# Patient Record
Sex: FEMALE | Race: WHITE | NOT HISPANIC OR LATINO | URBAN - METROPOLITAN AREA
[De-identification: names, ages, dates, MRNs, and addresses within clinical notes are randomized per-mention and may not be internally consistent; named-entity substitution may affect disease eponyms.]

---

## 2017-11-14 ENCOUNTER — NEW PATIENT (OUTPATIENT)
Dept: URBAN - METROPOLITAN AREA CLINIC 51 | Facility: CLINIC | Age: 52
End: 2017-11-14
Payer: MEDICAID

## 2017-11-14 DIAGNOSIS — H43.392 OTHER VITREOUS OPACITIES, LEFT EYE: ICD-10-CM

## 2017-11-14 DIAGNOSIS — E11.9 TYPE 2 DIABETES MELLITUS WITHOUT COMPLICATIONS: Primary | ICD-10-CM

## 2017-11-14 DIAGNOSIS — Z79.84 LONG TERM (CURRENT) USE OF ORAL ANTIDIABETIC DRUGS: ICD-10-CM

## 2017-11-14 DIAGNOSIS — H25.013 CORTICAL AGE-RELATED CATARACT, BILATERAL: ICD-10-CM

## 2017-11-14 PROCEDURE — 92250 FUNDUS PHOTOGRAPHY W/I&R: CPT | Performed by: OPTOMETRIST

## 2017-11-14 PROCEDURE — 92004 COMPRE OPH EXAM NEW PT 1/>: CPT | Performed by: OPTOMETRIST

## 2017-11-14 ASSESSMENT — KERATOMETRY
OS: 42.66
OD: 42.97

## 2017-11-14 ASSESSMENT — VISUAL ACUITY
OD: 20/25
OS: 20/20

## 2017-11-14 ASSESSMENT — INTRAOCULAR PRESSURE
OD: 13
OS: 13

## 2020-11-13 ENCOUNTER — FOLLOW UP ESTABLISHED (OUTPATIENT)
Dept: URBAN - METROPOLITAN AREA CLINIC 51 | Facility: CLINIC | Age: 55
End: 2020-11-13
Payer: MEDICAID

## 2020-11-13 DIAGNOSIS — H43.313 VITREOUS MEMBRANES AND STRANDS, BILATERAL: ICD-10-CM

## 2020-11-13 DIAGNOSIS — Z79.4 LONG TERM (CURRENT) USE OF INSULIN: ICD-10-CM

## 2020-11-13 DIAGNOSIS — H11.053 PERIPHERAL PTERYGIUM, PROGRESSIVE, BILATERAL: ICD-10-CM

## 2020-11-13 DIAGNOSIS — H25.813 COMBINED FORMS OF AGE-RELATED CATARACT, BILATERAL: ICD-10-CM

## 2020-11-13 PROCEDURE — 92250 FUNDUS PHOTOGRAPHY W/I&R: CPT | Performed by: OPTOMETRIST

## 2020-11-13 PROCEDURE — 92014 COMPRE OPH EXAM EST PT 1/>: CPT | Performed by: OPTOMETRIST

## 2020-11-13 ASSESSMENT — VISUAL ACUITY
OS: 20/25
OD: 20/30

## 2020-11-13 ASSESSMENT — KERATOMETRY
OD: 43.16
OS: 42.67

## 2020-11-13 ASSESSMENT — INTRAOCULAR PRESSURE
OD: 13
OS: 12

## 2023-07-24 PROBLEM — Z00.00 ENCOUNTER FOR PREVENTIVE HEALTH EXAMINATION: Status: ACTIVE | Noted: 2023-07-24

## 2023-07-27 ENCOUNTER — APPOINTMENT (OUTPATIENT)
Dept: NEUROLOGY | Facility: CLINIC | Age: 58
End: 2023-07-27

## 2023-10-06 ENCOUNTER — OFFICE VISIT (OUTPATIENT)
Dept: URBAN - METROPOLITAN AREA CLINIC 51 | Facility: CLINIC | Age: 58
End: 2023-10-06
Payer: MEDICAID

## 2023-10-06 DIAGNOSIS — H52.4 PRESBYOPIA: ICD-10-CM

## 2023-10-06 DIAGNOSIS — H11.053 PERIPHERAL PTERYGIUM, PROGRESSIVE, BILATERAL: ICD-10-CM

## 2023-10-06 DIAGNOSIS — E11.3293 TYPE 2 DIAB W MILD NONPRLF DIABETIC RTNOP W/O MACULAR EDEMA, BILATERAL: ICD-10-CM

## 2023-10-06 DIAGNOSIS — H25.813 COMBINED FORMS OF AGE-RELATED CATARACT, BILATERAL: Primary | ICD-10-CM

## 2023-10-06 DIAGNOSIS — Z79.84 LONG TERM (CURRENT) USE OF ORAL ANTIDIABETIC DRUGS: ICD-10-CM

## 2023-10-06 DIAGNOSIS — H43.313 VITREOUS MEMBRANES AND STRANDS, BILATERAL: ICD-10-CM

## 2023-10-06 PROCEDURE — 99214 OFFICE O/P EST MOD 30 MIN: CPT | Performed by: OPTOMETRIST

## 2023-10-06 PROCEDURE — 92134 CPTRZ OPH DX IMG PST SGM RTA: CPT | Performed by: OPTOMETRIST

## 2023-10-06 ASSESSMENT — VISUAL ACUITY
OS: 20/20
OD: 20/25

## 2023-10-06 ASSESSMENT — INTRAOCULAR PRESSURE
OS: 14
OD: 14

## 2023-10-06 ASSESSMENT — KERATOMETRY
OS: 42.88
OD: 43.00

## 2024-06-18 ENCOUNTER — EMERGENCY (EMERGENCY)
Facility: HOSPITAL | Age: 59
LOS: 1 days | Discharge: HOME | End: 2024-06-18
Attending: EMERGENCY MEDICINE | Admitting: EMERGENCY MEDICINE
Payer: MEDICAID

## 2024-06-18 VITALS
SYSTOLIC BLOOD PRESSURE: 114 MMHG | RESPIRATION RATE: 18 BRPM | OXYGEN SATURATION: 95 % | DIASTOLIC BLOOD PRESSURE: 81 MMHG | HEART RATE: 91 BPM | TEMPERATURE: 98 F

## 2024-06-18 PROCEDURE — 99284 EMERGENCY DEPT VISIT MOD MDM: CPT

## 2024-06-18 RX ORDER — IBUPROFEN 200 MG
600 TABLET ORAL ONCE
Refills: 0 | Status: COMPLETED | OUTPATIENT
Start: 2024-06-18 | End: 2024-06-18

## 2024-06-18 RX ADMIN — Medication 600 MILLIGRAM(S): at 09:04

## 2024-06-18 NOTE — ED PROVIDER NOTE - CLINICAL SUMMARY MEDICAL DECISION MAKING FREE TEXT BOX
Recurrent left ankle pain, prior workup negative, patient refused additional workup.,  States she just wants a dose of Motrin to go to her program.  Stable for IA home with outpatient follow-up.

## 2024-06-18 NOTE — ED PROVIDER NOTE - CARE PROVIDER_API CALL
Junior Conklin  Vascular Surgery  [address]  [place] [address]  Phone: [phone]  Fax: [phone]  Follow Up Time: 7-10 Days

## 2024-06-18 NOTE — ED PROVIDER NOTE - CARE PLAN
1 Principal Discharge DX:	Foot and ankle pain  Secondary Diagnosis:	H/O varicose veins  Secondary Diagnosis:	H/O varicose veins

## 2024-06-18 NOTE — ED PROVIDER NOTE - PATIENT PORTAL LINK FT
You can access the FollowMyHealth Patient Portal offered by [facility] by registering at the following website: [email or web address]. By joining [facility]’s FollowMyHealth portal, you will also be able to view your health information using other applications (apps) compatible with our system.

## 2024-06-18 NOTE — ED PROVIDER NOTE - OBJECTIVE STATEMENT
59-year-old female with complaints of pain and discomfort in the left ankle/left lower extremity.  She was seen at [facility] yesterday and had an ultrasound which showed no DVT, she had x-rays that showed no fracture.  She was medicated with Motrin which completely relieved her pain and she went home.  She returns today because her pain recurred on the way to her program.  She did not take any medications today.  She has extensive varicose veins that she is aware of and scheduled for outpatient therapy for.  No fever/chills, no fall or trauma.  No other complaints.

## 2024-06-18 NOTE — ED ADULT TRIAGE NOTE - CHIEF COMPLAINT QUOTE
[place] [facility] [place] of left foot pain s/p injecting heroine into foot 3 days ago. Pt states that she has not used heroine or received methadone for 3 days. BGL in field 119

## 2024-06-18 NOTE — ED PROVIDER NOTE - PHYSICAL EXAMINATION
CONSTITUTIONAL: Well-appearing; well-nourished; in no apparent distress.   HEAD: Normocephalic; atraumatic.   EYES:  clear bilaterally  ENT: airway patent  Resp breathing comfortably with no distress  PSYCHOLOGICAL: The patient’s mood and manner are appropriate.  Left lower extremity with extensive varicose veins, no calf tenderness, no swelling, full range of motion of left ankle and knee with no swelling or bony tenderness.  DPI, NVI, left lower extremity compartments soft.

## 2024-06-21 DIAGNOSIS — M25.572 PAIN IN LEFT ANKLE AND JOINTS OF LEFT FOOT: ICD-10-CM

## 2024-06-21 DIAGNOSIS — M79.672 PAIN IN LEFT FOOT: ICD-10-CM

## 2024-06-21 DIAGNOSIS — Z86.79 PERSONAL HISTORY OF OTHER DISEASES OF THE CIRCULATORY SYSTEM: ICD-10-CM

## 2024-07-01 ENCOUNTER — INPATIENT (INPATIENT)
Facility: HOSPITAL | Age: 59
LOS: 27 days | Discharge: HOME | DRG: 500 | End: 2024-07-29
Attending: STUDENT IN AN ORGANIZED HEALTH CARE EDUCATION/TRAINING PROGRAM | Admitting: INTERNAL MEDICINE
Payer: MEDICAID

## 2024-07-01 VITALS
SYSTOLIC BLOOD PRESSURE: 107 MMHG | HEIGHT: 63 IN | TEMPERATURE: 99 F | DIASTOLIC BLOOD PRESSURE: 70 MMHG | HEART RATE: 99 BPM | WEIGHT: 179.02 LBS | OXYGEN SATURATION: 97 % | RESPIRATION RATE: 16 BRPM

## 2024-07-01 DIAGNOSIS — M79.605 PAIN IN LEFT LEG: ICD-10-CM

## 2024-07-01 DIAGNOSIS — D64.9 ANEMIA, UNSPECIFIED: ICD-10-CM

## 2024-07-01 DIAGNOSIS — F39 UNSPECIFIED MOOD [AFFECTIVE] DISORDER: ICD-10-CM

## 2024-07-01 DIAGNOSIS — Z29.9 ENCOUNTER FOR PROPHYLACTIC MEASURES, UNSPECIFIED: ICD-10-CM

## 2024-07-01 DIAGNOSIS — F19.90 OTHER PSYCHOACTIVE SUBSTANCE USE, UNSPECIFIED, UNCOMPLICATED: ICD-10-CM

## 2024-07-01 DIAGNOSIS — M70.72 OTHER BURSITIS OF HIP, LEFT HIP: ICD-10-CM

## 2024-07-01 DIAGNOSIS — G40.909 EPILEPSY, UNSPECIFIED, NOT INTRACTABLE, WITHOUT STATUS EPILEPTICUS: ICD-10-CM

## 2024-07-01 LAB
ADD ON TEST-SPECIMEN IN LAB: SIGNIFICANT CHANGE UP
ALBUMIN SERPL ELPH-MCNC: 3.3 G/DL — SIGNIFICANT CHANGE UP (ref 3.3–5)
ALP SERPL-CCNC: 76 U/L — SIGNIFICANT CHANGE UP (ref 40–120)
ALT FLD-CCNC: 27 U/L — SIGNIFICANT CHANGE UP (ref 10–45)
ANION GAP SERPL CALC-SCNC: 11 MMOL/L — SIGNIFICANT CHANGE UP (ref 5–17)
AST SERPL-CCNC: 61 U/L — HIGH (ref 10–40)
BASOPHILS # BLD AUTO: 0.03 K/UL — SIGNIFICANT CHANGE UP (ref 0–0.2)
BASOPHILS NFR BLD AUTO: 0.3 % — SIGNIFICANT CHANGE UP (ref 0–2)
BILIRUB SERPL-MCNC: 0.3 MG/DL — SIGNIFICANT CHANGE UP (ref 0.2–1.2)
BUN SERPL-MCNC: 24 MG/DL — HIGH (ref 7–23)
CALCIUM SERPL-MCNC: 10 MG/DL — SIGNIFICANT CHANGE UP (ref 8.4–10.5)
CHLORIDE SERPL-SCNC: 103 MMOL/L — SIGNIFICANT CHANGE UP (ref 96–108)
CO2 SERPL-SCNC: 25 MMOL/L — SIGNIFICANT CHANGE UP (ref 22–31)
CREAT SERPL-MCNC: 1.28 MG/DL — SIGNIFICANT CHANGE UP (ref 0.5–1.3)
CRP SERPL-MCNC: 99.5 MG/L — HIGH (ref 0–4)
EGFR: 49 ML/MIN/1.73M2 — LOW
EOSINOPHIL # BLD AUTO: 0.04 K/UL — SIGNIFICANT CHANGE UP (ref 0–0.5)
EOSINOPHIL NFR BLD AUTO: 0.5 % — SIGNIFICANT CHANGE UP (ref 0–6)
GLUCOSE SERPL-MCNC: 85 MG/DL — SIGNIFICANT CHANGE UP (ref 70–99)
HCT VFR BLD CALC: 31 % — LOW (ref 34.5–45)
HGB BLD-MCNC: 10.2 G/DL — LOW (ref 11.5–15.5)
IMM GRANULOCYTES NFR BLD AUTO: 0.7 % — SIGNIFICANT CHANGE UP (ref 0–0.9)
LYMPHOCYTES # BLD AUTO: 0.98 K/UL — LOW (ref 1–3.3)
LYMPHOCYTES # BLD AUTO: 11.1 % — LOW (ref 13–44)
MCHC RBC-ENTMCNC: 27.9 PG — SIGNIFICANT CHANGE UP (ref 27–34)
MCHC RBC-ENTMCNC: 32.9 GM/DL — SIGNIFICANT CHANGE UP (ref 32–36)
MCV RBC AUTO: 84.7 FL — SIGNIFICANT CHANGE UP (ref 80–100)
MONOCYTES # BLD AUTO: 0.39 K/UL — SIGNIFICANT CHANGE UP (ref 0–0.9)
MONOCYTES NFR BLD AUTO: 4.4 % — SIGNIFICANT CHANGE UP (ref 2–14)
NEUTROPHILS # BLD AUTO: 7.34 K/UL — SIGNIFICANT CHANGE UP (ref 1.8–7.4)
NEUTROPHILS NFR BLD AUTO: 83 % — HIGH (ref 43–77)
NRBC # BLD: 0 /100 WBCS — SIGNIFICANT CHANGE UP (ref 0–0)
PLATELET # BLD AUTO: 344 K/UL — SIGNIFICANT CHANGE UP (ref 150–400)
POTASSIUM SERPL-MCNC: 4.4 MMOL/L — SIGNIFICANT CHANGE UP (ref 3.5–5.3)
POTASSIUM SERPL-SCNC: 4.4 MMOL/L — SIGNIFICANT CHANGE UP (ref 3.5–5.3)
PROT SERPL-MCNC: 8.8 G/DL — HIGH (ref 6–8.3)
RBC # BLD: 3.66 M/UL — LOW (ref 3.8–5.2)
RBC # FLD: 14.3 % — SIGNIFICANT CHANGE UP (ref 10.3–14.5)
SODIUM SERPL-SCNC: 139 MMOL/L — SIGNIFICANT CHANGE UP (ref 135–145)
WBC # BLD: 8.84 K/UL — SIGNIFICANT CHANGE UP (ref 3.8–10.5)
WBC # FLD AUTO: 8.84 K/UL — SIGNIFICANT CHANGE UP (ref 3.8–10.5)

## 2024-07-01 PROCEDURE — 93971 EXTREMITY STUDY: CPT | Mod: 26,LT

## 2024-07-01 PROCEDURE — 99285 EMERGENCY DEPT VISIT HI MDM: CPT

## 2024-07-01 PROCEDURE — 99223 1ST HOSP IP/OBS HIGH 75: CPT | Mod: GC

## 2024-07-01 PROCEDURE — 72170 X-RAY EXAM OF PELVIS: CPT | Mod: 26

## 2024-07-01 PROCEDURE — 73701 CT LOWER EXTREMITY W/DYE: CPT | Mod: 26,LT,MC

## 2024-07-01 RX ORDER — NALOXONE HYDROCHLORIDE 0.4 MG/ML
1 INJECTION, SOLUTION INTRAMUSCULAR; INTRAVENOUS; SUBCUTANEOUS
Refills: 0 | Status: DISCONTINUED | OUTPATIENT
Start: 2024-07-01 | End: 2024-07-29

## 2024-07-01 RX ORDER — IBUPROFEN 200 MG
600 TABLET ORAL ONCE
Refills: 0 | Status: COMPLETED | OUTPATIENT
Start: 2024-07-01 | End: 2024-07-01

## 2024-07-01 RX ORDER — BACTERIOSTATIC SODIUM CHLORIDE 0.9 %
1000 VIAL (ML) INJECTION ONCE
Refills: 0 | Status: COMPLETED | OUTPATIENT
Start: 2024-07-01 | End: 2024-07-01

## 2024-07-01 RX ORDER — IBUPROFEN 200 MG
600 TABLET ORAL EVERY 8 HOURS
Refills: 0 | Status: DISCONTINUED | OUTPATIENT
Start: 2024-07-01 | End: 2024-07-02

## 2024-07-01 RX ORDER — PIPERACILLIN SODIUM, TAZOBACTAM SODIUM 3; .375 G/15ML; G/15ML
3.38 INJECTION, POWDER, LYOPHILIZED, FOR SOLUTION INTRAVENOUS ONCE
Refills: 0 | Status: COMPLETED | OUTPATIENT
Start: 2024-07-01 | End: 2024-07-01

## 2024-07-01 RX ORDER — VANCOMYCIN HYDROCHLORIDE 5 G/100ML
1250 INJECTION, POWDER, LYOPHILIZED, FOR SOLUTION INTRAVENOUS EVERY 12 HOURS
Refills: 0 | Status: DISCONTINUED | OUTPATIENT
Start: 2024-07-02 | End: 2024-07-02

## 2024-07-01 RX ORDER — KETOROLAC TROMETHAMINE 10 MG
15 TABLET ORAL ONCE
Refills: 0 | Status: DISCONTINUED | OUTPATIENT
Start: 2024-07-01 | End: 2024-07-01

## 2024-07-01 RX ORDER — LEVETIRACETAM 1000 MG/1
500 TABLET, FILM COATED ORAL EVERY 12 HOURS
Refills: 0 | Status: COMPLETED | OUTPATIENT
Start: 2024-07-01 | End: 2024-07-02

## 2024-07-01 RX ORDER — METHADONE HCL 10 MG
160 TABLET ORAL ONCE
Refills: 0 | Status: DISCONTINUED | OUTPATIENT
Start: 2024-07-01 | End: 2024-07-01

## 2024-07-01 RX ORDER — PIPERACILLIN SODIUM, TAZOBACTAM SODIUM 3; .375 G/15ML; G/15ML
3.38 INJECTION, POWDER, LYOPHILIZED, FOR SOLUTION INTRAVENOUS EVERY 8 HOURS
Refills: 0 | Status: DISCONTINUED | OUTPATIENT
Start: 2024-07-01 | End: 2024-07-02

## 2024-07-01 RX ORDER — ACETAMINOPHEN 500 MG
650 TABLET ORAL EVERY 6 HOURS
Refills: 0 | Status: DISCONTINUED | OUTPATIENT
Start: 2024-07-01 | End: 2024-07-02

## 2024-07-01 RX ORDER — VANCOMYCIN HYDROCHLORIDE 5 G/100ML
1000 INJECTION, POWDER, LYOPHILIZED, FOR SOLUTION INTRAVENOUS ONCE
Refills: 0 | Status: COMPLETED | OUTPATIENT
Start: 2024-07-01 | End: 2024-07-01

## 2024-07-01 RX ADMIN — Medication 600 MILLIGRAM(S): at 21:39

## 2024-07-01 RX ADMIN — Medication 600 MILLIGRAM(S): at 14:36

## 2024-07-01 RX ADMIN — Medication 160 MILLIGRAM(S): at 13:39

## 2024-07-01 RX ADMIN — PIPERACILLIN SODIUM, TAZOBACTAM SODIUM 200 GRAM(S): 3; .375 INJECTION, POWDER, LYOPHILIZED, FOR SOLUTION INTRAVENOUS at 16:51

## 2024-07-01 RX ADMIN — PIPERACILLIN SODIUM, TAZOBACTAM SODIUM 25 GRAM(S): 3; .375 INJECTION, POWDER, LYOPHILIZED, FOR SOLUTION INTRAVENOUS at 21:40

## 2024-07-01 RX ADMIN — VANCOMYCIN HYDROCHLORIDE 250 MILLIGRAM(S): 5 INJECTION, POWDER, LYOPHILIZED, FOR SOLUTION INTRAVENOUS at 18:20

## 2024-07-01 RX ADMIN — Medication 1000 MILLILITER(S): at 16:51

## 2024-07-01 RX ADMIN — Medication 600 MILLIGRAM(S): at 22:26

## 2024-07-01 RX ADMIN — LEVETIRACETAM 500 MILLIGRAM(S): 1000 TABLET, FILM COATED ORAL at 21:39

## 2024-07-01 NOTE — H&P ADULT - PROBLEM SELECTOR PLAN 5
As per patient has history of epilepsy  States she takes Keppra but unsure of dose, follows with Neurologist at [facility]  Plan to confirm meds and dosage As per patient has history of epilepsy  States she takes Keppra but unsure of dose, follows with Neurologist at [facility]  Per Jennifer, previously prescribed Keppra 1000mg bid.   - order Keppra 500mg bid x 2 doses for now, confirm doses in the morning

## 2024-07-01 NOTE — ED ADULT NURSE NOTE - OBJECTIVE STATEMENT
59y female presents to ED c/o left foot pain. Pt states she is undomiciled and IVDU who shoots up both heroine and cocaine into left foot. Pt has had pain to left foot xweeks and has been to multiple hospitals for treatment. Pt states she took full course of oral antibiotics and still having pain that radiates up left side of body. Pt recently seen at [facility] and given referral to vein specialist, pt has been unable to make appointment with them. No signs of redness/swelling/discharge to left foot. On keppra. Speaking in full and complete sentences. A&Ox4.

## 2024-07-01 NOTE — H&P ADULT - PROBLEM SELECTOR PLAN 7
F: NS 1L  E: replete as needed  N: regular diet  DVT ppx: hold prior to IR procedure  Dispo: [facility]

## 2024-07-01 NOTE — H&P ADULT - PROBLEM SELECTOR PLAN 4
As per patient has history of epilepsy  States she takes Keppra but unsure of dose, follows with Neurologist at [facility]  Plan to confirm meds and dosage Pt states history of anemia  On admission Hg 10.2, Hct 31.0  Continue to monitor H&H  Maintain active type & screen Pt states history of anemia  On admission Hg 10.2, Hct 31.0  - Continue to monitor H&H  - Maintain active type & screen

## 2024-07-01 NOTE — ED ADULT TRIAGE NOTE - CHIEF COMPLAINT QUOTE
as per EMS patient was picked up at the shelter for left foot pain, patient has hx of IV drug use, methadone use and states her foot hurts a lot".

## 2024-07-01 NOTE — H&P ADULT - NSHPPHYSICALEXAM_GEN_ALL_CORE
.  VITAL SIGNS:  T(C): 37.1 (07-01-24 @ 16:55), Max: 37.1 (07-01-24 @ 16:55)  T(F): 98.7 (07-01-24 @ 16:55), Max: 98.7 (07-01-24 @ 16:55)  HR: 82 (07-01-24 @ 16:55) (82 - 99)  BP: 100/68 (07-01-24 @ 16:55) (100/68 - 107/70)  BP(mean): --  RR: 16 (07-01-24 @ 16:55) (16 - 16)  SpO2: 96% (07-01-24 @ 16:55) (96% - 97%)  Wt(kg): --    PHYSICAL EXAM:    Constitutional: resting comfortably in bed; AAOx3  Head: NC/AT  Eyes: PERRL, EOMI, anicteric sclera  Neck: supple; no JVD or thyromegaly  Respiratory: CTA B/L; no W/R/R, no retractions  Cardiac: +S1/S2; RRR; no M/R/G   Gastrointestinal: abdomen soft, NT/ND; no rebound or guarding; +BSx4  Back: no midline, paraspinous or CVA tenderness  MSK- Full ROM of b/l UE and LE, no gross deformities, soft compressible compartments, no joint effusion, no foot drop  Extremities: no appreciable edema to b/l LEs but significantly tender to gross manipulation/palpation of LLE from prox thigh to foot, bounding/palpable DP pulse  Skin: warm, well perfused, +chronic appearing skin changes, track marks, and varicose veins over b/l LEs, no discrete wound or ulcer, palpable crepitus, or blister/bullae  Neurologic: AAOx3;no focal deficits

## 2024-07-01 NOTE — ED PROVIDER NOTE - OBJECTIVE STATEMENT
59 year old female with history of IVDU (cocaine, heroin), hep C, ?seizures, presenting with LLE pain x 1 mo. States having persistent LLE pain radiating from toes to L groin, was recently treated @ [facility] with antibiotics, denies recent fever/chills. Last IVDU on Saturday, injected into legs. Denies recent trauma. Takes 160mg methadone.

## 2024-07-01 NOTE — ED PROVIDER NOTE - PHYSICAL EXAMINATION
Gen - Nontoxic appearing, calm and cooperative; A+Ox3   HEENT - NCAT, EOMI, moist mucous membranes, clear oropharynx  Neck - supple  Resp - CTAB, no increased WOB  CV -  RRR, no m/r/g  Abd - soft, NT, ND; no guarding or rebound  Back - no midline, paraspinous, or CVA tenderness  MSK - FROM of b/l UE and LE, no gross deformities, soft compressible compartments, no joint effusion, NVI distally, no foot drop  Extrem - no appreciable edema to b/l LEs but significantly tender to gross manipulation/palpation of LLE from prox thigh to foot; bounding/palpable DP pulse  Neuro - no focal motor or sensation deficits  Skin - warm, well perfused; +chronic appearing skin changes, track marks, and varicose veins over b/l LEs; no discrete wound or ulcer, palpable crepitus, or blister/bullae

## 2024-07-01 NOTE — H&P ADULT - PROBLEM SELECTOR PLAN 1
Worsening left leg and thigh pain with radiation to groin  CT LE w IV contrast showed loculated fluid in L iliacus muscle, iliopsoas bursitis of infectious or inflamm. etiology  ortho consulted, recommended IV antibiotics and IR consult in AM, will continue to follow  Given Zosyn and Vanc in ED  Toradol 15 mg IV push given for pain control Worsening left leg and thigh pain with radiation to groin  CT LE w IV contrast showed loculated fluid in L iliacus muscle, iliopsoas bursitis of infectious or inflamm. etiology  ortho consulted, recommended IV antibiotics and IR consult in AM, will continue to follow  Given Zosyn and Vanc in ED  - f/u blood cultures  - IR consulted for drainage; NPO after MN  - c/w vancomycin 1250mg q24h, obtain trough before 3rd dose  - c/w zosyn 3.375g q8h to cover GNR

## 2024-07-01 NOTE — H&P ADULT - PROBLEM SELECTOR PLAN 2
Hx of IV drug use   Pt states she is chronically on methadone 160 mg 5 days a week since age 19  Last heroin injection in L leg on Saturday 6/29 Worsening left leg and thigh pain with radiation to groin  CT LE w IV contrast showed loculated fluid in L iliacus muscle, iliopsoas bursitis of infectious or inflamm. etiology  Ortho consulted, recommended IV antibiotics and IR consult in AM, will continue to follow  Given Zosyn and Vanc in ED  Toradol 15 mg IV push given for pain control  - f/u duplex of LLE --> no evidence of DVT  - pain control: tylenol for mild pain, ibuprofen 600mg q8h for moderate, toradol 15mg IV for severe pain

## 2024-07-01 NOTE — H&P ADULT - ASSESSMENT
Pt is 58 y/o F pmhx significant for IV drug use, anemia, epilepsy who presents for sharp L leg pain progressively worsening over the past month, likely 2/2 iliopsoas bursitis i/s/o IVDU.

## 2024-07-01 NOTE — ED ADULT NURSE NOTE - NSFALLUNIVINTERV_ED_ALL_ED
Bed/Stretcher in lowest position, wheels locked, appropriate side rails in place/Call bell, personal items and telephone in reach/Instruct patient to call for assistance before getting out of bed/chair/stretcher/Non-slip footwear applied when patient is off stretcher/[place] to call system/Physically safe environment - no spills, clutter or unnecessary equipment/Purposeful proactive rounding/Room/bathroom lighting operational, light cord in reach

## 2024-07-01 NOTE — ED ADULT TRIAGE NOTE - CCCP TRG CHIEF CMPLNT
PAST SURGICAL HISTORY:  H/O eye surgery     H/O right knee surgery     H/O transurethral resection of prostate      pain, foot

## 2024-07-01 NOTE — H&P ADULT - NSTOBACCOSCREENHP_GEN_A_NCS
Immediate Brief Procedure Note    Patient: Rigo Chambers    Pre-op Diagnosis:   Inguinal LAD; uncertain etiology     Post-op Diagnosis: Same    Procedure:   US guided core biopsy enlarged L inguinal ln     Proceduralist: Duane Garland MD    Assistants: none    Anesthesia Staff: [unfilled]    Anesthesia Type: local     Findings:   US guided core biopsy enlarged L inguinal lymph node  17/18G temno  Multiple cores   No complications     Estimated Blood Loss: Minimal     Complications: None    Specimens Removed:  As above   
No

## 2024-07-01 NOTE — H&P ADULT - ATTENDING COMMENTS
60 y/o F pmhx significant for IV drug use, anemia, epilepsy who presents for sharp L leg pain progressively worsening over the past month, likely 2/2 iliopsoas bursitis and ?abscess i/s/o IVDU. Also w/ severe L hip OA. Admitted for further management    #LLE pain    #Iliopsoas bursitis    #r/o abscess   #hip OA    #IVDU      Plan   - c/w vanc/zosyn  - IR consult for I&D per ortho  - f/up ESR/cRP, abscess cx, blood cx   - per ortho f/up CTAP to evaluate severe OA L hip   - c/w pain control, avoid opioids  - confirm methadone in am  - f/up utox   - monitor COWs

## 2024-07-01 NOTE — PATIENT PROFILE ADULT - FUNCTIONAL ASSESSMENT - DAILY ACTIVITY SCORE.
I'd recommend checking with your insurance to see if they cover the new shingles vaccine, Shingrix.  This vaccine is much more effective than the older shingles vaccine.  Check for availability at your pharmacy.     
23

## 2024-07-01 NOTE — ED PROVIDER NOTE - CLINICAL SUMMARY MEDICAL DECISION MAKING FREE TEXT BOX
59 year old female with history of IVDU (cocaine, heroin), hep C, ?seizures, presenting with LLE pain x 1 mo. 59 year old female with history of IVDU (cocaine, heroin), hep C, ?seizures, presenting with LLE pain x 1 mo. Overall nontoxic with good vitals/afebrile. Very tender to touch over LLE diffusely but no appreciable swelling, wound, or acute appearing skin changes overlying her chronic track marks/varicose veins. Given her IVDU into LEs--will obtain CT imaging to r/o deep space infection/abscess vs cellulitis. US to r/o DVT--overall low suspicion. Patient stating that she is unable to walk due to the acuity of her pain. Will reassess after adequate analgesia.

## 2024-07-01 NOTE — ED PROVIDER NOTE - PROGRESS NOTE DETAILS
9
Ronen Guerrero MD: CT showing ?loculated fluid in L iliacus muscle, consistent with region of tenderness/pain on exam, in setting of IVDU to LEs--ortho consulted for eval, will follow but recommending medical admission for IV abx/ IR consult. Endorsed to [facility].

## 2024-07-01 NOTE — PATIENT PROFILE ADULT - FALL HARM RISK - HARM RISK INTERVENTIONS

## 2024-07-01 NOTE — CONSULT NOTE ADULT - SUBJECTIVE AND OBJECTIVE BOX
Orthopaedic Surgery Consult Note    For Surgeon: Yared Hernandez MD    HPI: Pt is 58 y/o F pmhx significant for IV drug use, anemia, epilepsy who presents for sharp L leg pain progressively worsening over the past month. Pt states her pain began in the left thigh and later radiated to her toes but now has persisted in her entire left leg and groin. Pt reports this began about one month ago and required multiple ER visits. She states she went to [place] on 5/31 where nothing was done and she was discharged. Most recently last week she returned to [place] where she states she was given a 7 day course of oral antibiotics which she completed but did not improve her pain. She took Motrin which provided very minimal relief.  Pt last  injected heroin into her left thigh on Saturday 6/29. As per patient, she is chronically on methadone 160 mg 5 days a week since she was 19. She reports subjective fevers. Denies nausea, vomiting, chest pain, shortness of breath, constipation, diarrhea, pain on urination, hematuria, hematemesis.       PAST MEDICAL & SURGICAL HISTORY:  IVDU (intravenous drug user)  Anemia  Asthma  Epilepsy      MEDICATIONS  (STANDING):  ketorolac   Injectable 15 milliGRAM(s) IV Push once  levETIRAcetam 500 milliGRAM(s) Oral every 12 hours  piperacillin/tazobactam IVPB.. 3.375 Gram(s) IV Intermittent every 8 hours    MEDICATIONS  (PRN):  acetaminophen     Tablet .. 650 milliGRAM(s) Oral every 6 hours PRN Mild Pain (1 - 3)  ibuprofen  Tablet. 600 milliGRAM(s) Oral every 8 hours PRN Moderate Pain (4 - 6)      Vital Signs Last 24 Hrs  T(C): 37.5 (01 Jul 2024 19:31), Max: 37.5 (01 Jul 2024 19:31)  T(F): 99.5 (01 Jul 2024 19:31), Max: 99.5 (01 Jul 2024 19:31)  HR: 92 (01 Jul 2024 19:31) (82 - 99)  BP: 100/62 (01 Jul 2024 19:31) (100/62 - 107/70)  BP(mean): --  RR: 16 (01 Jul 2024 19:31) (16 - 16)  SpO2: 95% (01 Jul 2024 19:31) (95% - 97%)    Parameters below as of 01 Jul 2024 19:31  Patient On (Oxygen Delivery Method): room air      Physical Exam:  -ttp  -Pain w/ passive extension of L thigh & resisted flexion  -NVID                          10.2   8.84  )-----------( 344      ( 01 Jul 2024 12:58 )             31.0     07-01    139  |  103  |  24<H>  ----------------------------<  85  4.4   |  25  |  1.28    Ca    10.0      01 Jul 2024 12:58    TPro  8.8<H>  /  Alb  3.3  /  TBili  0.3  /  DBili  x   /  AST  61<H>  /  ALT  27  /  AlkPhos  76  07-01      Imaging:   -CT shows abscess along iliopsoas, collapsed femoral head and severe joint space narrowing      A/P: 59F w/ L sided iliopsoas abscess and severe hip arthritis    -Recommend AP Pelvis & L hip XRs  -Recommend admission for IV Abx, IR aspiration  -Ortho will continue to follow      -Discussed with Dr. Hernandez    Ortho Pager [phone]

## 2024-07-01 NOTE — H&P ADULT - HISTORY OF PRESENT ILLNESS
HPI: Pt is 58 y/o F pmhx significant for IV drug use, anemia, epilepsy who presents for sharp L leg pain progressively worsening over the past month. Pt states her pain began in the left thigh and later radiated to her toes but now has persisted in her entire left leg and groin. Pt reports this began about one month ago and required multiple ER visits. She states she went to [place] on 5/31 where nothing was done and she was discharged. Most recently last week she returned to [place] where she states she was given a 7 day course of oral antibiotics which she completed but did not improve her pain. She took Motrin which provided very minimal relief.  Pt last  injected heroin into her left thigh on Saturday 6/29. As per patient, she is chronically on methadone 160 mg 5 days a week since she was 19. She reports subjective fevers. Denies nausea, vomiting, chest pain, shortness of breath, constipation, diarrhea, pain on urination, hematuria, hematemesis.       In the ED:  Initial vital signs: T: 98.7 F, HR: 82, BP: 100/68, RR: 16, SpO2: 96% on RA  Labs: significant for AST 61, CRP 99.5, Hg 10.2, Hct 31.0  CT Lower Extremity with IV Contrast, Left: loculated fluid in L iliacus muscle, iliopsoas bursitis of infectious or inflammatory etiology  US Duplex Venous LE, Left: No evidence of L lower extremity DVT  Medications: Zosyn, Vancomycin, NS, Ibuprofen  Consults:         Ortho- recommended admission for IV antibiotics and IR consult, will continue to follow

## 2024-07-01 NOTE — H&P ADULT - NSHPLABSRESULTS_GEN_ALL_CORE
10.2   8.84  )-----------( 344      ( 01 Jul 2024 12:58 )             31.0       07-01    139  |  103  |  24<H>  ----------------------------<  85  4.4   |  25  |  1.28    Ca    10.0      01 Jul 2024 12:58    TPro  8.8<H>  /  Alb  3.3  /  TBili  0.3  /  DBili  x   /  AST  61<H>  /  ALT  27  /  AlkPhos  76  07-01              Urinalysis Basic - ( 01 Jul 2024 12:58 )    Color: x / Appearance: x / SG: x / pH: x  Gluc: 85 mg/dL / Ketone: x  / Bili: x / Urobili: x   Blood: x / Protein: x / Nitrite: x   Leuk Esterase: x / RBC: x / WBC x   Sq Epi: x / Non Sq Epi: x / Bacteria: x            Lactate Trend            CAPILLARY BLOOD GLUCOSE

## 2024-07-01 NOTE — H&P ADULT - PROBLEM SELECTOR PLAN 3
Pt states history of anemia  On admission Hg 10.2, Hct 31.0  Continue to monitor H&H  Maintain active type & screen Hx of IV drug use   Pt states she is chronically on methadone 160 mg 5 days a week since age 19  Last heroin injection in L leg on Saturday 6/29  - confirm dose with methadone clinic Hx of IV drug use   Pt states she is chronically on methadone 160 mg 5 days a week since age 19  Last heroin injection in L leg on Saturday 6/29  - confirm dose with methadone clinic  - monitor COWs

## 2024-07-01 NOTE — H&P ADULT - NSHPSOCIALHISTORY_GEN_ALL_CORE
Living in shelter in [place] Living in shelter in [place]; previously able to ambulate without assistance now unsteady on feet 2/2 pain.

## 2024-07-01 NOTE — H&P ADULT - PROBLEM SELECTOR PLAN 6
F: NS 1L  E: replete as needed  N: regular diet  DVT ppx: hold prior to IR procedure  Dispo: [facility] Patient previously prescribed quetiapine 150mg qd, buspirone 15mg qd, mirtazaine 15mg qd. Unclear what she is currently taking.  - med rec in the AM

## 2024-07-01 NOTE — H&P ADULT - NSICDXFAMILYHX_GEN_ALL_CORE_FT
FAMILY HISTORY:  Father  Still living? Unknown  Family history of alcohol abuse, Age at diagnosis: Age Unknown    Mother  Still living? Unknown  Family history of AIDS, Age at diagnosis: Age Unknown    Sibling  Still living? Unknown  Family history of AIDS, Age at diagnosis: Age Unknown

## 2024-07-02 DIAGNOSIS — E11.9 TYPE 2 DIABETES MELLITUS WITHOUT COMPLICATIONS: ICD-10-CM

## 2024-07-02 LAB
A1C WITH ESTIMATED AVERAGE GLUCOSE RESULT: 5.1 % — SIGNIFICANT CHANGE UP (ref 4–5.6)
ANION GAP SERPL CALC-SCNC: 8 MMOL/L — SIGNIFICANT CHANGE UP (ref 5–17)
APTT BLD: 35.8 SEC — HIGH (ref 24.5–35.6)
BASOPHILS # BLD AUTO: 0.02 K/UL — SIGNIFICANT CHANGE UP (ref 0–0.2)
BASOPHILS NFR BLD AUTO: 0.2 % — SIGNIFICANT CHANGE UP (ref 0–2)
BLD GP AB SCN SERPL QL: NEGATIVE — SIGNIFICANT CHANGE UP
BLD GP AB SCN SERPL QL: NEGATIVE — SIGNIFICANT CHANGE UP
BUN SERPL-MCNC: 25 MG/DL — HIGH (ref 7–23)
CALCIUM SERPL-MCNC: 9.2 MG/DL — SIGNIFICANT CHANGE UP (ref 8.4–10.5)
CHLORIDE SERPL-SCNC: 105 MMOL/L — SIGNIFICANT CHANGE UP (ref 96–108)
CO2 SERPL-SCNC: 26 MMOL/L — SIGNIFICANT CHANGE UP (ref 22–31)
CREAT SERPL-MCNC: 1.31 MG/DL — HIGH (ref 0.5–1.3)
EGFR: 48 ML/MIN/1.73M2 — LOW
EOSINOPHIL # BLD AUTO: 0.08 K/UL — SIGNIFICANT CHANGE UP (ref 0–0.5)
EOSINOPHIL NFR BLD AUTO: 1 % — SIGNIFICANT CHANGE UP (ref 0–6)
ESTIMATED AVERAGE GLUCOSE: 100 MG/DL — SIGNIFICANT CHANGE UP (ref 68–114)
GLUCOSE SERPL-MCNC: 89 MG/DL — SIGNIFICANT CHANGE UP (ref 70–99)
GRAM STN FLD: ABNORMAL
HCT VFR BLD CALC: 28.2 % — LOW (ref 34.5–45)
HGB BLD-MCNC: 9 G/DL — LOW (ref 11.5–15.5)
IMM GRANULOCYTES NFR BLD AUTO: 0.6 % — SIGNIFICANT CHANGE UP (ref 0–0.9)
INR BLD: 1.26 — HIGH (ref 0.85–1.18)
LYMPHOCYTES # BLD AUTO: 1.08 K/UL — SIGNIFICANT CHANGE UP (ref 1–3.3)
LYMPHOCYTES # BLD AUTO: 13 % — SIGNIFICANT CHANGE UP (ref 13–44)
MAGNESIUM SERPL-MCNC: 1.7 MG/DL — SIGNIFICANT CHANGE UP (ref 1.6–2.6)
MCHC RBC-ENTMCNC: 27.4 PG — SIGNIFICANT CHANGE UP (ref 27–34)
MCHC RBC-ENTMCNC: 31.9 GM/DL — LOW (ref 32–36)
MCV RBC AUTO: 86 FL — SIGNIFICANT CHANGE UP (ref 80–100)
METHOD TYPE: SIGNIFICANT CHANGE UP
MONOCYTES # BLD AUTO: 0.71 K/UL — SIGNIFICANT CHANGE UP (ref 0–0.9)
MONOCYTES NFR BLD AUTO: 8.5 % — SIGNIFICANT CHANGE UP (ref 2–14)
MSSA DNA SPEC QL NAA+PROBE: SIGNIFICANT CHANGE UP
NEUTROPHILS # BLD AUTO: 6.38 K/UL — SIGNIFICANT CHANGE UP (ref 1.8–7.4)
NEUTROPHILS NFR BLD AUTO: 76.7 % — SIGNIFICANT CHANGE UP (ref 43–77)
NRBC # BLD: 0 /100 WBCS — SIGNIFICANT CHANGE UP (ref 0–0)
PHOSPHATE SERPL-MCNC: 3.9 MG/DL — SIGNIFICANT CHANGE UP (ref 2.5–4.5)
PLATELET # BLD AUTO: 309 K/UL — SIGNIFICANT CHANGE UP (ref 150–400)
POTASSIUM SERPL-MCNC: 4.4 MMOL/L — SIGNIFICANT CHANGE UP (ref 3.5–5.3)
POTASSIUM SERPL-SCNC: 4.4 MMOL/L — SIGNIFICANT CHANGE UP (ref 3.5–5.3)
PROTHROM AB SERPL-ACNC: 14.3 SEC — HIGH (ref 9.5–13)
RBC # BLD: 3.28 M/UL — LOW (ref 3.8–5.2)
RBC # FLD: 14.1 % — SIGNIFICANT CHANGE UP (ref 10.3–14.5)
RH IG SCN BLD-IMP: POSITIVE — SIGNIFICANT CHANGE UP
RH IG SCN BLD-IMP: POSITIVE — SIGNIFICANT CHANGE UP
SODIUM SERPL-SCNC: 139 MMOL/L — SIGNIFICANT CHANGE UP (ref 135–145)
SPECIMEN SOURCE: SIGNIFICANT CHANGE UP
WBC # BLD: 8.32 K/UL — SIGNIFICANT CHANGE UP (ref 3.8–10.5)
WBC # FLD AUTO: 8.32 K/UL — SIGNIFICANT CHANGE UP (ref 3.8–10.5)

## 2024-07-02 PROCEDURE — 99233 SBSQ HOSP IP/OBS HIGH 50: CPT | Mod: GC

## 2024-07-02 RX ORDER — MIRTAZAPINE 15 MG
15 TABLET ORAL DAILY
Refills: 0 | Status: DISCONTINUED | OUTPATIENT
Start: 2024-07-02 | End: 2024-07-29

## 2024-07-02 RX ORDER — METHADONE HCL 10 MG
160 TABLET ORAL DAILY
Refills: 0 | Status: DISCONTINUED | OUTPATIENT
Start: 2024-07-02 | End: 2024-07-02

## 2024-07-02 RX ORDER — METHADONE HCL 10 MG
120 TABLET ORAL ONCE
Refills: 0 | Status: DISCONTINUED | OUTPATIENT
Start: 2024-07-02 | End: 2024-07-02

## 2024-07-02 RX ORDER — LIDOCAINE 5% 5 G/100G
1 CREAM TOPICAL DAILY
Refills: 0 | Status: DISCONTINUED | OUTPATIENT
Start: 2024-07-02 | End: 2024-07-29

## 2024-07-02 RX ORDER — BUSPIRONE HYDROCHLORIDE 15 MG/1
15 TABLET ORAL ONCE
Refills: 0 | Status: COMPLETED | OUTPATIENT
Start: 2024-07-02 | End: 2024-07-03

## 2024-07-02 RX ORDER — ACETAMINOPHEN 500 MG
975 TABLET ORAL EVERY 8 HOURS
Refills: 0 | Status: DISCONTINUED | OUTPATIENT
Start: 2024-07-02 | End: 2024-07-29

## 2024-07-02 RX ORDER — OXYCODONE HYDROCHLORIDE 30 MG/1
5 TABLET ORAL EVERY 6 HOURS
Refills: 0 | Status: DISCONTINUED | OUTPATIENT
Start: 2024-07-02 | End: 2024-07-09

## 2024-07-02 RX ORDER — OXYCODONE HYDROCHLORIDE 30 MG/1
10 TABLET ORAL EVERY 8 HOURS
Refills: 0 | Status: DISCONTINUED | OUTPATIENT
Start: 2024-07-02 | End: 2024-07-08

## 2024-07-02 RX ORDER — METHADONE HCL 10 MG
40 TABLET ORAL ONCE
Refills: 0 | Status: DISCONTINUED | OUTPATIENT
Start: 2024-07-02 | End: 2024-07-02

## 2024-07-02 RX ORDER — KETOROLAC TROMETHAMINE 10 MG
15 TABLET ORAL ONCE
Refills: 0 | Status: DISCONTINUED | OUTPATIENT
Start: 2024-07-02 | End: 2024-07-02

## 2024-07-02 RX ORDER — METHADONE HCL 10 MG
160 TABLET ORAL DAILY
Refills: 0 | Status: DISCONTINUED | OUTPATIENT
Start: 2024-07-03 | End: 2024-07-08

## 2024-07-02 RX ADMIN — PIPERACILLIN SODIUM, TAZOBACTAM SODIUM 25 GRAM(S): 3; .375 INJECTION, POWDER, LYOPHILIZED, FOR SOLUTION INTRAVENOUS at 09:58

## 2024-07-02 RX ADMIN — OXYCODONE HYDROCHLORIDE 5 MILLIGRAM(S): 30 TABLET ORAL at 17:37

## 2024-07-02 RX ADMIN — OXYCODONE HYDROCHLORIDE 5 MILLIGRAM(S): 30 TABLET ORAL at 18:37

## 2024-07-02 RX ADMIN — Medication 40 MILLIGRAM(S): at 10:27

## 2024-07-02 RX ADMIN — OXYCODONE HYDROCHLORIDE 10 MILLIGRAM(S): 30 TABLET ORAL at 21:20

## 2024-07-02 RX ADMIN — VANCOMYCIN HYDROCHLORIDE 166.67 MILLIGRAM(S): 5 INJECTION, POWDER, LYOPHILIZED, FOR SOLUTION INTRAVENOUS at 07:07

## 2024-07-02 RX ADMIN — Medication 100 MILLIGRAM(S): at 21:20

## 2024-07-02 RX ADMIN — Medication 975 MILLIGRAM(S): at 17:18

## 2024-07-02 RX ADMIN — LIDOCAINE 5% 1 PATCH: 5 CREAM TOPICAL at 18:16

## 2024-07-02 RX ADMIN — LIDOCAINE 5% 1 PATCH: 5 CREAM TOPICAL at 12:09

## 2024-07-02 RX ADMIN — LEVETIRACETAM 500 MILLIGRAM(S): 1000 TABLET, FILM COATED ORAL at 09:58

## 2024-07-02 RX ADMIN — Medication 600 MILLIGRAM(S): at 07:30

## 2024-07-02 RX ADMIN — Medication 15 MILLIGRAM(S): at 01:05

## 2024-07-02 RX ADMIN — OXYCODONE HYDROCHLORIDE 10 MILLIGRAM(S): 30 TABLET ORAL at 21:57

## 2024-07-02 RX ADMIN — Medication 15 MILLIGRAM(S): at 01:26

## 2024-07-02 RX ADMIN — OXYCODONE HYDROCHLORIDE 10 MILLIGRAM(S): 30 TABLET ORAL at 13:08

## 2024-07-02 RX ADMIN — Medication 120 MILLIGRAM(S): at 12:40

## 2024-07-02 RX ADMIN — Medication 975 MILLIGRAM(S): at 16:18

## 2024-07-02 RX ADMIN — Medication 975 MILLIGRAM(S): at 22:25

## 2024-07-02 RX ADMIN — Medication 15 MILLIGRAM(S): at 19:30

## 2024-07-02 RX ADMIN — OXYCODONE HYDROCHLORIDE 10 MILLIGRAM(S): 30 TABLET ORAL at 12:08

## 2024-07-02 RX ADMIN — Medication 975 MILLIGRAM(S): at 23:19

## 2024-07-02 RX ADMIN — Medication 600 MILLIGRAM(S): at 08:03

## 2024-07-02 RX ADMIN — LIDOCAINE 5% 1 PATCH: 5 CREAM TOPICAL at 23:19

## 2024-07-02 NOTE — PHYSICAL THERAPY INITIAL EVALUATION ADULT - PERTINENT HX OF CURRENT PROBLEM, REHAB EVAL
Pt is 60 y/o F pmhx significant for IV drug use, anemia, epilepsy who presents for sharp L leg pain progressively worsening over the past month, likely 2/2 iliopsoas bursitis i/s/o IVDU.

## 2024-07-02 NOTE — PROGRESS NOTE ADULT - PROBLEM SELECTOR PLAN 7
F: NS 1L  E: replete as needed  N: regular diet  DVT ppx: hold prior to IR procedure  Dispo: [facility] Patient previously prescribed quetiapine 150mg qd, buspirone 15mg qd, mirtazaine 15mg qd. Unclear what she is currently taking. Patient previously prescribed quetiapine 150mg qd, buspirone 15mg qd, mirtazaine 15mg qd. Unclear what she is currently taking.  - follow up on surescripts regarding her medications

## 2024-07-02 NOTE — PROGRESS NOTE ADULT - PROBLEM SELECTOR PLAN 3
Hx of IV drug use   Pt states she is chronically on methadone 160 mg 5 days a week since age 19  Last heroin injection in L leg on Saturday 6/29  - confirmed dose with methadone clinic: 160mg, 5x/week  - spoke with Esther Baker [phone]  - last dose given on 6/29 for 6/30  - monitor COWs Hx of IV drug use   Pt states she is chronically on methadone 160 mg 5 days a week since age 19  Last heroin injection in L leg on Saturday 6/29  - confirmed dose with methadone clinic: 160mg, 5x/week  - spoke with Esther Baker at methadone clinic: [phone]  - last dose given on 6/29 for 6/30  - monitor COWs

## 2024-07-02 NOTE — CONSULT NOTE ADULT - SUBJECTIVE AND OBJECTIVE BOX
58 y/o F pmhx significant for IV drug use, anemia, epilepsy who presents for sharp L leg pain progressively worsening over the past month. Pt states her pain began in the left thigh and later radiated to her toes but now has persisted in her entire left leg and groin. Pt reports this began about one month ago and required multiple ER visits. She states she went to [place] on 5/31 where nothing was done and she was discharged. Most recently last week she returned to [place] where she states she was given a 7 day course of oral antibiotics which she completed but did not improve her pain. She took Motrin which provided very minimal relief. CT showing small left iliacus loculated collection and moderate fluid in the left iliopsoas bursa. IR consulted for drainage of left iliacus collection.     Clinical History: LEG PAIN    Family history of AIDS (Mother, Sibling)    Family history of alcohol abuse (Father)    Handoff    MEWS Score    IVDU (intravenous drug user)    Anemia    Asthma    Epilepsy    Leg pain    Leg pain    Leg pain, left    Anemia    Epilepsy    IVDU (intravenous drug user)    Iliopsoas bursitis of left hip    Prophylactic measure    Mood disorder    LEG PAIN    13    [email or web address]        Meds:acetaminophen     Tablet .. 650 milliGRAM(s) Oral every 6 hours PRN  naloxone Injectable 1 milliGRAM(s) IV Push every 3 minutes PRN  piperacillin/tazobactam IVPB.. 3.375 Gram(s) IV Intermittent every 8 hours  vancomycin  IVPB 1250 milliGRAM(s) IV Intermittent every 12 hours      Allergies:No Known Allergies        Labs:                           9.0    8.32  )-----------( 309      ( 02 Jul 2024 05:30 )             28.2     PT/INR - ( 02 Jul 2024 05:30 )   PT: 14.3 sec;   INR: 1.26          PTT - ( 02 Jul 2024 05:30 )  PTT:35.8 sec  07-02    139  |  105  |  25<H>  ----------------------------<  89  4.4   |  26  |  1.31<H>    Ca    9.2      02 Jul 2024 05:30  Phos  3.9     07-02  Mg     1.7     07-02    TPro  8.8<H>  /  Alb  3.3  /  TBili  0.3  /  DBili  x   /  AST  61<H>  /  ALT  27  /  AlkPhos  76  07-01          Imaging Findings: Moderate fluid in the left iliopsoas bursa. Small fluid loculated within the left iliacus muscle.

## 2024-07-02 NOTE — SBIRT NOTE ADULT - NSSBIRTDRGPASSREFTXDET_GEN_A_CORE
Patient reported that she is currently on methadone. Patient reported that she uses heroin and cocaine "once a month" and is trying to discontinue her usage. Patient is connected to [facility]stance abuse program and has a .

## 2024-07-02 NOTE — PROGRESS NOTE ADULT - ASSESSMENT
This is a 58 y/o F with past medical history of IV drug use, anemia, epilepsy who presented with sharp LLE pain that progressively worsened over the past month. CT of LLE in ED showed loculated fluid in L iliacus muscle, iliopsoas bursitis of infectious or inflammatory etiology. Patient will be undergoing IR drainage procedure tomorrow.    HPI: Pt is 58 y/o F pmhx significant for IV drug use, anemia, epilepsy who presents for sharp L leg pain progressively worsening over the past month. Pt states her pain began in the left thigh and later radiated to her toes but now has persisted in her entire left leg and groin. Pt reports this began about one month ago and required multiple ER visits. She states she went to [place] on 5/31 where nothing was done and she was discharged. Most recently last week she returned to [place] where she states she was given a 7 day course of oral antibiotics which she completed but did not improve her pain. She took Motrin which provided very minimal relief.  Pt last  injected heroin into her left thigh on Saturday 6/29. As per patient, she is chronically on methadone 160 mg 5 days a week since she was 19. She reports subjective fevers. Denies nausea, vomiting, chest pain, shortness of breath, constipation, diarrhea, pain on urination, hematuria, hematemesis.  This is a 60 y/o F with past medical history of IV drug use on 160mg methadone, anemia, epilepsy who presented with sharp LLE pain that progressively worsened over the past month. CT of LLE in ED showed loculated fluid in L iliacus muscle, iliopsoas bursitis of infectious or inflammatory etiology. Patient will be undergoing IR drainage procedure tomorrow.

## 2024-07-02 NOTE — PROGRESS NOTE ADULT - PROBLEM SELECTOR PLAN 6
Patient previously prescribed quetiapine 150mg qd, buspirone 15mg qd, mirtazaine 15mg qd. Unclear what she is currently taking.  - med rec in the AM Per patient's shelter, she was on insulin at some point, unclear of when she was taking it or history of diabetes.  - HbA1c ordered, follow up  - blood glucose 89 today

## 2024-07-02 NOTE — PROGRESS NOTE ADULT - PROBLEM SELECTOR PLAN 1
Worsening left leg and thigh pain with radiation to groin  CT LE w IV contrast showed loculated fluid in L iliacus muscle, iliopsoas bursitis of infectious or inflamm. etiology  ortho consulted, recommended IV antibiotics and IR consult in AM, will continue to follow  Given Zosyn and Vanc in ED  - f/u blood cultures  - IR consulted for drainage; NPO after midnight  - c/w vancomycin 1250mg q24h, obtain trough before 3rd dose  - c/w zosyn 3.375g q8h to cover GNR Worsening left leg and thigh pain with radiation to groin  CT LE w IV contrast showed loculated fluid in L iliacus muscle, iliopsoas bursitis of infectious or inflamm. etiology  ortho consulted, recommended IV antibiotics and IR consult in AM, will continue to follow  Given Zosyn and Vanc in ED  - f/u blood cultures  - IR consulted for drainage; NPO after midnight  - c/w vancomycin 1250mg q12h (s/p 2nd dose)  - c/w zosyn 3.375g q8h to cover GNR (s/p 3rd dose)  - antibiotics held until after IR procedure

## 2024-07-02 NOTE — PROGRESS NOTE ADULT - PROBLEM SELECTOR PLAN 5
As per patient has history of epilepsy  States she takes Keppra but unsure of dose, follows with Neurologist at [facility]  Per Jennifer, previously prescribed Keppra 1000mg bid.   - order Keppra 500mg bid x 2 doses for now, confirm doses in the morning As per patient has history of epilepsy  States she takes Keppra but unsure of dose, follows with Neurologist at [facility]  Per Jennifer, previously prescribed Keppra 1000mg bid.   - order Keppra 500mg bid x 2 doses for now, confirm doses

## 2024-07-02 NOTE — CONSULT NOTE ADULT - ASSESSMENT
Assessment/Plan: 60 y/o F pmhx significant for IV drug use, anemia, epilepsy who presents for sharp L leg pain progressively worsening over the past month. Pt states her pain began in the left thigh and later radiated to her toes but now has persisted in her entire left leg and groin. Pt reports this began about one month ago and required multiple ER visits. She states she went to [place] on 5/31 where nothing was done and she was discharged. Most recently last week she returned to [place] where she states she was given a 7 day course of oral antibiotics which she completed but did not improve her pain. She took Motrin which provided very minimal relief. CT showing small left iliacus loculated collection and moderate fluid in the left iliopsoas bursa. IR consulted for drainage of left iliacus collection. Case reviewed with Dr. Harvey, defer drainage at this time as collection is small and not well organized. Please reconsult if clinically deteriorates or repeat imaging obtained.     Communicated with: Dr. Ingram primary team

## 2024-07-02 NOTE — PHYSICAL THERAPY INITIAL EVALUATION ADULT - GENERAL OBSERVATIONS, REHAB EVAL
PT IE Completed. Pt received semi-supine in bed, NAD, +hep-lock, +CB, +alarm. Cleared to be seen by ANUJ Jiang. Pt requires 1 person assist and RW for OOB mobility. Pt left as received all lines intact, needs met and within reach, RN aware.

## 2024-07-02 NOTE — CONSULT NOTE ADULT - ASSESSMENT
{\rtf1\jkkqhd35331\ansi\wsldtxl2195\ftnbj\uc1\deff0  {\fonttbl{\f0 \fnil Segoe UI;}{\f1 \fnil \fcharset0 Segoe UI;}{\f2 \fnil Times New Ronald;}}  {\colortbl ;\yup026\lkkps971\bwqh839 ;\red0\green0\blue0 ;\red0\green0\jmdy984 ;\red0\green0\blue0 ;}  {\stylesheet{\f0\fs20 Normal;}{\cs1 Default Paragraph Font;}{\cs2\f0\fs16 Line Number;}{\cs3\f2\fs24\ul\cf3 Hyperlink;}}  {\*\revtbl{Unknown;}}  \zfiknq81166\abhlry84301\muuga1182\azsrg4898\dqkhm1442\bidjb5376\kopmpag633\iwtrsip114\nogrowautofit\yewatg644\formshade\nofeaturethrottle1\dntblnsbdb\fet4\aendnotes\aftnnrlc\pgbrdrhead\pgbrdrfoot  \sectd\ilzxsl68767\wbthsv69691\guttersxn0\stasyuwv3277\tfbblrew8941\blttlbnb7925\ggupiagl0027\jkftryj725\emewaeq144\sbkpage\pgncont\pgndec  \plain\plain\f0\fs24\ql\plain\f0\fs24\plain\f0\fs20\ynlh5094\hich\f0\dbch\f0\loch\f0\fs20\par  I M\par  \par  59 y o F pmhx significant for IV drug use, anemia, epilepsy who presents for sharp L leg pain progressively worsening over the past month, likely 2/2 iliopsoas bursitis i/s/o IVDU. \par  \par  \plain\f1\fs20\iojj5424\hich\f1\dbch\f1\loch\f1\cf2\fs20\ul{\field{\*\fldinst HYPERLINK [id number] }{\fldrslt Problem/Plan - 1:}}\plain\f0\fs20\bxsa6262\hich\f0\dbch\f0\loch\f0\fs20\ql\par  \'b7  {\*\bkmkstart zf77888561013}{\*\bkmkend ev62159090178}Problem: {\*\bkmkstart fx72244252415}{\*\bkmkend ga32460237734}Iliopsoas bursitis of left hip. \par  \'b7  {\*\bkmkstart ls52168566808}{\*\bkmkend jq76077855781}Plan: {\*\bkmkstart mr44194278797}{\*\bkmkend uk89055778640}Worsening left leg and thigh pain with radiation to groin\par  CT LE w IV contrast showed loculated fluid in L iliacus muscle, iliopsoas bursitis of infectious or inflamm. etiology\par  ortho consulted, recommended IV antibiotics and IR consult in AM, will continue to follow\par  Given Zosyn and Vanc in ED\par  - f/u blood cultures\par  - IR consulted for drainage; NPO after MN\par  - c/w vancomycin 1250mg q24h, obtain trough before 3rd dose\par  - c/w zosyn 3.375g q8h to cover GNR.\par  \par  \plain\f1\fs20\jokc7695\hich\f1\dbch\f1\loch\f1\cf2\fs20\ul{\field{\*\fldinst HYPERLINK [id number] }{\fldrslt Problem/Plan - 2:}}\plain\f0\fs20\jyip3565\hich\f0\dbch\f0\loch\f0\fs20\ql\par  \'b7  {\*\bkmkstart uo08072708876}{\*\bkmkend vk01936396327}Problem: {\*\bkmkstart sn59456844523}{\*\bkmkend ij79695828227}Leg pain, left. \par  \'b7  {\*\bkmkstart yq20449965462}{\*\bkmkend bq60685784612}Plan: {\*\bkmkstart wi69233126488}{\*\bkmkend kl84083124084}Worsening left leg and thigh pain with radiation to groin\par  CT LE w IV contrast showed loculated fluid in L iliacus muscle, iliopsoas bursitis of infectious or inflamm. etiology\par  Ortho consulted, recommended IV antibiotics and IR consult in AM, will continue to follow\par  Given Zosyn and Vanc in ED\par  Toradol 15 mg IV push given for pain control\par  - f/u duplex of LLE --> no evidence of DVT\par  - pain control: tylenol for mild pain, ibuprofen 600mg q8h for moderate, toradol 15mg IV for severe pain.\par  \par  \plain\f1\fs20\tpok1898\hich\f1\dbch\f1\loch\f1\cf2\fs20\ul{\field{\*\fldinst HYPERLINK [id number] }{\fldrslt Problem/Plan - 3:}}\plain\f0\fs20\rhwa0145\hich\f0\dbch\f0\loch\f0\fs20\ql\par  \'b7  {\*\bkmkstart nq39681305491}{\*\bkmkend ja45574153754}Problem: {\*\bkmkstart mh70861877292}{\*\bkmkend mk82613250691}IVDU (intravenous drug user). \par  \'b7  {\*\bkmkstart ou54534319477}{\*\bkmkend qu94688815658}Plan: {\*\bkmkstart ya00257173295}{\*\bkmkend da12262725057}Hx of IV drug use \par  Pt states she is chronically on methadone 160 mg 5 days a week since age 19\par  Last heroin injection in L leg on Saturday 6/29\par  - confirm dose with methadone clinic\par  - monitor COWs.\plain\f1\fs20\rdsh0335\hich\f1\dbch\f1\loch\f1\cf2\fs20\strike\plain\f0\fs20\dkbv8156\hich\f0\dbch\f0\loch\f0\fs20\par  \par  \plain\f1\fs20\fwlx9637\hich\f1\dbch\f1\loch\f1\cf2\fs20\ul{\field{\*\fldinst HYPERLINK [id number] }{\fldrslt Problem/Plan - 4:}}\plain\f0\fs20\aimw4666\hich\f0\dbch\f0\loch\f0\fs20\ql\par  \'b7  {\*\bkmkstart bt02002214156}{\*\bkmkend av07391394826}Problem: {\*\bkmkstart ga73642799129}{\*\bkmkend jk12696258275}Anemia. \par  \'b7  {\*\bkmkstart pl99614001183}{\*\bkmkend rc93965022407}Plan: {\*\bkmkstart vg89058484474}{\*\bkmkend zc21763012384}Pt states history of anemia\par  On admission Hg 10.2, Hct 31.0\par  - Continue to monitor H&H\par  - Maintain active type & screen.\par  \par  \plain\f1\fs20\opiy3366\hich\f1\dbch\f1\loch\f1\cf2\fs20\ul{\field{\*\fldinst HYPERLINK [id number] }{\fldrslt Problem/Plan - 5:}}\plain\f0\fs20\brzp3389\hich\f0\dbch\f0\loch\f0\fs20\ql\par  \'b7  {\*\bkmkstart og26841988898}{\*\bkmkend ht49590492971}Problem: {\*\bkmkstart on91661338088}{\*\bkmkend sn10159176711}Epilepsy. \par  \'b7  {\*\bkmkstart uo10284864520}{\*\bkmkend nk25677202715}Plan: {\*\bkmkstart me41972487495}{\*\bkmkend ol67860416021}As per patient has history of epilepsy\par  States she takes Keppra but unsure of dose, follows with Neurologist at [facility]\par  Per [employer], previously prescribed Keppra 1000mg bid. \par  - order Keppra 500mg bid x 2 doses for now, confirm doses in the morning.\par  \par  \plain\f1\fs20\hyws2446\hich\f1\dbch\f1\loch\f1\cf2\fs20\ul{\field{\*\fldinst HYPERLINK [id number] }{\fldrslt Problem/Plan - 6:}}\plain\f0\fs20\juhq3413\hich\f0\dbch\f0\loch\f0\fs20\ql\par  \'b7  {\*\bkmkstart kn24923340234}{\*\bkmkend gv71632266052}Problem: {\*\bkmkstart iv34997884945}{\*\bkmkend qz12181599433}Mood disorder. \par  \'b7  {\*\bkmkstart xs35121275881}{\*\bkmkend bo48568737111}Plan: {\*\bkmkstart tk09716027944}{\*\bkmkend ii69504471400}Patient previously prescribed quetiapine 150mg qd, buspirone 15mg qd, mirtazaine 15mg qd. Unclear what she is currently taking.\par  - med rec in the AM.\par  \par  \plain\f1\fs20\bpbm9962\hich\f1\dbch\f1\loch\f1\cf2\fs20\ul{\field{\*\fldinst HYPERLINK [id number] }{\fldrslt Problem/Plan - 7:}}\plain\f0\fs20\ikoh9444\hich\f0\dbch\f0\loch\f0\fs20\ql\par  \'b7  {\*\bkmkstart gx46407622194}{\*\bkmkend rp33349907407}Problem: {\*\bkmkstart ul08134499678}{\*\bkmkend fg43721914246}Prophylactic measure. \par  \'b7  {\*\bkmkstart wk47954918732}{\*\bkmkend yo90482141320}Plan: {\*\bkmkstart xu96575760988}{\*\bkmkend ex02908542742}F: NS 1L\par  E: replete as needed\par  N: regular diet\par  DVT ppx: hold prior to IR procedure\par  Dispo: [facility]F.\par  \par  }

## 2024-07-02 NOTE — PHYSICAL THERAPY INITIAL EVALUATION ADULT - LEVEL OF INDEPENDENCE: STAIR NEGOTIATION, REHAB EVAL
Medication: omeprazole,  passed protocol. Amlodipine-benazepril  Last office visit date: 9/28/2023  Next appointment: 9/24/2024  Next appointment scheduled?: Yes   Number of refills given: 3     unable to perform

## 2024-07-02 NOTE — PROGRESS NOTE ADULT - PROBLEM SELECTOR PLAN 4
Pt states history of anemia  On admission Hg 10.2, Hct 31.0  - Continue to monitor H&H  - Maintain active type & screen  - Most recent Hb 9.0

## 2024-07-02 NOTE — PHYSICAL THERAPY INITIAL EVALUATION ADULT - ADDITIONAL COMMENTS
Pt reports living in [facility], elevator access to enter. PTA pt independent with ADLs, IADLs, and ambulation using SC.

## 2024-07-02 NOTE — PROGRESS NOTE ADULT - ATTENDING COMMENTS
Patient was seen and examined at bedside on 7/2/2024 at 1130 am with nurse present. Patient feels well; has no acute complaints. Denies SOB, CP, abdominal pain. ROS is otherwise negative. Vitals, labwork and pertinent imaging reviewed. Physical exam - NAD, AAO x 4, PERRLA, EOMI, supple neck, chest - CTA b/l, CV - irregular, no m/r/g, abd - soft, + BS, ext - wwp, psych - normal affect, skin - no rash    Plan  -PT/OT rec MARY  -Pain control  -D/w IR - patient planned for IR aspiration in AM, NPO s/p midnight  -Can stop abx pending IR procedure  -Cr.

## 2024-07-02 NOTE — CONSULT NOTE ADULT - SUBJECTIVE AND OBJECTIVE BOX
Patient is a 59y old  Female who presents with a chief complaint of left leg pain (01 Jul 2024 21:25)      HPI:  HPI: Pt is 58 y/o F pmhx significant for IV drug use, anemia, epilepsy who presents for sharp L leg pain progressively worsening over the past month. Pt states her pain began in the left thigh and later radiated to her toes but now has persisted in her entire left leg and groin. Pt reports this began about one month ago and required multiple ER visits. She states she went to [place] on 5/31 where nothing was done and she was discharged. Most recently last week she returned to [place] where she states she was given a 7 day course of oral antibiotics which she completed but did not improve her pain. She took Motrin which provided very minimal relief.  Pt last  injected heroin into her left thigh on Saturday 6/29. As per patient, she is chronically on methadone 160 mg 5 days a week since she was 19. She reports subjective fevers. Denies nausea, vomiting, chest pain, shortness of breath, constipation, diarrhea, pain on urination, hematuria, hematemesis.       In the ED:  Initial vital signs: T: 98.7 F, HR: 82, BP: 100/68, RR: 16, SpO2: 96% on RA  Labs: significant for AST 61, CRP 99.5, Hg 10.2, Hct 31.0  CT Lower Extremity with IV Contrast, Left: loculated fluid in L iliacus muscle, iliopsoas bursitis of infectious or inflammatory etiology  US Duplex Venous LE, Left: No evidence of L lower extremity DVT  Medications: Zosyn, Vancomycin, NS, Ibuprofen  Consults:         Ortho- recommended admission for IV antibiotics and IR consult, will continue to follow (01 Jul 2024 18:56)    PAST MEDICAL & SURGICAL HISTORY:  IVDU (intravenous drug user)      Anemia      Asthma      Epilepsy        MEDICATIONS  (STANDING):  levETIRAcetam 500 milliGRAM(s) Oral every 12 hours  piperacillin/tazobactam IVPB.. 3.375 Gram(s) IV Intermittent every 8 hours  vancomycin  IVPB 1250 milliGRAM(s) IV Intermittent every 12 hours    MEDICATIONS  (PRN):  acetaminophen     Tablet .. 650 milliGRAM(s) Oral every 6 hours PRN Mild Pain (1 - 3)  naloxone Injectable 1 milliGRAM(s) IV Push every 3 minutes PRN in case of overdose        FAMILY HISTORY:  Family history of AIDS (Mother, Sibling)    Family history of alcohol abuse (Father)        CBC Full  -  ( 02 Jul 2024 05:30 )  WBC Count : 8.32 K/uL  RBC Count : 3.28 M/uL  Hemoglobin : 9.0 g/dL  Hematocrit : 28.2 %  Platelet Count - Automated : 309 K/uL  Mean Cell Volume : 86.0 fl  Mean Cell Hemoglobin : 27.4 pg  Mean Cell Hemoglobin Concentration : 31.9 gm/dL  Auto Neutrophil # : 6.38 K/uL  Auto Lymphocyte # : 1.08 K/uL  Auto Monocyte # : 0.71 K/uL  Auto Eosinophil # : 0.08 K/uL  Auto Basophil # : 0.02 K/uL  Auto Neutrophil % : 76.7 %  Auto Lymphocyte % : 13.0 %  Auto Monocyte % : 8.5 %  Auto Eosinophil % : 1.0 %  Auto Basophil % : 0.2 %      07-02    139  |  105  |  25<H>  ----------------------------<  89  4.4   |  26  |  1.31<H>    Ca    9.2      02 Jul 2024 05:30  Phos  3.9     07-02  Mg     1.7     07-02    TPro  8.8<H>  /  Alb  3.3  /  TBili  0.3  /  DBili  x   /  AST  61<H>  /  ALT  27  /  AlkPhos  76  07-01      Urinalysis Basic - ( 02 Jul 2024 05:30 )    Color: x / Appearance: x / SG: x / pH: x  Gluc: 89 mg/dL / Ketone: x  / Bili: x / Urobili: x   Blood: x / Protein: x / Nitrite: x   Leuk Esterase: x / RBC: x / WBC x   Sq Epi: x / Non Sq Epi: x / Bacteria: x        Radiology :     < from: CT Lower Extremity w/ IV Cont, Left (07.01.24 @ 16:12) >  ACC: [id number] EXAM:  CT LWR EXT IC LT   ORDERED BY: GERALDO LUCERO     PROCEDURE DATE:  07/01/2024          INTERPRETATION:  CT LOWER EXTREMITY WITH IV CONTRAST LEFT dated 7/1/2024   4:12 PM    INDICATION: Left lower extremity pain and swelling    COMPARISON: None available.    TECHNIQUE: CT imaging of the left lower extremity was performed with   contrast. The data was reformatted in the axial, coronal, and sagittal   planes. Contrast: Omnipaque 350. Administered: 90 cc. Discarded: 10 cc.    FINDINGS:    OSSEOUS STRUCTURES: Severe left hip superior joint space narrowing with   sclerosis and marginal productive changes. Moderate medial lateral knee   joint space narrowing. Moderate patellofemoral compartment narrowing. Old   avulsion injury involving the anterior process of the calcaneus without   osseous healing. Degenerative changes at the navicula/cuneiform joints   and at the second through fourth TMT's. No cortical erosion or   destruction is present.  SYNOVIUM/ JOINT FLUID: No joint effusion. Moderate fluid in the left   iliopsoas bursa.  TENDONS: Intact tendons  MUSCLES: Small fluid loculated within the left iliacus muscle.  NEUROVASCULAR STRUCTURES: There are varicosities are noted.  INTRAPELVIC SOFT TISSUES: No abnormality  SUBCUTANEOUS SOFT TISSUES: No large soft tissue swelling. No drainable   collection.      IMPRESSION:    1.  Loculated fluid in the left iliacus muscle. Nonspecific and may   reflect infection or posttraumatic change.  2.  Iliopsoas bursitis of infectious or inflammatory etiology.  3.  Degenerative changes of the left hip and knee.  4.  Midfoot arthrosis.         Review of Systems : per HPI         Vital Signs Last 24 Hrs  T(C): 37.2 (02 Jul 2024 05:30), Max: 37.5 (01 Jul 2024 19:31)  T(F): 99 (02 Jul 2024 05:30), Max: 99.5 (01 Jul 2024 19:31)  HR: 82 (02 Jul 2024 05:30) (82 - 99)  BP: 110/74 (02 Jul 2024 05:30) (100/62 - 110/74)  BP(mean): --  RR: 17 (02 Jul 2024 05:30) (16 - 17)  SpO2: 96% (02 Jul 2024 05:30) (95% - 97%)    Parameters below as of 02 Jul 2024 05:30  Patient On (Oxygen Delivery Method): room air            Physical Exam:   59 y o woman lying comfortably in semi Andino's position , awake , alert , no acute complaints     Head: normocephalic , atraumatic    Eyes: PERRLA , EOMI , no nystagmus , sclera anicteric    ENT / FACE: neg nasal discharge , uvula midline , no oropharyngeal erythema / exudate    Neck: supple , negative JVD , negative carotid bruits , no thyromegaly    Chest: CTA bilaterally     Cardiovascular: regular rate and rhythm , neg murmurs / rubs / gallops    Abdomen: soft , non distended , no tenderness to palpation in all 4 quadrants ,  normal bowel sounds     Extremities: WWP , neg cyanosis /clubbing / edema     Musculoskeletal: pain w/ L hip flexion     Skin:     :     Neurologic Exam:     Alert and oriented   3     Motor Exam:        > 3+/5 x 4 extremities , LLE pain limited proximally     Sensation:         intact to light touch x 4 extremities                                DTR:           biceps/brachioradialis: equal                            patella/ankle: equal          Gait:  not tested         PM&R Impression: admitted for  L leg pain progressively worsening over the past month      - CT revealed loculated fluid in the left iliacus muscle ,  Iliopsoas bursitis of infectious or inflammatory etiology        Recommendations / Plan:       1) Physical / Occupational therapy focusing on therapeutic exercises , equipment evaluation , bed mobility/transfer out of bed evaluation , progressive ambulation with assistive devices prn .    2) Current disposition plan recommendation:    pending functional progress , lives in a shelter

## 2024-07-02 NOTE — PROGRESS NOTE ADULT - PROBLEM SELECTOR PLAN 2
Worsening left leg and thigh pain with radiation to groin  CT LE w IV contrast showed loculated fluid in L iliacus muscle, iliopsoas bursitis of infectious or inflamm. etiology  Ortho consulted, recommended IV antibiotics and IR consult, will continue to follow  Given Zosyn and Vanc in ED  s/p Toradol 15 mg IV push given for pain control  - duplex of LLE showed no evidence of DVT  - pain regimen: standing 975 tylenol q8, lidocaine patch, oxy prn 5mg q4

## 2024-07-02 NOTE — PROGRESS NOTE ADULT - SUBJECTIVE AND OBJECTIVE BOX
OVERNIGHT EVENTS: Patient continued to have LLE pain for which she was given IV toradol 15mg.     SUBJECTIVE / INTERVAL HPI: Patient seen and examined at bedside. She reports severe 10/10 LLE pain and ongoing swelling.     VITAL SIGNS:  Vital Signs Last 24 Hrs  T(C): 37.1 (02 Jul 2024 09:13), Max: 37.5 (01 Jul 2024 19:31)  T(F): 98.8 (02 Jul 2024 09:13), Max: 99.5 (01 Jul 2024 19:31)  HR: 85 (02 Jul 2024 09:13) (82 - 99)  BP: 106/67 (02 Jul 2024 09:13) (100/62 - 110/74)  BP(mean): --  RR: 16 (02 Jul 2024 09:13) (16 - 17)  SpO2: 98% (02 Jul 2024 09:13) (95% - 98%)    Parameters below as of 02 Jul 2024 09:13  Patient On (Oxygen Delivery Method): room air      I&O's Summary      PHYSICAL EXAM:  General: WDWN  HEENT: NC/AT; PERRL, anicteric sclera; MMM  Neck: supple  Cardiovascular: +S1/S2; RRR  Respiratory: CTA B/L; no W/R/R  Gastrointestinal: soft, NT/ND; +BSx4  Extremities: WWP; no edema, clubbing or cyanosis  Vascular: 2+ radial, DP/PT pulses B/L  Neurological: AAOx3; no focal deficits    MEDICATIONS:  MEDICATIONS  (STANDING):  acetaminophen     Tablet .. 975 milliGRAM(s) Oral every 8 hours  lidocaine   4% Patch 1 Patch Transdermal daily  methadone    Tablet 120 milliGRAM(s) Oral once  piperacillin/tazobactam IVPB.. 3.375 Gram(s) IV Intermittent every 8 hours  vancomycin  IVPB 1250 milliGRAM(s) IV Intermittent every 12 hours    MEDICATIONS  (PRN):  naloxone Injectable 1 milliGRAM(s) IV Push every 3 minutes PRN in case of overdose  oxyCODONE    IR 5 milliGRAM(s) Oral every 6 hours PRN Moderate Pain (4 - 6)  oxyCODONE    IR 10 milliGRAM(s) Oral every 8 hours PRN Severe Pain (7 - 10)      ALLERGIES:  Allergies    No Known Allergies    Intolerances        LABS:                        9.0    8.32  )-----------( 309      ( 02 Jul 2024 05:30 )             28.2     07-02    139  |  105  |  25<H>  ----------------------------<  89  4.4   |  26  |  1.31<H>    Ca    9.2      02 Jul 2024 05:30  Phos  3.9     07-02  Mg     1.7     07-02    TPro  8.8<H>  /  Alb  3.3  /  TBili  0.3  /  DBili  x   /  AST  61<H>  /  ALT  27  /  AlkPhos  76  07-01    PT/INR - ( 02 Jul 2024 05:30 )   PT: 14.3 sec;   INR: 1.26          PTT - ( 02 Jul 2024 05:30 )  PTT:35.8 sec  Urinalysis Basic - ( 02 Jul 2024 05:30 )    Color: x / Appearance: x / SG: x / pH: x  Gluc: 89 mg/dL / Ketone: x  / Bili: x / Urobili: x   Blood: x / Protein: x / Nitrite: x   Leuk Esterase: x / RBC: x / WBC x   Sq Epi: x / Non Sq Epi: x / Bacteria: x      CAPILLARY BLOOD GLUCOSE          RADIOLOGY & ADDITIONAL TESTS: Reviewed.   OVERNIGHT EVENTS: Patient continued to have LLE pain for which she was given IV toradol 15mg.     SUBJECTIVE / INTERVAL HPI: Patient seen and examined at bedside. She reports severe 10/10 LLE pain and ongoing swelling.     VITAL SIGNS:  Vital Signs Last 24 Hrs  T(C): 37.1 (02 Jul 2024 09:13), Max: 37.5 (01 Jul 2024 19:31)  T(F): 98.8 (02 Jul 2024 09:13), Max: 99.5 (01 Jul 2024 19:31)  HR: 85 (02 Jul 2024 09:13) (82 - 99)  BP: 106/67 (02 Jul 2024 09:13) (100/62 - 110/74)  BP(mean): --  RR: 16 (02 Jul 2024 09:13) (16 - 17)  SpO2: 98% (02 Jul 2024 09:13) (95% - 98%)    Parameters below as of 02 Jul 2024 09:13  Patient On (Oxygen Delivery Method): room air      PHYSICAL EXAM:  General: WDWN  HEENT: NC/AT; PERRL, anicteric sclera; MMM  Neck: supple  Cardiovascular: +S1/S2; RRR  Respiratory: CTA B/L; no W/R/R  Gastrointestinal: soft, NT/ND; +BSx4  Extremities: WWP; no edema, clubbing or cyanosis  Vascular: 2+ radial, DP/PT pulses B/L  Neurological: AAOx3; no focal deficits    MEDICATIONS:  MEDICATIONS  (STANDING):  acetaminophen     Tablet .. 975 milliGRAM(s) Oral every 8 hours  lidocaine   4% Patch 1 Patch Transdermal daily  methadone    Tablet 120 milliGRAM(s) Oral once  piperacillin/tazobactam IVPB.. 3.375 Gram(s) IV Intermittent every 8 hours  vancomycin  IVPB 1250 milliGRAM(s) IV Intermittent every 12 hours    MEDICATIONS  (PRN):  naloxone Injectable 1 milliGRAM(s) IV Push every 3 minutes PRN in case of overdose  oxyCODONE    IR 5 milliGRAM(s) Oral every 6 hours PRN Moderate Pain (4 - 6)  oxyCODONE    IR 10 milliGRAM(s) Oral every 8 hours PRN Severe Pain (7 - 10)      ALLERGIES:  Allergies    No Known Allergies    Intolerances        LABS:                        9.0    8.32  )-----------( 309      ( 02 Jul 2024 05:30 )             28.2     07-02    139  |  105  |  25<H>  ----------------------------<  89  4.4   |  26  |  1.31<H>    Ca    9.2      02 Jul 2024 05:30  Phos  3.9     07-02  Mg     1.7     07-02    TPro  8.8<H>  /  Alb  3.3  /  TBili  0.3  /  DBili  x   /  AST  61<H>  /  ALT  27  /  AlkPhos  76  07-01    PT/INR - ( 02 Jul 2024 05:30 )   PT: 14.3 sec;   INR: 1.26          PTT - ( 02 Jul 2024 05:30 )  PTT:35.8 sec  Urinalysis Basic - ( 02 Jul 2024 05:30 )    Color: x / Appearance: x / SG: x / pH: x  Gluc: 89 mg/dL / Ketone: x  / Bili: x / Urobili: x   Blood: x / Protein: x / Nitrite: x   Leuk Esterase: x / RBC: x / WBC x   Sq Epi: x / Non Sq Epi: x / Bacteria: x      CAPILLARY BLOOD GLUCOSE          RADIOLOGY & ADDITIONAL TESTS: Reviewed.   OVERNIGHT EVENTS: Patient continued to have LLE pain for which she was given IV toradol 15mg.     SUBJECTIVE / INTERVAL HPI: Patient seen and examined at bedside. She reports severe 10/10 LLE pain and ongoing swelling. Denies symptoms of fevers, chills, nausea, vomiting.  Patient was reassessed later in the evening around 5pm.    VITAL SIGNS:  Vital Signs Last 24 Hrs  T(C): 37.1 (02 Jul 2024 09:13), Max: 37.5 (01 Jul 2024 19:31)  T(F): 98.8 (02 Jul 2024 09:13), Max: 99.5 (01 Jul 2024 19:31)  HR: 85 (02 Jul 2024 09:13) (82 - 99)  BP: 106/67 (02 Jul 2024 09:13) (100/62 - 110/74)  BP(mean): --  RR: 16 (02 Jul 2024 09:13) (16 - 17)  SpO2: 98% (02 Jul 2024 09:13) (95% - 98%)    Parameters below as of 02 Jul 2024 09:13  Patient On (Oxygen Delivery Method): room air      PHYSICAL EXAM:  General: well developed.  HEENT: NC/AT; PERRL, anicteric sclera; MMM  Neck: supple  Cardiovascular: +S1/S2; RRR  Respiratory: CTA B/L; no W/R/R  Gastrointestinal: soft, NT/ND; +BSx4  Extremities: Tenderness to palpation of entire LLE, greatest in upper thigh and groin, LLE edema, full ROM of all extremities.   Vascular: 2+ radial, DP/PT pulses B/L  Neurological: AAOx3; no focal deficits    MEDICATIONS:  MEDICATIONS  (STANDING):  acetaminophen     Tablet .. 975 milliGRAM(s) Oral every 8 hours  lidocaine   4% Patch 1 Patch Transdermal daily  methadone    Tablet 120 milliGRAM(s) Oral once  piperacillin/tazobactam IVPB.. 3.375 Gram(s) IV Intermittent every 8 hours  vancomycin  IVPB 1250 milliGRAM(s) IV Intermittent every 12 hours    MEDICATIONS  (PRN):  naloxone Injectable 1 milliGRAM(s) IV Push every 3 minutes PRN in case of overdose  oxyCODONE    IR 5 milliGRAM(s) Oral every 6 hours PRN Moderate Pain (4 - 6)  oxyCODONE    IR 10 milliGRAM(s) Oral every 8 hours PRN Severe Pain (7 - 10)      ALLERGIES:  No Known Allergies      LABS:                        9.0    8.32  )-----------( 309      ( 02 Jul 2024 05:30 )             28.2     07-02    139  |  105  |  25<H>  ----------------------------<  89  4.4   |  26  |  1.31<H>    Ca    9.2      02 Jul 2024 05:30  Phos  3.9     07-02  Mg     1.7     07-02    TPro  8.8<H>  /  Alb  3.3  /  TBili  0.3  /  DBili  x   /  AST  61<H>  /  ALT  27  /  AlkPhos  76  07-01    PT/INR - ( 02 Jul 2024 05:30 )   PT: 14.3 sec;   INR: 1.26          PTT - ( 02 Jul 2024 05:30 )  PTT:35.8 sec  Urinalysis Basic - ( 02 Jul 2024 05:30 )    Color: x / Appearance: x / SG: x / pH: x  Gluc: 89 mg/dL / Ketone: x  / Bili: x / Urobili: x   Blood: x / Protein: x / Nitrite: x   Leuk Esterase: x / RBC: x / WBC x   Sq Epi: x / Non Sq Epi: x / Bacteria: x      CAPILLARY BLOOD GLUCOSE          RADIOLOGY & ADDITIONAL TESTS: Reviewed.   OVERNIGHT EVENTS: Patient continued to have LLE pain for which she was given IV toradol 15mg.     SUBJECTIVE / INTERVAL HPI: Patient seen and examined at bedside. She reports severe 10/10 LLE pain and ongoing swelling. Denies symptoms of fevers, chills, nausea, vomiting.  Patient was reassessed later in the evening around 5pm. She states her pain is well controlled with oxycodone and lidocaine patch and she was able to get up to walk around and clean herself. She also understands that she is having her IR procedure tomorrow and knows she needs to be NPO overnight.     VITAL SIGNS:  Vital Signs Last 24 Hrs  T(C): 37.1 (02 Jul 2024 09:13), Max: 37.5 (01 Jul 2024 19:31)  T(F): 98.8 (02 Jul 2024 09:13), Max: 99.5 (01 Jul 2024 19:31)  HR: 85 (02 Jul 2024 09:13) (82 - 99)  BP: 106/67 (02 Jul 2024 09:13) (100/62 - 110/74)  BP(mean): --  RR: 16 (02 Jul 2024 09:13) (16 - 17)  SpO2: 98% (02 Jul 2024 09:13) (95% - 98%)    Parameters below as of 02 Jul 2024 09:13  Patient On (Oxygen Delivery Method): room air      PHYSICAL EXAM:  General: well developed.  HEENT: NC/AT; PERRL, anicteric sclera; MMM  Neck: supple  Cardiovascular: +S1/S2; RRR  Respiratory: CTA B/L; no W/R/R  Gastrointestinal: soft, NT/ND; +BSx4  Extremities: Tenderness to palpation of entire LLE, greatest in upper thigh and groin, LLE edema, full ROM of all extremities, track marks in bilateral lower extremities.   Vascular: 2+ radial, DP/PT pulses B/L  Neurological: AAOx3; no focal deficits    MEDICATIONS:  MEDICATIONS  (STANDING):  acetaminophen     Tablet .. 975 milliGRAM(s) Oral every 8 hours  lidocaine   4% Patch 1 Patch Transdermal daily  methadone    Tablet 120 milliGRAM(s) Oral once  piperacillin/tazobactam IVPB.. 3.375 Gram(s) IV Intermittent every 8 hours  vancomycin  IVPB 1250 milliGRAM(s) IV Intermittent every 12 hours    MEDICATIONS  (PRN):  naloxone Injectable 1 milliGRAM(s) IV Push every 3 minutes PRN in case of overdose  oxyCODONE    IR 5 milliGRAM(s) Oral every 6 hours PRN Moderate Pain (4 - 6)  oxyCODONE    IR 10 milliGRAM(s) Oral every 8 hours PRN Severe Pain (7 - 10)      ALLERGIES:  No Known Allergies      LABS:                        9.0    8.32  )-----------( 309      ( 02 Jul 2024 05:30 )             28.2     07-02    139  |  105  |  25<H>  ----------------------------<  89  4.4   |  26  |  1.31<H>    Ca    9.2      02 Jul 2024 05:30  Phos  3.9     07-02  Mg     1.7     07-02    TPro  8.8<H>  /  Alb  3.3  /  TBili  0.3  /  DBili  x   /  AST  61<H>  /  ALT  27  /  AlkPhos  76  07-01    PT/INR - ( 02 Jul 2024 05:30 )   PT: 14.3 sec;   INR: 1.26          PTT - ( 02 Jul 2024 05:30 )  PTT:35.8 sec  Urinalysis Basic - ( 02 Jul 2024 05:30 )    Color: x / Appearance: x / SG: x / pH: x  Gluc: 89 mg/dL / Ketone: x  / Bili: x / Urobili: x   Blood: x / Protein: x / Nitrite: x   Leuk Esterase: x / RBC: x / WBC x   Sq Epi: x / Non Sq Epi: x / Bacteria: x      CAPILLARY BLOOD GLUCOSE          RADIOLOGY & ADDITIONAL TESTS: Reviewed.   OVERNIGHT EVENTS: Patient continued to have LLE pain for which she was given IV toradol 15mg.     SUBJECTIVE / INTERVAL HPI: Patient seen and examined at bedside. She reports severe 10/10 LLE pain and ongoing swelling. Denies symptoms of fevers, chills, nausea, vomiting.  Patient was reassessed later in the evening around 5pm. She states her pain is well controlled with oxycodone and lidocaine patch and she was able to get up to walk around and clean herself. She also understands that she is having her IR procedure tomorrow and knows she needs to be NPO overnight.     Spoke with patient's brother about her care. Patient consented twice to this to patient experience advocate. Brother's contact: [phone].     VITAL SIGNS:  Vital Signs Last 24 Hrs  T(C): 37.1 (02 Jul 2024 09:13), Max: 37.5 (01 Jul 2024 19:31)  T(F): 98.8 (02 Jul 2024 09:13), Max: 99.5 (01 Jul 2024 19:31)  HR: 85 (02 Jul 2024 09:13) (82 - 99)  BP: 106/67 (02 Jul 2024 09:13) (100/62 - 110/74)  BP(mean): --  RR: 16 (02 Jul 2024 09:13) (16 - 17)  SpO2: 98% (02 Jul 2024 09:13) (95% - 98%)    Parameters below as of 02 Jul 2024 09:13  Patient On (Oxygen Delivery Method): room air      PHYSICAL EXAM:  General: well developed.  HEENT: NC/AT; PERRL, anicteric sclera; MMM  Neck: supple  Cardiovascular: +S1/S2; RRR  Respiratory: CTA B/L; no W/R/R  Gastrointestinal: soft, NT/ND; +BSx4  Extremities: Tenderness to palpation of entire LLE, greatest in upper thigh and groin, LLE edema, full ROM of all extremities, track marks in bilateral lower extremities.   Vascular: 2+ radial, DP/PT pulses B/L  Neurological: AAOx3; no focal deficits    MEDICATIONS:  MEDICATIONS  (STANDING):  acetaminophen     Tablet .. 975 milliGRAM(s) Oral every 8 hours  lidocaine   4% Patch 1 Patch Transdermal daily  methadone    Tablet 120 milliGRAM(s) Oral once  piperacillin/tazobactam IVPB.. 3.375 Gram(s) IV Intermittent every 8 hours  vancomycin  IVPB 1250 milliGRAM(s) IV Intermittent every 12 hours    MEDICATIONS  (PRN):  naloxone Injectable 1 milliGRAM(s) IV Push every 3 minutes PRN in case of overdose  oxyCODONE    IR 5 milliGRAM(s) Oral every 6 hours PRN Moderate Pain (4 - 6)  oxyCODONE    IR 10 milliGRAM(s) Oral every 8 hours PRN Severe Pain (7 - 10)      ALLERGIES:  No Known Allergies      LABS:                        9.0    8.32  )-----------( 309      ( 02 Jul 2024 05:30 )             28.2     07-02    139  |  105  |  25<H>  ----------------------------<  89  4.4   |  26  |  1.31<H>    Ca    9.2      02 Jul 2024 05:30  Phos  3.9     07-02  Mg     1.7     07-02    TPro  8.8<H>  /  Alb  3.3  /  TBili  0.3  /  DBili  x   /  AST  61<H>  /  ALT  27  /  AlkPhos  76  07-01    PT/INR - ( 02 Jul 2024 05:30 )   PT: 14.3 sec;   INR: 1.26          PTT - ( 02 Jul 2024 05:30 )  PTT:35.8 sec  Urinalysis Basic - ( 02 Jul 2024 05:30 )    Color: x / Appearance: x / SG: x / pH: x  Gluc: 89 mg/dL / Ketone: x  / Bili: x / Urobili: x   Blood: x / Protein: x / Nitrite: x   Leuk Esterase: x / RBC: x / WBC x   Sq Epi: x / Non Sq Epi: x / Bacteria: x      CAPILLARY BLOOD GLUCOSE          RADIOLOGY & ADDITIONAL TESTS: Reviewed.   OVERNIGHT EVENTS: Patient continued to have LLE pain for which she was given IV toradol 15mg.     SUBJECTIVE / INTERVAL HPI: Patient seen and examined at bedside. She reports severe 10/10 LLE pain and ongoing swelling. Denies symptoms of fevers, chills, nausea, vomiting.  Patient was reassessed later in the evening around 5pm. She states her pain is well controlled with oxycodone and lidocaine patch and she was able to get up to walk around and clean herself. She also understands that she is having her IR procedure tomorrow and knows she needs to be NPO overnight.     Spoke with patient's brother about her care. Patient consented twice to this to patient experience advocate. Brother's contact: [phone].     VITAL SIGNS:  Vital Signs Last 24 Hrs  T(C): 37.1 (02 Jul 2024 09:13), Max: 37.5 (01 Jul 2024 19:31)  T(F): 98.8 (02 Jul 2024 09:13), Max: 99.5 (01 Jul 2024 19:31)  HR: 85 (02 Jul 2024 09:13) (82 - 99)  BP: 106/67 (02 Jul 2024 09:13) (100/62 - 110/74)  BP(mean): --  RR: 16 (02 Jul 2024 09:13) (16 - 17)  SpO2: 98% (02 Jul 2024 09:13) (95% - 98%)    Parameters below as of 02 Jul 2024 09:13  Patient On (Oxygen Delivery Method): room air      PHYSICAL EXAM:  General: fatigued, inadequate nutrition.  HEENT: NC/AT; PERRL, anicteric sclera; MMM  Neck: supple  Cardiovascular: +S1/S2; RRR  Respiratory: CTA B/L; no W/R/R  Gastrointestinal: soft, NT/ND; +BSx4  Extremities: Tenderness to palpation of entire LLE, greatest in upper thigh and groin, LLE edema, full ROM of all extremities, track marks in bilateral lower extremities.   Vascular: 2+ radial, DP/PT pulses B/L  Neurological: AAOx3; no focal deficits    MEDICATIONS:  MEDICATIONS  (STANDING):  acetaminophen     Tablet .. 975 milliGRAM(s) Oral every 8 hours  lidocaine   4% Patch 1 Patch Transdermal daily  methadone    Tablet 120 milliGRAM(s) Oral once  piperacillin/tazobactam IVPB.. 3.375 Gram(s) IV Intermittent every 8 hours  vancomycin  IVPB 1250 milliGRAM(s) IV Intermittent every 12 hours    MEDICATIONS  (PRN):  naloxone Injectable 1 milliGRAM(s) IV Push every 3 minutes PRN in case of overdose  oxyCODONE    IR 5 milliGRAM(s) Oral every 6 hours PRN Moderate Pain (4 - 6)  oxyCODONE    IR 10 milliGRAM(s) Oral every 8 hours PRN Severe Pain (7 - 10)      ALLERGIES:  No Known Allergies      LABS:                        9.0    8.32  )-----------( 309      ( 02 Jul 2024 05:30 )             28.2     07-02    139  |  105  |  25<H>  ----------------------------<  89  4.4   |  26  |  1.31<H>    Ca    9.2      02 Jul 2024 05:30  Phos  3.9     07-02  Mg     1.7     07-02    TPro  8.8<H>  /  Alb  3.3  /  TBili  0.3  /  DBili  x   /  AST  61<H>  /  ALT  27  /  AlkPhos  76  07-01    PT/INR - ( 02 Jul 2024 05:30 )   PT: 14.3 sec;   INR: 1.26          PTT - ( 02 Jul 2024 05:30 )  PTT:35.8 sec  Urinalysis Basic - ( 02 Jul 2024 05:30 )    Color: x / Appearance: x / SG: x / pH: x  Gluc: 89 mg/dL / Ketone: x  / Bili: x / Urobili: x   Blood: x / Protein: x / Nitrite: x   Leuk Esterase: x / RBC: x / WBC x   Sq Epi: x / Non Sq Epi: x / Bacteria: x      CAPILLARY BLOOD GLUCOSE          RADIOLOGY & ADDITIONAL TESTS: Reviewed.

## 2024-07-03 ENCOUNTER — RESULT REVIEW (OUTPATIENT)
Age: 59
End: 2024-07-03

## 2024-07-03 LAB
ALBUMIN SERPL ELPH-MCNC: 3 G/DL — LOW (ref 3.3–5)
ALP SERPL-CCNC: 66 U/L — SIGNIFICANT CHANGE UP (ref 40–120)
ALT FLD-CCNC: 18 U/L — SIGNIFICANT CHANGE UP (ref 10–45)
ANION GAP SERPL CALC-SCNC: 9 MMOL/L — SIGNIFICANT CHANGE UP (ref 5–17)
AST SERPL-CCNC: 30 U/L — SIGNIFICANT CHANGE UP (ref 10–40)
B-OH-BUTYR SERPL-SCNC: 0.1 MMOL/L — SIGNIFICANT CHANGE UP
BASOPHILS # BLD AUTO: 0.03 K/UL — SIGNIFICANT CHANGE UP (ref 0–0.2)
BASOPHILS NFR BLD AUTO: 0.3 % — SIGNIFICANT CHANGE UP (ref 0–2)
BILIRUB SERPL-MCNC: 0.3 MG/DL — SIGNIFICANT CHANGE UP (ref 0.2–1.2)
BUN SERPL-MCNC: 22 MG/DL — SIGNIFICANT CHANGE UP (ref 7–23)
CALCIUM SERPL-MCNC: 9.7 MG/DL — SIGNIFICANT CHANGE UP (ref 8.4–10.5)
CHLORIDE SERPL-SCNC: 102 MMOL/L — SIGNIFICANT CHANGE UP (ref 96–108)
CO2 SERPL-SCNC: 27 MMOL/L — SIGNIFICANT CHANGE UP (ref 22–31)
CREAT SERPL-MCNC: 1.18 MG/DL — SIGNIFICANT CHANGE UP (ref 0.5–1.3)
CRP SERPL-MCNC: 190.4 MG/L — HIGH (ref 0–4)
EGFR: 54 ML/MIN/1.73M2 — LOW
EOSINOPHIL # BLD AUTO: 0.05 K/UL — SIGNIFICANT CHANGE UP (ref 0–0.5)
EOSINOPHIL NFR BLD AUTO: 0.6 % — SIGNIFICANT CHANGE UP (ref 0–6)
GLUCOSE SERPL-MCNC: 94 MG/DL — SIGNIFICANT CHANGE UP (ref 70–99)
HCT VFR BLD CALC: 27.3 % — LOW (ref 34.5–45)
HGB BLD-MCNC: 9 G/DL — LOW (ref 11.5–15.5)
HIV 1+2 AB+HIV1 P24 AG SERPL QL IA: SIGNIFICANT CHANGE UP
IMM GRANULOCYTES NFR BLD AUTO: 0.7 % — SIGNIFICANT CHANGE UP (ref 0–0.9)
LYMPHOCYTES # BLD AUTO: 1.35 K/UL — SIGNIFICANT CHANGE UP (ref 1–3.3)
LYMPHOCYTES # BLD AUTO: 15.3 % — SIGNIFICANT CHANGE UP (ref 13–44)
MAGNESIUM SERPL-MCNC: 1.8 MG/DL — SIGNIFICANT CHANGE UP (ref 1.6–2.6)
MCHC RBC-ENTMCNC: 28.2 PG — SIGNIFICANT CHANGE UP (ref 27–34)
MCHC RBC-ENTMCNC: 33 GM/DL — SIGNIFICANT CHANGE UP (ref 32–36)
MCV RBC AUTO: 85.6 FL — SIGNIFICANT CHANGE UP (ref 80–100)
MONOCYTES # BLD AUTO: 0.76 K/UL — SIGNIFICANT CHANGE UP (ref 0–0.9)
MONOCYTES NFR BLD AUTO: 8.6 % — SIGNIFICANT CHANGE UP (ref 2–14)
NEUTROPHILS # BLD AUTO: 6.59 K/UL — SIGNIFICANT CHANGE UP (ref 1.8–7.4)
NEUTROPHILS NFR BLD AUTO: 74.5 % — SIGNIFICANT CHANGE UP (ref 43–77)
NRBC # BLD: 0 /100 WBCS — SIGNIFICANT CHANGE UP (ref 0–0)
PHOSPHATE SERPL-MCNC: 4.6 MG/DL — HIGH (ref 2.5–4.5)
PLATELET # BLD AUTO: 270 K/UL — SIGNIFICANT CHANGE UP (ref 150–400)
POTASSIUM SERPL-MCNC: 4.5 MMOL/L — SIGNIFICANT CHANGE UP (ref 3.5–5.3)
POTASSIUM SERPL-SCNC: 4.5 MMOL/L — SIGNIFICANT CHANGE UP (ref 3.5–5.3)
PROT SERPL-MCNC: 7.9 G/DL — SIGNIFICANT CHANGE UP (ref 6–8.3)
RBC # BLD: 3.19 M/UL — LOW (ref 3.8–5.2)
RBC # FLD: 14.5 % — SIGNIFICANT CHANGE UP (ref 10.3–14.5)
SODIUM SERPL-SCNC: 138 MMOL/L — SIGNIFICANT CHANGE UP (ref 135–145)
WBC # BLD: 8.84 K/UL — SIGNIFICANT CHANGE UP (ref 3.8–10.5)
WBC # FLD AUTO: 8.84 K/UL — SIGNIFICANT CHANGE UP (ref 3.8–10.5)

## 2024-07-03 PROCEDURE — 10160 PNXR ASPIR ABSC HMTMA BULLA: CPT

## 2024-07-03 PROCEDURE — 93306 TTE W/DOPPLER COMPLETE: CPT | Mod: 26

## 2024-07-03 PROCEDURE — 99233 SBSQ HOSP IP/OBS HIGH 50: CPT | Mod: GC

## 2024-07-03 PROCEDURE — 99222 1ST HOSP IP/OBS MODERATE 55: CPT

## 2024-07-03 PROCEDURE — 76942 ECHO GUIDE FOR BIOPSY: CPT | Mod: 26

## 2024-07-03 PROCEDURE — 74176 CT ABD & PELVIS W/O CONTRAST: CPT | Mod: 26

## 2024-07-03 RX ORDER — LEVETIRACETAM 1000 MG/1
500 TABLET, FILM COATED ORAL
Refills: 0 | Status: DISCONTINUED | OUTPATIENT
Start: 2024-07-03 | End: 2024-07-29

## 2024-07-03 RX ORDER — CEFAZOLIN SODIUM 10 G
2000 VIAL (EA) INJECTION EVERY 8 HOURS
Refills: 0 | Status: COMPLETED | OUTPATIENT
Start: 2024-07-03 | End: 2024-07-09

## 2024-07-03 RX ORDER — KETOROLAC TROMETHAMINE 10 MG
15 TABLET ORAL ONCE
Refills: 0 | Status: DISCONTINUED | OUTPATIENT
Start: 2024-07-03 | End: 2024-07-03

## 2024-07-03 RX ORDER — CEFAZOLIN SODIUM 10 G
VIAL (EA) INJECTION
Refills: 0 | Status: DISCONTINUED | OUTPATIENT
Start: 2024-07-03 | End: 2024-07-03

## 2024-07-03 RX ORDER — ACETAMINOPHEN 500 MG
1000 TABLET ORAL ONCE
Refills: 0 | Status: COMPLETED | OUTPATIENT
Start: 2024-07-03 | End: 2024-07-03

## 2024-07-03 RX ADMIN — OXYCODONE HYDROCHLORIDE 10 MILLIGRAM(S): 30 TABLET ORAL at 09:39

## 2024-07-03 RX ADMIN — Medication 400 MILLIGRAM(S): at 19:39

## 2024-07-03 RX ADMIN — Medication 15 MILLIGRAM(S): at 01:30

## 2024-07-03 RX ADMIN — LIDOCAINE 5% 1 PATCH: 5 CREAM TOPICAL at 11:01

## 2024-07-03 RX ADMIN — OXYCODONE HYDROCHLORIDE 10 MILLIGRAM(S): 30 TABLET ORAL at 19:58

## 2024-07-03 RX ADMIN — Medication 15 MILLIGRAM(S): at 11:01

## 2024-07-03 RX ADMIN — LIDOCAINE 5% 1 PATCH: 5 CREAM TOPICAL at 23:40

## 2024-07-03 RX ADMIN — OXYCODONE HYDROCHLORIDE 10 MILLIGRAM(S): 30 TABLET ORAL at 18:58

## 2024-07-03 RX ADMIN — LEVETIRACETAM 500 MILLIGRAM(S): 1000 TABLET, FILM COATED ORAL at 11:01

## 2024-07-03 RX ADMIN — OXYCODONE HYDROCHLORIDE 10 MILLIGRAM(S): 30 TABLET ORAL at 08:39

## 2024-07-03 RX ADMIN — LIDOCAINE 5% 1 PATCH: 5 CREAM TOPICAL at 18:25

## 2024-07-03 RX ADMIN — Medication 15 MILLIGRAM(S): at 01:10

## 2024-07-03 RX ADMIN — Medication 100 MILLIGRAM(S): at 22:05

## 2024-07-03 RX ADMIN — Medication 100 MILLIGRAM(S): at 06:47

## 2024-07-03 RX ADMIN — LEVETIRACETAM 500 MILLIGRAM(S): 1000 TABLET, FILM COATED ORAL at 22:05

## 2024-07-03 RX ADMIN — Medication 975 MILLIGRAM(S): at 23:05

## 2024-07-03 RX ADMIN — Medication 160 MILLIGRAM(S): at 11:01

## 2024-07-03 RX ADMIN — Medication 975 MILLIGRAM(S): at 22:05

## 2024-07-03 RX ADMIN — BUSPIRONE HYDROCHLORIDE 15 MILLIGRAM(S): 15 TABLET ORAL at 11:22

## 2024-07-03 RX ADMIN — Medication 100 MILLIGRAM(S): at 16:11

## 2024-07-03 NOTE — CONSULT NOTE ADULT - ASSESSMENT
Suggest:  -f/u bcxs   -send surveillance blood culture   -send culture from IR aspiration  -HIV screen, Hep C   -TTE   -check ESR   -continue cefazolin 2g IV Q8  59F with hx of IVDU (heroin/cocaine) on methadone, epilepsy who presents for sharp L leg pain progressively worsening over the past month found to have loculated fluid in L iliacus muscle, iliopsoas bursitis c/b MSSA bacteremia. Patient afebrile without leukocytosis. Fluid collection/bursitis likely 2/2 injecting IV drug into LLE. Given MSSA BSI- suspect MSSA as causative pathogen of iliopsoas collection. For IR drainage today.     Suggest:  -f/u bcxs   -send surveillance blood culture   -send culture from IR aspiration  -HIV screen, Hep C   -TTE to eval for IE   -check ESR   -ok to continue cefazolin 2g IV Q8     Team 2 will follow you.  Dr. Chaves will cover 7/4. Dr. He will assume care 7/5  Case d/w primary team.  Final recommendation pending attending note.    Lena Decker, Infectious Diseases PA  Please reach out for any questions 9 am-5pm. For evenings and weekends, please call the ID physician on call.  Work cell: [phone]

## 2024-07-03 NOTE — PROGRESS NOTE ADULT - PROBLEM SELECTOR PLAN 4
Plan: Pt states history of anemia  On admission Hg 10.2, Hct 31.0  - Continue to monitor H&H  - Maintain active type & screen  - Most recent Hb 9.0.

## 2024-07-03 NOTE — PROGRESS NOTE ADULT - PROBLEM SELECTOR PLAN 8
Plan: F: NS 1L  E: replete as needed  N: regular diet  DVT ppx: given 1 dose of lovenox on 7/2  Dispo: [facility].

## 2024-07-03 NOTE — PROGRESS NOTE ADULT - PROBLEM SELECTOR PLAN 1
Plan: Worsening left leg and thigh pain with radiation to groin  CT LE w IV contrast showed loculated fluid in L iliacus muscle, iliopsoas bursitis of infectious or inflamm. etiology  ortho consulted, recommended IV antibiotics and IR consult in AM, will continue to follow  Given Zosyn and Vanc in ED  - f/u blood cultures  - IR consulted for drainage; NPO after midnight  - c/w vancomycin 1250mg q12h (s/p 2nd dose)  - c/w zosyn 3.375g q8h to cover GNR (s/p 3rd dose)  - antibiotics held until after IR procedure. Plan: Worsening left leg and thigh pain with radiation to groin  CT LE w IV contrast showed loculated fluid in L iliacus muscle, iliopsoas bursitis of infectious or inflamm. etiology  ortho consulted, recommended IV antibiotics and IR consult in AM, will continue to follow  Given Zosyn and Vanc in ED  - f/u blood cultures  - IR consulted for drainage; NPO after midnight  - c/w vancomycin 1250mg q12h (s/p 2nd dose)  - c/w zosyn 3.375g q8h to cover GNR (s/p 3rd dose)  - antibiotics held until after IR procedure.  - ESR ordered  - cultures sent from IR aspiration  - send surveillance blood culture Plan: Worsening left leg and thigh pain with radiation to groin  CT LE w IV contrast showed loculated fluid in L iliacus muscle, iliopsoas bursitis of infectious or inflamm. etiology  ortho consulted, recommended IV antibiotics and IR consult in AM, will continue to follow  Given Zosyn and Vanc in ED  - blood cultures grew   - IR consulted for drainage; NPO after midnight  - c/w vancomycin 1250mg q12h (s/p 2nd dose)  - c/w zosyn 3.375g q8h to cover GNR (s/p 3rd dose)  - antibiotics held until after IR procedure.  - ESR ordered  - cultures sent from IR aspiration  - send surveillance blood culture Plan: Worsening left leg and thigh pain with radiation to groin  CT LE w IV contrast showed loculated fluid in L iliacus muscle, iliopsoas bursitis of infectious or inflammatory etiology  ortho consulted, recommended IV antibiotics and IR consult in AM, will continue to follow  Given Zosyn and Vanc in ED  - blood cultures grew   - continue with cefazolin 2g q8hs  - ESR ordered  - f/u cultures from IR aspiration  - surveillance blood culture sent

## 2024-07-03 NOTE — CONSULT NOTE ADULT - CONSULT REASON
L hip pain
Left leg abscess, MSSA bacteremia
request for left iliacus collection drainage
PM&R consult

## 2024-07-03 NOTE — PROGRESS NOTE ADULT - PROBLEM SELECTOR PLAN 7
Plan: Patient previously prescribed quetiapine 150mg qd, buspirone 15mg qd, mirtazaine 15mg qd. Unclear what she is currently taking.  - follow up on surescripts regarding her medications.

## 2024-07-03 NOTE — PROGRESS NOTE ADULT - ASSESSMENT
This is a 60 y/o F with past medical history of IV drug use on 160mg methadone, anemia, epilepsy who presented with sharp LLE pain that progressively worsened over the past month. CT of LLE in ED showed loculated fluid in L iliacus muscle, iliopsoas bursitis of infectious or inflammatory etiology. Patient will be undergoing IR drainage procedure today.     This is a 60 y/o F with past medical history of IV drug use on 160mg methadone, anemia, epilepsy who presented with sharp LLE pain that progressively worsened over the past month. CT of LLE in ED showed loculated fluid in L iliacus muscle, iliopsoas bursitis of infectious or inflammatory etiology. Patient underwent IR drainage procedure today and tolerated the procedure well.

## 2024-07-03 NOTE — CONSULT NOTE ADULT - NS ATTEND AMEND GEN_ALL_CORE FT
60yo shelter living F h/o IVDU (heroin/cocaine) on Methadone and epilepsy p/w L hip/leg pain x 1 month.  Patient started to have L hip/leg pain about 1 month PTA without any trauma. She had difficulty walking so due to pain so she went to ED at [facility] on 6/17, was told neg for DVT and X-ray neg.  She returned to [facility] on 6/17 and she was referred to vascular and was sent home. Patient returned to ED on 7/1 since L hip/leg pain progressively gotten worsen.  Denied fever/chills, n/v/d, abdominal pain, CP, SOB.  Upon arrival, she was afebrile, VSS, lab notable for WBC 8.84, Cr 1.28, CRP 99.5. CT LLE showed L loculated fluid in ileacus muscle, iliopsoas bursitis of infectious vs inflammatory etiology. Duplex neg.  She was put on vanc/Zosyn and was admitted to medicine.  Seen by ortho, who recommended IR drainage. BCx grew MSSA. Abx was switched to cefazolin. Today patient underwent IR drainage - 5cc purulent fluid was sent for culture. On exam, L lateral hip area ttp, but no edema/erythema.  TTE w/o vegetation.    Patient here with MSSA bacteremia due to L iliopsoas abscess and bursitis.  Cont cefazolin 2g IV q8h.  Obtain daily BCx (one set) until bacteremia clears >72h.  f/u Ortho regarding I&D plan.  Obtain TED.  f/u IR drainage culture.        Team 2 will follow you.  Dr. Chaves will cover 7/4, and Dr. He will resume care on Friday.  Case d/w primary team.    Mary Rodriguez MD, MS  Infectious Disease attending  office phone [phone]  For any questions during evening/weekend/holiday, please page ID on call

## 2024-07-03 NOTE — CONSULT NOTE ADULT - SUBJECTIVE AND OBJECTIVE BOX
INFECTIOUS DISEASES INITIAL CONSULT NOTE    HPI:  HPI: Pt is 60 y/o F pmhx significant for IV drug use, anemia, epilepsy who presents for sharp L leg pain progressively worsening over the past month. Pt states her pain began in the left thigh and later radiated to her toes but now has persisted in her entire left leg and groin. Pt reports this began about one month ago and required multiple ER visits. She states she went to [place] on 5/31 where nothing was done and she was discharged. Most recently last week she returned to [place] where she states she was given a 7 day course of oral antibiotics which she completed but did not improve her pain. She took Motrin which provided very minimal relief.  Pt last  injected heroin into her left thigh on Saturday 6/29. As per patient, she is chronically on methadone 160 mg 5 days a week since she was 19. She reports subjective fevers. Denies nausea, vomiting, chest pain, shortness of breath, constipation, diarrhea, pain on urination, hematuria, hematemesis.       In the ED:  Initial vital signs: T: 98.7 F, HR: 82, BP: 100/68, RR: 16, SpO2: 96% on RA  Labs: significant for AST 61, CRP 99.5, Hg 10.2, Hct 31.0  CT Lower Extremity with IV Contrast, Left: loculated fluid in L iliacus muscle, iliopsoas bursitis of infectious or inflammatory etiology  US Duplex Venous LE, Left: No evidence of L lower extremity DVT  Medications: Zosyn, Vancomycin, NS, Ibuprofen  Consults:         Ortho- recommended admission for IV antibiotics and IR consult, will continue to follow (01 Jul 2024 18:56)      PAST MEDICAL & SURGICAL HISTORY:  IVDU (intravenous drug user)      Anemia      Asthma      Epilepsy            Review of Systems:   Constitutional, eyes, ENT, cardiovascular, respiratory, gastrointestinal, genitourinary, integumentary, neurological, psychiatric and heme/lymph are otherwise negative other than noted above       ANTIBIOTICS:  MEDICATIONS  (STANDING):  acetaminophen     Tablet .. 975 milliGRAM(s) Oral every 8 hours  ceFAZolin   IVPB 2000 milliGRAM(s) IV Intermittent every 8 hours  levETIRAcetam 500 milliGRAM(s) Oral two times a day  lidocaine   4% Patch 1 Patch Transdermal daily  methadone    Tablet 160 milliGRAM(s) Oral daily  mirtazapine 15 milliGRAM(s) Oral daily    MEDICATIONS  (PRN):  naloxone Injectable 1 milliGRAM(s) IV Push every 3 minutes PRN in case of overdose  oxyCODONE    IR 5 milliGRAM(s) Oral every 6 hours PRN Moderate Pain (4 - 6)  oxyCODONE    IR 10 milliGRAM(s) Oral every 8 hours PRN Severe Pain (7 - 10)      Allergies    No Known Allergies    Intolerances        SOCIAL HISTORY:    FAMILY HISTORY:  Family history of AIDS (Mother, Sibling)    Family history of alcohol abuse (Father)     no FH leading to current infection    Vital Signs Last 24 Hrs  T(C): 37.5 (03 Jul 2024 08:38), Max: 37.5 (03 Jul 2024 08:38)  T(F): 99.5 (03 Jul 2024 08:38), Max: 99.5 (03 Jul 2024 08:38)  HR: 100 (03 Jul 2024 08:38) (92 - 100)  BP: 114/77 (03 Jul 2024 08:38) (103/62 - 114/77)  BP(mean): --  RR: 17 (03 Jul 2024 08:38) (17 - 18)  SpO2: 100% (03 Jul 2024 08:38) (95% - 100%)    Parameters below as of 03 Jul 2024 08:38  Patient On (Oxygen Delivery Method): room air        07-02-24 @ 07:01  -  07-03-24 @ 07:00  --------------------------------------------------------  IN: 100 mL / OUT: 0 mL / NET: 100 mL        PHYSICAL EXAM:  Constitutional: alert, NAD  Eyes: the sclera and conjunctiva were normal.   ENT: the ears and nose were normal in appearance.   Neck: the appearance of the neck was normal and the neck was supple.   Pulmonary: no respiratory distress and lungs were clear to auscultation bilaterally.   Heart: heart rate was normal and rhythm regular, normal S1 and S2  Vascular:. there was no peripheral edema  Abdomen: normal bowel sounds, soft, non-tender  Neurological: no focal deficits.   Psychiatric: the affect was normal      LABS:                        9.0    8.84  )-----------( 270      ( 03 Jul 2024 05:30 )             27.3     07-03    138  |  102  |  22  ----------------------------<  94  4.5   |  27  |  1.18    Ca    9.7      03 Jul 2024 05:30  Phos  4.6     07-03  Mg     1.8     07-03    TPro  7.9  /  Alb  3.0<L>  /  TBili  0.3  /  DBili  x   /  AST  30  /  ALT  18  /  AlkPhos  66  07-03    PT/INR - ( 02 Jul 2024 05:30 )   PT: 14.3 sec;   INR: 1.26          PTT - ( 02 Jul 2024 05:30 )  PTT:35.8 sec  Urinalysis Basic - ( 03 Jul 2024 05:30 )    Color: x / Appearance: x / SG: x / pH: x  Gluc: 94 mg/dL / Ketone: x  / Bili: x / Urobili: x   Blood: x / Protein: x / Nitrite: x   Leuk Esterase: x / RBC: x / WBC x   Sq Epi: x / Non Sq Epi: x / Bacteria: x        MICROBIOLOGY:    RADIOLOGY & ADDITIONAL STUDIES:   INFECTIOUS DISEASES INITIAL CONSULT NOTE    HPI:   59F with hx of IVDU (heroin/cocaine) on methadone, epilepsy who presents for sharp L leg pain progressively worsening over the past month. ID consulted for MSSA bacteremia.   Patient states L thigh pain radiating into hip and foot started about 1 month ago. She [facility] - most recently presented to [facility] the week before this admission. She was prescribed a 7d course of PO abx (pill was green and was taking TID  - ?Keflex) which she completed without improvement. She denies fevers (said she was told she had a fever at [facility] last week), chills, N/V/D PTA. She states she last injected heroin into her left thigh on Saturday 6/29. Upon arrival here, afebrile without leukocytosis. Labs notable for sCr 1.28 and CRP 99.5. She had CT LE with IVC which showed loculated fluid in L iliacus muscle, iliopsoas bursitis. She was started on IV vanc and zosyn. Ortho was consulted for L thigh collection and recommended IR drainage. Patient to have IR drainage today. Bcxs with MSSA (1/4 bottles). Vanc/zosyn discontinued and cefazolin started today. Today, patient states L thigh pain is worse than yesterday. Denies hardware/prosthetic material in body, denies other joint pains. Has had endocarditis in past (35 years ago) was treated with 6 weeks of IV antibiotics at [facility].       PAST MEDICAL & SURGICAL HISTORY:  IVDU (intravenous drug user)      Anemia      Asthma      Epilepsy      Review of Systems:   Constitutional, eyes, ENT, cardiovascular, respiratory, gastrointestinal, genitourinary, integumentary, neurological, psychiatric and heme/lymph are otherwise negative other than noted above       ANTIBIOTICS:  MEDICATIONS  (STANDING):  acetaminophen     Tablet .. 975 milliGRAM(s) Oral every 8 hours  ceFAZolin   IVPB 2000 milliGRAM(s) IV Intermittent every 8 hours  levETIRAcetam 500 milliGRAM(s) Oral two times a day  lidocaine   4% Patch 1 Patch Transdermal daily  methadone    Tablet 160 milliGRAM(s) Oral daily  mirtazapine 15 milliGRAM(s) Oral daily    MEDICATIONS  (PRN):  naloxone Injectable 1 milliGRAM(s) IV Push every 3 minutes PRN in case of overdose  oxyCODONE    IR 5 milliGRAM(s) Oral every 6 hours PRN Moderate Pain (4 - 6)  oxyCODONE    IR 10 milliGRAM(s) Oral every 8 hours PRN Severe Pain (7 - 10)      Allergies    No Known Allergies    Intolerances        SOCIAL HISTORY:  -lives in shelter but she is moving into her own apartment next week in [place]   -born in [place]   -denies pets   -denies travel   -denies tobacco, etoh  -IVDU (cocaine and heroin)       FAMILY HISTORY:  Family history of AIDS (Mother, Sibling)    Family history of alcohol abuse (Father)     no FH leading to current infection    Vital Signs Last 24 Hrs  T(C): 37.5 (03 Jul 2024 08:38), Max: 37.5 (03 Jul 2024 08:38)  T(F): 99.5 (03 Jul 2024 08:38), Max: 99.5 (03 Jul 2024 08:38)  HR: 100 (03 Jul 2024 08:38) (92 - 100)  BP: 114/77 (03 Jul 2024 08:38) (103/62 - 114/77)  BP(mean): --  RR: 17 (03 Jul 2024 08:38) (17 - 18)  SpO2: 100% (03 Jul 2024 08:38) (95% - 100%)    Parameters below as of 03 Jul 2024 08:38  Patient On (Oxygen Delivery Method): room air        07-02-24 @ 07:01  -  07-03-24 @ 07:00  --------------------------------------------------------  IN: 100 mL / OUT: 0 mL / NET: 100 mL      PHYSICAL EXAM:  Constitutional: alert, NAD  Eyes: the sclera and conjunctiva were normal.   ENT: the ears and nose were normal in appearance.   Neck: the appearance of the neck was normal and the neck was supple.   Pulmonary: no respiratory distress and lungs were clear to auscultation bilaterally.   Heart: heart rate was normal and rhythm regular, normal S1 and S2  Vascular:. there was no peripheral edema  Abdomen: normal bowel sounds, soft, non-tender  Extremities : LLE thigh warmth and tenderness extending towards L hip. Palpated c/t/l spine, b/l shoulders/elbows/wrists/knees/ankles - no tenderness.   Neurological: no focal deficits.   Psychiatric: the affect was normal      LABS:                        9.0    8.84  )-----------( 270      ( 03 Jul 2024 05:30 )             27.3     07-03    138  |  102  |  22  ----------------------------<  94  4.5   |  27  |  1.18    Ca    9.7      03 Jul 2024 05:30  Phos  4.6     07-03  Mg     1.8     07-03    TPro  7.9  /  Alb  3.0<L>  /  TBili  0.3  /  DBili  x   /  AST  30  /  ALT  18  /  AlkPhos  66  07-03    PT/INR - ( 02 Jul 2024 05:30 )   PT: 14.3 sec;   INR: 1.26          PTT - ( 02 Jul 2024 05:30 )  PTT:35.8 sec  Urinalysis Basic - ( 03 Jul 2024 05:30 )    Color: x / Appearance: x / SG: x / pH: x  Gluc: 94 mg/dL / Ketone: x  / Bili: x / Urobili: x   Blood: x / Protein: x / Nitrite: x   Leuk Esterase: x / RBC: x / WBC x   Sq Epi: x / Non Sq Epi: x / Bacteria: x      MICROBIOLOGY:    Culture - Blood (collected 07-01-24 @ 12:58)  Source: .Blood Blood  Gram Stain (07-02-24 @ 19:09):    Growth in anaerobic bottle: Gram Positive Cocci in Clusters  Preliminary Report (07-02-24 @ 19:09):    Growth in anaerobic bottle: Gram Positive Cocci in Clusters    Direct identification is available within approximately 3-5    hours either by Blood Panel Multiplexed PCR or Direct    MALDI-TOF. Details: [email or web address]  Organism: Blood Culture PCR (07-02-24 @ 21:34)  Organism: Blood Culture PCR (07-02-24 @ 21:34)      Method Type: PCR      -  Methicillin SENSITIVE Staphylococcus aureus (MSSA): Detec Any isolate of Staphylococcus aureus from a blood culture is NOT considered a contaminant.    Culture - Blood (collected 07-01-24 @ 12:58)  Source: .Blood Blood  Preliminary Report (07-02-24 @ 22:02):    No growth at 24 hours        RADIOLOGY & ADDITIONAL STUDIES:  reviewed

## 2024-07-03 NOTE — PROGRESS NOTE ADULT - ATTENDING COMMENTS
Patient was seen and examined at bedside on 7/3/2024 at 130 pm. Patient feels well; has no acute complaints. Denies SOB, CP, abdominal pain. ROS is otherwise negative. Vitals, labwork and pertinent imaging reviewed. Physical exam - NAD, AAO x 4, PERRLA, EOMI, supple neck, chest - CTA b/l, CV - irregular, no m/r/g, abd - soft, + BS, ext - wwp, psych - normal affect, skin - no rash    Plan  -PT/OT rec MARY  -Pain control  -Pt is now bacteremic w/ MSSA, Cefazolin started  -Patient planned for IR aspiration today; will also discuss new found bacteremia with Orthopedics for possible need of washout?  -Echo  -ID consult Patient was seen and examined at bedside on 7/3/2024 at 135 pm. Patient feels well; has no acute complaints. Denies SOB, CP, abdominal pain. ROS is otherwise negative. Vitals, labwork and pertinent imaging reviewed. Physical exam - NAD, AAO x 4, PERRLA, EOMI, supple neck, chest - CTA b/l, CV - irregular, no m/r/g, abd - soft, + BS, ext - wwp, psych - normal affect, skin - no rash    Plan  -PT/OT rec MARY  -Pain control  -Pt is now bacteremic w/ MSSA, Cefazolin started  -Patient planned for IR aspiration today; will also discuss new found bacteremia with Orthopedics for possible need of washout?  -Echo  -ID consult

## 2024-07-03 NOTE — PHARMACOTHERAPY INTERVENTION NOTE - COMMENTS
Recommended to consult infectious diseases since the 7/1 blood culture grew MSSA.      Maribel More, PharmD   Clinical Pharmacy Specialist, Infectious Diseases  Tele-Antimicrobial Stewardship Program (Tele-ASP)  Tele-ASP Phone: [phone]  
Recommended to initiate cefazolin 2g q8h for MSSA bacteremia. CrCl 60.      Dottie VázquezD   Clinical Pharmacy Specialist, Infectious Diseases  Tele-Antimicrobial Stewardship Program (Tele-ASP)  Tele-ASP Phone: [phone]

## 2024-07-03 NOTE — PROGRESS NOTE ADULT - PROBLEM SELECTOR PLAN 5
Plan: As per patient has history of epilepsy  States she takes Keppra but unsure of dose, follows with Neurologist at [facility]  Per [facility], previously prescribed Keppra 1000mg bid.   - order Keppra 500mg bid x 2 doses for now, confirm doses.

## 2024-07-03 NOTE — PROGRESS NOTE ADULT - PROBLEM SELECTOR PLAN 2
Plan: Worsening left leg and thigh pain with radiation to groin  CT LE w IV contrast showed loculated fluid in L iliacus muscle, iliopsoas bursitis of infectious or inflamm. etiology  Ortho consulted, recommended IV antibiotics and IR consult, will continue to follow  Given Zosyn and Vanc in ED  s/p Toradol 15 mg IV push given for pain control  - duplex of LLE showed no evidence of DVT  - pain regimen: standing 975 tylenol q8, lidocaine patch, oxy prn 5mg q4. Plan: Worsening left leg and thigh pain with radiation to groin  CT LE w IV contrast showed loculated fluid in L iliacus muscle, iliopsoas bursitis of infectious or inflamm. etiology  Ortho consulted, recommended IV antibiotics and IR consult, will continue to follow  Given Zosyn and Vanc in ED  s/p Toradol 15 mg IV push given for pain control  - duplex of LLE showed no evidence of DVT  - pain regimen: standing 975 tylenol q8, lidocaine patch, oxy prn 5mg q4.  - IR procedure completed Plan: Worsening left leg and thigh pain with radiation to groin  CT LE w IV contrast showed loculated fluid in L iliacus muscle, iliopsoas bursitis of infectious or inflamm. etiology  Ortho consulted, recommended IV antibiotics and IR consult, will continue to follow  Given Zosyn and Vanc in ED  s/p Toradol 15 mg IV push given for pain control  - duplex of LLE showed no evidence of DVT  - pain regimen: standing 975 tylenol q8, lidocaine patch, oxy prn 5mg q4.  - IR procedure completed, pt tolerated procedure well

## 2024-07-03 NOTE — PROGRESS NOTE ADULT - PROBLEM SELECTOR PLAN 6
Plan: Per patient's shelter, she was on insulin at some point, unclear of when she was taking it or history of diabetes.  - HbA1c ordered, follow up  - blood glucose 89 today.

## 2024-07-03 NOTE — PROGRESS NOTE ADULT - SUBJECTIVE AND OBJECTIVE BOX
OVERNIGHT EVENTS:    SUBJECTIVE / INTERVAL HPI: Patient seen and examined at bedside.     VITAL SIGNS:  Vital Signs Last 24 Hrs  T(C): 37.1 (02 Jul 2024 21:15), Max: 37.2 (02 Jul 2024 15:26)  T(F): 98.8 (02 Jul 2024 21:15), Max: 98.9 (02 Jul 2024 15:26)  HR: 92 (03 Jul 2024 05:47) (85 - 100)  BP: 103/67 (03 Jul 2024 05:47) (103/62 - 108/74)  BP(mean): --  RR: 17 (03 Jul 2024 05:47) (16 - 18)  SpO2: 95% (03 Jul 2024 05:47) (95% - 98%)    Parameters below as of 03 Jul 2024 05:47  Patient On (Oxygen Delivery Method): room air      02 Jul 2024 07:01  -  03 Jul 2024 06:01  --------------------------------------------------------  IN: 100 mL / OUT: 0 mL / NET: 100 mL        PHYSICAL EXAM:  General: WDWN  HEENT: NC/AT; PERRL, anicteric sclera; MMM  Neck: supple  Cardiovascular: +S1/S2; RRR  Respiratory: CTA B/L; no W/R/R  Gastrointestinal: soft, NT/ND; +BSx4  Extremities: WWP; no edema, clubbing or cyanosis  Vascular: 2+ radial, DP/PT pulses B/L  Neurological: AAOx3; no focal deficits    MEDICATIONS:  MEDICATIONS  (STANDING):  acetaminophen     Tablet .. 975 milliGRAM(s) Oral every 8 hours  busPIRone 15 milliGRAM(s) Oral once  ceFAZolin   IVPB 2000 milliGRAM(s) IV Intermittent every 8 hours  lidocaine   4% Patch 1 Patch Transdermal daily  methadone    Tablet 160 milliGRAM(s) Oral daily  mirtazapine 15 milliGRAM(s) Oral daily    MEDICATIONS  (PRN):  naloxone Injectable 1 milliGRAM(s) IV Push every 3 minutes PRN in case of overdose  oxyCODONE    IR 5 milliGRAM(s) Oral every 6 hours PRN Moderate Pain (4 - 6)  oxyCODONE    IR 10 milliGRAM(s) Oral every 8 hours PRN Severe Pain (7 - 10)      ALLERGIES:  Allergies        LABS:                        9.0    8.32  )-----------( 309      ( 02 Jul 2024 05:30 )             28.2     07-02    139  |  105  |  25<H>  ----------------------------<  89  4.4   |  26  |  1.31<H>    Ca    9.2      02 Jul 2024 05:30  Phos  3.9     07-02  Mg     1.7     07-02    TPro  8.8<H>  /  Alb  3.3  /  TBili  0.3  /  DBili  x   /  AST  61<H>  /  ALT  27  /  AlkPhos  76  07-01    PT/INR - ( 02 Jul 2024 05:30 )   PT: 14.3 sec;   INR: 1.26          PTT - ( 02 Jul 2024 05:30 )  PTT:35.8 sec  Urinalysis Basic - ( 02 Jul 2024 05:30 )    Color: x / Appearance: x / SG: x / pH: x  Gluc: 89 mg/dL / Ketone: x  / Bili: x / Urobili: x   Blood: x / Protein: x / Nitrite: x   Leuk Esterase: x / RBC: x / WBC x   Sq Epi: x / Non Sq Epi: x / Bacteria: x      CAPILLARY BLOOD GLUCOSE          RADIOLOGY & ADDITIONAL TESTS: Reviewed.   OVERNIGHT EVENTS: No overnight events.    SUBJECTIVE / INTERVAL HPI: Patient seen and examined at bedside. She had her IR procedure done this morning and tolerated it well with no issues. Immediately after the procedure, she took multiple walks in the hallway with her walker. She denies pain, warmth, redness, swelling at the incision site. Her pain is well controlled with the current regimen. Denies fever, chills, nausea, vomiting, shortness of breath, chest pain.    VITAL SIGNS:  Vital Signs Last 24 Hrs  T(C): 37.1 (02 Jul 2024 21:15), Max: 37.2 (02 Jul 2024 15:26)  T(F): 98.8 (02 Jul 2024 21:15), Max: 98.9 (02 Jul 2024 15:26)  HR: 92 (03 Jul 2024 05:47) (85 - 100)  BP: 103/67 (03 Jul 2024 05:47) (103/62 - 108/74)  BP(mean): --  RR: 17 (03 Jul 2024 05:47) (16 - 18)  SpO2: 95% (03 Jul 2024 05:47) (95% - 98%)    Parameters below as of 03 Jul 2024 05:47  Patient On (Oxygen Delivery Method): room air      02 Jul 2024 07:01  -  03 Jul 2024 06:01  --------------------------------------------------------  IN: 100 mL / OUT: 0 mL / NET: 100 mL      PHYSICAL EXAM;  General: well developed.  HEENT: adentulous, NC/AT; PERRL, anicteric sclera; dry mucous membranes  Cardiovascular: +S1/S2; RRR  Respiratory: CTA B/L; no W/R/R  Gastrointestinal: soft, NT/ND; +BSx4  Extremities: mild TTP of LLE, greatest in upper thigh and groin but decreased from yesterday, LLE edema, full ROM of all extremities, track marks in bilateral lower extremities.   Vascular: 2+ radial, DP/PT pulses B/L  Neurological: AAOx3; no focal deficits      MEDICATIONS:  MEDICATIONS  (STANDING):  acetaminophen     Tablet .. 975 milliGRAM(s) Oral every 8 hours  busPIRone 15 milliGRAM(s) Oral once  ceFAZolin   IVPB 2000 milliGRAM(s) IV Intermittent every 8 hours  lidocaine   4% Patch 1 Patch Transdermal daily  methadone    Tablet 160 milliGRAM(s) Oral daily  mirtazapine 15 milliGRAM(s) Oral daily    MEDICATIONS  (PRN):  naloxone Injectable 1 milliGRAM(s) IV Push every 3 minutes PRN in case of overdose  oxyCODONE    IR 5 milliGRAM(s) Oral every 6 hours PRN Moderate Pain (4 - 6)  oxyCODONE    IR 10 milliGRAM(s) Oral every 8 hours PRN Severe Pain (7 - 10)      ALLERGIES:  Allergies        LABS:                        9.0    8.32  )-----------( 309      ( 02 Jul 2024 05:30 )             28.2     07-02    139  |  105  |  25<H>  ----------------------------<  89  4.4   |  26  |  1.31<H>    Ca    9.2      02 Jul 2024 05:30  Phos  3.9     07-02  Mg     1.7     07-02    TPro  8.8<H>  /  Alb  3.3  /  TBili  0.3  /  DBili  x   /  AST  61<H>  /  ALT  27  /  AlkPhos  76  07-01    PT/INR - ( 02 Jul 2024 05:30 )   PT: 14.3 sec;   INR: 1.26          PTT - ( 02 Jul 2024 05:30 )  PTT:35.8 sec  Urinalysis Basic - ( 02 Jul 2024 05:30 )    Color: x / Appearance: x / SG: x / pH: x  Gluc: 89 mg/dL / Ketone: x  / Bili: x / Urobili: x   Blood: x / Protein: x / Nitrite: x   Leuk Esterase: x / RBC: x / WBC x   Sq Epi: x / Non Sq Epi: x / Bacteria: x      CAPILLARY BLOOD GLUCOSE          RADIOLOGY & ADDITIONAL TESTS: Reviewed.   OVERNIGHT EVENTS: No overnight events.    SUBJECTIVE / INTERVAL HPI: Patient seen and examined at bedside. She had her IR procedure done this morning and tolerated it well with no issues. Immediately after the procedure, she took multiple walks in the hallway with her walker. She denies pain, warmth, redness, swelling at the incision site. Her pain is well controlled with the current regimen. Denies fever, chills, nausea, vomiting, shortness of breath, chest pain.    VITAL SIGNS:  Vital Signs Last 24 Hrs  T(C): 37.1 (02 Jul 2024 21:15), Max: 37.2 (02 Jul 2024 15:26)  T(F): 98.8 (02 Jul 2024 21:15), Max: 98.9 (02 Jul 2024 15:26)  HR: 92 (03 Jul 2024 05:47) (85 - 100)  BP: 103/67 (03 Jul 2024 05:47) (103/62 - 108/74)  BP(mean): --  RR: 17 (03 Jul 2024 05:47) (16 - 18)  SpO2: 95% (03 Jul 2024 05:47) (95% - 98%)    Parameters below as of 03 Jul 2024 05:47  Patient On (Oxygen Delivery Method): room air      02 Jul 2024 07:01  -  03 Jul 2024 06:01  --------------------------------------------------------  IN: 100 mL / OUT: 0 mL / NET: 100 mL      PHYSICAL EXAM;  General: fatigued, inadequate nutrition.  HEENT: adentulous, NC/AT; PERRL, anicteric sclera; dry mucous membranes  Cardiovascular: +S1/S2; RRR  Respiratory: CTA B/L; no W/R/R  Gastrointestinal: soft, NT/ND; +BSx4  Extremities: mild TTP of LLE, greatest in upper thigh and groin but decreased from yesterday, LLE edema, full ROM of all extremities, track marks in bilateral lower extremities.   Vascular: 2+ radial, DP/PT pulses B/L  Neurological: AAOx3; no focal deficits      MEDICATIONS:  MEDICATIONS  (STANDING):  acetaminophen     Tablet .. 975 milliGRAM(s) Oral every 8 hours  busPIRone 15 milliGRAM(s) Oral once  ceFAZolin   IVPB 2000 milliGRAM(s) IV Intermittent every 8 hours  lidocaine   4% Patch 1 Patch Transdermal daily  methadone    Tablet 160 milliGRAM(s) Oral daily  mirtazapine 15 milliGRAM(s) Oral daily    MEDICATIONS  (PRN):  naloxone Injectable 1 milliGRAM(s) IV Push every 3 minutes PRN in case of overdose  oxyCODONE    IR 5 milliGRAM(s) Oral every 6 hours PRN Moderate Pain (4 - 6)  oxyCODONE    IR 10 milliGRAM(s) Oral every 8 hours PRN Severe Pain (7 - 10)      ALLERGIES:  Allergies        LABS:                        9.0    8.32  )-----------( 309      ( 02 Jul 2024 05:30 )             28.2     07-02    139  |  105  |  25<H>  ----------------------------<  89  4.4   |  26  |  1.31<H>    Ca    9.2      02 Jul 2024 05:30  Phos  3.9     07-02  Mg     1.7     07-02    TPro  8.8<H>  /  Alb  3.3  /  TBili  0.3  /  DBili  x   /  AST  61<H>  /  ALT  27  /  AlkPhos  76  07-01    PT/INR - ( 02 Jul 2024 05:30 )   PT: 14.3 sec;   INR: 1.26          PTT - ( 02 Jul 2024 05:30 )  PTT:35.8 sec  Urinalysis Basic - ( 02 Jul 2024 05:30 )    Color: x / Appearance: x / SG: x / pH: x  Gluc: 89 mg/dL / Ketone: x  / Bili: x / Urobili: x   Blood: x / Protein: x / Nitrite: x   Leuk Esterase: x / RBC: x / WBC x   Sq Epi: x / Non Sq Epi: x / Bacteria: x      CAPILLARY BLOOD GLUCOSE          RADIOLOGY & ADDITIONAL TESTS: Reviewed.

## 2024-07-03 NOTE — PROGRESS NOTE ADULT - PROBLEM SELECTOR PLAN 3
Progress Note - OB/GYN  Richa Hernández 34 y o  female MRN: [id number]  Unit/Bed#: L&D 308-01 Encounter: [id number]    Assessment and Plan     Zee Torres is a patient of: [facility]  She is PPD# 1 s/p  spontaneous vaginal delivery  Recovering well and is stable  Forehead laceration  Assessment & Plan  Sustained in fall  Appears to require sutures  Gen surg consulted  Gestational diabetes mellitus (GDM) in third trimester  Assessment & Plan  Lab Results   Component Value Date    HGBA1C 4 9 2022       No results for input(s): POCGLU in the last 72 hours  Blood Sugar Average: Last 72 hrs:      Chronic hypertension affecting pregnancy  Assessment & Plan  Systolic (33LZI), NEK:346 , Min:105 , XYO:566      Diastolic (63PFX), ZPK:03, Min:58, Max:72      PreE Labs wnl   P:C ratio: 1 16        *  (spontaneous vaginal delivery)  Assessment & Plan  Recovering well   Encourage Ambulation  Encourage breastfeeding  GBS negative   Rh +      37 weeks gestation of pregnancy-resolved as of 2022  Assessment & Plan  Admit to OBN   Clear liquid diet   F/u T&S, CBC, RPR   IVF NS 125cc/hr   Continuous fetal monitoring and tocometry   Analgesia at maternal request   Vertex by TAUS  Induction plan: scott balloon, cytotec, pitocin          Disposition    - Anticipate discharge home on PPD# 1 vs 2      Subjective/Objective     Chief Complaint: Postpartum State     Subjective:    Richa Hernándze is PPD#1 s/p  spontaneous vaginal delivery  She has no current complaints  Pain is well controlled  Patient is currently voiding  She is ambulating  Patient is currently passing flatus and has had no bowel movement  She is tolerating PO, and denies nausea or vomitting  Patient denies fever, chills, chest pain, shortness of breath, or calf tenderness  Lochia is normal  She is  Breastfeeding  She is recovering well and is stable   She desires discharge today if she and baby are both cleared but understands that due to her dx of PreE she may require additional monitoring  Vitals:   /72 (BP Location: Left arm)   Pulse 89   Temp 97 6 °F (36 4 °C) (Oral)   Resp 18   Ht 5' 7" (1 702 m)   Wt 126 kg (277 lb)   LMP 01/05/2022   SpO2 99%   Breastfeeding Yes   BMI 43 38 kg/m²       Intake/Output Summary (Last 24 hours) at 9/29/2022 0654  Last data filed at 9/28/2022 2101  Gross per 24 hour   Intake --   Output 3125 ml   Net -3125 ml       Invasive Devices  Timeline    Peripheral Intravenous Line  Duration           Peripheral IV 09/27/22 Dorsal (posterior); Right Hand 2 days                Physical Exam:   GEN: Rima Melchor appears well, alert and oriented x 3, pleasant and cooperative   CARDIO: RRR, no murmurs or rubs  RESP:  CTAB, no wheezes or rales  ABDOMEN: soft, no tenderness, no distention, fundus @ U-3  EXTREMITIES: non tender, no erythema  Labs:     Hemoglobin   Date Value Ref Range Status   09/28/2022 10 1 (L) 11 5 - 15 4 g/dL Final   09/28/2022 11 6 11 5 - 15 4 g/dL Final     WBC   Date Value Ref Range Status   09/28/2022 18 67 (H) 4 31 - 10 16 Thousand/uL Final   09/28/2022 21 02 (H) 4 31 - 10 16 Thousand/uL Final     Platelets   Date Value Ref Range Status   09/28/2022 274 149 - 390 Thousands/uL Final   09/28/2022 290 149 - 390 Thousands/uL Final     Creatinine   Date Value Ref Range Status   09/28/2022 0 68 0 60 - 1 30 mg/dL Final     Comment:     Standardized to IDMS reference method   09/28/2022 0 84 0 60 - 1 30 mg/dL Final     Comment:     Standardized to IDMS reference method     AST   Date Value Ref Range Status   09/28/2022 26 5 - 45 U/L Final     Comment:     Specimen collection should occur prior to Sulfasalazine administration due to the potential for falsely depressed results  09/28/2022 31 5 - 45 U/L Final     Comment:     Specimen collection should occur prior to Sulfasalazine administration due to the potential for falsely depressed results        ALT Date Value Ref Range Status   09/28/2022 50 12 - 78 U/L Final     Comment:     Specimen collection should occur prior to Sulfasalazine administration due to the potential for falsely depressed results  09/28/2022 58 12 - 78 U/L Final     Comment:     Specimen collection should occur prior to Sulfasalazine administration due to the potential for falsely depressed results             Breonna Campa  9/29/2022  6:54 AM Plan: Hx of IV drug use   Pt states she is chronically on methadone 160 mg 5 days a week since age 19  Last heroin injection in L leg on Saturday 6/29  - confirmed dose with methadone clinic: 160mg, 5x/week  - spoke with Esther Baker at methadone clinic: [phone]  - last dose given on 6/29 for 6/30  - monitor COWs. Plan: Hx of IV drug use   Pt states she is chronically on methadone 160 mg 5 days a week since age 19  Last heroin injection in L leg on Saturday 6/29  - confirmed dose with methadone clinic: 160mg, 5x/week  - spoke with Esther Baker at methadone clinic: [phone]  - last dose given on 6/29 for 6/30  - monitor COWs.  - HIV screen  - hepatitis panel ordered  - ID recommended TTE   - continue cefazolin 2g IV q8 per ID recs Plan: Hx of IV drug use   Pt states she is chronically on methadone 160 mg 5 days a week since age 19  Last heroin injection in L leg on Saturday 6/29  - confirmed dose with methadone clinic: 160mg, 5x/week  - spoke with Esther Baker at methadone clinic: [phone]  - last dose given on 6/29 for 6/30  - monitor COWs.  - HIV testing ordered, f/u  - hepatitis panel ordered  - ID recommended TTE   - continue cefazolin 2g IV q8 per ID recs Plan: Hx of IV drug use   Pt states she is chronically on methadone 160 mg 5 days a week since age 19  Last heroin injection in L leg on Saturday 6/29  - confirmed dose with methadone clinic: 160mg, 5x/week  - spoke with Esther Baker at methadone clinic: [phone]  - last dose given on 6/29 for 6/30  - monitor COWs.  - HIV testing ordered, f/u  - hepatitis panel ordered  - TTE ordered  - continue cefazolin 2g IV q8 per ID recs

## 2024-07-04 DIAGNOSIS — R78.81 BACTEREMIA: ICD-10-CM

## 2024-07-04 LAB
-  CLINDAMYCIN: SIGNIFICANT CHANGE UP
-  ERYTHROMYCIN: SIGNIFICANT CHANGE UP
-  GENTAMICIN: SIGNIFICANT CHANGE UP
-  OXACILLIN: SIGNIFICANT CHANGE UP
-  PENICILLIN: SIGNIFICANT CHANGE UP
-  RIFAMPIN: SIGNIFICANT CHANGE UP
-  TETRACYCLINE: SIGNIFICANT CHANGE UP
-  TRIMETHOPRIM/SULFAMETHOXAZOLE: SIGNIFICANT CHANGE UP
-  VANCOMYCIN: SIGNIFICANT CHANGE UP
ADD ON TEST-SPECIMEN IN LAB: SIGNIFICANT CHANGE UP
ALBUMIN SERPL ELPH-MCNC: 2.6 G/DL — LOW (ref 3.3–5)
ALP SERPL-CCNC: 66 U/L — SIGNIFICANT CHANGE UP (ref 40–120)
ALT FLD-CCNC: 16 U/L — SIGNIFICANT CHANGE UP (ref 10–45)
ANION GAP SERPL CALC-SCNC: 7 MMOL/L — SIGNIFICANT CHANGE UP (ref 5–17)
AST SERPL-CCNC: 29 U/L — SIGNIFICANT CHANGE UP (ref 10–40)
BASOPHILS # BLD AUTO: 0.02 K/UL — SIGNIFICANT CHANGE UP (ref 0–0.2)
BASOPHILS NFR BLD AUTO: 0.3 % — SIGNIFICANT CHANGE UP (ref 0–2)
BILIRUB SERPL-MCNC: 0.2 MG/DL — SIGNIFICANT CHANGE UP (ref 0.2–1.2)
BLD GP AB SCN SERPL QL: NEGATIVE — SIGNIFICANT CHANGE UP
BUN SERPL-MCNC: 21 MG/DL — SIGNIFICANT CHANGE UP (ref 7–23)
CALCIUM SERPL-MCNC: 9.3 MG/DL — SIGNIFICANT CHANGE UP (ref 8.4–10.5)
CHLORIDE SERPL-SCNC: 101 MMOL/L — SIGNIFICANT CHANGE UP (ref 96–108)
CO2 SERPL-SCNC: 28 MMOL/L — SIGNIFICANT CHANGE UP (ref 22–31)
CREAT SERPL-MCNC: 1.03 MG/DL — SIGNIFICANT CHANGE UP (ref 0.5–1.3)
CRP SERPL-MCNC: 230.3 MG/L — HIGH (ref 0–4)
CULTURE RESULTS: ABNORMAL
EGFR: 63 ML/MIN/1.73M2 — SIGNIFICANT CHANGE UP
EOSINOPHIL # BLD AUTO: 0.06 K/UL — SIGNIFICANT CHANGE UP (ref 0–0.5)
EOSINOPHIL NFR BLD AUTO: 0.8 % — SIGNIFICANT CHANGE UP (ref 0–6)
ERYTHROCYTE [SEDIMENTATION RATE] IN BLOOD: 119 MM/HR — HIGH
GLUCOSE SERPL-MCNC: 110 MG/DL — HIGH (ref 70–99)
GRAM STN FLD: ABNORMAL
GRAM STN FLD: SIGNIFICANT CHANGE UP
HAV IGM SER-ACNC: SIGNIFICANT CHANGE UP
HBV CORE IGM SER-ACNC: SIGNIFICANT CHANGE UP
HBV SURFACE AG SER-ACNC: SIGNIFICANT CHANGE UP
HCT VFR BLD CALC: 23.6 % — LOW (ref 34.5–45)
HCV AB S/CO SERPL IA: 20.59 S/CO — HIGH
HCV AB SERPL-IMP: REACTIVE
HGB BLD-MCNC: 7.6 G/DL — LOW (ref 11.5–15.5)
HIV 1+2 AB+HIV1 P24 AG SERPL QL IA: SIGNIFICANT CHANGE UP
IMM GRANULOCYTES NFR BLD AUTO: 0.5 % — SIGNIFICANT CHANGE UP (ref 0–0.9)
LYMPHOCYTES # BLD AUTO: 0.97 K/UL — LOW (ref 1–3.3)
LYMPHOCYTES # BLD AUTO: 12.3 % — LOW (ref 13–44)
MAGNESIUM SERPL-MCNC: 1.9 MG/DL — SIGNIFICANT CHANGE UP (ref 1.6–2.6)
MCHC RBC-ENTMCNC: 27.4 PG — SIGNIFICANT CHANGE UP (ref 27–34)
MCHC RBC-ENTMCNC: 32.2 GM/DL — SIGNIFICANT CHANGE UP (ref 32–36)
MCV RBC AUTO: 85.2 FL — SIGNIFICANT CHANGE UP (ref 80–100)
METHOD TYPE: SIGNIFICANT CHANGE UP
MONOCYTES # BLD AUTO: 0.82 K/UL — SIGNIFICANT CHANGE UP (ref 0–0.9)
MONOCYTES NFR BLD AUTO: 10.4 % — SIGNIFICANT CHANGE UP (ref 2–14)
NEUTROPHILS # BLD AUTO: 5.96 K/UL — SIGNIFICANT CHANGE UP (ref 1.8–7.4)
NEUTROPHILS NFR BLD AUTO: 75.7 % — SIGNIFICANT CHANGE UP (ref 43–77)
NRBC # BLD: 0 /100 WBCS — SIGNIFICANT CHANGE UP (ref 0–0)
ORGANISM # SPEC MICROSCOPIC CNT: ABNORMAL
ORGANISM # SPEC MICROSCOPIC CNT: ABNORMAL
ORGANISM # SPEC MICROSCOPIC CNT: SIGNIFICANT CHANGE UP
PHOSPHATE SERPL-MCNC: 4 MG/DL — SIGNIFICANT CHANGE UP (ref 2.5–4.5)
PLATELET # BLD AUTO: 274 K/UL — SIGNIFICANT CHANGE UP (ref 150–400)
POTASSIUM SERPL-MCNC: 4.2 MMOL/L — SIGNIFICANT CHANGE UP (ref 3.5–5.3)
POTASSIUM SERPL-SCNC: 4.2 MMOL/L — SIGNIFICANT CHANGE UP (ref 3.5–5.3)
PROT SERPL-MCNC: 7.4 G/DL — SIGNIFICANT CHANGE UP (ref 6–8.3)
RBC # BLD: 2.77 M/UL — LOW (ref 3.8–5.2)
RBC # FLD: 14.5 % — SIGNIFICANT CHANGE UP (ref 10.3–14.5)
RH IG SCN BLD-IMP: POSITIVE — SIGNIFICANT CHANGE UP
SODIUM SERPL-SCNC: 136 MMOL/L — SIGNIFICANT CHANGE UP (ref 135–145)
SPECIMEN SOURCE: SIGNIFICANT CHANGE UP
SPECIMEN SOURCE: SIGNIFICANT CHANGE UP
VANCOMYCIN TROUGH SERPL-MCNC: <4 UG/ML — LOW (ref 10–20)
WBC # BLD: 7.87 K/UL — SIGNIFICANT CHANGE UP (ref 3.8–10.5)
WBC # FLD AUTO: 7.87 K/UL — SIGNIFICANT CHANGE UP (ref 3.8–10.5)

## 2024-07-04 PROCEDURE — 99232 SBSQ HOSP IP/OBS MODERATE 35: CPT

## 2024-07-04 PROCEDURE — 99233 SBSQ HOSP IP/OBS HIGH 50: CPT | Mod: GC

## 2024-07-04 RX ORDER — HEPARIN SODIUM 1000 [USP'U]/ML
5000 INJECTION, SOLUTION INTRAVENOUS; SUBCUTANEOUS EVERY 8 HOURS
Refills: 0 | Status: DISCONTINUED | OUTPATIENT
Start: 2024-07-04 | End: 2024-07-07

## 2024-07-04 RX ORDER — SENNOSIDES 8.6 MG/1
1 TABLET ORAL ONCE
Refills: 0 | Status: COMPLETED | OUTPATIENT
Start: 2024-07-04 | End: 2024-07-04

## 2024-07-04 RX ORDER — LORATADINE 10 MG
17 TABLET,DISINTEGRATING ORAL ONCE
Refills: 0 | Status: COMPLETED | OUTPATIENT
Start: 2024-07-04 | End: 2024-07-04

## 2024-07-04 RX ADMIN — Medication 975 MILLIGRAM(S): at 14:23

## 2024-07-04 RX ADMIN — Medication 975 MILLIGRAM(S): at 15:00

## 2024-07-04 RX ADMIN — Medication 975 MILLIGRAM(S): at 22:57

## 2024-07-04 RX ADMIN — OXYCODONE HYDROCHLORIDE 10 MILLIGRAM(S): 30 TABLET ORAL at 03:43

## 2024-07-04 RX ADMIN — LEVETIRACETAM 500 MILLIGRAM(S): 1000 TABLET, FILM COATED ORAL at 05:12

## 2024-07-04 RX ADMIN — Medication 160 MILLIGRAM(S): at 11:02

## 2024-07-04 RX ADMIN — Medication 100 MILLIGRAM(S): at 14:23

## 2024-07-04 RX ADMIN — OXYCODONE HYDROCHLORIDE 5 MILLIGRAM(S): 30 TABLET ORAL at 11:06

## 2024-07-04 RX ADMIN — HEPARIN SODIUM 5000 UNIT(S): 1000 INJECTION, SOLUTION INTRAVENOUS; SUBCUTANEOUS at 14:23

## 2024-07-04 RX ADMIN — OXYCODONE HYDROCHLORIDE 10 MILLIGRAM(S): 30 TABLET ORAL at 16:00

## 2024-07-04 RX ADMIN — OXYCODONE HYDROCHLORIDE 10 MILLIGRAM(S): 30 TABLET ORAL at 23:06

## 2024-07-04 RX ADMIN — Medication 100 MILLIGRAM(S): at 22:56

## 2024-07-04 RX ADMIN — Medication 17 GRAM(S): at 11:07

## 2024-07-04 RX ADMIN — OXYCODONE HYDROCHLORIDE 5 MILLIGRAM(S): 30 TABLET ORAL at 11:36

## 2024-07-04 RX ADMIN — OXYCODONE HYDROCHLORIDE 10 MILLIGRAM(S): 30 TABLET ORAL at 04:28

## 2024-07-04 RX ADMIN — Medication 975 MILLIGRAM(S): at 23:27

## 2024-07-04 RX ADMIN — LIDOCAINE 5% 1 PATCH: 5 CREAM TOPICAL at 11:02

## 2024-07-04 RX ADMIN — HEPARIN SODIUM 5000 UNIT(S): 1000 INJECTION, SOLUTION INTRAVENOUS; SUBCUTANEOUS at 22:57

## 2024-07-04 RX ADMIN — Medication 100 MILLIGRAM(S): at 05:25

## 2024-07-04 RX ADMIN — LEVETIRACETAM 500 MILLIGRAM(S): 1000 TABLET, FILM COATED ORAL at 18:25

## 2024-07-04 RX ADMIN — Medication 975 MILLIGRAM(S): at 05:47

## 2024-07-04 RX ADMIN — Medication 975 MILLIGRAM(S): at 05:12

## 2024-07-04 RX ADMIN — SENNOSIDES 1 TABLET(S): 8.6 TABLET ORAL at 11:08

## 2024-07-04 RX ADMIN — OXYCODONE HYDROCHLORIDE 10 MILLIGRAM(S): 30 TABLET ORAL at 14:56

## 2024-07-04 RX ADMIN — Medication 15 MILLIGRAM(S): at 11:01

## 2024-07-04 NOTE — PROGRESS NOTE ADULT - SUBJECTIVE AND OBJECTIVE BOX
OVERNIGHT EVENTS:    SUBJECTIVE / INTERVAL HPI: Patient seen and examined at bedside.     VITAL SIGNS:  Vital Signs Last 24 Hrs  T(C): 37.8 (04 Jul 2024 05:48), Max: 38.2 (03 Jul 2024 19:39)  T(F): 100 (04 Jul 2024 05:48), Max: 100.7 (03 Jul 2024 19:39)  HR: 90 (04 Jul 2024 05:48) (90 - 100)  BP: 107/75 (04 Jul 2024 05:48) (103/68 - 118/72)  BP(mean): 86 (04 Jul 2024 05:48) (86 - 86)  RR: 16 (04 Jul 2024 05:48) (16 - 18)  SpO2: 98% (04 Jul 2024 05:48) (95% - 100%)    Parameters below as of 04 Jul 2024 05:48  Patient On (Oxygen Delivery Method): room air      I&O's Summary    02 Jul 2024 07:01  -  03 Jul 2024 07:00  --------------------------------------------------------  IN: 100 mL / OUT: 0 mL / NET: 100 mL        PHYSICAL EXAM:    General: WDWN  HEENT: NC/AT; PERRL, anicteric sclera; MMM  Neck: supple  Cardiovascular: +S1/S2; RRR  Respiratory: CTA B/L; no W/R/R  Gastrointestinal: soft, NT/ND; +BSx4  Extremities: WWP; no edema, clubbing or cyanosis  Vascular: 2+ radial, DP/PT pulses B/L  Neurological: AAOx3; no focal deficits    MEDICATIONS:  MEDICATIONS  (STANDING):  acetaminophen     Tablet .. 975 milliGRAM(s) Oral every 8 hours  ceFAZolin   IVPB 2000 milliGRAM(s) IV Intermittent every 8 hours  levETIRAcetam 500 milliGRAM(s) Oral two times a day  lidocaine   4% Patch 1 Patch Transdermal daily  methadone    Tablet 160 milliGRAM(s) Oral daily  mirtazapine 15 milliGRAM(s) Oral daily    MEDICATIONS  (PRN):  naloxone Injectable 1 milliGRAM(s) IV Push every 3 minutes PRN in case of overdose  oxyCODONE    IR 5 milliGRAM(s) Oral every 6 hours PRN Moderate Pain (4 - 6)  oxyCODONE    IR 10 milliGRAM(s) Oral every 8 hours PRN Severe Pain (7 - 10)      ALLERGIES:  Allergies    No Known Allergies    Intolerances        LABS:                        9.0    8.84  )-----------( 270      ( 03 Jul 2024 05:30 )             27.3     07-03    138  |  102  |  22  ----------------------------<  94  4.5   |  27  |  1.18    Ca    9.7      03 Jul 2024 05:30  Phos  4.6     07-03  Mg     1.8     07-03    TPro  7.9  /  Alb  3.0<L>  /  TBili  0.3  /  DBili  x   /  AST  30  /  ALT  18  /  AlkPhos  66  07-03      Urinalysis Basic - ( 03 Jul 2024 05:30 )    Color: x / Appearance: x / SG: x / pH: x  Gluc: 94 mg/dL / Ketone: x  / Bili: x / Urobili: x   Blood: x / Protein: x / Nitrite: x   Leuk Esterase: x / RBC: x / WBC x   Sq Epi: x / Non Sq Epi: x / Bacteria: x      CAPILLARY BLOOD GLUCOSE          RADIOLOGY & ADDITIONAL TESTS: Reviewed.   OVERNIGHT EVENTS: No overnight events.    SUBJECTIVE / INTERVAL HPI: Patient seen and examined at bedside. Still endorsing pain in her LLE. She has no other complaints at this time.     VITAL SIGNS:  Vital Signs Last 24 Hrs  T(C): 37.8 (04 Jul 2024 05:48), Max: 38.2 (03 Jul 2024 19:39)  T(F): 100 (04 Jul 2024 05:48), Max: 100.7 (03 Jul 2024 19:39)  HR: 90 (04 Jul 2024 05:48) (90 - 100)  BP: 107/75 (04 Jul 2024 05:48) (103/68 - 118/72)  BP(mean): 86 (04 Jul 2024 05:48) (86 - 86)  RR: 16 (04 Jul 2024 05:48) (16 - 18)  SpO2: 98% (04 Jul 2024 05:48) (95% - 100%)    Parameters below as of 04 Jul 2024 05:48  Patient On (Oxygen Delivery Method): room air      I&O's Summary    02 Jul 2024 07:01  -  03 Jul 2024 07:00  --------------------------------------------------------  IN: 100 mL / OUT: 0 mL / NET: 100 mL        PHYSICAL EXAM:    General: fatigued, inadequate nutrition.  HEENT: NC/AT; PERRL, anicteric sclera; MMM  Neck: supple  Cardiovascular: +S1/S2; RRR  Respiratory: CTA B/L; no W/R/R  Gastrointestinal: soft, NT/ND; +BSx4  Extremities: WWP; no edema, clubbing or cyanosis  Vascular: 2+ radial, DP/PT pulses B/L  Neurological: AAOx3; no focal deficits    MEDICATIONS:  MEDICATIONS  (STANDING):  acetaminophen     Tablet .. 975 milliGRAM(s) Oral every 8 hours  ceFAZolin   IVPB 2000 milliGRAM(s) IV Intermittent every 8 hours  levETIRAcetam 500 milliGRAM(s) Oral two times a day  lidocaine   4% Patch 1 Patch Transdermal daily  methadone    Tablet 160 milliGRAM(s) Oral daily  mirtazapine 15 milliGRAM(s) Oral daily    MEDICATIONS  (PRN):  naloxone Injectable 1 milliGRAM(s) IV Push every 3 minutes PRN in case of overdose  oxyCODONE    IR 5 milliGRAM(s) Oral every 6 hours PRN Moderate Pain (4 - 6)  oxyCODONE    IR 10 milliGRAM(s) Oral every 8 hours PRN Severe Pain (7 - 10)      ALLERGIES:  Allergies    No Known Allergies    Intolerances        LABS:                        9.0    8.84  )-----------( 270      ( 03 Jul 2024 05:30 )             27.3     07-03    138  |  102  |  22  ----------------------------<  94  4.5   |  27  |  1.18    Ca    9.7      03 Jul 2024 05:30  Phos  4.6     07-03  Mg     1.8     07-03    TPro  7.9  /  Alb  3.0<L>  /  TBili  0.3  /  DBili  x   /  AST  30  /  ALT  18  /  AlkPhos  66  07-03      Urinalysis Basic - ( 03 Jul 2024 05:30 )    Color: x / Appearance: x / SG: x / pH: x  Gluc: 94 mg/dL / Ketone: x  / Bili: x / Urobili: x   Blood: x / Protein: x / Nitrite: x   Leuk Esterase: x / RBC: x / WBC x   Sq Epi: x / Non Sq Epi: x / Bacteria: x      CAPILLARY BLOOD GLUCOSE          RADIOLOGY & ADDITIONAL TESTS: Reviewed.

## 2024-07-04 NOTE — PROGRESS NOTE ADULT - ASSESSMENT
This is a 60 y/o F with past medical history of IV drug use on 160mg methadone, anemia, epilepsy who presented with sharp LLE pain that progressively worsened over the past month. CT of LLE in ED showed loculated fluid in L iliacus muscle, iliopsoas bursitis of infectious or inflammatory etiology. Patient underwent IR drainage procedure today and tolerated the procedure well.

## 2024-07-04 NOTE — PROGRESS NOTE ADULT - PROBLEM SELECTOR PLAN 7
Plan: Patient previously prescribed quetiapine 150mg qd, buspirone 15mg qd, mirtazaine 15mg qd. Unclear what she is currently taking.  - follow up on surescripts regarding her medications. Plan: Per patient's shelter, she was on insulin at some point, unclear of when she was taking it or history of diabetes.  - HbA1c 5.1, well controlled  - blood glucose 110 today.

## 2024-07-04 NOTE — PROGRESS NOTE ADULT - SUBJECTIVE AND OBJECTIVE BOX
Physical Medicine and Rehabilitation Progress Note :       Patient is a 59y old  Female who presents with a chief complaint of left leg pain (04 Jul 2024 06:39)      HPI:  HPI: Pt is 58 y/o F pmhx significant for IV drug use, anemia, epilepsy who presents for sharp L leg pain progressively worsening over the past month. Pt states her pain began in the left thigh and later radiated to her toes but now has persisted in her entire left leg and groin. Pt reports this began about one month ago and required multiple ER visits. She states she went to [facility] on 5/31 where nothing was done and she was discharged. Most recently last week she returned to [facility] where she states she was given a 7 day course of oral antibiotics which she completed but did not improve her pain. She took Motrin which provided very minimal relief.  Pt last  injected heroin into her left thigh on Saturday 6/29. As per patient, she is chronically on methadone 160 mg 5 days a week since she was 19. She reports subjective fevers. Denies nausea, vomiting, chest pain, shortness of breath, constipation, diarrhea, pain on urination, hematuria, hematemesis.       In the ED:  Initial vital signs: T: 98.7 F, HR: 82, BP: 100/68, RR: 16, SpO2: 96% on RA  Labs: significant for AST 61, CRP 99.5, Hg 10.2, Hct 31.0  CT Lower Extremity with IV Contrast, Left: loculated fluid in L iliacus muscle, iliopsoas bursitis of infectious or inflammatory etiology  US Duplex Venous LE, Left: No evidence of L lower extremity DVT  Medications: Zosyn, Vancomycin, NS, Ibuprofen  Consults:         Ortho- recommended admission for IV antibiotics and IR consult, will continue to follow (01 Jul 2024 18:56)                            7.6    7.87  )-----------( 274      ( 04 Jul 2024 05:30 )             23.6       07-04    136  |  101  |  21  ----------------------------<  110<H>  4.2   |  28  |  1.03    Ca    9.3      04 Jul 2024 05:30  Phos  4.6     07-03  Mg     1.8     07-03    TPro  7.4  /  Alb  2.6<L>  /  TBili  0.2  /  DBili  x   /  AST  29  /  ALT  16  /  AlkPhos  66  07-04    Vital Signs Last 24 Hrs  T(C): 36.9 (04 Jul 2024 09:07), Max: 38.2 (03 Jul 2024 19:39)  T(F): 98.5 (04 Jul 2024 09:07), Max: 100.7 (03 Jul 2024 19:39)  HR: 88 (04 Jul 2024 09:07) (88 - 96)  BP: 109/75 (04 Jul 2024 09:07) (103/68 - 118/72)  BP(mean): 86 (04 Jul 2024 05:48) (86 - 86)  RR: 17 (04 Jul 2024 09:07) (16 - 18)  SpO2: 98% (04 Jul 2024 09:07) (95% - 98%)    Parameters below as of 04 Jul 2024 09:07  Patient On (Oxygen Delivery Method): room air        MEDICATIONS  (STANDING):  acetaminophen     Tablet .. 975 milliGRAM(s) Oral every 8 hours  ceFAZolin   IVPB 2000 milliGRAM(s) IV Intermittent every 8 hours  levETIRAcetam 500 milliGRAM(s) Oral two times a day  lidocaine   4% Patch 1 Patch Transdermal daily  methadone    Tablet 160 milliGRAM(s) Oral daily  mirtazapine 15 milliGRAM(s) Oral daily  polyethylene glycol 3350 17 Gram(s) Oral once  senna 1 Tablet(s) Oral once    MEDICATIONS  (PRN):  naloxone Injectable 1 milliGRAM(s) IV Push every 3 minutes PRN in case of overdose  oxyCODONE    IR 5 milliGRAM(s) Oral every 6 hours PRN Moderate Pain (4 - 6)  oxyCODONE    IR 10 milliGRAM(s) Oral every 8 hours PRN Severe Pain (7 - 10)      T(C): 36.9 (07-04-24 @ 09:07), Max: 38.2 (07-03-24 @ 19:39)  HR: 88 (07-04-24 @ 09:07) (88 - 96)  BP: 109/75 (07-04-24 @ 09:07) (103/68 - 118/72)  RR: 17 (07-04-24 @ 09:07) (16 - 18)  SpO2: 98% (07-04-24 @ 09:07) (95% - 98%)        Physical Exam:   59 y o woman lying comfortably in semi Andino's position , awake , alert , no acute complaints     Head: normocephalic , atraumatic    Eyes: PERRLA , EOMI , no nystagmus , sclera anicteric    ENT / FACE: neg nasal discharge , uvula midline , no oropharyngeal erythema / exudate    Neck: supple , negative JVD , negative carotid bruits , no thyromegaly    Chest: CTA bilaterally     Cardiovascular: regular rate and rhythm , neg murmurs / rubs / gallops    Abdomen: soft , non distended , no tenderness to palpation in all 4 quadrants ,  normal bowel sounds     Extremities: WWP , neg cyanosis /clubbing / edema     Musculoskeletal: pain w/ L hip flexion ,  in upper thigh and groin but decreased m LLE edema    Skin:     :     Neurologic Exam:     Alert and oriented   3     Motor Exam:        > 3+/5 x 4 extremities , LLE pain limited proximally     Sensation:         intact to light touch x 4 extremities                                DTR:           biceps/brachioradialis: equal                            patella/ankle: equal              Functional Status Assessment :   7/3/2024     Pain Assessment/Number Scale (0-10) Adult  Presence of Pain: complains of pain/discomfort  Body Location: left leg  Pain Rating (0-10): Rest: 8 (severe pain)  Pain Rating (0-10): Activity: 8 (severe pain)    Safety      AM-PAC Functional Assessment: Basic Mobility  Type of Assessment: Daily assessment  Turning from your back to your side while in a flat bed without using bedrails?: 3 = A little assistance  Moving from lying on your back to sitting on the flat side of a flat bed without using bedrails?: 3 = A little assistance  Moving to and from a bed to a chair (including a wheelchair)?: 3 = A little assistance  Standing up from a chair using your arms (e.g. wheelchair or bedside chair)?: 3 = A little assistance  Walking in hospital room?: 3 = A little assistance  Climbing 3-5 steps with a railing?: 3-calculated by average   Score: 18   Row Comment: Ask the patient "How much help from another person do you currently need? (If the patient hasn't done an activity recently, how much help from another person do you think he/she needs if he/she tried?)    Cognitive/Neuro      Cognitive/Neuro/Behavioral  Cognitive/Neuro/Behavioral [WDL Definition: Alert; opens eyes spontaneously; arouses to voice or touch; oriented x 4; follows commands; speech spontaneous, logical; purposeful motor response; behavior appropriate to situation]: WDL    Language Assistance  Preferred Language to Address Healthcare Preferred Language to Address Healthcare: English    Therapeutic Interventions      Bed Mobility  Bed Mobility Training Sit-to-Supine: supervsion;  verbal cues  Bed Mobility Training Supine-to-Sit: supervsion;  verbal cues    Sit-Stand Transfer Training  Transfer Training Sit-to-Stand Transfer: contact guard;  verbal cues;  1 person assist;  rolling walker  Transfer Training Stand-to-Sit Transfer: contact guard;  verbal cues;  1 person assist;  rolling walker  Sit-to-Stand Transfer Training Transfer Safety Analysis: decreased balance;  impaired balance;  decreased strength;  pain    Gait Training  Gait Training: contact guard;  verbal cues;  1 person assist;  rolling walker;  40 ft x2   Gait Analysis: 3-point gait   decreased angie;  crouch;  decreased step length;  shuffling;  decreased stride length;  impaired balance;  decreased strength;  pain  Gait Number of Times:: x 2  Type of Rest Type of Rest: standing  Duration of Rest Duration of Rest: 45 sec             PM&R Impression : as above    Current disposition plan recommendation :    subacute rehab placement

## 2024-07-04 NOTE — PROGRESS NOTE ADULT - PROBLEM SELECTOR PLAN 6
Plan: Per patient's shelter, she was on insulin at some point, unclear of when she was taking it or history of diabetes.  - HbA1c ordered, follow up  - blood glucose 89 today. Plan: As per patient has history of epilepsy  States she takes Keppra but unsure of dose, follows [facility]rolog[facility] at [facility]  Per [facility], previously prescribed Keppra 1000mg bid.   - order Keppra 500mg bid x 2 doses for now, confirm doses.

## 2024-07-04 NOTE — PROVIDER CONTACT NOTE (CRITICAL VALUE NOTIFICATION) - TEST AND RESULT REPORTED:
Hepatitis C virus interpretation reactive
Blood culture 7/1 growth in anaerobic bottle - gram positive cocci in clusters

## 2024-07-04 NOTE — PROGRESS NOTE ADULT - ASSESSMENT
# MSSA bacteremia from IVDU, complicated by L iliacus abscess, iliopsoas bursitis  - TTE negative for IE. Plan for TED tomorrow  - Follow serial blood cultures and repeat BCx x1 today  - Follow cx from iliopsoas IR aspiration (7/3)  - Continue cefazolin 2g IV q8h    ID Team 1 will follow today. ID Team 2 will resume care tomorrow.

## 2024-07-04 NOTE — PROGRESS NOTE ADULT - PROBLEM SELECTOR PLAN 3
Plan: Hx of IV drug use   Pt states she is chronically on methadone 160 mg 5 days a week since age 19  Last heroin injection in L leg on Saturday 6/29  - confirmed dose with methadone clinic: 160mg, 5x/week  - spoke with Esther Baker at methadone clinic: [phone]  - last dose given on 6/29 for 6/30  - monitor COWs.  - HIV testing ordered, f/u  - hepatitis panel ordered  - TTE ordered  - continue cefazolin 2g IV q8 per ID recs

## 2024-07-04 NOTE — PROGRESS NOTE ADULT - SUBJECTIVE AND OBJECTIVE BOX
INFECTIOUS DISEASES CONSULT FOLLOW-UP NOTE    INTERVAL HPI/OVERNIGHT EVENTS:  Coverage for Dr. He  T 100.7 overnight, afebrile today  L hip pain improving  Tolerating abx    ROS:   Constitutional, eyes, ENT, cardiovascular, respiratory, gastrointestinal, genitourinary, integumentary, neurological, psychiatric and heme/lymph are otherwise negative other than noted above       ANTIBIOTICS/RELEVANT:    MEDICATIONS  (STANDING):  acetaminophen     Tablet .. 975 milliGRAM(s) Oral every 8 hours  ceFAZolin   IVPB 2000 milliGRAM(s) IV Intermittent every 8 hours  heparin   Injectable 5000 Unit(s) SubCutaneous every 8 hours  levETIRAcetam 500 milliGRAM(s) Oral two times a day  lidocaine   4% Patch 1 Patch Transdermal daily  methadone    Tablet 160 milliGRAM(s) Oral daily  mirtazapine 15 milliGRAM(s) Oral daily    MEDICATIONS  (PRN):  naloxone Injectable 1 milliGRAM(s) IV Push every 3 minutes PRN in case of overdose  oxyCODONE    IR 5 milliGRAM(s) Oral every 6 hours PRN Moderate Pain (4 - 6)  oxyCODONE    IR 10 milliGRAM(s) Oral every 8 hours PRN Severe Pain (7 - 10)        Vital Signs Last 24 Hrs  T(C): 37.6 (04 Jul 2024 15:39), Max: 38.2 (03 Jul 2024 19:39)  T(F): 99.7 (04 Jul 2024 15:39), Max: 100.7 (03 Jul 2024 19:39)  HR: 77 (04 Jul 2024 15:39) (77 - 90)  BP: 111/69 (04 Jul 2024 15:39) (103/68 - 111/69)  BP(mean): 86 (04 Jul 2024 05:48) (86 - 86)  RR: 17 (04 Jul 2024 15:39) (16 - 18)  SpO2: 94% (04 Jul 2024 15:39) (94% - 98%)    Parameters below as of 04 Jul 2024 15:39  Patient On (Oxygen Delivery Method): room air        PHYSICAL EXAM:  Constitutional: alert, NAD  Pulmonary: no respiratory distress  Heart: heart rate was normal and rhythm regular, no murmur  Abdomen: soft, non-tender  MSK: Improving LLE warmth  Skin: no rash or stigmata of IE      LABS:                        7.6    7.87  )-----------( 274      ( 04 Jul 2024 05:30 )             23.6     07-04    136  |  101  |  21  ----------------------------<  110<H>  4.2   |  28  |  1.03    Ca    9.3      04 Jul 2024 05:30  Phos  4.0     07-04  Mg     1.9     07-04    TPro  7.4  /  Alb  2.6<L>  /  TBili  0.2  /  DBili  x   /  AST  29  /  ALT  16  /  AlkPhos  66  07-04      Urinalysis Basic - ( 04 Jul 2024 05:30 )    Color: x / Appearance: x / SG: x / pH: x  Gluc: 110 mg/dL / Ketone: x  / Bili: x / Urobili: x   Blood: x / Protein: x / Nitrite: x   Leuk Esterase: x / RBC: x / WBC x   Sq Epi: x / Non Sq Epi: x / Bacteria: x        MICROBIOLOGY:  Reviewed    RADIOLOGY & ADDITIONAL STUDIES:  Reviewed

## 2024-07-04 NOTE — PROGRESS NOTE ADULT - SUBJECTIVE AND OBJECTIVE BOX
Ortho Note    Pt comfortable without complaints, pain controlled  Denies CP, SOB, N/V, numbness/tingling     Vital Signs Last 24 Hrs  T(C): 36.9 (07-04-24 @ 09:07), Max: 37.8 (07-04-24 @ 05:48)  T(F): 98.5 (07-04-24 @ 09:07), Max: 100 (07-04-24 @ 05:48)  HR: 88 (07-04-24 @ 09:07) (88 - 90)  BP: 109/75 (07-04-24 @ 09:07) (107/75 - 109/75)  BP(mean): 86 (07-04-24 @ 05:48) (86 - 86)  RR: 17 (07-04-24 @ 09:07) (16 - 17)  SpO2: 98% (07-04-24 @ 09:07) (98% - 98%)  I&O's Summary      General: Pt Alert and oriented, NAD  Physical Exam:  -ttp  -Pain w/ passive extension of L thigh & resisted flexion  -NVID                          7.6    7.87  )-----------( 274      ( 04 Jul 2024 05:30 )             23.6     07-04    136  |  101  |  21  ----------------------------<  110<H>  4.2   |  28  |  1.03    Ca    9.3      04 Jul 2024 05:30  Phos  4.6     07-03  Mg     1.8     07-03    TPro  7.4  /  Alb  2.6<L>  /  TBili  0.2  /  DBili  x   /  AST  29  /  ALT  16  /  AlkPhos  66  07-04      A/P: 59F w/ L sided iliopsoas abscess and severe hip arthritis    -No acute orhtopeidc surgical intervention at this time  -s/p IR aspiration  -Recommend IV Abx,  - f/u  IR aspiration results  Recommend Gen surg consult if c/f patient decompensation / need for surgical intervention      Ortho Pager [phone]

## 2024-07-04 NOTE — PROGRESS NOTE ADULT - PROBLEM SELECTOR PLAN 2
Plan: Worsening left leg and thigh pain with radiation to groin  CT LE w IV contrast showed loculated fluid in L iliacus muscle, iliopsoas bursitis of infectious or inflamm. etiology  Ortho consulted, recommended IV antibiotics and IR consult, will continue to follow  Given Zosyn and Vanc in ED  s/p Toradol 15 mg IV push given for pain control  - duplex of LLE showed no evidence of DVT  - pain regimen: standing 975 tylenol q8, lidocaine patch, oxy prn 5mg q4.  - IR procedure completed, pt tolerated procedure well

## 2024-07-04 NOTE — PROGRESS NOTE ADULT - ASSESSMENT
{\rtf1\kzpvcz06745\ansi\mynvgdd4837\ftnbj\uc1\deff0  {\fonttbl{\f0 \fnil Segoe UI;}{\f1 \fnil \fcharset0 Segoe UI;}{\f2 \fnil Times New Ronald;}}  {\colortbl ;\kuk851\bxnkm756\nfli079 ;\red0\green0\blue0 ;\red0\green0\pdqq834 ;\red0\green0\blue0 ;}  {\stylesheet{\f0\fs20 Normal;}{\cs1 Default Paragraph Font;}{\cs2\f0\fs16 Line Number;}{\cs3\f2\fs24\ul\cf3 Hyperlink;}}  {\*\revtbl{Unknown;}}  \wttczn95565\ncmhyh60249\iqcyl0428\hyigu1987\tvnmj5274\gdvuo7366\lhtvnuf685\kynxoqm988\nogrowautofit\fvohyf057\formshade\nofeaturethrottle1\dntblnsbdb\fet4\aendnotes\aftnnrlc\pgbrdrhead\pgbrdrfoot  \sectd\gwrpfk67023\szcmps58480\guttersxn0\bgyhukcn6399\itcytleb9655\ptthuidr6582\tmoxivif0031\ugcacbq600\ardmxki155\sbkpage\pgncont\pgndec  \plain\plain\f0\fs24\ql\plain\f0\fs24\plain\f0\fs20\cemq5104\hich\f0\dbch\f0\loch\f0\fs20\par  I M\par  \par   58 y/o F with past medical history of IV drug use on 160mg methadone, anemia, epilepsy who presented with sharp LLE pain that progressively worsened over the past month. CT of LLE in ED showed loculated fluid in L iliacus muscle, iliopsoas bursitis of   infectious or inflammatory etiology. Patient underwent IR drainage procedure today and tolerated the procedure well.\par  \par  \plain\f1\fs20\msms1938\hich\f1\dbch\f1\loch\f1\cf2\fs20\ul{\field{\*\fldinst HYPERLINK [id number] }{\fldrslt Problem/Plan - 1:}}\plain\f0\fs20\ojun0329\hich\f0\dbch\f0\loch\f0\fs20\ql\par  \'b7  {\*\bkmkstart [id number]}{\*\bkmkend jf12411390181}Problem: {\*\bkmkstart sd76979055819}{\*\bkmkend ss55302487735}Iliopsoas bursitis of left hip. \par  \'b7  {\*\bkmkstart xt30396609088}{\*\bkmkend pr09684721064}Plan: {\*\bkmkstart po91261931179}{\*\bkmkend ks90960874104}Plan: Worsening left leg and thigh pain with radiation to groin\par  CT LE w IV contrast showed loculated fluid in L iliacus muscle, iliopsoas bursitis of infectious or inflammatory etiology\par  ortho consulted, recommended IV antibiotics and IR consult in AM, will continue to follow\par  Given Zosyn and Vanc in ED\par  - blood cultures grew \par  - continue with cefazolin 2g q8hs\par  - ESR ordered\par  - f/u cultures from IR aspiration\par  - surveillance blood culture sent.\plain\f1\fs20\akvr7005\hich\f1\dbch\f1\loch\f1\cf2\fs20\strike\plain\f0\fs20\rfyl5552\hich\f0\dbch\f0\loch\f0\fs20\par  \par  \plain\f1\fs20\jzoe4341\hich\f1\dbch\f1\loch\f1\cf2\fs20\ul{\field{\*\fldinst HYPERLINK [id number] }{\fldrslt Problem/Plan - 2:}}\plain\f0\fs20\mreg7363\hich\f0\dbch\f0\loch\f0\fs20\ql\par  \'b7  {\*\bkmkstart gg17052408912}{\*\bkmkend jb58875031022}Problem: {\*\bkmkstart od14148861514}{\*\bkmkend pp54628114774}Leg pain, left. \par  \'b7  {\*\bkmkstart vu78819602393}{\*\bkmkend uz25276484604}Plan: {\*\bkmkstart sx20313168310}{\*\bkmkend op10970879521}Plan: Worsening left leg and thigh pain with radiation to groin\par  CT LE w IV contrast showed loculated fluid in L iliacus muscle, iliopsoas bursitis of infectious or inflamm. etiology\par  Ortho consulted, recommended IV antibiotics and IR consult, will continue to follow\par  Given Zosyn and Vanc in ED\par  s/p Toradol 15 mg IV push given for pain control\par  - duplex of LLE showed no evidence of DVT\par  - pain regimen: standing 975 tylenol q8, lidocaine patch, oxy prn 5mg q4.\par  - IR procedure completed, pt tolerated procedure well.\plain\f1\fs20\yjem0333\hich\f1\dbch\f1\loch\f1\cf2\fs20\strike\plain\f0\fs20\vrwy1458\hich\f0\dbch\f0\loch\f0\fs20\par  \par  \plain\f1\fs20\wgte8471\hich\f1\dbch\f1\loch\f1\cf2\fs20\ul{\field{\*\fldinst HYPERLINK [id number] }{\fldrslt Problem/Plan - 3:}}\plain\f0\fs20\elww0436\hich\f0\dbch\f0\loch\f0\fs20\ql\par  \'b7  {\*\bkmkstart fq75350037946}{\*\bkmkend nl61888007835}Problem: {\*\bkmkstart zi84699738277}{\*\bkmkend cx86333474745}IVDU (intravenous drug user). \par  \'b7  {\*\bkmkstart el52945857185}{\*\bkmkend gy13085529074}Plan: {\*\bkmkstart wh82202440782}{\*\bkmkend ox30356919911}Plan: Hx of IV drug use \par  Pt states she is chronically on methadone 160 mg 5 days a week since age 19\par  Last heroin injection in L leg on Saturday 6/29\par  - confirmed dose with methadone clinic: 160mg, 5x/week\par  - spoke with Esther Baker at methadone clinic: [phone]\par  - last dose given on 6/29 for 6/30\par  - monitor COWs.\par  - HIV testing ordered, f/u\par  - hepatitis panel ordered\par  - TTE ordered\par  - continue cefazolin 2g IV q8 per ID recs.\par  \par  \plain\f1\fs20\idem5773\hich\f1\dbch\f1\loch\f1\cf2\fs20\ul{\field{\*\fldinst HYPERLINK [id number] }{\fldrslt Problem/Plan - 4:}}\plain\f0\fs20\mkcl1568\hich\f0\dbch\f0\loch\f0\fs20\ql\par  \'b7  {\*\bkmkstart gm69212692502}{\*\bkmkend hg34555467795}Problem: {\*\bkmkstart zo24620844958}{\*\bkmkend pm53707174265}Anemia. \par  \'b7  {\*\bkmkstart zo87794935629}{\*\bkmkend vq07010032976}Plan: {\*\bkmkstart ym21756499772}{\*\bkmkend wm31978244261}Plan: Pt states history of anemia\par  On admission Hg 10.2, Hct 31.0\par  - Continue to monitor H&H\par  - Maintain active type & screen\par  - Most recent Hb 9.0.\par  \par  \plain\f1\fs20\bpgb2717\hich\f1\dbch\f1\loch\f1\cf2\fs20\ul{\field{\*\fldinst HYPERLINK [id number] }{\fldrslt Problem/Plan - 5:}}\plain\f0\fs20\hidh2797\hich\f0\dbch\f0\loch\f0\fs20\ql\par  \'b7  {\*\bkmkstart vd65115003217}{\*\bkmkend zb55345029440}Problem: {\*\bkmkstart qd97516007638}{\*\bkmkend rd67208143294}Epilepsy. \par  \'b7  {\*\bkmkstart ha71846966932}{\*\bkmkend na75942878046}Plan: {\*\bkmkstart vk34014881985}{\*\bkmkend ns23721277898}Plan: As per patient has history of epilepsy\par  States she takes Keppra but unsure of dose, follows with Neurologist at [facility]\par  Per [employer], previously prescribed Keppra 1000mg bid. \par  - order Keppra 500mg bid x 2 doses for now, confirm doses.\par  \par  \plain\f1\fs20\opaj7684\hich\f1\dbch\f1\loch\f1\cf2\fs20\ul{\field{\*\fldinst HYPERLINK [id number] }{\fldrslt Problem/Plan - 6:}}\plain\f0\fs20\xeof5431\hich\f0\dbch\f0\loch\f0\fs20\ql\par  \'b7  {\*\bkmkstart ad33778534501}{\*\bkmkend im92922196294}Problem: {\*\bkmkstart jc02885298562}{\*\bkmkend kl54859878459}Type 2 diabetes mellitus. \par  \'b7  {\*\bkmkstart id64694315335}{\*\bkmkend ah10879948456}Plan: {\*\bkmkstart fl26214928858}{\*\bkmkend vw32533256245}Plan: Per patient's shelter, she was on insulin at some point, unclear of when she was taking it or history of diabetes.\par  - HbA1c ordered, follow up\par  - blood glucose 89 today.\par  \par  \plain\f1\fs20\arxk2378\hich\f1\dbch\f1\loch\f1\cf2\fs20\ul{\field{\*\fldinst HYPERLINK [id number] }{\fldrslt Problem/Plan - 7:}}\plain\f0\fs20\jvhx0947\hich\f0\dbch\f0\loch\f0\fs20\ql\par  \'b7  {\*\bkmkstart xb66929591824}{\*\bkmkend vb50407892475}Problem: {\*\bkmkstart vq19873352202}{\*\bkmkend ak56855470022}Mood disorder. \par  \'b7  {\*\bkmkstart qf52796529868}{\*\bkmkend rc48691084770}Plan: {\*\bkmkstart ex82752035254}{\*\bkmkend gk04851683587}Plan: Patient previously prescribed quetiapine 150mg qd, buspirone 15mg qd, mirtazaine 15mg qd. Unclear what she is currently taking.\par  - follow up on surescripts regarding her medications.\par  \par  \plain\f1\fs20\cmqu9166\hich\f1\dbch\f1\loch\f1\cf2\fs20\ul{\field{\*\fldinst HYPERLINK [id number] }{\fldrslt Problem/Plan - 8:}}\plain\f0\fs20\agth8331\hich\f0\dbch\f0\loch\f0\fs20\ql\par  \'b7  {\*\bkmkstart vl26629889752}{\*\bkmkend wp39555323218}Problem: {\*\bkmkstart zs43257916672}{\*\bkmkend hm84749805267}Prophylactic measure. \par  \'b7  {\*\bkmkstart fu65537861289}{\*\bkmkend bz24665236033}Plan: {\*\bkmkstart gz84356085761}{\*\bkmkend og22098741421}Plan: F: NS 1L\par  E: replete as needed\par  N: regular diet\par  DVT ppx: given 1 dose of lovenox on 7/2\par  Dispo: RMF.\par  \par  \par  }

## 2024-07-04 NOTE — PROGRESS NOTE ADULT - PROBLEM SELECTOR PLAN 4
Plan: Pt states history of anemia  On admission Hg 10.2, Hct 31.0  - Continue to monitor H&H  - Maintain active type & screen  - Most recent Hb 9.0. on 7/1, blood culture showed MSSA  on Cefazolin  Check blood cultures daily  TTE showed no significant valvular disease, plan for TED tomorrow, NPO at midnight

## 2024-07-04 NOTE — PROGRESS NOTE ADULT - ATTENDING COMMENTS
Patient was seen and examined at bedside on 7/4/2024 at 930 am. Patient feels that her pain is improved. Denies SOB, CP, abdominal pain. ROS is otherwise negative. Vitals, labwork and pertinent imaging reviewed. Physical exam - NAD, AAO x 4, PERRLA, EOMI, supple neck, chest - CTA b/l, CV - irregular, no m/r/g, abd - soft, + BS, ext - wwp, ROM has improved, psych - normal affect, skin - no rash    Plan  -PT/OT rec MARY  -Pain control  -Pt is now bacteremic w/ MSSA, c/w Cefazolin; repeat BCX daily  -Surveillance blood cultures -   -S/p IR aspiration; will also discuss new found bacteremia with Orthopedics for possible need of washout? (CRP is uptrending)  -TTE without evidence of IE; check TED  -ID consulted  -Start DVT PPX

## 2024-07-04 NOTE — PROGRESS NOTE ADULT - PROBLEM SELECTOR PLAN 1
Plan: Worsening left leg and thigh pain with radiation to groin  CT LE w IV contrast showed loculated fluid in L iliacus muscle, iliopsoas bursitis of infectious or inflammatory etiology  ortho consulted, recommended IV antibiotics and IR consult in AM, will continue to follow  Given Zosyn and Vanc in ED  - blood cultures grew   - continue with cefazolin 2g q8hs  - ESR ordered  - f/u cultures from IR aspiration  - surveillance blood culture sent

## 2024-07-04 NOTE — PROGRESS NOTE ADULT - PROBLEM SELECTOR PLAN 5
Plan: As per patient has history of epilepsy  States she takes Keppra but unsure of dose, follows with Neurologist at [facility]  Per [employer], previously prescribed Keppra 1000mg bid.   - order Keppra 500mg bid x 2 doses for now, confirm doses. Plan: Pt states history of anemia  On admission Hg 10.2, Hct 31.0  - Continue to monitor H&H  - Maintain active type & screen  - Most recent Hb 7.6 from 9.0 Plan: Pt states history of anemia  On admission Hg 10.2, Hct 31.0  - Continue to monitor hemoglobin&hematocrit  - Maintain active type & screen  - Most recent Hb 7.6 from 9.0

## 2024-07-04 NOTE — PROGRESS NOTE ADULT - PROBLEM SELECTOR PLAN 8
Plan: F: NS 1L  E: replete as needed  N: regular diet  DVT ppx: given 1 dose of lovenox on 7/2  Dispo: RMF. Plan: Patient previously prescribed quetiapine 150mg qd, buspirone 15mg qd, mirtazaine 15mg qd. Unclear what she is currently taking.  - follow up on surescripts regarding her medications.

## 2024-07-05 ENCOUNTER — RESULT REVIEW (OUTPATIENT)
Age: 59
End: 2024-07-05

## 2024-07-05 DIAGNOSIS — F11.20 OPIOID DEPENDENCE, UNCOMPLICATED: ICD-10-CM

## 2024-07-05 LAB
-  CLINDAMYCIN: SIGNIFICANT CHANGE UP
-  ERYTHROMYCIN: SIGNIFICANT CHANGE UP
-  GENTAMICIN: SIGNIFICANT CHANGE UP
-  OXACILLIN: SIGNIFICANT CHANGE UP
-  PENICILLIN: SIGNIFICANT CHANGE UP
-  RIFAMPIN: SIGNIFICANT CHANGE UP
-  TETRACYCLINE: SIGNIFICANT CHANGE UP
-  TRIMETHOPRIM/SULFAMETHOXAZOLE: SIGNIFICANT CHANGE UP
-  VANCOMYCIN: SIGNIFICANT CHANGE UP
ALBUMIN SERPL ELPH-MCNC: 3.1 G/DL — LOW (ref 3.3–5)
ALP SERPL-CCNC: 72 U/L — SIGNIFICANT CHANGE UP (ref 40–120)
ALT FLD-CCNC: 32 U/L — SIGNIFICANT CHANGE UP (ref 10–45)
ANION GAP SERPL CALC-SCNC: 12 MMOL/L — SIGNIFICANT CHANGE UP (ref 5–17)
AST SERPL-CCNC: 65 U/L — HIGH (ref 10–40)
BILIRUB SERPL-MCNC: 0.3 MG/DL — SIGNIFICANT CHANGE UP (ref 0.2–1.2)
BUN SERPL-MCNC: 13 MG/DL — SIGNIFICANT CHANGE UP (ref 7–23)
CALCIUM SERPL-MCNC: 10.2 MG/DL — SIGNIFICANT CHANGE UP (ref 8.4–10.5)
CHLORIDE SERPL-SCNC: 97 MMOL/L — SIGNIFICANT CHANGE UP (ref 96–108)
CO2 SERPL-SCNC: 27 MMOL/L — SIGNIFICANT CHANGE UP (ref 22–31)
CREAT SERPL-MCNC: 0.87 MG/DL — SIGNIFICANT CHANGE UP (ref 0.5–1.3)
CRP SERPL-MCNC: 235.5 MG/L — HIGH (ref 0–4)
EGFR: 78 ML/MIN/1.73M2 — SIGNIFICANT CHANGE UP
ERYTHROCYTE [SEDIMENTATION RATE] IN BLOOD: 114 MM/HR — HIGH
GLUCOSE SERPL-MCNC: 90 MG/DL — SIGNIFICANT CHANGE UP (ref 70–99)
HCT VFR BLD CALC: 29 % — LOW (ref 34.5–45)
HCV RNA FLD QL NAA+PROBE: SIGNIFICANT CHANGE UP
HCV RNA SPEC QL PROBE+SIG AMP: SIGNIFICANT CHANGE UP
HGB BLD-MCNC: 9 G/DL — LOW (ref 11.5–15.5)
MAGNESIUM SERPL-MCNC: 2 MG/DL — SIGNIFICANT CHANGE UP (ref 1.6–2.6)
MCHC RBC-ENTMCNC: 27.7 PG — SIGNIFICANT CHANGE UP (ref 27–34)
MCHC RBC-ENTMCNC: 31 GM/DL — LOW (ref 32–36)
MCV RBC AUTO: 89.2 FL — SIGNIFICANT CHANGE UP (ref 80–100)
METHOD TYPE: SIGNIFICANT CHANGE UP
NRBC # BLD: 0 /100 WBCS — SIGNIFICANT CHANGE UP (ref 0–0)
PHOSPHATE SERPL-MCNC: 4.9 MG/DL — HIGH (ref 2.5–4.5)
PLATELET # BLD AUTO: 363 K/UL — SIGNIFICANT CHANGE UP (ref 150–400)
POTASSIUM SERPL-MCNC: 5 MMOL/L — SIGNIFICANT CHANGE UP (ref 3.5–5.3)
POTASSIUM SERPL-SCNC: 5 MMOL/L — SIGNIFICANT CHANGE UP (ref 3.5–5.3)
PROT SERPL-MCNC: 8.5 G/DL — HIGH (ref 6–8.3)
RBC # BLD: 3.25 M/UL — LOW (ref 3.8–5.2)
RBC # FLD: 14.2 % — SIGNIFICANT CHANGE UP (ref 10.3–14.5)
SODIUM SERPL-SCNC: 136 MMOL/L — SIGNIFICANT CHANGE UP (ref 135–145)
WBC # BLD: 6.82 K/UL — SIGNIFICANT CHANGE UP (ref 3.8–10.5)
WBC # FLD AUTO: 6.82 K/UL — SIGNIFICANT CHANGE UP (ref 3.8–10.5)

## 2024-07-05 PROCEDURE — 93312 ECHO TRANSESOPHAGEAL: CPT | Mod: 26

## 2024-07-05 PROCEDURE — 76376 3D RENDER W/INTRP POSTPROCES: CPT | Mod: 26

## 2024-07-05 PROCEDURE — 99233 SBSQ HOSP IP/OBS HIGH 50: CPT | Mod: GC

## 2024-07-05 PROCEDURE — 93320 DOPPLER ECHO COMPLETE: CPT | Mod: 26

## 2024-07-05 PROCEDURE — 99232 SBSQ HOSP IP/OBS MODERATE 35: CPT

## 2024-07-05 RX ORDER — LORATADINE 10 MG
17 TABLET,DISINTEGRATING ORAL DAILY
Refills: 0 | Status: DISCONTINUED | OUTPATIENT
Start: 2024-07-05 | End: 2024-07-11

## 2024-07-05 RX ORDER — SODIUM PHOSPHATE,MONO-DIBASIC
1 SOLUTION, ORAL ORAL ONCE
Refills: 0 | Status: DISCONTINUED | OUTPATIENT
Start: 2024-07-05 | End: 2024-07-22

## 2024-07-05 RX ORDER — SENNOSIDES 8.6 MG/1
2 TABLET ORAL AT BEDTIME
Refills: 0 | Status: DISCONTINUED | OUTPATIENT
Start: 2024-07-05 | End: 2024-07-25

## 2024-07-05 RX ADMIN — OXYCODONE HYDROCHLORIDE 5 MILLIGRAM(S): 30 TABLET ORAL at 13:00

## 2024-07-05 RX ADMIN — LEVETIRACETAM 500 MILLIGRAM(S): 1000 TABLET, FILM COATED ORAL at 06:39

## 2024-07-05 RX ADMIN — OXYCODONE HYDROCHLORIDE 5 MILLIGRAM(S): 30 TABLET ORAL at 04:48

## 2024-07-05 RX ADMIN — OXYCODONE HYDROCHLORIDE 10 MILLIGRAM(S): 30 TABLET ORAL at 07:29

## 2024-07-05 RX ADMIN — Medication 160 MILLIGRAM(S): at 11:08

## 2024-07-05 RX ADMIN — LIDOCAINE 5% 1 PATCH: 5 CREAM TOPICAL at 18:20

## 2024-07-05 RX ADMIN — Medication 100 MILLIGRAM(S): at 06:40

## 2024-07-05 RX ADMIN — Medication 975 MILLIGRAM(S): at 06:41

## 2024-07-05 RX ADMIN — Medication 975 MILLIGRAM(S): at 18:22

## 2024-07-05 RX ADMIN — Medication 975 MILLIGRAM(S): at 07:41

## 2024-07-05 RX ADMIN — LEVETIRACETAM 500 MILLIGRAM(S): 1000 TABLET, FILM COATED ORAL at 17:33

## 2024-07-05 RX ADMIN — LIDOCAINE 5% 1 PATCH: 5 CREAM TOPICAL at 11:07

## 2024-07-05 RX ADMIN — Medication 975 MILLIGRAM(S): at 17:31

## 2024-07-05 RX ADMIN — OXYCODONE HYDROCHLORIDE 10 MILLIGRAM(S): 30 TABLET ORAL at 07:59

## 2024-07-05 RX ADMIN — Medication 15 MILLIGRAM(S): at 11:09

## 2024-07-05 RX ADMIN — HEPARIN SODIUM 5000 UNIT(S): 1000 INJECTION, SOLUTION INTRAVENOUS; SUBCUTANEOUS at 06:39

## 2024-07-05 RX ADMIN — HEPARIN SODIUM 5000 UNIT(S): 1000 INJECTION, SOLUTION INTRAVENOUS; SUBCUTANEOUS at 17:34

## 2024-07-05 RX ADMIN — Medication 100 MILLIGRAM(S): at 17:31

## 2024-07-05 RX ADMIN — OXYCODONE HYDROCHLORIDE 5 MILLIGRAM(S): 30 TABLET ORAL at 04:18

## 2024-07-05 NOTE — PROGRESS NOTE ADULT - PROBLEM SELECTOR PLAN 7
Patient previously prescribed quetiapine 150mg qd, buspirone 15mg qd, mirtazaine 15mg qd. Unclear what she is currently taking.  - follow up on surescripts regarding her medications. Patient previously prescribed quetiapine 150mg qd, buspirone 15mg qd, mirtazapine 15mg qd. Unclear what she is currently taking.  - prescribed home doses of all medications

## 2024-07-05 NOTE — DIETITIAN INITIAL EVALUATION ADULT - PROBLEM SELECTOR PLAN 3
Hx of IV drug use   Pt states she is chronically on methadone 160 mg 5 days a week since age 19  Last heroin injection in L leg on Saturday 6/29  - confirm dose with methadone clinic  - monitor COWs

## 2024-07-05 NOTE — DIETITIAN INITIAL EVALUATION ADULT - WEIGHT FOR BMI (KG)
[FreeTextEntry2] : treatment will focus on supporting pt achieving his goals getting work, improving his mood, socializing more, decreasing perfectionism, and increasing motivation. will revisit treatment plan and duration in August. [Psychodynamic Therapy] : Psychodynamic Therapy  [Psychoeducation] : Psychoeducation  [de-identified] : Pt arrived on time for session. Pt reported he was able to make significant progress on his resume, which constitutes major progress toward treatment goals, as his avoidance of his resume has constituted a major source of angst and anxiety for pt. Additionally, Session focused on processing past modern masculinities group, and pt's conflicted feelings about other group members. Processed his capacity to express his displeasure in group and helped him see the valuable contribution of speaking his mind as it relates to having his feelings hurt by other group members. Pt was cooperative and open to therapeutic inquiry. Session concluded with pt in good emotional control.  [Recommended Frequency of Visits: ____] : Recommended frequency of visits: [unfilled] [Return in ____ week(s)] : Return in [unfilled] week(s) [FreeTextEntry1] : continue twice weekly psychotherapy, medication management, modern masculinities group.  81.2

## 2024-07-05 NOTE — PROGRESS NOTE ADULT - PROBLEM SELECTOR PLAN 5
As per patient has history of epilepsy  States she takes Keppra but unsure of dose, follows with Neurologist at [facility]  Per Jennifer, previously prescribed Keppra 1000mg bid.   - order Keppra 500mg bid x 2 doses for now, confirm doses.

## 2024-07-05 NOTE — DIETITIAN INITIAL EVALUATION ADULT - PROBLEM SELECTOR PLAN 2
Worsening left leg and thigh pain with radiation to groin  CT LE w IV contrast showed loculated fluid in L iliacus muscle, iliopsoas bursitis of infectious or inflamm. etiology  Ortho consulted, recommended IV antibiotics and IR consult in AM, will continue to follow  Given Zosyn and Vanc in ED  Toradol 15 mg IV push given for pain control  - f/u duplex of LLE --> no evidence of DVT  - pain control: tylenol for mild pain, ibuprofen 600mg q8h for moderate, toradol 15mg IV for severe pain

## 2024-07-05 NOTE — PROGRESS NOTE ADULT - PROBLEM SELECTOR PLAN 6
Problem/Plan - 6:  ·  Problem: Type 2 diabetes mellitus.   ·  Plan: Plan: Per patient's shelter, she was on insulin at some point, unclear of when she was taking it or history of diabetes.  - HbA1c ordered, follow up  - blood glucose 89 today. Per patient's shelter, she was on insulin at some point, unclear of when she was taking it or history of diabetes.  - HbA1c 5.1  - no need for management of this

## 2024-07-05 NOTE — DIETITIAN INITIAL EVALUATION ADULT - PROBLEM SELECTOR PLAN 1
Worsening left leg and thigh pain with radiation to groin  CT LE w IV contrast showed loculated fluid in L iliacus muscle, iliopsoas bursitis of infectious or inflamm. etiology  ortho consulted, recommended IV antibiotics and IR consult in AM, will continue to follow  Given Zosyn and Vanc in ED  - f/u blood cultures  - IR consulted for drainage; NPO after MN  - c/w vancomycin 1250mg q24h, obtain trough before 3rd dose  - c/w zosyn 3.375g q8h to cover GNR

## 2024-07-05 NOTE — DIETITIAN INITIAL EVALUATION ADULT - NS FNS DIET ORDER
Diet, Regular (07-05-24 @ 13:30)  Diet, NPO after Midnight:      NPO Start Date: 04-Jul-2024,   NPO Start Time: 23:59  Except Medications (07-05-24 @ 04:12)

## 2024-07-05 NOTE — PROGRESS NOTE ADULT - SUBJECTIVE AND OBJECTIVE BOX
INFECTIOUS DISEASES CONSULT FOLLOW-UP NOTE    INTERVAL HPI/OVERNIGHT EVENTS:    Patient seen and examined at bedside. JAKE. Afebrile.       ROS:   Constitutional, eyes, ENT, cardiovascular, respiratory, gastrointestinal, genitourinary, integumentary, neurological, psychiatric and heme/lymph are otherwise negative other than noted above       ANTIBIOTICS/RELEVANT:    MEDICATIONS  (STANDING):  acetaminophen     Tablet .. 975 milliGRAM(s) Oral every 8 hours  ceFAZolin   IVPB 2000 milliGRAM(s) IV Intermittent every 8 hours  heparin   Injectable 5000 Unit(s) SubCutaneous every 8 hours  levETIRAcetam 500 milliGRAM(s) Oral two times a day  lidocaine   4% Patch 1 Patch Transdermal daily  methadone    Tablet 160 milliGRAM(s) Oral daily  mirtazapine 15 milliGRAM(s) Oral daily    MEDICATIONS  (PRN):  naloxone Injectable 1 milliGRAM(s) IV Push every 3 minutes PRN in case of overdose  oxyCODONE    IR 5 milliGRAM(s) Oral every 6 hours PRN Moderate Pain (4 - 6)  oxyCODONE    IR 10 milliGRAM(s) Oral every 8 hours PRN Severe Pain (7 - 10)        Vital Signs Last 24 Hrs  T(C): 36.9 (05 Jul 2024 04:49), Max: 37.6 (04 Jul 2024 15:39)  T(F): 98.5 (05 Jul 2024 04:49), Max: 99.7 (04 Jul 2024 15:39)  HR: 78 (05 Jul 2024 04:49) (77 - 98)  BP: 115/60 (05 Jul 2024 04:49) (109/67 - 115/60)  BP(mean): --  RR: 18 (05 Jul 2024 04:49) (17 - 18)  SpO2: 96% (05 Jul 2024 04:49) (94% - 97%)    Parameters below as of 05 Jul 2024 04:49  Patient On (Oxygen Delivery Method): room air        PHYSICAL EXAM:  Constitutional: alert, NAD  Eyes: the sclera and conjunctiva were normal.   ENT: the ears and nose were normal in appearance.   Neck: the appearance of the neck was normal and the neck was supple.   Pulmonary: no respiratory distress and lungs were clear to auscultation bilaterally.   Heart: heart rate was normal and rhythm regular, normal S1 and S2  Vascular:. there was no peripheral edema  Abdomen: normal bowel sounds, soft, non-tender  Extremities : LLE thigh tenderness extending towards L hip. Palpated c/t/l spine, b/l shoulders/elbows/wrists/knees/ankles - no tenderness.   Neurological: no focal deficits.   Psychiatric: the affect was normal      LABS:                        9.0    6.82  )-----------( 363      ( 05 Jul 2024 10:00 )             29.0     07-05    136  |  97  |  13  ----------------------------<  90  5.0   |  27  |  0.87    Ca    10.2      05 Jul 2024 10:00  Phos  4.9     07-05  Mg     2.0     07-05    TPro  8.5<H>  /  Alb  3.1<L>  /  TBili  0.3  /  DBili  x   /  AST  65<H>  /  ALT  32  /  AlkPhos  72  07-05      Urinalysis Basic - ( 05 Jul 2024 10:00 )    Color: x / Appearance: x / SG: x / pH: x  Gluc: 90 mg/dL / Ketone: x  / Bili: x / Urobili: x   Blood: x / Protein: x / Nitrite: x   Leuk Esterase: x / RBC: x / WBC x   Sq Epi: x / Non Sq Epi: x / Bacteria: x        MICROBIOLOGY:    Culture - Blood (collected 07-03-24 @ 22:42)  Source: .Blood Blood  Preliminary Report (07-05-24 @ 02:02):    No growth at 24 hours    Culture - Body Fluid with Gram Stain (collected 07-03-24 @ 14:00)  Source: .Body Fluid left psoas tendon fluid  Gram Stain (07-04-24 @ 18:59):    Moderate polymorphonuclear leukocytes per low power field    No organisms seen per oil power field  Preliminary Report (07-04-24 @ 18:59):    Rare Staphylococcus aureus    Culture - Blood (collected 07-03-24 @ 08:30)  Source: .Blood Blood  Preliminary Report (07-04-24 @ 18:02):    No growth at 24 hours    Culture - Blood (collected 07-03-24 @ 08:30)  Source: .Blood Blood  Preliminary Report (07-04-24 @ 18:02):    No growth at 24 hours    Culture - Blood (collected 07-01-24 @ 12:58)  Source: .Blood Blood  Preliminary Report (07-04-24 @ 22:01):    No growth at 72 Hours    Culture - Blood (collected 07-01-24 @ 12:58)  Source: .Blood Blood  Gram Stain (07-02-24 @ 19:09):    Growth in anaerobic bottle: Gram Positive Cocci in Clusters  Final Report (07-04-24 @ 10:15):    Growth in anaerobic bottle: Staphylococcus aureus    Direct identification is available within approximately 3-5    hours either by Blood Panel Multiplexed PCR or Direct    MALDI-TOF. Details: [email or web address]  Organism: Blood Culture PCR  Staphylococcus aureus (07-04-24 @ 10:15)  Organism: Staphylococcus aureus (07-04-24 @ 10:15)      -  Clindamycin: R 0.5 This isolate is presumed to be clindamycin resistant based on detection of inducible resistance. Clindamycin may still be effective in some patients.      -  Oxacillin: S <=0.25 Oxacillin predicts susceptibility for dicloxacillin, methicillin, and nafcillin      -  Gentamicin: S <=1 Should not be used as monotherapy      -  Vancomycin: S 2      -  Tetracycline: S <=1      Method Type: BHARAT      -  Penicillin: R 4      -  Rifampin: S <=1 Should not be used as monotherapy      -  Erythromycin: R >4      -  Trimethoprim/Sulfamethoxazole: S <=0.5/9.5  Organism: Blood Culture PCR (07-04-24 @ 10:15)      Method Type: PCR      -  Methicillin SENSITIVE Staphylococcus aureus (MSSA): Detec Any isolate of Staphylococcus aureus from a blood culture is NOT considered a contaminant.        RADIOLOGY & ADDITIONAL STUDIES:  Reviewed

## 2024-07-05 NOTE — PROGRESS NOTE ADULT - PROBLEM SELECTOR PLAN 8
F: NS 1L  E: replete as needed  N: regular diet  DVT ppx: given 1 dose of lovenox on 7/2  Dispo: RMF.

## 2024-07-05 NOTE — DIETITIAN INITIAL EVALUATION ADULT - PROBLEM SELECTOR PLAN 5
As per patient has history of epilepsy  States she takes Keppra but unsure of dose, follows with Neurologist at [facility]  Per Jennifer, previously prescribed Keppra 1000mg bid.   - order Keppra 500mg bid x 2 doses for now, confirm doses in the morning

## 2024-07-05 NOTE — DIETITIAN INITIAL EVALUATION ADULT - PERTINENT LABORATORY DATA
07-05    136  |  97  |  13  ----------------------------<  90  5.0   |  27  |  0.87    Ca    10.2      05 Jul 2024 10:00  Phos  4.9     07-05  Mg     2.0     07-05    TPro  8.5<H>  /  Alb  3.1<L>  /  TBili  0.3  /  DBili  x   /  AST  65<H>  /  ALT  32  /  AlkPhos  72  07-05  A1C with Estimated Average Glucose Result: 5.1 % (07-02-24 @ 19:01)

## 2024-07-05 NOTE — DIETITIAN INITIAL EVALUATION ADULT - ORAL INTAKE PTA/DIET HISTORY
No known food allergies nor food intolerances reported. Pt reported good appetite PTA, denied following any specific diet at home, however reported has been forgetting to eat due to ?memory loss and IVDU.

## 2024-07-05 NOTE — DIETITIAN INITIAL EVALUATION ADULT - COLLABORATION WITH OTHER PROVIDERS
Lydia - [facility]/ Dr. Mensah  Returning call, available at:  [phone]   Recommendations provided to team

## 2024-07-05 NOTE — PROGRESS NOTE ADULT - SUBJECTIVE AND OBJECTIVE BOX
OVERNIGHT EVENTS:    SUBJECTIVE / INTERVAL HPI: Patient seen and examined at bedside.     VITAL SIGNS:  Vital Signs Last 24 Hrs  T(C): 36.9 (05 Jul 2024 04:49), Max: 37.6 (04 Jul 2024 15:39)  T(F): 98.5 (05 Jul 2024 04:49), Max: 99.7 (04 Jul 2024 15:39)  HR: 78 (05 Jul 2024 04:49) (77 - 98)  BP: 115/60 (05 Jul 2024 04:49) (109/67 - 115/60)  BP(mean): --  RR: 18 (05 Jul 2024 04:49) (17 - 18)  SpO2: 96% (05 Jul 2024 04:49) (94% - 98%)    Parameters below as of 05 Jul 2024 04:49  Patient On (Oxygen Delivery Method): room air      I&O's Summary      PHYSICAL EXAM:  General: WDWN  HEENT: NC/AT; PERRL, anicteric sclera; MMM  Neck: supple  Cardiovascular: +S1/S2; RRR  Respiratory: CTA B/L; no W/R/R  Gastrointestinal: soft, NT/ND; +BSx4  Extremities: WWP; no edema, clubbing or cyanosis  Vascular: 2+ radial, DP/PT pulses B/L  Neurological: AAOx3; no focal deficits    MEDICATIONS:  MEDICATIONS  (STANDING):  acetaminophen     Tablet .. 975 milliGRAM(s) Oral every 8 hours  ceFAZolin   IVPB 2000 milliGRAM(s) IV Intermittent every 8 hours  heparin   Injectable 5000 Unit(s) SubCutaneous every 8 hours  levETIRAcetam 500 milliGRAM(s) Oral two times a day  lidocaine   4% Patch 1 Patch Transdermal daily  methadone    Tablet 160 milliGRAM(s) Oral daily  mirtazapine 15 milliGRAM(s) Oral daily    MEDICATIONS  (PRN):  naloxone Injectable 1 milliGRAM(s) IV Push every 3 minutes PRN in case of overdose  oxyCODONE    IR 5 milliGRAM(s) Oral every 6 hours PRN Moderate Pain (4 - 6)  oxyCODONE    IR 10 milliGRAM(s) Oral every 8 hours PRN Severe Pain (7 - 10)      ALLERGIES:  Allergies    No Known Allergies    Intolerances        LABS:                        7.6    7.87  )-----------( 274      ( 04 Jul 2024 05:30 )             23.6     07-04    136  |  101  |  21  ----------------------------<  110<H>  4.2   |  28  |  1.03    Ca    9.3      04 Jul 2024 05:30  Phos  4.0     07-04  Mg     1.9     07-04    TPro  7.4  /  Alb  2.6<L>  /  TBili  0.2  /  DBili  x   /  AST  29  /  ALT  16  /  AlkPhos  66  07-04      Urinalysis Basic - ( 04 Jul 2024 05:30 )    Color: x / Appearance: x / SG: x / pH: x  Gluc: 110 mg/dL / Ketone: x  / Bili: x / Urobili: x   Blood: x / Protein: x / Nitrite: x   Leuk Esterase: x / RBC: x / WBC x   Sq Epi: x / Non Sq Epi: x / Bacteria: x      CAPILLARY BLOOD GLUCOSE          RADIOLOGY & ADDITIONAL TESTS: Reviewed.   OVERNIGHT EVENTS: No overnight events. Patient NPO at midnight.    SUBJECTIVE / INTERVAL HPI: Patient seen and examined at bedside. She reports some mild pain still in her lower extremity. She is currently on tylenol and oxycodone 5mg PRN for moderate pain, and 10mg PRN for severe pain.    VITAL SIGNS:  Vital Signs Last 24 Hrs  T(C): 36.9 (05 Jul 2024 04:49), Max: 37.6 (04 Jul 2024 15:39)  T(F): 98.5 (05 Jul 2024 04:49), Max: 99.7 (04 Jul 2024 15:39)  HR: 78 (05 Jul 2024 04:49) (77 - 98)  BP: 115/60 (05 Jul 2024 04:49) (109/67 - 115/60)  BP(mean): --  RR: 18 (05 Jul 2024 04:49) (17 - 18)  SpO2: 96% (05 Jul 2024 04:49) (94% - 98%)    Parameters below as of 05 Jul 2024 04:49  Patient On (Oxygen Delivery Method): room air    General: WDWN  HEENT: NC/AT; PERRL, anicteric sclera; MMM  Neck: supple  Cardiovascular: +S1/S2; RRR  Respiratory: CTA B/L; no W/R/R  Gastrointestinal: soft, NT/ND; +BSx4  Extremities: WWP; no edema, clubbing or cyanosis  Vascular: 2+ radial, DP/PT pulses B/L  Neurological: AAOx3; no focal deficits      PHYSICAL EXAM:  General: WDWN  HEENT: NC/AT; PERRL, anicteric sclera; MMM  Neck: supple  Cardiovascular: +S1/S2; RRR  Respiratory: CTA B/L; no W/R/R  Gastrointestinal: soft, NT/ND; +BSx4  Extremities: WWP; no edema, clubbing or cyanosis  Vascular: 2+ radial, DP/PT pulses B/L  Neurological: AAOx3; no focal deficits    MEDICATIONS:  MEDICATIONS  (STANDING):  acetaminophen     Tablet .. 975 milliGRAM(s) Oral every 8 hours  ceFAZolin   IVPB 2000 milliGRAM(s) IV Intermittent every 8 hours  heparin   Injectable 5000 Unit(s) SubCutaneous every 8 hours  levETIRAcetam 500 milliGRAM(s) Oral two times a day  lidocaine   4% Patch 1 Patch Transdermal daily  methadone    Tablet 160 milliGRAM(s) Oral daily  mirtazapine 15 milliGRAM(s) Oral daily    MEDICATIONS  (PRN):  naloxone Injectable 1 milliGRAM(s) IV Push every 3 minutes PRN in case of overdose  oxyCODONE    IR 5 milliGRAM(s) Oral every 6 hours PRN Moderate Pain (4 - 6)  oxyCODONE    IR 10 milliGRAM(s) Oral every 8 hours PRN Severe Pain (7 - 10)      ALLERGIES:  No Known Allergies          LABS:                        7.6    7.87  )-----------( 274      ( 04 Jul 2024 05:30 )             23.6     07-04    136  |  101  |  21  ----------------------------<  110<H>  4.2   |  28  |  1.03    Ca    9.3      04 Jul 2024 05:30  Phos  4.0     07-04  Mg     1.9     07-04    TPro  7.4  /  Alb  2.6<L>  /  TBili  0.2  /  DBili  x   /  AST  29  /  ALT  16  /  AlkPhos  66  07-04      Urinalysis Basic - ( 04 Jul 2024 05:30 )    Color: x / Appearance: x / SG: x / pH: x  Gluc: 110 mg/dL / Ketone: x  / Bili: x / Urobili: x   Blood: x / Protein: x / Nitrite: x   Leuk Esterase: x / RBC: x / WBC x   Sq Epi: x / Non Sq Epi: x / Bacteria: x      CAPILLARY BLOOD GLUCOSE          RADIOLOGY & ADDITIONAL TESTS: Reviewed.   OVERNIGHT EVENTS: No overnight events. Patient NPO at midnight.    SUBJECTIVE / INTERVAL HPI: Patient seen and examined at bedside. She reports some mild pain still in her lower extremity. She is currently on tylenol and oxycodone 5mg PRN for moderate pain, and 10mg PRN for severe pain which is working for her.    VITAL SIGNS:  Vital Signs Last 24 Hrs  T(C): 36.9 (05 Jul 2024 04:49), Max: 37.6 (04 Jul 2024 15:39)  T(F): 98.5 (05 Jul 2024 04:49), Max: 99.7 (04 Jul 2024 15:39)  HR: 78 (05 Jul 2024 04:49) (77 - 98)  BP: 115/60 (05 Jul 2024 04:49) (109/67 - 115/60)  BP(mean): --  RR: 18 (05 Jul 2024 04:49) (17 - 18)  SpO2: 96% (05 Jul 2024 04:49) (94% - 98%)    Parameters below as of 05 Jul 2024 04:49  Patient On (Oxygen Delivery Method): room air    General: WDWN  HEENT: NC/AT; PERRL, anicteric sclera; MMM  Neck: supple  Cardiovascular: +S1/S2; RRR  Respiratory: CTA B/L; no W/R/R  Gastrointestinal: soft, NT/ND; +BSx4  Extremities: WWP; no edema, clubbing or cyanosis  Vascular: 2+ radial, DP/PT pulses B/L  Neurological: AAOx3; no focal deficits      PHYSICAL EXAM:  General: WDWN  HEENT: NC/AT; PERRL, anicteric sclera; MMM  Neck: supple  Cardiovascular: +S1/S2; RRR  Respiratory: CTA B/L; no W/R/R  Gastrointestinal: soft, NT/ND; +BSx4  Extremities: WWP; no edema, clubbing or cyanosis  Vascular: 2+ radial, DP/PT pulses B/L  Neurological: AAOx3; no focal deficits    MEDICATIONS:  MEDICATIONS  (STANDING):  acetaminophen     Tablet .. 975 milliGRAM(s) Oral every 8 hours  ceFAZolin   IVPB 2000 milliGRAM(s) IV Intermittent every 8 hours  heparin   Injectable 5000 Unit(s) SubCutaneous every 8 hours  levETIRAcetam 500 milliGRAM(s) Oral two times a day  lidocaine   4% Patch 1 Patch Transdermal daily  methadone    Tablet 160 milliGRAM(s) Oral daily  mirtazapine 15 milliGRAM(s) Oral daily    MEDICATIONS  (PRN):  naloxone Injectable 1 milliGRAM(s) IV Push every 3 minutes PRN in case of overdose  oxyCODONE    IR 5 milliGRAM(s) Oral every 6 hours PRN Moderate Pain (4 - 6)  oxyCODONE    IR 10 milliGRAM(s) Oral every 8 hours PRN Severe Pain (7 - 10)      ALLERGIES:  No Known Allergies          LABS:                        7.6    7.87  )-----------( 274      ( 04 Jul 2024 05:30 )             23.6     07-04    136  |  101  |  21  ----------------------------<  110<H>  4.2   |  28  |  1.03    Ca    9.3      04 Jul 2024 05:30  Phos  4.0     07-04  Mg     1.9     07-04    TPro  7.4  /  Alb  2.6<L>  /  TBili  0.2  /  DBili  x   /  AST  29  /  ALT  16  /  AlkPhos  66  07-04      Urinalysis Basic - ( 04 Jul 2024 05:30 )    Color: x / Appearance: x / SG: x / pH: x  Gluc: 110 mg/dL / Ketone: x  / Bili: x / Urobili: x   Blood: x / Protein: x / Nitrite: x   Leuk Esterase: x / RBC: x / WBC x   Sq Epi: x / Non Sq Epi: x / Bacteria: x      CAPILLARY BLOOD GLUCOSE          RADIOLOGY & ADDITIONAL TESTS: Reviewed.   OVERNIGHT EVENTS: No overnight events. Patient NPO at midnight.    SUBJECTIVE / INTERVAL HPI: Patient seen and examined at bedside. She reports some mild pain still in her lower extremity. She is currently on tylenol and oxycodone 5mg PRN for moderate pain, and 10mg PRN for severe pain which is working for her.    VITAL SIGNS:  Vital Signs Last 24 Hrs  T(C): 36.9 (05 Jul 2024 04:49), Max: 37.6 (04 Jul 2024 15:39)  T(F): 98.5 (05 Jul 2024 04:49), Max: 99.7 (04 Jul 2024 15:39)  HR: 78 (05 Jul 2024 04:49) (77 - 98)  BP: 115/60 (05 Jul 2024 04:49) (109/67 - 115/60)  BP(mean): --  RR: 18 (05 Jul 2024 04:49) (17 - 18)  SpO2: 96% (05 Jul 2024 04:49) (94% - 98%)    Parameters below as of 05 Jul 2024 04:49  Patient On (Oxygen Delivery Method): room air    General: WDWN  HEENT: NC/AT; PERRL, anicteric sclera; MMM  Neck: supple  Cardiovascular: +S1/S2; RRR  Respiratory: CTA B/L; no W/R/R  Gastrointestinal: soft, NT/ND; +BSx4  Extremities: WWP; no edema, clubbing or cyanosis  Vascular: 2+ radial, DP/PT pulses B/L  Neurological: AAOx3; no focal deficits      PHYSICAL EXAM:  General: fatigued, inadequate nutrition.  HEENT: NC/AT; PERRL, anicteric sclera; MMM  Neck: supple  Cardiovascular: +S1/S2; RRR  Respiratory: CTA B/L; no W/R/R  Gastrointestinal: soft, NT/ND; +BSx4  Extremities: WWP; no edema, clubbing or cyanosis  Vascular: 2+ radial, DP/PT pulses B/L  Neurological: AAOx3; no focal deficits    MEDICATIONS:  MEDICATIONS  (STANDING):  acetaminophen     Tablet .. 975 milliGRAM(s) Oral every 8 hours  ceFAZolin   IVPB 2000 milliGRAM(s) IV Intermittent every 8 hours  heparin   Injectable 5000 Unit(s) SubCutaneous every 8 hours  levETIRAcetam 500 milliGRAM(s) Oral two times a day  lidocaine   4% Patch 1 Patch Transdermal daily  methadone    Tablet 160 milliGRAM(s) Oral daily  mirtazapine 15 milliGRAM(s) Oral daily    MEDICATIONS  (PRN):  naloxone Injectable 1 milliGRAM(s) IV Push every 3 minutes PRN in case of overdose  oxyCODONE    IR 5 milliGRAM(s) Oral every 6 hours PRN Moderate Pain (4 - 6)  oxyCODONE    IR 10 milliGRAM(s) Oral every 8 hours PRN Severe Pain (7 - 10)      ALLERGIES:  No Known Allergies          LABS:                        7.6    7.87  )-----------( 274      ( 04 Jul 2024 05:30 )             23.6     07-04    136  |  101  |  21  ----------------------------<  110<H>  4.2   |  28  |  1.03    Ca    9.3      04 Jul 2024 05:30  Phos  4.0     07-04  Mg     1.9     07-04    TPro  7.4  /  Alb  2.6<L>  /  TBili  0.2  /  DBili  x   /  AST  29  /  ALT  16  /  AlkPhos  66  07-04      Urinalysis Basic - ( 04 Jul 2024 05:30 )    Color: x / Appearance: x / SG: x / pH: x  Gluc: 110 mg/dL / Ketone: x  / Bili: x / Urobili: x   Blood: x / Protein: x / Nitrite: x   Leuk Esterase: x / RBC: x / WBC x   Sq Epi: x / Non Sq Epi: x / Bacteria: x      CAPILLARY BLOOD GLUCOSE          RADIOLOGY & ADDITIONAL TESTS: Reviewed.   OVERNIGHT EVENTS: No overnight events. Patient NPO at midnight.    SUBJECTIVE / INTERVAL HPI: Patient seen and examined at bedside. She reports some mild pain still in her lower extremity. She is currently on tylenol and oxycodone 5mg PRN for moderate pain, and 10mg PRN for severe pain which is working for her.    VITAL SIGNS:  Vital Signs Last 24 Hrs  T(C): 36.9 (05 Jul 2024 04:49), Max: 37.6 (04 Jul 2024 15:39)  T(F): 98.5 (05 Jul 2024 04:49), Max: 99.7 (04 Jul 2024 15:39)  HR: 78 (05 Jul 2024 04:49) (77 - 98)  BP: 115/60 (05 Jul 2024 04:49) (109/67 - 115/60)  BP(mean): --  RR: 18 (05 Jul 2024 04:49) (17 - 18)  SpO2: 96% (05 Jul 2024 04:49) (94% - 98%)    Parameters below as of 05 Jul 2024 04:49  Patient On (Oxygen Delivery Method): room air    General:  fatigued, inadequate nutrition.  HEENT: NC/AT; PERRL, anicteric sclera; MMM  Neck: supple  Cardiovascular: +S1/S2; RRR  Respiratory: CTA B/L; no W/R/R  Gastrointestinal: soft, NT/ND; +BSx4  Extremities: WWP; no edema, clubbing or cyanosis  Vascular: 2+ radial, DP/PT pulses B/L  Neurological: AAOx3; no focal deficits      PHYSICAL EXAM:  General: fatigued, inadequate nutrition.  HEENT: NC/AT; PERRL, anicteric sclera; MMM  Neck: supple  Cardiovascular: +S1/S2; RRR  Respiratory: CTA B/L; no W/R/R  Gastrointestinal: soft, NT/ND; +BSx4  Extremities: WWP; no edema, clubbing or cyanosis  Vascular: 2+ radial, DP/PT pulses B/L  Neurological: AAOx3; no focal deficits    MEDICATIONS:  MEDICATIONS  (STANDING):  acetaminophen     Tablet .. 975 milliGRAM(s) Oral every 8 hours  ceFAZolin   IVPB 2000 milliGRAM(s) IV Intermittent every 8 hours  heparin   Injectable 5000 Unit(s) SubCutaneous every 8 hours  levETIRAcetam 500 milliGRAM(s) Oral two times a day  lidocaine   4% Patch 1 Patch Transdermal daily  methadone    Tablet 160 milliGRAM(s) Oral daily  mirtazapine 15 milliGRAM(s) Oral daily    MEDICATIONS  (PRN):  naloxone Injectable 1 milliGRAM(s) IV Push every 3 minutes PRN in case of overdose  oxyCODONE    IR 5 milliGRAM(s) Oral every 6 hours PRN Moderate Pain (4 - 6)  oxyCODONE    IR 10 milliGRAM(s) Oral every 8 hours PRN Severe Pain (7 - 10)      ALLERGIES:  No Known Allergies          LABS:                        7.6    7.87  )-----------( 274      ( 04 Jul 2024 05:30 )             23.6     07-04    136  |  101  |  21  ----------------------------<  110<H>  4.2   |  28  |  1.03    Ca    9.3      04 Jul 2024 05:30  Phos  4.0     07-04  Mg     1.9     07-04    TPro  7.4  /  Alb  2.6<L>  /  TBili  0.2  /  DBili  x   /  AST  29  /  ALT  16  /  AlkPhos  66  07-04      Urinalysis Basic - ( 04 Jul 2024 05:30 )    Color: x / Appearance: x / SG: x / pH: x  Gluc: 110 mg/dL / Ketone: x  / Bili: x / Urobili: x   Blood: x / Protein: x / Nitrite: x   Leuk Esterase: x / RBC: x / WBC x   Sq Epi: x / Non Sq Epi: x / Bacteria: x      CAPILLARY BLOOD GLUCOSE          RADIOLOGY & ADDITIONAL TESTS: Reviewed.

## 2024-07-05 NOTE — DIETITIAN INITIAL EVALUATION ADULT - OTHER CALCULATIONS
*Using +10% ideal body weight (126.5 pounds) as pt is >120% ideal body weight. Needs adjusted for malnutrition. ideal body weight: 115 pounds; % ideal body weight: 155.7%

## 2024-07-05 NOTE — DIETITIAN INITIAL EVALUATION ADULT - PROBLEM SELECTOR PLAN 7
Pre-op Diagnosis: Septic infrapatellar bursitis of left knee [M71.162, B96.89]    The above referenced H&P was reviewed by Jalyn Dick MD on 6/3/2020, the patient was examined and no significant changes have occurred in the patient's condition since the F: NS 1L  E: replete as needed  N: regular diet  DVT ppx: hold prior to IR procedure  Dispo: [facility]

## 2024-07-05 NOTE — PROGRESS NOTE ADULT - ATTENDING COMMENTS
59-year-old female with a PMHx of IVDU (on Methadone), anemia and epilepsy who presented with LLE pain, found to have loculated fluid collection of left iliacus muscle and iliopsoas bursitis c/b MSSA bacteremia.  BCx (7/1) MSSA, BCx (7/3) NGTD.     Plan:    -IR consulted, s/p drainage, Cx with staph aureus    -ID consulted, continue with Cefazolin, follow up TED and obtain another set of surveillance BCx    -ortho consulted, no acute surgical intervention   -PT recommends MARY    Rest of plan as per resident note.

## 2024-07-05 NOTE — PROGRESS NOTE ADULT - PROBLEM SELECTOR PLAN 1
Worsening left leg and thigh pain with radiation to groin  CT LE w IV contrast showed loculated fluid in L iliacus muscle, iliopsoas bursitis of infectious or inflammatory etiology  ortho consulted, recommended IV antibiotics and IR consult in AM, will continue to follow ********  Given Zosyn and Vanc in ED  - blood cultures grew ______  - cefazolin 2g q8hs, day ____  - ESR ordered **  - f/u cultures from IR aspiration **  - surveillance blood culture sent **  - HepC RNA per ID recs ** Worsening left leg and thigh pain with radiation to groin  CT LE w IV contrast showed loculated fluid in L iliacus muscle, iliopsoas bursitis of infectious or inflammatory etiology  ortho consulted, recommended IV antibiotics and IR consult in AM, will continue to follow   Given Zosyn and Vanc in ED  - blood cultures grew ______  - cefazolin 2g q8hs, day ____  - ESR ordered **  - f/u cultures from IR aspiration **  - surveillance blood culture sent **  - HepC RNA per ID recs ** Worsening left leg and thigh pain with radiation to groin  CT LE w IV contrast showed loculated fluid in L iliacus muscle, iliopsoas bursitis of infectious or inflammatory etiology  ortho consulted, recommended IV antibiotics and IR consult in AM, will continue to follow   Given Zosyn and Vanc in ED  - blood cultures grew MSSA  - cefazolin 2g q8hs, day 3  - ESR ordered **  - f/u cultures from IR aspiration  - surveillance blood cultures sent  - per ID recs, if cultures from 7/3 have no growth, no need for further surveillance cultures  - HepC RNA pending Worsening left leg and thigh pain with radiation to groin  CT LE w IV contrast showed loculated fluid in L iliacus muscle, iliopsoas bursitis of infectious or inflammatory etiology  ortho consulted, recommended IV antibiotics and IR consult in AM, will continue to follow   Given Zosyn and Vanc in ED  - blood cultures grew MSSA  - cefazolin 2g q8hs, day 3  -   - f/u cultures from IR aspiration  - surveillance blood cultures sent  - per ID recs, if cultures from 7/3 have no growth, no need for further surveillance cultures  - HepC RNA pending

## 2024-07-05 NOTE — DIETITIAN INITIAL EVALUATION ADULT - PERTINENT MEDS FT
MEDICATIONS  (STANDING):  acetaminophen     Tablet .. 975 milliGRAM(s) Oral every 8 hours  ceFAZolin   IVPB 2000 milliGRAM(s) IV Intermittent every 8 hours  heparin   Injectable 5000 Unit(s) SubCutaneous every 8 hours  levETIRAcetam 500 milliGRAM(s) Oral two times a day  lidocaine   4% Patch 1 Patch Transdermal daily  methadone    Tablet 160 milliGRAM(s) Oral daily  mirtazapine 15 milliGRAM(s) Oral daily    MEDICATIONS  (PRN):  naloxone Injectable 1 milliGRAM(s) IV Push every 3 minutes PRN in case of overdose  oxyCODONE    IR 5 milliGRAM(s) Oral every 6 hours PRN Moderate Pain (4 - 6)  oxyCODONE    IR 10 milliGRAM(s) Oral every 8 hours PRN Severe Pain (7 - 10)

## 2024-07-05 NOTE — PROGRESS NOTE ADULT - ASSESSMENT
59F with hx of IVDU (heroin/cocaine) on methadone, epilepsy who presents for sharp L leg pain progressively worsening over the past month found to have loculated fluid in L iliacus muscle, iliopsoas bursitis c/b MSSA bacteremia. Patient afebrile without leukocytosis. Fluid collection/bursitis likely 2/2 injecting IV drug into LLE. Given MSSA BSI- suspect MSSA as causative pathogen of iliopsoas collection. S/P IR drainage 7/3 - 5 cc purulent fluid aspirated and sent for culture. IR drainage cx growing staph aureus (sensis pending). TTE without vegetation- TED pending (scheduled for today)  HIV screen neg  Hep C Ab reactive - Quantitative RNA pending      Suggest:  -f/u bcxs 7/1 - MSSA  -f/u surveillance blood cultures 7/3 --ngtd    -send set of surveillance cultures today    -f/u IR aspirate culture 7/3  -f/u ETD  -continue cefazolin 2g IV Q8     Team 2 will follow you.  Dr. Chaves will cover the weekend. Dr. He will assume care 7/8  Case d/w primary team.  Final recommendation pending attending note.    Lena Decker, Infectious Diseases PA  Please reach out for any questions 9 am-5pm. For evenings and weekends, please call the ID physician on call.  Work cell: [phone]

## 2024-07-05 NOTE — PROGRESS NOTE ADULT - PROBLEM SELECTOR PLAN 2
Worsening left leg and thigh pain with radiation to groin  CT LE w IV contrast showed loculated fluid in L iliacus muscle, iliopsoas bursitis of infectious or inflamm. etiology  Ortho consulted, recommended IV antibiotics and IR consult, will continue to follow ********  Given Zosyn and Vanc in ED  s/p Toradol 15 mg IV push given for pain control  - duplex of LLE showed no evidence of DVT **  - pain regimen: standing 975 tylenol q8, lidocaine patch, oxy prn 5mg q4.  - IR procedure completed, pt tolerated procedure well. ** Left leg and thigh pain with radiation to groin  CT LE w IV contrast showed loculated fluid in L iliacus muscle, iliopsoas bursitis of likely infections etiology  s/p IR drainage   - duplex of LLE showed no evidence of DVT  - pain regimen: standing 975 tylenol q8, lidocaine patch, oxy prn 5mg for moderate pain, oxy prn 10mg for severe pain

## 2024-07-05 NOTE — PROGRESS NOTE ADULT - PROBLEM SELECTOR PLAN 4
Pt states history of anemia  On admission Hg 10.2, Hct 31.0  - Continue to monitor H&H  - Maintain active type & screen  - Most recent Hb 9.0. Pt states history of anemia  On admission Hg 10.2, Hct 31.0  - Continue to monitor H&H  - Maintain active type & screen  - Most recent Hb 9.0, improved from 7.6

## 2024-07-05 NOTE — DIETITIAN INITIAL EVALUATION ADULT - OTHER INFO
Per H&P: Pt is 58 y/o F pmhx significant for IV drug use, anemia, epilepsy who presents for sharp L leg pain progressively worsening over the past month. Pt states her pain began in the left thigh and later radiated to her toes but now has persisted in her entire left leg and groin. Pt reports this began about one month ago and required multiple ER visits. She states she went to [place] on 5/31 where nothing was done and she was discharged. Most recently last week she returned to [place] where she states she was given a 7 day course of oral antibiotics which she completed but did not improve her pain. She took Motrin which provided very minimal relief.  Pt last  injected heroin into her left thigh on Saturday 6/29. As per patient, she is chronically on methadone 160 mg 5 days a week since she was 19. She reports subjective fevers. Denies nausea, vomiting, chest pain, shortness of breath, constipation, diarrhea, pain on urination, hematuria, hematemesis.     Met with pt this AM at bedside. Pt was seated upright and able to articulate her nutrition hx well. No known food allergies nor food intolerances reported. Pt reported good appetite PTA, denied following any specific diet at home, however reported has been forgetting to eat due to ?memory loss and IVDU. Pt mentioned her current appetite is good, however she has been unable to eat due to NPO status for potential procedure. Pt endorsed nausea due to hunger (denied emesis). Pt denied diarrhea, however endorsed constipation, stated last BM 6/30. RD encouraged adequate fluids and fiber when PO diet returns to assist with BMs. Pt reported usual body weight 179 pounds and denies any recent wt loss/gain, however mentioned usual body weight ~286 pounds x 1 yr ago (37% wt loss x 1 year - meets criteria for malnutrition) with significant weight loss due to ?memory loss and IVDU (RD ?accuracy of previous weight). nutrition focused physical exam did not reveal any overt signs of muscle or subcutaneous fat wasting at this time.     *Note: Phos 4.9 mg/dL (7/5)

## 2024-07-05 NOTE — DIETITIAN INITIAL EVALUATION ADULT - PROBLEM SELECTOR PLAN 4
Pt states history of anemia  On admission Hg 10.2, Hct 31.0  - Continue to monitor H&H  - Maintain active type & screen

## 2024-07-05 NOTE — PROGRESS NOTE ADULT - PROBLEM SELECTOR PLAN 3
Problem/Plan - 3:  ·  Problem: IVDU (intravenous drug user).   ·  Plan: Plan: Hx of IV drug use   Pt states she is chronically on methadone 160 mg 5 days a week since age 19  Last heroin injection in L leg on Saturday 6/29  - confirmed dose with methadone clinic: 160mg, 5x/week  - spoke with Esther Baker at methadone clinic: [phone]  - last dose given on 6/29 for 6/30  - monitor COWs.  - HIV testing ordered, f/u  - hepatitis panel ordered ** results?  - TTE ordered ***  - TED scheduled for today 7/5  - continue cefazolin 2g IV q8 per ID recs. Hx of IV drug use  Pt states she is chronically on methadone 160 mg 5 days a week since age 19  Last heroin injection in L leg on Saturday 6/29  - confirmed dose with methadone clinic: 160mg, 5x/week  - last dose given on 6/29 for 6/30  - monitor COWs.  - HIV testing ordered, negative  - hepatitis panel ordered ** results?  - TTE ordered ***  - TED scheduled for today 7/5  - continue cefazolin 2g IV q8 per ID recs. Hx of IV drug use  Pt states she is chronically on methadone 160 mg 5 days a week since age 19  Last heroin injection in L leg on Saturday 6/29  - confirmed dose with methadone clinic: 160mg, 5x/week  - last dose given on 6/29 for 6/30  - monitor COWs.  - HIV testing ordered, negative  - hepatitis panel reactive  - TTE ordered, negative for endocarditis  - TED scheduled for today 7/5  - continue cefazolin 2g IV q8 per ID recs.

## 2024-07-05 NOTE — DIETITIAN INITIAL EVALUATION ADULT - ADD RECOMMEND
1. Continue with Regular diet  - Encourage adequate PO and protein intake  - Honor food preferences  2. Recommend MVI for general nutrient coverage  3. Recommend ENsure plus HP 1x/day   - Provides 350 kcal, 20 g pro  4. Continue with bowel regimen, prn  5. Appreciate weekly weight trends

## 2024-07-05 NOTE — DIETITIAN INITIAL EVALUATION ADULT - NSFNSGIIOFT_GEN_A_CORE
Pt endorsed nausea due to hunger (denied emesis). Pt denied diarrhea, however endorsed constipation, stated last BM 6/30.

## 2024-07-05 NOTE — DIETITIAN INITIAL EVALUATION ADULT - PROBLEM SELECTOR PLAN 6
Patient previously prescribed quetiapine 150mg qd, buspirone 15mg qd, mirtazaine 15mg qd. Unclear what she is currently taking.  - med rec in the AM

## 2024-07-05 NOTE — PROGRESS NOTE ADULT - ASSESSMENT
60 y/o F with past medical history of IV drug use on 160mg methadone, anemia, epilepsy who presented with sharp LLE pain that progressively worsened over the past month. CT of LLE in ED showed loculated fluid in L iliacus muscle, iliopsoas bursitis of infectious or inflammatory etiology. Patient underwent IR drainage procedure today and tolerated the procedure well.       58 y/o F with past medical history of IV drug use on 160mg methadone, anemia, epilepsy who presented with sharp LLE pain that progressively worsened over the past month. CT of LLE in ED showed loculated fluid in L iliacus muscle, iliopsoas bursitis of infectious or inflammatory etiology. Patient underwent IR drainage procedure and tolerated the procedure well. She is scheduled for TED today after TTE yesterday was normal.         60 y/o F with past medical history of IV drug use on 160mg methadone, anemia, epilepsy who presented with sharp LLE pain that progressively worsened over the past month. CT of LLE in ED showed loculated fluid in L iliacus muscle, iliopsoas bursitis of infectious or inflammatory etiology. Patient underwent IR drainage procedure and tolerated the procedure well. She is scheduled for TED today after TTE yesterday was normal. NPO.

## 2024-07-06 DIAGNOSIS — A41.01 SEPSIS DUE TO METHICILLIN SUSCEPTIBLE STAPHYLOCOCCUS AUREUS: ICD-10-CM

## 2024-07-06 DIAGNOSIS — E43 UNSPECIFIED SEVERE PROTEIN-CALORIE MALNUTRITION: ICD-10-CM

## 2024-07-06 LAB
ANION GAP SERPL CALC-SCNC: 9 MMOL/L — SIGNIFICANT CHANGE UP (ref 5–17)
BASOPHILS # BLD AUTO: 0.03 K/UL — SIGNIFICANT CHANGE UP (ref 0–0.2)
BASOPHILS NFR BLD AUTO: 0.5 % — SIGNIFICANT CHANGE UP (ref 0–2)
BUN SERPL-MCNC: 20 MG/DL — SIGNIFICANT CHANGE UP (ref 7–23)
CALCIUM SERPL-MCNC: 9.5 MG/DL — SIGNIFICANT CHANGE UP (ref 8.4–10.5)
CHLORIDE SERPL-SCNC: 99 MMOL/L — SIGNIFICANT CHANGE UP (ref 96–108)
CO2 SERPL-SCNC: 30 MMOL/L — SIGNIFICANT CHANGE UP (ref 22–31)
CREAT SERPL-MCNC: 1.15 MG/DL — SIGNIFICANT CHANGE UP (ref 0.5–1.3)
CULTURE RESULTS: SIGNIFICANT CHANGE UP
EGFR: 56 ML/MIN/1.73M2 — LOW
EOSINOPHIL # BLD AUTO: 0.16 K/UL — SIGNIFICANT CHANGE UP (ref 0–0.5)
EOSINOPHIL NFR BLD AUTO: 2.9 % — SIGNIFICANT CHANGE UP (ref 0–6)
GLUCOSE SERPL-MCNC: 102 MG/DL — HIGH (ref 70–99)
HCT VFR BLD CALC: 26.5 % — LOW (ref 34.5–45)
HGB BLD-MCNC: 8.3 G/DL — LOW (ref 11.5–15.5)
IMM GRANULOCYTES NFR BLD AUTO: 0.5 % — SIGNIFICANT CHANGE UP (ref 0–0.9)
LYMPHOCYTES # BLD AUTO: 0.98 K/UL — LOW (ref 1–3.3)
LYMPHOCYTES # BLD AUTO: 17.9 % — SIGNIFICANT CHANGE UP (ref 13–44)
MAGNESIUM SERPL-MCNC: 2 MG/DL — SIGNIFICANT CHANGE UP (ref 1.6–2.6)
MCHC RBC-ENTMCNC: 27.5 PG — SIGNIFICANT CHANGE UP (ref 27–34)
MCHC RBC-ENTMCNC: 31.3 GM/DL — LOW (ref 32–36)
MCV RBC AUTO: 87.7 FL — SIGNIFICANT CHANGE UP (ref 80–100)
MONOCYTES # BLD AUTO: 0.57 K/UL — SIGNIFICANT CHANGE UP (ref 0–0.9)
MONOCYTES NFR BLD AUTO: 10.4 % — SIGNIFICANT CHANGE UP (ref 2–14)
NEUTROPHILS # BLD AUTO: 3.7 K/UL — SIGNIFICANT CHANGE UP (ref 1.8–7.4)
NEUTROPHILS NFR BLD AUTO: 67.8 % — SIGNIFICANT CHANGE UP (ref 43–77)
NRBC # BLD: 0 /100 WBCS — SIGNIFICANT CHANGE UP (ref 0–0)
PHOSPHATE SERPL-MCNC: 4.8 MG/DL — HIGH (ref 2.5–4.5)
PLATELET # BLD AUTO: 339 K/UL — SIGNIFICANT CHANGE UP (ref 150–400)
POTASSIUM SERPL-MCNC: 5.2 MMOL/L — SIGNIFICANT CHANGE UP (ref 3.5–5.3)
POTASSIUM SERPL-SCNC: 5.2 MMOL/L — SIGNIFICANT CHANGE UP (ref 3.5–5.3)
RBC # BLD: 3.02 M/UL — LOW (ref 3.8–5.2)
RBC # FLD: 14.6 % — HIGH (ref 10.3–14.5)
SODIUM SERPL-SCNC: 138 MMOL/L — SIGNIFICANT CHANGE UP (ref 135–145)
SPECIMEN SOURCE: SIGNIFICANT CHANGE UP
WBC # BLD: 5.47 K/UL — SIGNIFICANT CHANGE UP (ref 3.8–10.5)
WBC # FLD AUTO: 5.47 K/UL — SIGNIFICANT CHANGE UP (ref 3.8–10.5)

## 2024-07-06 PROCEDURE — 99233 SBSQ HOSP IP/OBS HIGH 50: CPT | Mod: GC

## 2024-07-06 RX ADMIN — Medication 15 MILLIGRAM(S): at 11:07

## 2024-07-06 RX ADMIN — Medication 100 MILLIGRAM(S): at 18:52

## 2024-07-06 RX ADMIN — OXYCODONE HYDROCHLORIDE 10 MILLIGRAM(S): 30 TABLET ORAL at 18:53

## 2024-07-06 RX ADMIN — Medication 17 GRAM(S): at 05:53

## 2024-07-06 RX ADMIN — HEPARIN SODIUM 5000 UNIT(S): 1000 INJECTION, SOLUTION INTRAVENOUS; SUBCUTANEOUS at 18:53

## 2024-07-06 RX ADMIN — Medication 160 MILLIGRAM(S): at 11:08

## 2024-07-06 RX ADMIN — Medication 100 MILLIGRAM(S): at 01:00

## 2024-07-06 RX ADMIN — LEVETIRACETAM 500 MILLIGRAM(S): 1000 TABLET, FILM COATED ORAL at 05:53

## 2024-07-06 RX ADMIN — Medication 975 MILLIGRAM(S): at 11:07

## 2024-07-06 RX ADMIN — Medication 100 MILLIGRAM(S): at 11:09

## 2024-07-06 RX ADMIN — LIDOCAINE 5% 1 PATCH: 5 CREAM TOPICAL at 22:45

## 2024-07-06 RX ADMIN — Medication 975 MILLIGRAM(S): at 01:00

## 2024-07-06 RX ADMIN — OXYCODONE HYDROCHLORIDE 5 MILLIGRAM(S): 30 TABLET ORAL at 15:23

## 2024-07-06 RX ADMIN — LEVETIRACETAM 500 MILLIGRAM(S): 1000 TABLET, FILM COATED ORAL at 18:53

## 2024-07-06 RX ADMIN — OXYCODONE HYDROCHLORIDE 5 MILLIGRAM(S): 30 TABLET ORAL at 22:12

## 2024-07-06 RX ADMIN — SENNOSIDES 2 TABLET(S): 8.6 TABLET ORAL at 00:01

## 2024-07-06 RX ADMIN — HEPARIN SODIUM 5000 UNIT(S): 1000 INJECTION, SOLUTION INTRAVENOUS; SUBCUTANEOUS at 01:00

## 2024-07-06 RX ADMIN — Medication 975 MILLIGRAM(S): at 18:53

## 2024-07-06 RX ADMIN — LIDOCAINE 5% 1 PATCH: 5 CREAM TOPICAL at 11:08

## 2024-07-06 RX ADMIN — OXYCODONE HYDROCHLORIDE 10 MILLIGRAM(S): 30 TABLET ORAL at 10:37

## 2024-07-06 RX ADMIN — OXYCODONE HYDROCHLORIDE 5 MILLIGRAM(S): 30 TABLET ORAL at 22:42

## 2024-07-06 RX ADMIN — HEPARIN SODIUM 5000 UNIT(S): 1000 INJECTION, SOLUTION INTRAVENOUS; SUBCUTANEOUS at 11:34

## 2024-07-06 RX ADMIN — OXYCODONE HYDROCHLORIDE 10 MILLIGRAM(S): 30 TABLET ORAL at 00:01

## 2024-07-06 NOTE — PROGRESS NOTE ADULT - NS ATTEST RISK PROBLEM GEN_ALL_CORE FT
# Sepsis due to MSSA bacteremia   # Left Ilio Psoas bursitis  Abscess s/p IR drainage, Cultures + for MSSA  # Opioid Dependence on MAT with Methadone  # Severe Protein Calorie Malnutrition - Ensure plus HP once daily    Following up surveillance blood cx , independent interpretation of vitals, labs , imaging

## 2024-07-06 NOTE — PROGRESS NOTE ADULT - SUBJECTIVE AND OBJECTIVE BOX
OVERNIGHT EVENTS: No overnight events.     SUBJECTIVE / INTERVAL HPI: Patient seen and examined at bedside.     VITAL SIGNS:  Vital Signs Last 24 Hrs  T(C): 36.9 (06 Jul 2024 05:10), Max: 37.8 (05 Jul 2024 17:15)  T(F): 98.4 (06 Jul 2024 05:10), Max: 100 (05 Jul 2024 17:15)  HR: 85 (06 Jul 2024 05:10) (85 - 101)  BP: 100/68 (06 Jul 2024 05:10) (80/55 - 116/76)  BP(mean): --  RR: 17 (06 Jul 2024 05:10) (16 - 18)  SpO2: 95% (06 Jul 2024 05:10) (94% - 97%)    Parameters below as of 05 Jul 2024 23:55  Patient On (Oxygen Delivery Method): room air      I&O's Summary      PHYSICAL EXAM:  General: WDWN  HEENT: NC/AT; PERRL, anicteric sclera; MMM  Neck: supple  Cardiovascular: +S1/S2; RRR  Respiratory: CTA B/L; no W/R/R  Gastrointestinal: soft, NT/ND; +BSx4  Extremities: WWP; no edema, clubbing or cyanosis  Vascular: 2+ radial, DP/PT pulses B/L  Neurological: AAOx3; no focal deficits    MEDICATIONS:  MEDICATIONS  (STANDING):  acetaminophen     Tablet .. 975 milliGRAM(s) Oral every 8 hours  ceFAZolin   IVPB 2000 milliGRAM(s) IV Intermittent every 8 hours  heparin   Injectable 5000 Unit(s) SubCutaneous every 8 hours  levETIRAcetam 500 milliGRAM(s) Oral two times a day  lidocaine   4% Patch 1 Patch Transdermal daily  methadone    Tablet 160 milliGRAM(s) Oral daily  mirtazapine 15 milliGRAM(s) Oral daily    MEDICATIONS  (PRN):  naloxone Injectable 1 milliGRAM(s) IV Push every 3 minutes PRN in case of overdose  oxyCODONE    IR 5 milliGRAM(s) Oral every 6 hours PRN Moderate Pain (4 - 6)  oxyCODONE    IR 10 milliGRAM(s) Oral every 8 hours PRN Severe Pain (7 - 10)  polyethylene glycol 3350 17 Gram(s) Oral daily PRN Constipation  saline laxative (FLEET) Rectal Enema 1 Enema Rectal once PRN constipation, severe, please try oral laxatives first  senna 2 Tablet(s) Oral at bedtime PRN Constipation      ALLERGIES:  Allergies    No Known Allergies    Intolerances        LABS:                        8.3    5.47  )-----------( 339      ( 06 Jul 2024 06:46 )             26.5     07-06    138  |  99  |  20  ----------------------------<  102<H>  5.2   |  30  |  1.15    Ca    9.5      06 Jul 2024 06:46  Phos  4.8     07-06  Mg     2.0     07-06    TPro  8.5<H>  /  Alb  3.1<L>  /  TBili  0.3  /  DBili  x   /  AST  65<H>  /  ALT  32  /  AlkPhos  72  07-05      Urinalysis Basic - ( 06 Jul 2024 06:46 )    Color: x / Appearance: x / SG: x / pH: x  Gluc: 102 mg/dL / Ketone: x  / Bili: x / Urobili: x   Blood: x / Protein: x / Nitrite: x   Leuk Esterase: x / RBC: x / WBC x   Sq Epi: x / Non Sq Epi: x / Bacteria: x      CAPILLARY BLOOD GLUCOSE          RADIOLOGY & ADDITIONAL TESTS: Reviewed.   OVERNIGHT EVENTS: No overnight events.     SUBJECTIVE / INTERVAL HPI: Patient seen and examined at bedside. She feels well and her pain is well controlled by her current pain regimen. Denies any shortness of breath, chest pain, nausea, vomiting, fevers, chills.    VITAL SIGNS:  Vital Signs Last 24 Hrs  T(C): 36.9 (06 Jul 2024 05:10), Max: 37.8 (05 Jul 2024 17:15)  T(F): 98.4 (06 Jul 2024 05:10), Max: 100 (05 Jul 2024 17:15)  HR: 85 (06 Jul 2024 05:10) (85 - 101)  BP: 100/68 (06 Jul 2024 05:10) (80/55 - 116/76)  BP(mean): --  RR: 17 (06 Jul 2024 05:10) (16 - 18)  SpO2: 95% (06 Jul 2024 05:10) (94% - 97%)    Parameters below as of 05 Jul 2024 23:55  Patient On (Oxygen Delivery Method): room air      PHYSICAL EXAM;  General: well developed.  HEENT: adentulous, NC/AT; PERRL, anicteric sclera; dry mucous membranes  Cardiovascular: +S1/S2; RRR  Respiratory: CTA B/L; no W/R/R  Gastrointestinal: soft, NT/ND; +BSx4  Extremities: mild TTP of LLE, greatest in upper thigh and groin but decreased from yesterday, LLE edema, full ROM of all extremities, track marks in bilateral lower extremities.   Vascular: 2+ radial, DP/PT pulses B/L  Neurological: AAOx3; no focal deficits    MEDICATIONS:  MEDICATIONS  (STANDING):  acetaminophen     Tablet .. 975 milliGRAM(s) Oral every 8 hours  ceFAZolin   IVPB 2000 milliGRAM(s) IV Intermittent every 8 hours  heparin   Injectable 5000 Unit(s) SubCutaneous every 8 hours  levETIRAcetam 500 milliGRAM(s) Oral two times a day  lidocaine   4% Patch 1 Patch Transdermal daily  methadone    Tablet 160 milliGRAM(s) Oral daily  mirtazapine 15 milliGRAM(s) Oral daily    MEDICATIONS  (PRN):  naloxone Injectable 1 milliGRAM(s) IV Push every 3 minutes PRN in case of overdose  oxyCODONE    IR 5 milliGRAM(s) Oral every 6 hours PRN Moderate Pain (4 - 6)  oxyCODONE    IR 10 milliGRAM(s) Oral every 8 hours PRN Severe Pain (7 - 10)  polyethylene glycol 3350 17 Gram(s) Oral daily PRN Constipation  saline laxative (FLEET) Rectal Enema 1 Enema Rectal once PRN constipation, severe, please try oral laxatives first  senna 2 Tablet(s) Oral at bedtime PRN Constipation      ALLERGIES:  No Known Allergies        LABS:                        8.3    5.47  )-----------( 339      ( 06 Jul 2024 06:46 )             26.5     07-06    138  |  99  |  20  ----------------------------<  102<H>  5.2   |  30  |  1.15    Ca    9.5      06 Jul 2024 06:46  Phos  4.8     07-06  Mg     2.0     07-06    TPro  8.5<H>  /  Alb  3.1<L>  /  TBili  0.3  /  DBili  x   /  AST  65<H>  /  ALT  32  /  AlkPhos  72  07-05      Urinalysis Basic - ( 06 Jul 2024 06:46 )    Color: x / Appearance: x / SG: x / pH: x  Gluc: 102 mg/dL / Ketone: x  / Bili: x / Urobili: x   Blood: x / Protein: x / Nitrite: x   Leuk Esterase: x / RBC: x / WBC x   Sq Epi: x / Non Sq Epi: x / Bacteria: x      CAPILLARY BLOOD GLUCOSE      RADIOLOGY & ADDITIONAL TESTS: Reviewed.   OVERNIGHT EVENTS: No overnight events.     SUBJECTIVE / INTERVAL HPI: Patient seen and examined at bedside. She feels well and her pain is well controlled by her current pain regimen. Denies any shortness of breath, chest pain, nausea, vomiting, fevers, chills.    VITAL SIGNS:  Vital Signs Last 24 Hrs  T(C): 36.9 (06 Jul 2024 05:10), Max: 37.8 (05 Jul 2024 17:15)  T(F): 98.4 (06 Jul 2024 05:10), Max: 100 (05 Jul 2024 17:15)  HR: 85 (06 Jul 2024 05:10) (85 - 101)  BP: 100/68 (06 Jul 2024 05:10) (80/55 - 116/76)  BP(mean): --  RR: 17 (06 Jul 2024 05:10) (16 - 18)  SpO2: 95% (06 Jul 2024 05:10) (94% - 97%)    Parameters below as of 05 Jul 2024 23:55  Patient On (Oxygen Delivery Method): room air      PHYSICAL EXAM;  General: fatigued, inadequate nutrition.  HEENT: adentulous, NC/AT; PERRL, anicteric sclera; dry mucous membranes  Cardiovascular: +S1/S2; RRR  Respiratory: CTA B/L; no W/R/R  Gastrointestinal: soft, NT/ND; +BSx4  Extremities: mild TTP of LLE, greatest in upper thigh and groin but decreased from yesterday, LLE edema, full ROM of all extremities, track marks in bilateral lower extremities.   Vascular: 2+ radial, DP/PT pulses B/L  Neurological: AAOx3; no focal deficits    MEDICATIONS:  MEDICATIONS  (STANDING):  acetaminophen     Tablet .. 975 milliGRAM(s) Oral every 8 hours  ceFAZolin   IVPB 2000 milliGRAM(s) IV Intermittent every 8 hours  heparin   Injectable 5000 Unit(s) SubCutaneous every 8 hours  levETIRAcetam 500 milliGRAM(s) Oral two times a day  lidocaine   4% Patch 1 Patch Transdermal daily  methadone    Tablet 160 milliGRAM(s) Oral daily  mirtazapine 15 milliGRAM(s) Oral daily    MEDICATIONS  (PRN):  naloxone Injectable 1 milliGRAM(s) IV Push every 3 minutes PRN in case of overdose  oxyCODONE    IR 5 milliGRAM(s) Oral every 6 hours PRN Moderate Pain (4 - 6)  oxyCODONE    IR 10 milliGRAM(s) Oral every 8 hours PRN Severe Pain (7 - 10)  polyethylene glycol 3350 17 Gram(s) Oral daily PRN Constipation  saline laxative (FLEET) Rectal Enema 1 Enema Rectal once PRN constipation, severe, please try oral laxatives first  senna 2 Tablet(s) Oral at bedtime PRN Constipation      ALLERGIES:  No Known Allergies        LABS:                        8.3    5.47  )-----------( 339      ( 06 Jul 2024 06:46 )             26.5     07-06    138  |  99  |  20  ----------------------------<  102<H>  5.2   |  30  |  1.15    Ca    9.5      06 Jul 2024 06:46  Phos  4.8     07-06  Mg     2.0     07-06    TPro  8.5<H>  /  Alb  3.1<L>  /  TBili  0.3  /  DBili  x   /  AST  65<H>  /  ALT  32  /  AlkPhos  72  07-05      Urinalysis Basic - ( 06 Jul 2024 06:46 )    Color: x / Appearance: x / SG: x / pH: x  Gluc: 102 mg/dL / Ketone: x  / Bili: x / Urobili: x   Blood: x / Protein: x / Nitrite: x   Leuk Esterase: x / RBC: x / WBC x   Sq Epi: x / Non Sq Epi: x / Bacteria: x      CAPILLARY BLOOD GLUCOSE      RADIOLOGY & ADDITIONAL TESTS: Reviewed.

## 2024-07-06 NOTE — PROGRESS NOTE ADULT - ASSESSMENT
I M    60 y/o F with past medical history of IV drug use on 160mg methadone, anemia, epilepsy who presented with sharp LLE pain that progressively worsened over the past month. CT of LLE in ED showed loculated fluid in L iliacus muscle, iliopsoas bursitis of infectious or inflammatory etiology. Patient underwent IR drainage procedure and tolerated the procedure well. She is scheduled for TED today after TTE yesterday was normal. NPO.      Problem/Plan - 1:  ·  Problem: Iliopsoas bursitis of left hip.   ·  Plan: Worsening left leg and thigh pain with radiation to groin  CT LE w IV contrast showed loculated fluid in L iliacus muscle, iliopsoas bursitis of infectious or inflammatory etiology  ortho consulted, recommended IV antibiotics and IR consult in AM, will continue to follow   Given Zosyn and Vanc in ED  - blood cultures grew MSSA  - cefazolin 2g q8hs, day 3  -   - f/u cultures from IR aspiration  - surveillance blood cultures sent  - per ID recs, if cultures from 7/3 have no growth, no need for further surveillance cultures  - HepC RNA pending.    Problem/Plan - 2:  ·  Problem: Leg pain, left.   ·  Plan: Left leg and thigh pain with radiation to groin  CT LE w IV contrast showed loculated fluid in L iliacus muscle, iliopsoas bursitis of likely infections etiology  s/p IR drainage   - duplex of LLE showed no evidence of DVT  - pain regimen: standing 975 tylenol q8, lidocaine patch, oxy prn 5mg for moderate pain, oxy prn 10mg for severe pain.    Problem/Plan - 3:  ·  Problem: Opioid dependence.  ·  Plan: Hx of IV drug use  Pt states she is chronically on methadone 160 mg 5 days a week since age 19  Last heroin injection in L leg on Saturday 6/29  - confirmed dose with methadone clinic: 160mg, 5x/week  - last dose given on 6/29 for 6/30  - monitor COWs.  - HIV testing ordered, negative  - hepatitis panel reactive  - TTE ordered, negative for endocarditis  - TED scheduled for today 7/5  - continue cefazolin 2g IV q8 per ID recs.    Problem/Plan - 4:  ·  Problem: Anemia.   ·  Plan: Pt states history of anemia  On admission Hg 10.2, Hct 31.0  - Continue to monitor H&H  - Maintain active type & screen  - Most recent Hb 9.0, improved from 7.6.    Problem/Plan - 5:  ·  Problem: Epilepsy.   ·  Plan: As per patient has history of epilepsy  States she takes Keppra but unsure of dose, follows with Neurologist at [facility]  Per [facility], previously prescribed Keppra 1000mg bid.   - order Keppra 500mg bid x 2 doses for now, confirm doses.    Problem/Plan - 6:  ·  Problem: Type 2 diabetes mellitus.   ·  Plan: Per patient's shelter, she was on insulin at some point, unclear of when she was taking it or history of diabetes.  - HbA1c 5.1  - no need for management of this.    Problem/Plan - 7:  ·  Problem: Mood disorder.   ·  Plan: Patient previously prescribed quetiapine 150mg qd, buspirone 15mg qd, mirtazapine 15mg qd. Unclear what she is currently taking.  - prescribed home doses of all medications.    Problem/Plan - 8:  ·  Problem: Prophylactic measure.   ·  Plan: F: NS 1L  E: replete as needed  N: regular diet  DVT ppx: given 1 dose of lovenox on 7/2  Dispo: [facility].

## 2024-07-06 NOTE — PROGRESS NOTE ADULT - PROBLEM SELECTOR PLAN 1
Patient was found to have blood and wound cultures positive for MSSA bacteria  - cefazolin 2g q8hs, day 4  - per ID recs, if cultures from 7/3 have no growth, there is no need for further surveillance cultures  - cultures from 7/3 have no growth  - patient is medically stable for discharge, pending [facility] for IV antibiotics

## 2024-07-06 NOTE — PROGRESS NOTE ADULT - PROBLEM SELECTOR PLAN 9
Patient previously prescribed quetiapine 150mg qd, buspirone 15mg qd, mirtazapine 15mg qd. Unclear what she is currently taking.  - prescribed home doses of all medications Patient previously prescribed quetiapine 150mg qd, buspirone 15mg qd, mirtazapine 15mg qd. Unclear what she is currently taking.  - attempted to do med rec with shelter, pharmacy, patient, unable to find current doses for her mood medications  - she is currently stable without her medications  - will re-attempt to med rec on Monday

## 2024-07-06 NOTE — PROGRESS NOTE ADULT - ASSESSMENT
60 y/o F with past medical history of IV drug use on 160mg methadone, anemia, epilepsy who presented with sharp LLE pain that progressively worsened over the past month. CT of LLE in ED showed loculated fluid in L iliacus muscle, iliopsoas bursitis of infectious or inflammatory etiology. Patient underwent IR drainage procedure and tolerated the procedure well. She is scheduled for TED today after TTE yesterday was normal. NPO. 58 y/o F with past medical history of IV drug use on 160mg methadone, anemia, epilepsy who presented with sharp LLE pain that progressively worsened over the past month. CT of LLE in ED showed loculated fluid in L iliacus muscle, iliopsoas bursitis of infectious or inflammatory etiology. Patient underwent IR drainage procedure and tolerated the procedure well. Patient was found to be negative for endocarditis. She is now being treated with IV cefazolin, pending dispo to [facility].

## 2024-07-06 NOTE — PROGRESS NOTE ADULT - PROBLEM SELECTOR PLAN 3
Left leg and thigh pain with radiation to groin  CT LE w IV contrast showed loculated fluid in L iliacus muscle, iliopsoas bursitis of likely infections etiology  s/p IR drainage   - duplex of LLE showed no evidence of DVT  - pain regimen: standing 975 tylenol q8, lidocaine patch, oxy prn 5mg for moderate pain, oxy prn 10mg for severe pain

## 2024-07-06 NOTE — PROGRESS NOTE ADULT - SUBJECTIVE AND OBJECTIVE BOX
Physical Medicine and Rehabilitation Progress Note :       Patient is a 59y old  Female who presents with a chief complaint of left leg pain (06 Jul 2024 08:55)      HPI:  HPI: Pt is 60 y/o F pmhx significant for IV drug use, anemia, epilepsy who presents for sharp L leg pain progressively worsening over the past month. Pt states her pain began in the left thigh and later radiated to her toes but now has persisted in her entire left leg and groin. Pt reports this began about one month ago and required multiple ER visits. She states she went to [place] on 5/31 where nothing was done and she was discharged. Most recently last week she returned to [place] where she states she was given a 7 day course of oral antibiotics which she completed but did not improve her pain. She took Motrin which provided very minimal relief.  Pt last  injected heroin into her left thigh on Saturday 6/29. As per patient, she is chronically on methadone 160 mg 5 days a week since she was 19. She reports subjective fevers. Denies nausea, vomiting, chest pain, shortness of breath, constipation, diarrhea, pain on urination, hematuria, hematemesis.       In the ED:  Initial vital signs: T: 98.7 F, HR: 82, BP: 100/68, RR: 16, SpO2: 96% on RA  Labs: significant for AST 61, CRP 99.5, Hg 10.2, Hct 31.0  CT Lower Extremity with IV Contrast, Left: loculated fluid in L iliacus muscle, iliopsoas bursitis of infectious or inflammatory etiology  US Duplex Venous LE, Left: No evidence of L lower extremity DVT  Medications: Zosyn, Vancomycin, NS, Ibuprofen  Consults:         Ortho- recommended admission for IV antibiotics and IR consult, will continue to follow (01 Jul 2024 18:56)                            8.3    5.47  )-----------( 339      ( 06 Jul 2024 06:46 )             26.5       07-06    138  |  99  |  20  ----------------------------<  102<H>  5.2   |  30  |  1.15    Ca    9.5      06 Jul 2024 06:46  Phos  4.8     07-06  Mg     2.0     07-06    TPro  8.5<H>  /  Alb  3.1<L>  /  TBili  0.3  /  DBili  x   /  AST  65<H>  /  ALT  32  /  AlkPhos  72  07-05    Vital Signs Last 24 Hrs  T(C): 36.9 (06 Jul 2024 05:10), Max: 37.8 (05 Jul 2024 17:15)  T(F): 98.4 (06 Jul 2024 05:10), Max: 100 (05 Jul 2024 17:15)  HR: 85 (06 Jul 2024 05:10) (85 - 101)  BP: 100/68 (06 Jul 2024 05:10) (80/55 - 116/76)  BP(mean): --  RR: 17 (06 Jul 2024 05:10) (16 - 18)  SpO2: 95% (06 Jul 2024 05:10) (94% - 97%)    Parameters below as of 05 Jul 2024 23:55  Patient On (Oxygen Delivery Method): room air        MEDICATIONS  (STANDING):  acetaminophen     Tablet .. 975 milliGRAM(s) Oral every 8 hours  ceFAZolin   IVPB 2000 milliGRAM(s) IV Intermittent every 8 hours  heparin   Injectable 5000 Unit(s) SubCutaneous every 8 hours  levETIRAcetam 500 milliGRAM(s) Oral two times a day  lidocaine   4% Patch 1 Patch Transdermal daily  methadone    Tablet 160 milliGRAM(s) Oral daily  mirtazapine 15 milliGRAM(s) Oral daily    MEDICATIONS  (PRN):  naloxone Injectable 1 milliGRAM(s) IV Push every 3 minutes PRN in case of overdose  oxyCODONE    IR 5 milliGRAM(s) Oral every 6 hours PRN Moderate Pain (4 - 6)  oxyCODONE    IR 10 milliGRAM(s) Oral every 8 hours PRN Severe Pain (7 - 10)  polyethylene glycol 3350 17 Gram(s) Oral daily PRN Constipation  saline laxative (FLEET) Rectal Enema 1 Enema Rectal once PRN constipation, severe, please try oral laxatives first  senna 2 Tablet(s) Oral at bedtime PRN Constipation      T(C): 36.9 (07-06-24 @ 05:10), Max: 37.8 (07-05-24 @ 17:15)  HR: 85 (07-06-24 @ 05:10) (85 - 101)  BP: 100/68 (07-06-24 @ 05:10) (80/55 - 116/76)  RR: 17 (07-06-24 @ 05:10) (16 - 18)  SpO2: 95% (07-06-24 @ 05:10) (94% - 97%)        Physical Exam:     59 y o woman lying comfortably in semi Andino's position , awake , alert , no acute complaints     Head: normocephalic , atraumatic    Eyes: PERRLA , EOMI , no nystagmus , sclera anicteric    ENT / FACE: neg nasal discharge , uvula midline , no oropharyngeal erythema / exudate    Neck: supple , negative JVD , negative carotid bruits , no thyromegaly    Chest: CTA bilaterally     Cardiovascular: regular rate and rhythm , neg murmurs / rubs / gallops    Abdomen: soft , non distended , no tenderness to palpation in all 4 quadrants ,  normal bowel sounds     Extremities: WWP , neg cyanosis /clubbing / edema     Musculoskeletal: pain w/ L hip flexion ,  in upper thigh and groin but decreased m LLE edema    Skin:     :     Neurologic Exam:     Alert and oriented   3     Motor Exam:        > 3+/5 x 4 extremities , LLE pain limited proximally     Sensation:         intact to light touch x 4 extremities                                DTR:           biceps/brachioradialis: equal                            patella/ankle: equal          Functional Status Assessment :   7/5/2024       Pain Assessment/Number Scale (0-10) Adult  Presence of Pain: complains of pain/discomfort  Body Location: leg  Pain Rating (0-10): Rest: 4 (moderate pain)  Pain Rating (0-10): Activity: 8 (severe pain)  Pain Precipitating/Aggravating Factors: movement    Re-assessment (Number Scale)  Pain Rating: Rest (Reassessment) Pain Rating: Rest: 6 (moderate pain)  Pain Response to Interventions: partial relief    Safety      AM-PAC Functional Assessment: Basic Mobility  Type of Assessment: Daily assessment  Turning from your back to your side while in a flat bed without using bedrails?: 3 = A little assistance  Moving from lying on your back to sitting on the flat side of a flat bed without using bedrails?: 3 = A little assistance  Moving to and from a bed to a chair (including a wheelchair)?: 3 = A little assistance  Standing up from a chair using your arms (e.g. wheelchair or bedside chair)?: 3 = A little assistance  Walking in hospital room?: 3 = A little assistance  Climbing 3-5 steps with a railing?: 3-calculated by average   Score: 18   Row Comment: Ask the patient "How much help from another person do you currently need? (If the patient hasn't done an activity recently, how much help from another person do you think he/she needs if he/she tried?)    Cognitive/Neuro      Cognitive/Neuro/Behavioral  Cognitive/Neuro/Behavioral [WDL Definition: Alert; opens eyes spontaneously; arouses to voice or touch; oriented x 4; follows commands; speech spontaneous, logical; purposeful motor response; behavior appropriate to situation]: WDL    Language Assistance  Preferred Language to Address Healthcare Preferred Language to Address Healthcare: English    Therapeutic Interventions      Bed Mobility  Bed Mobility Training Supine-to-Sit: contact guard;  verbal cues;  1 person assist  Bed Mobility Training Limitations: decreased strength;  impaired balance;  pain    Sit-Stand Transfer Training  Transfer Training Sit-to-Stand Transfer: minimum assist (75% patient effort);  verbal cues;  1 person assist;  rolling walker  Transfer Training Stand-to-Sit Transfer: minimum assist (75% patient effort);  verbal cues;  1 person assist;  rolling walker  Sit-to-Stand Transfer Training Transfer Safety Analysis: decreased weight-shifting ability;  decreased strength;  impaired balance;  pain    Gait Training  Gait Training: minimum assist (75% patient effort);  verbal cues;  1 person assist;  rolling walker;  30ft x 2  Gait Analysis: 3-point gait   patient with fairly steady, antalgic gait, no LOB, verbal cues for upright posture, gait distance limited by pain;  decreased angie;  decreased step length;  impaired balance;  decreased strength;  pain        PM&R Impression : as above    Current disposition plan recommendation :    subacute rehab placement

## 2024-07-06 NOTE — PROGRESS NOTE ADULT - PROBLEM SELECTOR PLAN 2
Worsening left leg and thigh pain with radiation to groin  CT LE w IV contrast showed loculated fluid in L iliacus muscle, iliopsoas bursitis of infectious or inflammatory etiology  ortho consulted, recommended IV antibiotics and IR consult in AM, will continue to follow   Given Zosyn and Vanc in ED  - blood cultures grew MSSA  - cefazolin 2g q8hs, day 3  -   - f/u cultures from IR aspiration  - surveillance blood cultures sent  - per ID recs, if cultures from 7/3 have no growth, no need for further surveillance cultures  - HepC RNA pending Worsening left leg and thigh pain with radiation to groin  CT LE w IV contrast showed loculated fluid in L iliacus muscle, iliopsoas bursitis of infectious or inflammatory etiology  ortho consulted, recommended IV antibiotics and IR consult in AM, will continue to follow   Given Zosyn and Vanc in ED  - blood cultures grew MSSA  - cefazolin 2g q8hs, day 4  -   - f/u cultures from IR aspiration  - surveillance blood cultures sent  - per ID recs, if cultures from 7/3 have no growth, no need for further surveillance cultures  - HepC RNA reactive

## 2024-07-06 NOTE — PROGRESS NOTE ADULT - PROBLEM SELECTOR PLAN 5
Pt states history of anemia  On admission Hg 10.2, Hct 31.0  - Continue to monitor H&H  - Maintain active type & screen  - Most recent Hb 9.0, improved from 7.6 Pt states history of anemia  On admission Hg 10.2, Hct 31.0  - Continue to monitor H&H  - Maintain active type & screen  - Hb stable between 7.5 - 9

## 2024-07-06 NOTE — PROGRESS NOTE ADULT - PROBLEM SELECTOR PLAN 6
As per patient has history of epilepsy  States she takes Keppra but unsure of dose, follows with Neurologist at [facility]  Per Jennifer, previously prescribed Keppra 1000mg bid.   - order Keppra 500mg bid x 2 doses for now, confirm doses.

## 2024-07-06 NOTE — PROGRESS NOTE ADULT - PROBLEM SELECTOR PLAN 7
Per patient's shelter, she was on insulin at some point, unclear of when she was taking it or history of diabetes.  - HbA1c 5.1  - no need for management of this

## 2024-07-06 NOTE — PROGRESS NOTE ADULT - ATTENDING COMMENTS
59 year old woman admitted to the hospital with left leg and thigh pain , she was found to have psoas and hip bursitis and MSSA bacteremia , Surveillance cultures have been negative since 7/3     vitals , labs, imaging reviewed and interpreted independently     TTE and TED without evidence of Endocarditis     Impression and Plan  # Sepsis due to MSSA bacteremia   # Left Ilio Psoas bursitis  Abscess s/p IR drainage, Cultures + for MSSA   - TTE and TED negative for Endocarditis   - Surveillance Blood cx negative since 7/3   - Cefazolin 2mg Q8hrs per ID   - Seen by Orthopedics , no further inpatient surgical intervention at this time     # Opioid Dependence on MAT with Methadone , continue Methadone 160mg 5times per week     # Anemia , hgb has been stable , will order Iron studies   # Epilepsy , Continue Keppra  # Severe Protein Calorie Malnutrition - Ensure plus HP once daily

## 2024-07-06 NOTE — PROGRESS NOTE ADULT - PROBLEM SELECTOR PLAN 4
Hx of IV drug use  Pt states she is chronically on methadone 160 mg 5 days a week since age 19  Last heroin injection in L leg on Saturday 6/29  - confirmed dose with methadone clinic: 160mg, 5x/week  - last dose given on 6/29 for 6/30  - monitor COWs.  - HIV testing ordered, negative  - hepatitis panel reactive  - TTE ordered, negative for endocarditis  - TED scheduled for today 7/5  - continue cefazolin 2g IV q8 per ID recs. Hx of IV drug use  Pt states she is chronically on methadone 160 mg 5 days a week since age 19  Last heroin injection in L leg on Saturday 6/29  - confirmed dose with methadone clinic: 160mg, 5x/week  - last dose given on 6/29 for 6/30  - monitor COWs.  - HIV testing ordered, negative  - hepatitis panel reactive  - TTE ordered, negative for endocarditis  - TED completed 7/5, negative for endocarditis  - continue cefazolin 2g IV q8 per ID recs.

## 2024-07-07 LAB
ALBUMIN SERPL ELPH-MCNC: 2.8 G/DL — LOW (ref 3.3–5)
ALP SERPL-CCNC: 83 U/L — SIGNIFICANT CHANGE UP (ref 40–120)
ALT FLD-CCNC: 15 U/L — SIGNIFICANT CHANGE UP (ref 10–45)
ANION GAP SERPL CALC-SCNC: 10 MMOL/L — SIGNIFICANT CHANGE UP (ref 5–17)
AST SERPL-CCNC: 37 U/L — SIGNIFICANT CHANGE UP (ref 10–40)
BASOPHILS # BLD AUTO: 0.03 K/UL — SIGNIFICANT CHANGE UP (ref 0–0.2)
BASOPHILS NFR BLD AUTO: 0.6 % — SIGNIFICANT CHANGE UP (ref 0–2)
BILIRUB SERPL-MCNC: 0.2 MG/DL — SIGNIFICANT CHANGE UP (ref 0.2–1.2)
BLD GP AB SCN SERPL QL: NEGATIVE — SIGNIFICANT CHANGE UP
BUN SERPL-MCNC: 17 MG/DL — SIGNIFICANT CHANGE UP (ref 7–23)
CALCIUM SERPL-MCNC: 9.7 MG/DL — SIGNIFICANT CHANGE UP (ref 8.4–10.5)
CHLORIDE SERPL-SCNC: 99 MMOL/L — SIGNIFICANT CHANGE UP (ref 96–108)
CO2 SERPL-SCNC: 30 MMOL/L — SIGNIFICANT CHANGE UP (ref 22–31)
CREAT SERPL-MCNC: 1.05 MG/DL — SIGNIFICANT CHANGE UP (ref 0.5–1.3)
CRP SERPL-MCNC: 147 MG/L — HIGH (ref 0–4)
EGFR: 62 ML/MIN/1.73M2 — SIGNIFICANT CHANGE UP
EOSINOPHIL # BLD AUTO: 0.16 K/UL — SIGNIFICANT CHANGE UP (ref 0–0.5)
EOSINOPHIL NFR BLD AUTO: 3.3 % — SIGNIFICANT CHANGE UP (ref 0–6)
ERYTHROCYTE [SEDIMENTATION RATE] IN BLOOD: 122 MM/HR — HIGH
GLUCOSE SERPL-MCNC: 101 MG/DL — HIGH (ref 70–99)
HCT VFR BLD CALC: 25.9 % — LOW (ref 34.5–45)
HGB BLD-MCNC: 8.1 G/DL — LOW (ref 11.5–15.5)
IMM GRANULOCYTES NFR BLD AUTO: 0.8 % — SIGNIFICANT CHANGE UP (ref 0–0.9)
LYMPHOCYTES # BLD AUTO: 1.51 K/UL — SIGNIFICANT CHANGE UP (ref 1–3.3)
LYMPHOCYTES # BLD AUTO: 31.5 % — SIGNIFICANT CHANGE UP (ref 13–44)
MAGNESIUM SERPL-MCNC: 2.1 MG/DL — SIGNIFICANT CHANGE UP (ref 1.6–2.6)
MCHC RBC-ENTMCNC: 27.4 PG — SIGNIFICANT CHANGE UP (ref 27–34)
MCHC RBC-ENTMCNC: 31.3 GM/DL — LOW (ref 32–36)
MCV RBC AUTO: 87.5 FL — SIGNIFICANT CHANGE UP (ref 80–100)
MONOCYTES # BLD AUTO: 0.57 K/UL — SIGNIFICANT CHANGE UP (ref 0–0.9)
MONOCYTES NFR BLD AUTO: 11.9 % — SIGNIFICANT CHANGE UP (ref 2–14)
NEUTROPHILS # BLD AUTO: 2.48 K/UL — SIGNIFICANT CHANGE UP (ref 1.8–7.4)
NEUTROPHILS NFR BLD AUTO: 51.9 % — SIGNIFICANT CHANGE UP (ref 43–77)
NRBC # BLD: 0 /100 WBCS — SIGNIFICANT CHANGE UP (ref 0–0)
PHOSPHATE SERPL-MCNC: 4 MG/DL — SIGNIFICANT CHANGE UP (ref 2.5–4.5)
PLATELET # BLD AUTO: 334 K/UL — SIGNIFICANT CHANGE UP (ref 150–400)
POTASSIUM SERPL-MCNC: 5.2 MMOL/L — SIGNIFICANT CHANGE UP (ref 3.5–5.3)
POTASSIUM SERPL-SCNC: 5.2 MMOL/L — SIGNIFICANT CHANGE UP (ref 3.5–5.3)
PROT SERPL-MCNC: 8 G/DL — SIGNIFICANT CHANGE UP (ref 6–8.3)
RBC # BLD: 2.96 M/UL — LOW (ref 3.8–5.2)
RBC # FLD: 14.6 % — HIGH (ref 10.3–14.5)
RH IG SCN BLD-IMP: POSITIVE — SIGNIFICANT CHANGE UP
SODIUM SERPL-SCNC: 139 MMOL/L — SIGNIFICANT CHANGE UP (ref 135–145)
WBC # BLD: 4.79 K/UL — SIGNIFICANT CHANGE UP (ref 3.8–10.5)
WBC # FLD AUTO: 4.79 K/UL — SIGNIFICANT CHANGE UP (ref 3.8–10.5)

## 2024-07-07 PROCEDURE — 36000 PLACE NEEDLE IN VEIN: CPT

## 2024-07-07 PROCEDURE — 99232 SBSQ HOSP IP/OBS MODERATE 35: CPT | Mod: GC

## 2024-07-07 PROCEDURE — 76937 US GUIDE VASCULAR ACCESS: CPT | Mod: 26

## 2024-07-07 RX ORDER — ENOXAPARIN SODIUM 120 MG/.8ML
40 INJECTION SUBCUTANEOUS EVERY 24 HOURS
Refills: 0 | Status: DISCONTINUED | OUTPATIENT
Start: 2024-07-07 | End: 2024-07-29

## 2024-07-07 RX ORDER — IBUPROFEN 200 MG
400 TABLET ORAL EVERY 6 HOURS
Refills: 0 | Status: DISCONTINUED | OUTPATIENT
Start: 2024-07-07 | End: 2024-07-08

## 2024-07-07 RX ORDER — KETOROLAC TROMETHAMINE 10 MG
10 TABLET ORAL ONCE
Refills: 0 | Status: DISCONTINUED | OUTPATIENT
Start: 2024-07-07 | End: 2024-07-07

## 2024-07-07 RX ORDER — ACETAMINOPHEN 500 MG
1000 TABLET ORAL ONCE
Refills: 0 | Status: COMPLETED | OUTPATIENT
Start: 2024-07-07 | End: 2024-07-07

## 2024-07-07 RX ADMIN — SENNOSIDES 2 TABLET(S): 8.6 TABLET ORAL at 01:23

## 2024-07-07 RX ADMIN — Medication 975 MILLIGRAM(S): at 14:21

## 2024-07-07 RX ADMIN — OXYCODONE HYDROCHLORIDE 10 MILLIGRAM(S): 30 TABLET ORAL at 14:04

## 2024-07-07 RX ADMIN — ENOXAPARIN SODIUM 40 MILLIGRAM(S): 120 INJECTION SUBCUTANEOUS at 22:07

## 2024-07-07 RX ADMIN — Medication 975 MILLIGRAM(S): at 17:18

## 2024-07-07 RX ADMIN — LEVETIRACETAM 500 MILLIGRAM(S): 1000 TABLET, FILM COATED ORAL at 06:10

## 2024-07-07 RX ADMIN — OXYCODONE HYDROCHLORIDE 5 MILLIGRAM(S): 30 TABLET ORAL at 17:26

## 2024-07-07 RX ADMIN — Medication 160 MILLIGRAM(S): at 11:14

## 2024-07-07 RX ADMIN — LIDOCAINE 5% 1 PATCH: 5 CREAM TOPICAL at 23:43

## 2024-07-07 RX ADMIN — OXYCODONE HYDROCHLORIDE 5 MILLIGRAM(S): 30 TABLET ORAL at 06:10

## 2024-07-07 RX ADMIN — OXYCODONE HYDROCHLORIDE 10 MILLIGRAM(S): 30 TABLET ORAL at 22:35

## 2024-07-07 RX ADMIN — OXYCODONE HYDROCHLORIDE 10 MILLIGRAM(S): 30 TABLET ORAL at 22:05

## 2024-07-07 RX ADMIN — OXYCODONE HYDROCHLORIDE 10 MILLIGRAM(S): 30 TABLET ORAL at 15:39

## 2024-07-07 RX ADMIN — Medication 975 MILLIGRAM(S): at 18:17

## 2024-07-07 RX ADMIN — Medication 100 MILLIGRAM(S): at 22:06

## 2024-07-07 RX ADMIN — Medication 975 MILLIGRAM(S): at 01:20

## 2024-07-07 RX ADMIN — LIDOCAINE 5% 1 PATCH: 5 CREAM TOPICAL at 19:15

## 2024-07-07 RX ADMIN — Medication 975 MILLIGRAM(S): at 01:50

## 2024-07-07 RX ADMIN — OXYCODONE HYDROCHLORIDE 10 MILLIGRAM(S): 30 TABLET ORAL at 03:41

## 2024-07-07 RX ADMIN — Medication 100 MILLIGRAM(S): at 13:55

## 2024-07-07 RX ADMIN — Medication 400 MILLIGRAM(S): at 17:11

## 2024-07-07 RX ADMIN — OXYCODONE HYDROCHLORIDE 10 MILLIGRAM(S): 30 TABLET ORAL at 03:11

## 2024-07-07 RX ADMIN — Medication 100 MILLIGRAM(S): at 04:00

## 2024-07-07 RX ADMIN — LEVETIRACETAM 500 MILLIGRAM(S): 1000 TABLET, FILM COATED ORAL at 17:18

## 2024-07-07 RX ADMIN — Medication 975 MILLIGRAM(S): at 09:18

## 2024-07-07 RX ADMIN — Medication 15 MILLIGRAM(S): at 11:14

## 2024-07-07 RX ADMIN — OXYCODONE HYDROCHLORIDE 5 MILLIGRAM(S): 30 TABLET ORAL at 06:40

## 2024-07-07 RX ADMIN — HEPARIN SODIUM 5000 UNIT(S): 1000 INJECTION, SOLUTION INTRAVENOUS; SUBCUTANEOUS at 01:20

## 2024-07-07 RX ADMIN — Medication 17 GRAM(S): at 22:04

## 2024-07-07 RX ADMIN — LIDOCAINE 5% 1 PATCH: 5 CREAM TOPICAL at 11:14

## 2024-07-07 RX ADMIN — Medication 400 MILLIGRAM(S): at 18:17

## 2024-07-07 RX ADMIN — HEPARIN SODIUM 5000 UNIT(S): 1000 INJECTION, SOLUTION INTRAVENOUS; SUBCUTANEOUS at 08:59

## 2024-07-07 RX ADMIN — OXYCODONE HYDROCHLORIDE 5 MILLIGRAM(S): 30 TABLET ORAL at 18:16

## 2024-07-07 NOTE — PROGRESS NOTE ADULT - SUBJECTIVE AND OBJECTIVE BOX
Patient is a 59y old  Female who presents with a chief complaint of left leg pain (06 Jul 2024 10:57)        SUBJECTIVE:  Patient was seen and examined at bedside.    Overnight Events :       Review of systems: 12 point Review of systems negative unless otherwise documented elsewhere in note.     Diet, Regular:   Supplement Feeding Modality:  Oral  Ensure Plus High Protein Cans or Servings Per Day:  1       Frequency:  Daily (07-06-24 @ 12:30) [Active]  Diet, NPO after Midnight:      NPO Start Date: 04-Jul-2024,   NPO Start Time: 23:59  Except Medications (07-05-24 @ 04:12) [Active]      MEDICATIONS:  MEDICATIONS  (STANDING):  acetaminophen     Tablet .. 975 milliGRAM(s) Oral every 8 hours  ceFAZolin   IVPB 2000 milliGRAM(s) IV Intermittent every 8 hours  enoxaparin Injectable 40 milliGRAM(s) SubCutaneous every 24 hours  levETIRAcetam 500 milliGRAM(s) Oral two times a day  lidocaine   4% Patch 1 Patch Transdermal daily  methadone    Tablet 160 milliGRAM(s) Oral daily  mirtazapine 15 milliGRAM(s) Oral daily    MEDICATIONS  (PRN):  ibuprofen  Tablet. 400 milliGRAM(s) Oral every 6 hours PRN Mild Pain (1 - 3)  naloxone Injectable 1 milliGRAM(s) IV Push every 3 minutes PRN in case of overdose  oxyCODONE    IR 5 milliGRAM(s) Oral every 6 hours PRN Moderate Pain (4 - 6)  oxyCODONE    IR 10 milliGRAM(s) Oral every 8 hours PRN Severe Pain (7 - 10)  polyethylene glycol 3350 17 Gram(s) Oral daily PRN Constipation  saline laxative (FLEET) Rectal Enema 1 Enema Rectal once PRN constipation, severe, please try oral laxatives first  senna 2 Tablet(s) Oral at bedtime PRN Constipation      Allergies    No Known Allergies    Intolerances        OBJECTIVE:  Vital Signs Last 24 Hrs  T(C): 36.7 (07 Jul 2024 10:00), Max: 37.4 (06 Jul 2024 21:00)  T(F): 98 (07 Jul 2024 10:00), Max: 99.4 (06 Jul 2024 21:00)  HR: 75 (07 Jul 2024 10:00) (75 - 100)  BP: 95/60 (07 Jul 2024 10:00) (92/68 - 137/80)  BP(mean): --  RR: 18 (07 Jul 2024 10:00) (17 - 18)  SpO2: 96% (07 Jul 2024 10:00) (95% - 96%)    Parameters below as of 07 Jul 2024 05:26  Patient On (Oxygen Delivery Method): room air      I&O's Summary    06 Jul 2024 07:01  -  07 Jul 2024 07:00  --------------------------------------------------------  IN: 50 mL / OUT: 0 mL / NET: 50 mL        PHYSICAL EXAM:  PHYSICAL EXAM;  General: well developed.  HEENT: adentulous, NC/AT; PERRL, anicteric sclera; dry mucous membranes  Cardiovascular: +S1/S2; RRR  Respiratory: CTA B/L; no W/R/R  Gastrointestinal: soft, NT/ND; +BSx4  Extremities: mild TTP of LLE, greatest in upper thigh and groin but decreased from yesterday, LLE edema, full ROM of all extremities, track marks in bilateral lower extremities.   Vascular: 2+ radial, DP/PT pulses B/L  Neurological: AAOx3; no focal deficits  LABS:                        8.1    4.79  )-----------( 334      ( 07 Jul 2024 05:30 )             25.9     07-07    139  |  99  |  17  ----------------------------<  101<H>  5.2   |  30  |  1.05    Ca    9.7      07 Jul 2024 05:30  Phos  4.0     07-07  Mg     2.1     07-07    TPro  8.0  /  Alb  2.8<L>  /  TBili  0.2  /  DBili  x   /  AST  37  /  ALT  15  /  AlkPhos  83  07-07    LIVER FUNCTIONS - ( 07 Jul 2024 05:30 )  Alb: 2.8 g/dL / Pro: 8.0 g/dL / ALK PHOS: 83 U/L / ALT: 15 U/L / AST: 37 U/L / GGT: x             CAPILLARY BLOOD GLUCOSE        Urinalysis Basic - ( 07 Jul 2024 05:30 )    Color: x / Appearance: x / SG: x / pH: x  Gluc: 101 mg/dL / Ketone: x  / Bili: x / Urobili: x   Blood: x / Protein: x / Nitrite: x   Leuk Esterase: x / RBC: x / WBC x   Sq Epi: x / Non Sq Epi: x / Bacteria: x        MICRODATA:      RADIOLOGY/OTHER STUDIES:

## 2024-07-07 NOTE — PROGRESS NOTE ADULT - ASSESSMENT
59 year old woman admitted to the hospital with left leg and thigh pain , she was found to have psoas and hip bursitis and MSSA bacteremia , Surveillance cultures have been negative since 7/3     TTE and TED without evidence of Endocarditis     Impression and Plan  # Sepsis due to MSSA bacteremia   # Left Ilio Psoas bursitis  Abscess s/p IR drainage, Cultures + for MSSA   - TTE and TED negative for Endocarditis   - Surveillance Blood cx negative since 7/3   - Cefazolin 2mg Q8hrs per ID   - Seen by Orthopedics , no further inpatient surgical intervention at this time     # Opioid Dependence on MAT with Methadone , continue Methadone 160mg 5times per week     # Anemia , hgb has been stable , will order Iron studies   # Epilepsy , Continue Keppra  # Severe Protein Calorie Malnutrition - Ensure plus HP once daily.

## 2024-07-08 LAB
ALBUMIN SERPL ELPH-MCNC: 3 G/DL — LOW (ref 3.3–5)
ALP SERPL-CCNC: 80 U/L — SIGNIFICANT CHANGE UP (ref 40–120)
ALT FLD-CCNC: 11 U/L — SIGNIFICANT CHANGE UP (ref 10–45)
ANION GAP SERPL CALC-SCNC: 8 MMOL/L — SIGNIFICANT CHANGE UP (ref 5–17)
AST SERPL-CCNC: 29 U/L — SIGNIFICANT CHANGE UP (ref 10–40)
BASOPHILS # BLD AUTO: 0.03 K/UL — SIGNIFICANT CHANGE UP (ref 0–0.2)
BASOPHILS NFR BLD AUTO: 0.8 % — SIGNIFICANT CHANGE UP (ref 0–2)
BILIRUB SERPL-MCNC: 0.2 MG/DL — SIGNIFICANT CHANGE UP (ref 0.2–1.2)
BUN SERPL-MCNC: 21 MG/DL — SIGNIFICANT CHANGE UP (ref 7–23)
CALCIUM SERPL-MCNC: 9.9 MG/DL — SIGNIFICANT CHANGE UP (ref 8.4–10.5)
CHLORIDE SERPL-SCNC: 95 MMOL/L — LOW (ref 96–108)
CO2 SERPL-SCNC: 35 MMOL/L — HIGH (ref 22–31)
CREAT SERPL-MCNC: 1.14 MG/DL — SIGNIFICANT CHANGE UP (ref 0.5–1.3)
CRP SERPL-MCNC: 106.7 MG/L — HIGH (ref 0–4)
CULTURE RESULTS: ABNORMAL
CULTURE RESULTS: SIGNIFICANT CHANGE UP
CULTURE RESULTS: SIGNIFICANT CHANGE UP
EGFR: 56 ML/MIN/1.73M2 — LOW
EOSINOPHIL # BLD AUTO: 0.18 K/UL — SIGNIFICANT CHANGE UP (ref 0–0.5)
EOSINOPHIL NFR BLD AUTO: 4.7 % — SIGNIFICANT CHANGE UP (ref 0–6)
ERYTHROCYTE [SEDIMENTATION RATE] IN BLOOD: 119 MM/HR — HIGH
FERRITIN SERPL-MCNC: 309 NG/ML — SIGNIFICANT CHANGE UP (ref 13–330)
GLUCOSE SERPL-MCNC: 92 MG/DL — SIGNIFICANT CHANGE UP (ref 70–99)
HCT VFR BLD CALC: 26.8 % — LOW (ref 34.5–45)
HGB BLD-MCNC: 8.2 G/DL — LOW (ref 11.5–15.5)
IMM GRANULOCYTES NFR BLD AUTO: 0.8 % — SIGNIFICANT CHANGE UP (ref 0–0.9)
IRON SATN MFR SERPL: 22 % — SIGNIFICANT CHANGE UP (ref 14–50)
IRON SATN MFR SERPL: 44 UG/DL — SIGNIFICANT CHANGE UP (ref 30–160)
LYMPHOCYTES # BLD AUTO: 1.17 K/UL — SIGNIFICANT CHANGE UP (ref 1–3.3)
LYMPHOCYTES # BLD AUTO: 30.5 % — SIGNIFICANT CHANGE UP (ref 13–44)
MAGNESIUM SERPL-MCNC: 2.1 MG/DL — SIGNIFICANT CHANGE UP (ref 1.6–2.6)
MCHC RBC-ENTMCNC: 27.7 PG — SIGNIFICANT CHANGE UP (ref 27–34)
MCHC RBC-ENTMCNC: 30.6 GM/DL — LOW (ref 32–36)
MCV RBC AUTO: 90.5 FL — SIGNIFICANT CHANGE UP (ref 80–100)
MONOCYTES # BLD AUTO: 0.46 K/UL — SIGNIFICANT CHANGE UP (ref 0–0.9)
MONOCYTES NFR BLD AUTO: 12 % — SIGNIFICANT CHANGE UP (ref 2–14)
NEUTROPHILS # BLD AUTO: 1.97 K/UL — SIGNIFICANT CHANGE UP (ref 1.8–7.4)
NEUTROPHILS NFR BLD AUTO: 51.2 % — SIGNIFICANT CHANGE UP (ref 43–77)
NRBC # BLD: 0 /100 WBCS — SIGNIFICANT CHANGE UP (ref 0–0)
ORGANISM # SPEC MICROSCOPIC CNT: ABNORMAL
ORGANISM # SPEC MICROSCOPIC CNT: SIGNIFICANT CHANGE UP
PHOSPHATE SERPL-MCNC: 5.5 MG/DL — HIGH (ref 2.5–4.5)
PLATELET # BLD AUTO: 342 K/UL — SIGNIFICANT CHANGE UP (ref 150–400)
POTASSIUM SERPL-MCNC: 4.9 MMOL/L — SIGNIFICANT CHANGE UP (ref 3.5–5.3)
POTASSIUM SERPL-SCNC: 4.9 MMOL/L — SIGNIFICANT CHANGE UP (ref 3.5–5.3)
PROT SERPL-MCNC: 8 G/DL — SIGNIFICANT CHANGE UP (ref 6–8.3)
RBC # BLD: 2.96 M/UL — LOW (ref 3.8–5.2)
RBC # FLD: 14.5 % — SIGNIFICANT CHANGE UP (ref 10.3–14.5)
SODIUM SERPL-SCNC: 138 MMOL/L — SIGNIFICANT CHANGE UP (ref 135–145)
SPECIMEN SOURCE: SIGNIFICANT CHANGE UP
TIBC SERPL-MCNC: 203 UG/DL — LOW (ref 220–430)
UIBC SERPL-MCNC: 159 UG/DL — SIGNIFICANT CHANGE UP (ref 110–370)
WBC # BLD: 3.84 K/UL — SIGNIFICANT CHANGE UP (ref 3.8–10.5)
WBC # FLD AUTO: 3.84 K/UL — SIGNIFICANT CHANGE UP (ref 3.8–10.5)

## 2024-07-08 PROCEDURE — 99233 SBSQ HOSP IP/OBS HIGH 50: CPT | Mod: GC

## 2024-07-08 PROCEDURE — 99232 SBSQ HOSP IP/OBS MODERATE 35: CPT

## 2024-07-08 RX ORDER — METHADONE HCL 10 MG
160 TABLET ORAL DAILY
Refills: 0 | Status: DISCONTINUED | OUTPATIENT
Start: 2024-07-08 | End: 2024-07-15

## 2024-07-08 RX ORDER — MELATONIN 3 MG
3 TABLET ORAL AT BEDTIME
Refills: 0 | Status: DISCONTINUED | OUTPATIENT
Start: 2024-07-08 | End: 2024-07-29

## 2024-07-08 RX ORDER — OXYCODONE HYDROCHLORIDE 30 MG/1
10 TABLET ORAL EVERY 6 HOURS
Refills: 0 | Status: DISCONTINUED | OUTPATIENT
Start: 2024-07-08 | End: 2024-07-15

## 2024-07-08 RX ORDER — CEFAZOLIN SODIUM 10 G
2000 VIAL (EA) INJECTION EVERY 8 HOURS
Refills: 0 | Status: DISCONTINUED | OUTPATIENT
Start: 2024-07-10 | End: 2024-07-17

## 2024-07-08 RX ORDER — KETOROLAC TROMETHAMINE 10 MG
15 TABLET ORAL EVERY 6 HOURS
Refills: 0 | Status: DISCONTINUED | OUTPATIENT
Start: 2024-07-08 | End: 2024-07-09

## 2024-07-08 RX ADMIN — LEVETIRACETAM 500 MILLIGRAM(S): 1000 TABLET, FILM COATED ORAL at 18:13

## 2024-07-08 RX ADMIN — Medication 15 MILLIGRAM(S): at 11:18

## 2024-07-08 RX ADMIN — OXYCODONE HYDROCHLORIDE 5 MILLIGRAM(S): 30 TABLET ORAL at 18:12

## 2024-07-08 RX ADMIN — Medication 160 MILLIGRAM(S): at 11:20

## 2024-07-08 RX ADMIN — Medication 15 MILLIGRAM(S): at 21:41

## 2024-07-08 RX ADMIN — Medication 15 MILLIGRAM(S): at 15:28

## 2024-07-08 RX ADMIN — Medication 975 MILLIGRAM(S): at 08:51

## 2024-07-08 RX ADMIN — OXYCODONE HYDROCHLORIDE 10 MILLIGRAM(S): 30 TABLET ORAL at 22:36

## 2024-07-08 RX ADMIN — OXYCODONE HYDROCHLORIDE 10 MILLIGRAM(S): 30 TABLET ORAL at 06:27

## 2024-07-08 RX ADMIN — Medication 975 MILLIGRAM(S): at 01:59

## 2024-07-08 RX ADMIN — LIDOCAINE 5% 1 PATCH: 5 CREAM TOPICAL at 23:30

## 2024-07-08 RX ADMIN — ENOXAPARIN SODIUM 40 MILLIGRAM(S): 120 INJECTION SUBCUTANEOUS at 21:41

## 2024-07-08 RX ADMIN — Medication 975 MILLIGRAM(S): at 01:29

## 2024-07-08 RX ADMIN — LEVETIRACETAM 500 MILLIGRAM(S): 1000 TABLET, FILM COATED ORAL at 06:28

## 2024-07-08 RX ADMIN — Medication 100 MILLIGRAM(S): at 12:25

## 2024-07-08 RX ADMIN — Medication 100 MILLIGRAM(S): at 06:28

## 2024-07-08 RX ADMIN — Medication 17 GRAM(S): at 12:25

## 2024-07-08 RX ADMIN — OXYCODONE HYDROCHLORIDE 10 MILLIGRAM(S): 30 TABLET ORAL at 13:16

## 2024-07-08 RX ADMIN — Medication 100 MILLIGRAM(S): at 21:41

## 2024-07-08 RX ADMIN — OXYCODONE HYDROCHLORIDE 5 MILLIGRAM(S): 30 TABLET ORAL at 12:32

## 2024-07-08 RX ADMIN — Medication 975 MILLIGRAM(S): at 18:13

## 2024-07-08 RX ADMIN — Medication 3 MILLIGRAM(S): at 21:41

## 2024-07-08 RX ADMIN — OXYCODONE HYDROCHLORIDE 10 MILLIGRAM(S): 30 TABLET ORAL at 06:57

## 2024-07-08 RX ADMIN — LIDOCAINE 5% 1 PATCH: 5 CREAM TOPICAL at 11:20

## 2024-07-08 RX ADMIN — OXYCODONE HYDROCHLORIDE 10 MILLIGRAM(S): 30 TABLET ORAL at 22:06

## 2024-07-08 RX ADMIN — OXYCODONE HYDROCHLORIDE 5 MILLIGRAM(S): 30 TABLET ORAL at 08:51

## 2024-07-08 RX ADMIN — Medication 975 MILLIGRAM(S): at 12:32

## 2024-07-08 NOTE — PROGRESS NOTE ADULT - PROBLEM SELECTOR PLAN 2
Worsening left leg and thigh pain with radiation to groin  CT LE w IV contrast showed loculated fluid in L iliacus muscle, iliopsoas bursitis of infectious or inflammatory etiology  ortho consulted, recommended IV antibiotics and IR consult in AM, will continue to follow   Given Zosyn and Vanc in ED  - blood cultures grew MSSA  - cefazolin 2g q8hs, day 4  -   - f/u cultures from IR aspiration  - surveillance blood cultures sent  - per ID recs, if cultures from 7/3 have no growth, no need for further surveillance cultures  - HepC RNA reactive Worsening left leg and thigh pain with radiation to groin  CT LE w IV contrast showed loculated fluid in L iliacus muscle, iliopsoas bursitis of infectious or inflammatory etiology  ortho consulted, recommended IV antibiotics and IR consult in AM, will continue to follow   Given Zosyn and Vanc in ED  - blood cultures grew MSSA  - cefazolin 2g q8hs, day 6  - ESR lateralized  - possibly need to re-image LLE tomorrow in setting of worsening leg pain  - HepC RNA reactive

## 2024-07-08 NOTE — PROGRESS NOTE ADULT - PROBLEM SELECTOR PLAN 3
Left leg and thigh pain with radiation to groin  CT LE w IV contrast showed loculated fluid in L iliacus muscle, iliopsoas bursitis of likely infections etiology  s/p IR drainage   - duplex of LLE showed no evidence of DVT  - pain regimen: standing 975 tylenol q8, lidocaine patch, oxy prn 5mg for moderate pain, oxy prn 10mg for severe pain  - consider CT lower extremity tomorrow (7/9) if pain does not improve Left leg and thigh pain with radiation to groin  CT LE w IV contrast showed loculated fluid in L iliacus muscle, iliopsoas bursitis of likely infections etiology  s/p IR drainage   - duplex of LLE showed no evidence of DVT  - pain regimen: standing 975 tylenol q8, lidocaine patch, oxy 5mg q8PRN for moderate pain, oxy 10mg q6PRN for severe pain, toradol 15mg IV push Q6 PRN   - consider CT lower extremity tomorrow (7/9) if pain does not improve

## 2024-07-08 NOTE — PROGRESS NOTE ADULT - PROBLEM SELECTOR PLAN 4
Hx of IV drug use  Pt states she is chronically on methadone 160 mg 5 days a week since age 19  Last heroin injection in L leg on Saturday 6/29  - confirmed dose with methadone clinic: 160mg, 5x/week  - last dose given on 6/29 for 6/30  - monitor COWs.  - HIV testing ordered, negative  - hepatitis panel reactive  - TTE ordered, negative for endocarditis  - TED completed 7/5, negative for endocarditis  - continue cefazolin 2g IV q8 per ID recs.

## 2024-07-08 NOTE — PROGRESS NOTE ADULT - PROBLEM SELECTOR PLAN 5
Pt states history of anemia  On admission Hg 10.2, Hct 31.0  - Continue to monitor H&H  - Maintain active type & screen  - Hb stable between 7.5 - 9

## 2024-07-08 NOTE — PROGRESS NOTE ADULT - ATTENDING COMMENTS
59-year-old female with a PMHx of IVDU (on Methadone), anemia and epilepsy who presented with LLE pain, found to have loculated fluid collection of left iliacus muscle and iliopsoas bursitis c/b MSSA bacteremia.  BCx (7/1) MSSA, BCx (7/3) NGTD.       Plan:      -IR consulted, s/p drainage, Cx with staph aureus      -TTE and TED negative for endocarditis     -ID consulted, continue with Cefazolin, follow up final recommendations     -ortho consulted, no acute surgical intervention     -PT recommends [facility], no acceptances to date   -SW consult     Rest of plan as per resident note.

## 2024-07-08 NOTE — PROGRESS NOTE ADULT - SUBJECTIVE AND OBJECTIVE BOX
INFECTIOUS DISEASES CONSULT FOLLOW-UP NOTE    INTERVAL HPI/OVERNIGHT EVENTS:      ROS:   Constitutional, eyes, ENT, cardiovascular, respiratory, gastrointestinal, genitourinary, integumentary, neurological, psychiatric and heme/lymph are otherwise negative other than noted above       ANTIBIOTICS/RELEVANT:    MEDICATIONS  (STANDING):  acetaminophen     Tablet .. 975 milliGRAM(s) Oral every 8 hours  ceFAZolin   IVPB 2000 milliGRAM(s) IV Intermittent every 8 hours  enoxaparin Injectable 40 milliGRAM(s) SubCutaneous every 24 hours  ketorolac   Injectable 15 milliGRAM(s) IV Push every 6 hours  levETIRAcetam 500 milliGRAM(s) Oral two times a day  lidocaine   4% Patch 1 Patch Transdermal daily  methadone    Tablet 160 milliGRAM(s) Oral daily  mirtazapine 15 milliGRAM(s) Oral daily    MEDICATIONS  (PRN):  naloxone Injectable 1 milliGRAM(s) IV Push every 3 minutes PRN in case of overdose  oxyCODONE    IR 5 milliGRAM(s) Oral every 6 hours PRN Moderate Pain (4 - 6)  oxyCODONE    IR 10 milliGRAM(s) Oral every 6 hours PRN Severe Pain (7 - 10)  polyethylene glycol 3350 17 Gram(s) Oral daily PRN Constipation  saline laxative (FLEET) Rectal Enema 1 Enema Rectal once PRN constipation, severe, please try oral laxatives first  senna 2 Tablet(s) Oral at bedtime PRN Constipation        Vital Signs Last 24 Hrs  T(C): 37.2 (08 Jul 2024 09:30), Max: 37.2 (07 Jul 2024 15:45)  T(F): 99 (08 Jul 2024 09:30), Max: 99 (08 Jul 2024 09:30)  HR: 81 (08 Jul 2024 09:30) (81 - 88)  BP: 104/74 (08 Jul 2024 09:30) (95/60 - 109/75)  BP(mean): --  RR: 18 (08 Jul 2024 09:30) (17 - 18)  SpO2: 98% (08 Jul 2024 09:30) (95% - 100%)    Parameters below as of 08 Jul 2024 09:30  Patient On (Oxygen Delivery Method): room air        07-07-24 @ 07:01  -  07-08-24 @ 07:00  --------------------------------------------------------  IN: 50 mL / OUT: 0 mL / NET: 50 mL      PHYSICAL EXAM:  Constitutional: alert, NAD  Eyes: the sclera and conjunctiva were normal.   ENT: the ears and nose were normal in appearance.   Neck: the appearance of the neck was normal and the neck was supple.   Pulmonary: no respiratory distress and lungs were clear to auscultation bilaterally.   Heart: heart rate was normal and rhythm regular, normal S1 and S2  Vascular:. there was no peripheral edema  Abdomen: normal bowel sounds, soft, non-tender  Neurological: no focal deficits.   Psychiatric: the affect was normal        LABS:                        8.2    3.84  )-----------( 342      ( 08 Jul 2024 06:59 )             26.8     07-08    138  |  95<L>  |  21  ----------------------------<  92  4.9   |  35<H>  |  1.14    Ca    9.9      08 Jul 2024 06:59  Phos  5.5     07-08  Mg     2.1     07-08    TPro  8.0  /  Alb  3.0<L>  /  TBili  0.2  /  DBili  x   /  AST  29  /  ALT  11  /  AlkPhos  80  07-08      Urinalysis Basic - ( 08 Jul 2024 06:59 )    Color: x / Appearance: x / SG: x / pH: x  Gluc: 92 mg/dL / Ketone: x  / Bili: x / Urobili: x   Blood: x / Protein: x / Nitrite: x   Leuk Esterase: x / RBC: x / WBC x   Sq Epi: x / Non Sq Epi: x / Bacteria: x        MICROBIOLOGY:      RADIOLOGY & ADDITIONAL STUDIES:  Reviewed INFECTIOUS DISEASES CONSULT FOLLOW-UP NOTE    INTERVAL HPI/OVERNIGHT EVENTS:    Patient seen and examined at bedside. JAKE. Afebrile. Complains of LLE pain.     ROS:   Constitutional, eyes, ENT, cardiovascular, respiratory, gastrointestinal, genitourinary, integumentary, neurological, psychiatric and heme/lymph are otherwise negative other than noted above       ANTIBIOTICS/RELEVANT:    MEDICATIONS  (STANDING):  acetaminophen     Tablet .. 975 milliGRAM(s) Oral every 8 hours  ceFAZolin   IVPB 2000 milliGRAM(s) IV Intermittent every 8 hours  enoxaparin Injectable 40 milliGRAM(s) SubCutaneous every 24 hours  ketorolac   Injectable 15 milliGRAM(s) IV Push every 6 hours  levETIRAcetam 500 milliGRAM(s) Oral two times a day  lidocaine   4% Patch 1 Patch Transdermal daily  methadone    Tablet 160 milliGRAM(s) Oral daily  mirtazapine 15 milliGRAM(s) Oral daily    MEDICATIONS  (PRN):  naloxone Injectable 1 milliGRAM(s) IV Push every 3 minutes PRN in case of overdose  oxyCODONE    IR 5 milliGRAM(s) Oral every 6 hours PRN Moderate Pain (4 - 6)  oxyCODONE    IR 10 milliGRAM(s) Oral every 6 hours PRN Severe Pain (7 - 10)  polyethylene glycol 3350 17 Gram(s) Oral daily PRN Constipation  saline laxative (FLEET) Rectal Enema 1 Enema Rectal once PRN constipation, severe, please try oral laxatives first  senna 2 Tablet(s) Oral at bedtime PRN Constipation        Vital Signs Last 24 Hrs  T(C): 37.2 (08 Jul 2024 09:30), Max: 37.2 (07 Jul 2024 15:45)  T(F): 99 (08 Jul 2024 09:30), Max: 99 (08 Jul 2024 09:30)  HR: 81 (08 Jul 2024 09:30) (81 - 88)  BP: 104/74 (08 Jul 2024 09:30) (95/60 - 109/75)  BP(mean): --  RR: 18 (08 Jul 2024 09:30) (17 - 18)  SpO2: 98% (08 Jul 2024 09:30) (95% - 100%)    Parameters below as of 08 Jul 2024 09:30  Patient On (Oxygen Delivery Method): room air        07-07-24 @ 07:01  -  07-08-24 @ 07:00  --------------------------------------------------------  IN: 50 mL / OUT: 0 mL / NET: 50 mL      PHYSICAL EXAM:  Constitutional: alert, NAD  Eyes: the sclera and conjunctiva were normal.   ENT: the ears and nose were normal in appearance.   Neck: the appearance of the neck was normal and the neck was supple.   Pulmonary: no respiratory distress and lungs were clear to auscultation bilaterally.   Heart: heart rate was normal and rhythm regular, normal S1 and S2  Vascular:. there was no peripheral edema  Abdomen: normal bowel sounds, soft, non-tender  Extremities : no  LLE thigh tenderness . Palpated c/t/l spine, b/l shoulders/elbows/wrists/knees/ankles - no tenderness.   Neurological: no focal deficits.   Psychiatric: the affect was normal      LABS:                        8.2    3.84  )-----------( 342      ( 08 Jul 2024 06:59 )             26.8     07-08    138  |  95<L>  |  21  ----------------------------<  92  4.9   |  35<H>  |  1.14    Ca    9.9      08 Jul 2024 06:59  Phos  5.5     07-08  Mg     2.1     07-08    TPro  8.0  /  Alb  3.0<L>  /  TBili  0.2  /  DBili  x   /  AST  29  /  ALT  11  /  AlkPhos  80  07-08      Urinalysis Basic - ( 08 Jul 2024 06:59 )    Color: x / Appearance: x / SG: x / pH: x  Gluc: 92 mg/dL / Ketone: x  / Bili: x / Urobili: x   Blood: x / Protein: x / Nitrite: x   Leuk Esterase: x / RBC: x / WBC x   Sq Epi: x / Non Sq Epi: x / Bacteria: x        MICROBIOLOGY:    Culture - Blood (collected 07-03-24 @ 22:42)  Source: .Blood Blood  Preliminary Report (07-08-24 @ 02:02):    No growth at 4 days    Culture - Body Fluid with Gram Stain (collected 07-03-24 @ 14:00)  Source: .Body Fluid left psoas tendon fluid  Gram Stain (07-04-24 @ 18:59):    Moderate polymorphonuclear leukocytes per low power field    No organisms seen per oil power field  Preliminary Report (07-04-24 @ 18:59):    Rare Staphylococcus aureus  Organism: Staphylococcus aureus (07-05-24 @ 15:40)  Organism: Staphylococcus aureus (07-05-24 @ 15:40)      Method Type: BHARAT      -  Clindamycin: R <=0.25 This isolate is presumed to be clindamycin resistant based on detection of inducible resistance. Clindamycin may still be effective in some patients.      -  Erythromycin: R >4      -  Gentamicin: S <=1 Should not be used as monotherapy      -  Oxacillin: S <=0.25 Oxacillin predicts susceptibility for dicloxacillin, methicillin, and nafcillin      -  Penicillin: R 4      -  Rifampin: S <=1 Should not be used as monotherapy      -  Tetracycline: S <=1      -  Trimethoprim/Sulfamethoxazole: S <=0.5/9.5      -  Vancomycin: S 2    Culture - Blood (collected 07-03-24 @ 08:30)  Source: .Blood Blood  Preliminary Report (07-07-24 @ 18:01):    No growth at 4 days    Culture - Blood (collected 07-03-24 @ 08:30)  Source: .Blood Blood  Preliminary Report (07-07-24 @ 18:01):    No growth at 4 days        RADIOLOGY & ADDITIONAL STUDIES:  Reviewed

## 2024-07-08 NOTE — PROGRESS NOTE ADULT - PROBLEM SELECTOR PLAN 9
Patient previously prescribed quetiapine 150mg qd, buspirone 15mg qd, mirtazapine 15mg qd. Unclear what she is currently taking.  - attempted to do med rec with shelter, pharmacy, patient, unable to find current doses for her mood medications  - she is currently stable without her medications  - will re-attempt to med rec on Monday Patient previously prescribed quetiapine 150mg qd, buspirone 15mg qd, mirtazapine 15mg qd. Unclear what she is currently taking.  - attempted to do med rec with shelter, pharmacy, patient, unable to find current doses for her mood medications  - she is currently stable without her medications  - will re-attempt to med rec on Tuesday

## 2024-07-08 NOTE — PROGRESS NOTE ADULT - ASSESSMENT
59F with hx of IVDU (heroin/cocaine) on methadone, epilepsy who presents for sharp L leg pain progressively worsening over the past month found to have loculated fluid in L iliacus muscle, iliopsoas bursitis c/b MSSA bacteremia. Patient afebrile without leukocytosis. Fluid collection/bursitis likely 2/2 injecting IV drug into LLE. Given MSSA BSI- suspect MSSA as causative pathogen of iliopsoas collection. S/P IR drainage 7/3 - 5 cc purulent fluid aspirated and sent for culture. IR drainage cx growing staph aureus (sensis pending). TTE without vegetation- TED without vegetation.   HIV screen neg  Hep C Ab reactive - Quantitative RNA not detected   bcxs 7/1 - MSSA  bcxs 7/3 -ngtd     Suggest:  -f/u bcxs 7/1 - MSSA  -f/u surveillance blood cultures 7/3 --ngtd    -send set of surveillance cultures today    -f/u IR aspirate culture 7/3    -continue cefazolin 2g IV Q8     Team 2 will follow you.  Dr. Chaves will cover the weekend. Dr. He will assume care 7/8  Case d/w primary team.  Final recommendation pending attending note.    Lena Decker, Infectious Diseases PA  Please reach out for any questions 9 am-5pm. For evenings and weekends, please call the ID physician on call.  Work cell: [phone] 59F with hx of IVDU (heroin/cocaine) on methadone, epilepsy who presents for sharp L leg pain progressively worsening over the past month found to have loculated fluid in L iliacus muscle, iliopsoas bursitis c/b MSSA bacteremia. Patient afebrile without leukocytosis. Fluid collection/bursitis likely 2/2 injecting IV drug into LLE. Given MSSA BSI- suspect MSSA as causative pathogen of iliopsoas collection. S/P IR drainage 7/3 - 5 cc purulent fluid aspirated and sent for culture. IR drainage cx grew MSSA.    TTE without vegetation  TED without vegetation.   HIV screen neg  Hep C Ab reactive - Quantitative RNA not detected   bcxs 7/1 - MSSA  bcxs 7/3 -ngtd     Suggest:  -f/u surveillance blood cultures 7/3 --ngtd    -continue cefazolin 2g IV Q8   -Patient will need PICC line   -Duration of antibiotics is tentatively 4-6 weeks. Will need repeat CT LLE with IVC in 4 weeks prior to stopping antibiotics   -Weekly labs: CMP, CBC with diff, ESR, CRP faxed to ID office at [phone]  -Patient to follow up with Dr. He in 2 weeks ([address] [place] [address], [phone]), ID office will call patient to schedule     Team 2 will follow you.    Case d/w primary team.  Final recommendation pending attending note.    Lena Decker, Infectious Diseases PA  Please reach out for any questions 9 am-5pm. For evenings and weekends, please call the ID physician on call.  Work cell: [phone]

## 2024-07-08 NOTE — PROGRESS NOTE ADULT - ASSESSMENT
58 y/o F with past medical history of IV drug use on 160mg methadone, anemia, epilepsy who presented with sharp LLE pain that progressively worsened over the past month. CT of LLE in ED showed loculated fluid in L iliacus muscle, iliopsoas bursitis of infectious or inflammatory etiology. Patient underwent IR drainage procedure and tolerated the procedure well. Patient was found to be negative for endocarditis. She is now being treated with IV cefazolin, pending dispo to [facility].   60 y/o F with past medical history of IV drug use on 160mg methadone, anemia, epilepsy who presented with sharp LLE pain that progressively worsened over the past month. CT of LLE in ED showed loculated fluid in L iliacus muscle, iliopsoas bursitis of infectious or inflammatory etiology. Patient underwent IR drainage procedure and tolerated the procedure well. Patient was found to be negative for endocarditis. She is now being treated with IV cefazolin for 4-6 weeks.

## 2024-07-08 NOTE — PROGRESS NOTE ADULT - PROBLEM SELECTOR PLAN 1
Patient was found to have blood and wound cultures positive for MSSA bacteria  - cefazolin 2g q8hs, day 4  - per ID recs, if cultures from 7/3 have no growth, there is no need for further surveillance cultures  - cultures from 7/3 have no growth  - patient is medically stable for discharge, pending [facility] for IV antibiotics Patient was found to have blood and wound cultures positive for MSSA bacteria  - cefazolin 2g q8hs, day 6  - per ID recs, cultures from 7/3 have no growth, so there is no need for further surveillance cultures  - patient is medically stable for discharge  - per ID, patient will need 4-6 weeks of antibiotics, they recommend PICC line

## 2024-07-08 NOTE — PROGRESS NOTE ADULT - SUBJECTIVE AND OBJECTIVE BOX
OVERNIGHT EVENTS:    SUBJECTIVE / INTERVAL HPI: Patient seen and examined at bedside.     VITAL SIGNS:  Vital Signs Last 24 Hrs  T(C): 36.9 (08 Jul 2024 05:24), Max: 37.2 (07 Jul 2024 15:45)  T(F): 98.5 (08 Jul 2024 05:24), Max: 98.9 (07 Jul 2024 15:45)  HR: 87 (08 Jul 2024 05:24) (75 - 88)  BP: 96/63 (08 Jul 2024 05:24) (95/60 - 109/75)  BP(mean): --  RR: 18 (08 Jul 2024 05:24) (17 - 18)  SpO2: 100% (08 Jul 2024 05:24) (95% - 100%)    Parameters below as of 08 Jul 2024 05:24  Patient On (Oxygen Delivery Method): room air      I&O's Summary    07 Jul 2024 07:01  -  08 Jul 2024 07:00  --------------------------------------------------------  IN: 50 mL / OUT: 0 mL / NET: 50 mL        PHYSICAL EXAM:  General: WDWN  HEENT: NC/AT; PERRL, anicteric sclera; MMM  Neck: supple  Cardiovascular: +S1/S2; RRR  Respiratory: CTA B/L; no W/R/R  Gastrointestinal: soft, NT/ND; +BSx4  Extremities: WWP; no edema, clubbing or cyanosis  Vascular: 2+ radial, DP/PT pulses B/L  Neurological: AAOx3; no focal deficits    MEDICATIONS:  MEDICATIONS  (STANDING):  acetaminophen     Tablet .. 975 milliGRAM(s) Oral every 8 hours  ceFAZolin   IVPB 2000 milliGRAM(s) IV Intermittent every 8 hours  enoxaparin Injectable 40 milliGRAM(s) SubCutaneous every 24 hours  levETIRAcetam 500 milliGRAM(s) Oral two times a day  lidocaine   4% Patch 1 Patch Transdermal daily  methadone    Tablet 160 milliGRAM(s) Oral daily  mirtazapine 15 milliGRAM(s) Oral daily    MEDICATIONS  (PRN):  ibuprofen  Tablet. 400 milliGRAM(s) Oral every 6 hours PRN Mild Pain (1 - 3)  naloxone Injectable 1 milliGRAM(s) IV Push every 3 minutes PRN in case of overdose  oxyCODONE    IR 10 milliGRAM(s) Oral every 8 hours PRN Severe Pain (7 - 10)  oxyCODONE    IR 5 milliGRAM(s) Oral every 6 hours PRN Moderate Pain (4 - 6)  polyethylene glycol 3350 17 Gram(s) Oral daily PRN Constipation  saline laxative (FLEET) Rectal Enema 1 Enema Rectal once PRN constipation, severe, please try oral laxatives first  senna 2 Tablet(s) Oral at bedtime PRN Constipation      ALLERGIES:  Allergies    No Known Allergies    Intolerances        LABS:                        8.2    3.84  )-----------( 342      ( 08 Jul 2024 06:59 )             26.8     07-08    x   |  x   |  21  ----------------------------<  92  x    |  x   |  1.14    Ca    9.9      08 Jul 2024 06:59  Phos  4.0     07-07  Mg     2.1     07-08    TPro  8.0  /  Alb  2.8<L>  /  TBili  0.2  /  DBili  x   /  AST  37  /  ALT  15  /  AlkPhos  83  07-07      Urinalysis Basic - ( 08 Jul 2024 06:59 )    Color: x / Appearance: x / SG: x / pH: x  Gluc: 92 mg/dL / Ketone: x  / Bili: x / Urobili: x   Blood: x / Protein: x / Nitrite: x   Leuk Esterase: x / RBC: x / WBC x   Sq Epi: x / Non Sq Epi: x / Bacteria: x      CAPILLARY BLOOD GLUCOSE          RADIOLOGY & ADDITIONAL TESTS: Reviewed.   OVERNIGHT EVENTS: No overnight events.    SUBJECTIVE / INTERVAL HPI: Patient seen and examined at bedside.     VITAL SIGNS:  Vital Signs Last 24 Hrs  T(C): 36.9 (08 Jul 2024 05:24), Max: 37.2 (07 Jul 2024 15:45)  T(F): 98.5 (08 Jul 2024 05:24), Max: 98.9 (07 Jul 2024 15:45)  HR: 87 (08 Jul 2024 05:24) (75 - 88)  BP: 96/63 (08 Jul 2024 05:24) (95/60 - 109/75)  BP(mean): --  RR: 18 (08 Jul 2024 05:24) (17 - 18)  SpO2: 100% (08 Jul 2024 05:24) (95% - 100%)    Parameters below as of 08 Jul 2024 05:24  Patient On (Oxygen Delivery Method): room air      I&O's Summary    07 Jul 2024 07:01  -  08 Jul 2024 07:00  --------------------------------------------------------  IN: 50 mL / OUT: 0 mL / NET: 50 mL        PHYSICAL EXAM:  General: WDWN  HEENT: NC/AT; PERRL, anicteric sclera; MMM  Neck: supple  Cardiovascular: +S1/S2; RRR  Respiratory: CTA B/L; no W/R/R  Gastrointestinal: soft, NT/ND; +BSx4  Extremities: WWP; no edema, clubbing or cyanosis  Vascular: 2+ radial, DP/PT pulses B/L  Neurological: AAOx3; no focal deficits    MEDICATIONS:  MEDICATIONS  (STANDING):  acetaminophen     Tablet .. 975 milliGRAM(s) Oral every 8 hours  ceFAZolin   IVPB 2000 milliGRAM(s) IV Intermittent every 8 hours  enoxaparin Injectable 40 milliGRAM(s) SubCutaneous every 24 hours  levETIRAcetam 500 milliGRAM(s) Oral two times a day  lidocaine   4% Patch 1 Patch Transdermal daily  methadone    Tablet 160 milliGRAM(s) Oral daily  mirtazapine 15 milliGRAM(s) Oral daily    MEDICATIONS  (PRN):  ibuprofen  Tablet. 400 milliGRAM(s) Oral every 6 hours PRN Mild Pain (1 - 3)  naloxone Injectable 1 milliGRAM(s) IV Push every 3 minutes PRN in case of overdose  oxyCODONE    IR 10 milliGRAM(s) Oral every 8 hours PRN Severe Pain (7 - 10)  oxyCODONE    IR 5 milliGRAM(s) Oral every 6 hours PRN Moderate Pain (4 - 6)  polyethylene glycol 3350 17 Gram(s) Oral daily PRN Constipation  saline laxative (FLEET) Rectal Enema 1 Enema Rectal once PRN constipation, severe, please try oral laxatives first  senna 2 Tablet(s) Oral at bedtime PRN Constipation      ALLERGIES:  Allergies    No Known Allergies    Intolerances        LABS:                        8.2    3.84  )-----------( 342      ( 08 Jul 2024 06:59 )             26.8     07-08    x   |  x   |  21  ----------------------------<  92  x    |  x   |  1.14    Ca    9.9      08 Jul 2024 06:59  Phos  4.0     07-07  Mg     2.1     07-08    TPro  8.0  /  Alb  2.8<L>  /  TBili  0.2  /  DBili  x   /  AST  37  /  ALT  15  /  AlkPhos  83  07-07      Urinalysis Basic - ( 08 Jul 2024 06:59 )    Color: x / Appearance: x / SG: x / pH: x  Gluc: 92 mg/dL / Ketone: x  / Bili: x / Urobili: x   Blood: x / Protein: x / Nitrite: x   Leuk Esterase: x / RBC: x / WBC x   Sq Epi: x / Non Sq Epi: x / Bacteria: x      CAPILLARY BLOOD GLUCOSE          RADIOLOGY & ADDITIONAL TESTS: Reviewed.   OVERNIGHT EVENTS: No overnight events.    SUBJECTIVE / INTERVAL HPI: Patient seen and examined at bedside. She reports that she still is having LLE pain in the upper thigh / groin region.     VITAL SIGNS:  Vital Signs Last 24 Hrs  T(C): 36.9 (08 Jul 2024 05:24), Max: 37.2 (07 Jul 2024 15:45)  T(F): 98.5 (08 Jul 2024 05:24), Max: 98.9 (07 Jul 2024 15:45)  HR: 87 (08 Jul 2024 05:24) (75 - 88)  BP: 96/63 (08 Jul 2024 05:24) (95/60 - 109/75)  BP(mean): --  RR: 18 (08 Jul 2024 05:24) (17 - 18)  SpO2: 100% (08 Jul 2024 05:24) (95% - 100%)    Parameters below as of 08 Jul 2024 05:24  Patient On (Oxygen Delivery Method): room air      07 Jul 2024 07:01  -  08 Jul 2024 07:00  --------------------------------------------------------  IN: 50 mL / OUT: 0 mL / NET: 50 mL        PHYSICAL EXAM:  General: fatigued, inadequate nutrition.  HEENT: adentulous, NC/AT; PERRL, anicteric sclera; dry mucous membranes  Cardiovascular: +S1/S2; RRR  Respiratory: CTA B/L; no W/R/R  Gastrointestinal: soft, NT/ND; +BSx4  Extremities: mild TTP of LLE, greatest in upper thigh and groin, LLE edema, full ROM of all extremities, track marks in bilateral lower extremities.   Vascular: 2+ radial, DP/PT pulses B/L  Neurological: AAOx3; no focal deficits    MEDICATIONS:  MEDICATIONS  (STANDING):  acetaminophen     Tablet .. 975 milliGRAM(s) Oral every 8 hours  ceFAZolin   IVPB 2000 milliGRAM(s) IV Intermittent every 8 hours  enoxaparin Injectable 40 milliGRAM(s) SubCutaneous every 24 hours  levETIRAcetam 500 milliGRAM(s) Oral two times a day  lidocaine   4% Patch 1 Patch Transdermal daily  methadone    Tablet 160 milliGRAM(s) Oral daily  mirtazapine 15 milliGRAM(s) Oral daily    MEDICATIONS  (PRN):  ibuprofen  Tablet. 400 milliGRAM(s) Oral every 6 hours PRN Mild Pain (1 - 3)  naloxone Injectable 1 milliGRAM(s) IV Push every 3 minutes PRN in case of overdose  oxyCODONE    IR 10 milliGRAM(s) Oral every 8 hours PRN Severe Pain (7 - 10)  oxyCODONE    IR 5 milliGRAM(s) Oral every 6 hours PRN Moderate Pain (4 - 6)  polyethylene glycol 3350 17 Gram(s) Oral daily PRN Constipation  saline laxative (FLEET) Rectal Enema 1 Enema Rectal once PRN constipation, severe, please try oral laxatives first  senna 2 Tablet(s) Oral at bedtime PRN Constipation      ALLERGIES:  Allergies    No Known Allergies    Intolerances        LABS:                        8.2    3.84  )-----------( 342      ( 08 Jul 2024 06:59 )             26.8     07-08    x   |  x   |  21  ----------------------------<  92  x    |  x   |  1.14    Ca    9.9      08 Jul 2024 06:59  Phos  4.0     07-07  Mg     2.1     07-08    TPro  8.0  /  Alb  2.8<L>  /  TBili  0.2  /  DBili  x   /  AST  37  /  ALT  15  /  AlkPhos  83  07-07      Urinalysis Basic - ( 08 Jul 2024 06:59 )    Color: x / Appearance: x / SG: x / pH: x  Gluc: 92 mg/dL / Ketone: x  / Bili: x / Urobili: x   Blood: x / Protein: x / Nitrite: x   Leuk Esterase: x / RBC: x / WBC x   Sq Epi: x / Non Sq Epi: x / Bacteria: x      CAPILLARY BLOOD GLUCOSE      RADIOLOGY & ADDITIONAL TESTS: Reviewed.   OVERNIGHT EVENTS: No overnight events.    SUBJECTIVE / INTERVAL HPI: Patient seen and examined at bedside. She reports that she still is having LLE pain in the upper thigh / groin region that is worse than yesterday. Her pain is not entirely well controlled with her current pain regimen. Denies other symptoms besides this pain including fever, shortness of breath, dizziness, nausea, vomitng.    VITAL SIGNS:  Vital Signs Last 24 Hrs  T(C): 36.9 (08 Jul 2024 05:24), Max: 37.2 (07 Jul 2024 15:45)  T(F): 98.5 (08 Jul 2024 05:24), Max: 98.9 (07 Jul 2024 15:45)  HR: 87 (08 Jul 2024 05:24) (75 - 88)  BP: 96/63 (08 Jul 2024 05:24) (95/60 - 109/75)  BP(mean): --  RR: 18 (08 Jul 2024 05:24) (17 - 18)  SpO2: 100% (08 Jul 2024 05:24) (95% - 100%)    Parameters below as of 08 Jul 2024 05:24  Patient On (Oxygen Delivery Method): room air      07 Jul 2024 07:01  -  08 Jul 2024 07:00  --------------------------------------------------------  IN: 50 mL / OUT: 0 mL / NET: 50 mL        PHYSICAL EXAM:  General: fatigued, inadequate nutrition.  HEENT: adentulous, NC/AT; PERRL, anicteric sclera; dry mucous membranes  Cardiovascular: +S1/S2; RRR  Respiratory: CTA B/L; no W/R/R  Gastrointestinal: soft, NT/ND; +BSx4  Extremities: mild TTP of LLE, greatest in upper thigh and groin, LLE edema, full ROM of all extremities, track marks in bilateral lower extremities.   Vascular: 2+ radial, DP/PT pulses B/L  Neurological: AAOx3; no focal deficits    MEDICATIONS:  MEDICATIONS  (STANDING):  acetaminophen     Tablet .. 975 milliGRAM(s) Oral every 8 hours  ceFAZolin   IVPB 2000 milliGRAM(s) IV Intermittent every 8 hours  enoxaparin Injectable 40 milliGRAM(s) SubCutaneous every 24 hours  levETIRAcetam 500 milliGRAM(s) Oral two times a day  lidocaine   4% Patch 1 Patch Transdermal daily  methadone    Tablet 160 milliGRAM(s) Oral daily  mirtazapine 15 milliGRAM(s) Oral daily    MEDICATIONS  (PRN):  ibuprofen  Tablet. 400 milliGRAM(s) Oral every 6 hours PRN Mild Pain (1 - 3)  naloxone Injectable 1 milliGRAM(s) IV Push every 3 minutes PRN in case of overdose  oxyCODONE    IR 10 milliGRAM(s) Oral every 8 hours PRN Severe Pain (7 - 10)  oxyCODONE    IR 5 milliGRAM(s) Oral every 6 hours PRN Moderate Pain (4 - 6)  polyethylene glycol 3350 17 Gram(s) Oral daily PRN Constipation  saline laxative (FLEET) Rectal Enema 1 Enema Rectal once PRN constipation, severe, please try oral laxatives first  senna 2 Tablet(s) Oral at bedtime PRN Constipation      ALLERGIES:  No Known Allergies        LABS:                        8.2    3.84  )-----------( 342      ( 08 Jul 2024 06:59 )             26.8     07-08    x   |  x   |  21  ----------------------------<  92  x    |  x   |  1.14    Ca    9.9      08 Jul 2024 06:59  Phos  4.0     07-07  Mg     2.1     07-08    TPro  8.0  /  Alb  2.8<L>  /  TBili  0.2  /  DBili  x   /  AST  37  /  ALT  15  /  AlkPhos  83  07-07      Urinalysis Basic - ( 08 Jul 2024 06:59 )    Color: x / Appearance: x / SG: x / pH: x  Gluc: 92 mg/dL / Ketone: x  / Bili: x / Urobili: x   Blood: x / Protein: x / Nitrite: x   Leuk Esterase: x / RBC: x / WBC x   Sq Epi: x / Non Sq Epi: x / Bacteria: x      CAPILLARY BLOOD GLUCOSE      RADIOLOGY & ADDITIONAL TESTS: Reviewed.

## 2024-07-08 NOTE — PROGRESS NOTE ADULT - PROBLEM SELECTOR PLAN 7
Per patient's shelter, she was on insulin at some point, unclear of when she was taking it or history of diabetes.  - HbA1c 5.1  - no need for management of this Per patient's shelter, she was on insulin at some point, unclear of when she was taking it or history of diabetes.  - HbA1c 5.1  - no need for management of this  - BG well controlled inpatient

## 2024-07-08 NOTE — PROGRESS NOTE ADULT - PROBLEM SELECTOR PLAN 6
As per patient has history of epilepsy  States she takes Keppra but unsure of dose, follows with Neurologist at [facility]  Per Jennifer, previously prescribed Keppra 1000mg bid.   - order Keppra 500mg bid x 2 doses for now, confirm doses.

## 2024-07-09 DIAGNOSIS — N17.9 ACUTE KIDNEY FAILURE, UNSPECIFIED: ICD-10-CM

## 2024-07-09 LAB
ALBUMIN SERPL ELPH-MCNC: 2.8 G/DL — LOW (ref 3.3–5)
ALP SERPL-CCNC: 88 U/L — SIGNIFICANT CHANGE UP (ref 40–120)
ALT FLD-CCNC: 6 U/L — LOW (ref 10–45)
ANION GAP SERPL CALC-SCNC: 9 MMOL/L — SIGNIFICANT CHANGE UP (ref 5–17)
AST SERPL-CCNC: 26 U/L — SIGNIFICANT CHANGE UP (ref 10–40)
BILIRUB SERPL-MCNC: <0.2 MG/DL — SIGNIFICANT CHANGE UP (ref 0.2–1.2)
BUN SERPL-MCNC: 27 MG/DL — HIGH (ref 7–23)
CALCIUM SERPL-MCNC: 9.4 MG/DL — SIGNIFICANT CHANGE UP (ref 8.4–10.5)
CHLORIDE SERPL-SCNC: 93 MMOL/L — LOW (ref 96–108)
CO2 SERPL-SCNC: 34 MMOL/L — HIGH (ref 22–31)
CREAT SERPL-MCNC: 1.28 MG/DL — SIGNIFICANT CHANGE UP (ref 0.5–1.3)
CULTURE RESULTS: SIGNIFICANT CHANGE UP
EGFR: 49 ML/MIN/1.73M2 — LOW
GLUCOSE SERPL-MCNC: 114 MG/DL — HIGH (ref 70–99)
HCT VFR BLD CALC: 26.4 % — LOW (ref 34.5–45)
HGB BLD-MCNC: 7.7 G/DL — LOW (ref 11.5–15.5)
MAGNESIUM SERPL-MCNC: 2.1 MG/DL — SIGNIFICANT CHANGE UP (ref 1.6–2.6)
MCHC RBC-ENTMCNC: 27.1 PG — SIGNIFICANT CHANGE UP (ref 27–34)
MCHC RBC-ENTMCNC: 29.2 GM/DL — LOW (ref 32–36)
MCV RBC AUTO: 93 FL — SIGNIFICANT CHANGE UP (ref 80–100)
NRBC # BLD: 0 /100 WBCS — SIGNIFICANT CHANGE UP (ref 0–0)
PHOSPHATE SERPL-MCNC: 4.8 MG/DL — HIGH (ref 2.5–4.5)
PLATELET # BLD AUTO: 321 K/UL — SIGNIFICANT CHANGE UP (ref 150–400)
POTASSIUM SERPL-MCNC: 4.6 MMOL/L — SIGNIFICANT CHANGE UP (ref 3.5–5.3)
POTASSIUM SERPL-SCNC: 4.6 MMOL/L — SIGNIFICANT CHANGE UP (ref 3.5–5.3)
PROT SERPL-MCNC: 7.6 G/DL — SIGNIFICANT CHANGE UP (ref 6–8.3)
RBC # BLD: 2.84 M/UL — LOW (ref 3.8–5.2)
RBC # FLD: 14.7 % — HIGH (ref 10.3–14.5)
SODIUM SERPL-SCNC: 136 MMOL/L — SIGNIFICANT CHANGE UP (ref 135–145)
SPECIMEN SOURCE: SIGNIFICANT CHANGE UP
WBC # BLD: 3.72 K/UL — LOW (ref 3.8–10.5)
WBC # FLD AUTO: 3.72 K/UL — LOW (ref 3.8–10.5)

## 2024-07-09 PROCEDURE — 76937 US GUIDE VASCULAR ACCESS: CPT | Mod: 26

## 2024-07-09 PROCEDURE — 99231 SBSQ HOSP IP/OBS SF/LOW 25: CPT

## 2024-07-09 PROCEDURE — 36000 PLACE NEEDLE IN VEIN: CPT

## 2024-07-09 PROCEDURE — 99233 SBSQ HOSP IP/OBS HIGH 50: CPT | Mod: GC

## 2024-07-09 RX ORDER — OXYCODONE HYDROCHLORIDE 30 MG/1
5 TABLET ORAL EVERY 6 HOURS
Refills: 0 | Status: DISCONTINUED | OUTPATIENT
Start: 2024-07-09 | End: 2024-07-15

## 2024-07-09 RX ORDER — BACTERIOSTATIC SODIUM CHLORIDE 0.9 %
1000 VIAL (ML) INJECTION ONCE
Refills: 0 | Status: COMPLETED | OUTPATIENT
Start: 2024-07-09 | End: 2024-07-09

## 2024-07-09 RX ADMIN — Medication 160 MILLIGRAM(S): at 11:09

## 2024-07-09 RX ADMIN — OXYCODONE HYDROCHLORIDE 10 MILLIGRAM(S): 30 TABLET ORAL at 05:08

## 2024-07-09 RX ADMIN — Medication 100 MILLIGRAM(S): at 12:55

## 2024-07-09 RX ADMIN — OXYCODONE HYDROCHLORIDE 5 MILLIGRAM(S): 30 TABLET ORAL at 12:55

## 2024-07-09 RX ADMIN — OXYCODONE HYDROCHLORIDE 5 MILLIGRAM(S): 30 TABLET ORAL at 22:02

## 2024-07-09 RX ADMIN — OXYCODONE HYDROCHLORIDE 5 MILLIGRAM(S): 30 TABLET ORAL at 06:58

## 2024-07-09 RX ADMIN — LEVETIRACETAM 500 MILLIGRAM(S): 1000 TABLET, FILM COATED ORAL at 17:31

## 2024-07-09 RX ADMIN — OXYCODONE HYDROCHLORIDE 5 MILLIGRAM(S): 30 TABLET ORAL at 06:28

## 2024-07-09 RX ADMIN — Medication 12.5 MILLIGRAM(S): at 17:36

## 2024-07-09 RX ADMIN — Medication 975 MILLIGRAM(S): at 17:31

## 2024-07-09 RX ADMIN — LEVETIRACETAM 500 MILLIGRAM(S): 1000 TABLET, FILM COATED ORAL at 06:24

## 2024-07-09 RX ADMIN — ENOXAPARIN SODIUM 40 MILLIGRAM(S): 120 INJECTION SUBCUTANEOUS at 21:33

## 2024-07-09 RX ADMIN — OXYCODONE HYDROCHLORIDE 5 MILLIGRAM(S): 30 TABLET ORAL at 21:32

## 2024-07-09 RX ADMIN — Medication 25 MILLIGRAM(S): at 21:33

## 2024-07-09 RX ADMIN — Medication 100 MILLIGRAM(S): at 21:33

## 2024-07-09 RX ADMIN — OXYCODONE HYDROCHLORIDE 10 MILLIGRAM(S): 30 TABLET ORAL at 11:08

## 2024-07-09 RX ADMIN — Medication 15 MILLIGRAM(S): at 08:55

## 2024-07-09 RX ADMIN — OXYCODONE HYDROCHLORIDE 10 MILLIGRAM(S): 30 TABLET ORAL at 04:38

## 2024-07-09 RX ADMIN — Medication 100 MILLIGRAM(S): at 06:25

## 2024-07-09 RX ADMIN — Medication 15 MILLIGRAM(S): at 11:08

## 2024-07-09 RX ADMIN — Medication 975 MILLIGRAM(S): at 01:07

## 2024-07-09 RX ADMIN — OXYCODONE HYDROCHLORIDE 10 MILLIGRAM(S): 30 TABLET ORAL at 17:31

## 2024-07-09 RX ADMIN — Medication 15 MILLIGRAM(S): at 02:05

## 2024-07-09 RX ADMIN — Medication 100 MILLILITER(S): at 11:37

## 2024-07-09 RX ADMIN — Medication 100 MILLILITER(S): at 20:07

## 2024-07-09 RX ADMIN — Medication 975 MILLIGRAM(S): at 08:55

## 2024-07-09 RX ADMIN — Medication 3 MILLIGRAM(S): at 21:33

## 2024-07-09 NOTE — PROGRESS NOTE ADULT - SUBJECTIVE AND OBJECTIVE BOX
INFECTIOUS DISEASES CONSULT FOLLOW-UP NOTE    INTERVAL HPI/OVERNIGHT EVENTS:    Patient seen and examined at bedside. JAKE. Afebrile. Endorses LLE pain. Is requesting to speak to psych       ROS:   Constitutional, eyes, ENT, cardiovascular, respiratory, gastrointestinal, genitourinary, integumentary, neurological, psychiatric and heme/lymph are otherwise negative other than noted above       ANTIBIOTICS/RELEVANT:    MEDICATIONS  (STANDING):  acetaminophen     Tablet .. 975 milliGRAM(s) Oral every 8 hours  ceFAZolin   IVPB 2000 milliGRAM(s) IV Intermittent every 8 hours  enoxaparin Injectable 40 milliGRAM(s) SubCutaneous every 24 hours  levETIRAcetam 500 milliGRAM(s) Oral two times a day  lidocaine   4% Patch 1 Patch Transdermal daily  melatonin 3 milliGRAM(s) Oral at bedtime  methadone    Tablet 160 milliGRAM(s) Oral daily  mirtazapine 15 milliGRAM(s) Oral daily    MEDICATIONS  (PRN):  naloxone Injectable 1 milliGRAM(s) IV Push every 3 minutes PRN in case of overdose  oxyCODONE    IR 10 milliGRAM(s) Oral every 6 hours PRN Severe Pain (7 - 10)  oxyCODONE    IR 5 milliGRAM(s) Oral every 6 hours PRN Moderate Pain (4 - 6)  polyethylene glycol 3350 17 Gram(s) Oral daily PRN Constipation  saline laxative (FLEET) Rectal Enema 1 Enema Rectal once PRN constipation, severe, please try oral laxatives first  senna 2 Tablet(s) Oral at bedtime PRN Constipation        Vital Signs Last 24 Hrs  T(C): 36.8 (09 Jul 2024 08:50), Max: 37.3 (08 Jul 2024 15:39)  T(F): 98.3 (09 Jul 2024 08:50), Max: 99.2 (08 Jul 2024 15:39)  HR: 84 (09 Jul 2024 08:50) (84 - 92)  BP: 97/64 (09 Jul 2024 08:50) (90/62 - 97/64)  BP(mean): 70 (09 Jul 2024 05:34) (70 - 70)  RR: 18 (09 Jul 2024 08:50) (17 - 18)  SpO2: 98% (09 Jul 2024 08:50) (95% - 98%)    Parameters below as of 09 Jul 2024 08:50  Patient On (Oxygen Delivery Method): room air        PHYSICAL EXAM:  Constitutional: alert, NAD  Eyes: the sclera and conjunctiva were normal.   ENT: the ears and nose were normal in appearance.   Neck: the appearance of the neck was normal and the neck was supple.   Pulmonary: no respiratory distress and lungs were clear to auscultation bilaterally.   Heart: heart rate was normal and rhythm regular, normal S1 and S2  Vascular:. there was no peripheral edema  Abdomen: normal bowel sounds, soft, non-tender  Extremities : no LLE thigh tenderness to palpation . Palpated c/t/l spine, b/l shoulders/elbows/wrists/knees/ankles - no tenderness.   Neurological: no focal deficits.   Psychiatric: the affect was normal        LABS:                        7.7    3.72  )-----------( 321      ( 09 Jul 2024 08:20 )             26.4     07-09    136  |  93<L>  |  27<H>  ----------------------------<  114<H>  4.6   |  34<H>  |  1.28    Ca    9.4      09 Jul 2024 08:20  Phos  4.8     07-09  Mg     2.1     07-09    TPro  7.6  /  Alb  2.8<L>  /  TBili  <0.2  /  DBili  x   /  AST  26  /  ALT  6<L>  /  AlkPhos  88  07-09      Urinalysis Basic - ( 09 Jul 2024 08:20 )    Color: x / Appearance: x / SG: x / pH: x  Gluc: 114 mg/dL / Ketone: x  / Bili: x / Urobili: x   Blood: x / Protein: x / Nitrite: x   Leuk Esterase: x / RBC: x / WBC x   Sq Epi: x / Non Sq Epi: x / Bacteria: x        MICROBIOLOGY:    Culture - Blood (collected 07-03-24 @ 22:42)  Source: .Blood Blood  Final Report (07-09-24 @ 02:00):    No growth at 5 days    Culture - Body Fluid with Gram Stain (collected 07-03-24 @ 14:00)  Source: .Body Fluid left psoas tendon fluid  Gram Stain (07-04-24 @ 18:59):    Moderate polymorphonuclear leukocytes per low power field    No organisms seen per oil power field  Final Report (07-08-24 @ 16:58):    Rare Staphylococcus aureus  Organism: Staphylococcus aureus (07-08-24 @ 16:58)  Organism: Staphylococcus aureus (07-08-24 @ 16:58)      Method Type: BHARAT      -  Clindamycin: R <=0.25 This isolate is presumed to be clindamycin resistant based on detection of inducible resistance. Clindamycin may still be effective in some patients.      -  Erythromycin: R >4      -  Gentamicin: S <=1 Should not be used as monotherapy      -  Oxacillin: S <=0.25 Oxacillin predicts susceptibility for dicloxacillin, methicillin, and nafcillin      -  Penicillin: R 4      -  Rifampin: S <=1 Should not be used as monotherapy      -  Tetracycline: S <=1      -  Trimethoprim/Sulfamethoxazole: S <=0.5/9.5      -  Vancomycin: S 2    Culture - Blood (collected 07-03-24 @ 08:30)  Source: .Blood Blood  Final Report (07-08-24 @ 18:00):    No growth at 5 days    Culture - Blood (collected 07-03-24 @ 08:30)  Source: .Blood Blood  Final Report (07-08-24 @ 18:00):    No growth at 5 days        RADIOLOGY & ADDITIONAL STUDIES:  Reviewed

## 2024-07-09 NOTE — PROGRESS NOTE ADULT - PROBLEM SELECTOR PLAN 1
Patient was found to have blood and wound cultures positive for MSSA bacteria  - cefazolin 2g q8hs, day 6  - per ID recs, cultures from 7/3 have no growth, so there is no need for further surveillance cultures  - patient is medically stable for discharge  - per ID, patient will need 4-6 weeks of antibiotics, they recommend PICC line Patient was found to have blood and wound cultures positive for MSSA bacteria  - cefazolin 2g q8hs, day 7  - per ID recs, cultures from 7/3 have no growth, so there is no need for further surveillance cultures  - patient is medically stable  - per ID, patient will need 4-6 weeks of antibiotics, they recommend PICC line

## 2024-07-09 NOTE — PROGRESS NOTE ADULT - PROBLEM SELECTOR PLAN 2
Worsening left leg and thigh pain with radiation to groin  CT LE w IV contrast showed loculated fluid in L iliacus muscle, iliopsoas bursitis of infectious or inflammatory etiology  ortho consulted, recommended IV antibiotics and IR consult in AM, will continue to follow   Given Zosyn and Vanc in ED  - blood cultures grew MSSA  - cefazolin 2g q8hs, day 6  - ESR lateralized  - possibly need to re-image LLE tomorrow in setting of worsening leg pain  - HepC RNA reactive Worsening left leg and thigh pain with radiation to groin  CT LE w IV contrast showed loculated fluid in L iliacus muscle, iliopsoas bursitis of infectious or inflammatory etiology  ortho consulted, recommended IV antibiotics and IR consult in AM, will continue to follow   Given Zosyn and Vanc in ED  - blood cultures grew MSSA  - cefazolin 2g q8hs, day 7  - ESR lateralized  - plan to re-image hip today d/t persistent pain  - HepC RNA reactive

## 2024-07-09 NOTE — PROGRESS NOTE ADULT - PROBLEM SELECTOR PLAN 4
- bun, cr, gfr ___  - discontinued toradol  - pt scheduled for ct le with contrast, getting fluid bolus  - encouraged fluid intake for patient Cr 1.28, BUN 29, GFR 49  - discontinued toradol  - scheduled for CT LE with IV contrast  - encouraged PO fluid intake  - started on 1L NS 100cc/hour

## 2024-07-09 NOTE — PROGRESS NOTE ADULT - PROBLEM SELECTOR PLAN 3
Left leg and thigh pain with radiation to groin  CT LE w IV contrast showed loculated fluid in L iliacus muscle, iliopsoas bursitis of likely infections etiology  s/p IR drainage   - duplex of LLE showed no evidence of DVT  - pain regimen: standing 975 tylenol q8, lidocaine patch, oxy 5mg q8PRN for moderate pain, oxy 10mg q6PRN for severe pain, toradol 15mg IV push Q6 PRN   - consider CT lower extremity tomorrow (7/9) if pain does not improve Left leg and thigh pain with radiation to groin  CT LE w IV contrast showed loculated fluid in L iliacus muscle, iliopsoas bursitis of likely infections etiology  s/p IR drainage   - duplex of LLE showed no evidence of DVT  - pain regimen: standing 975 tylenol q8, lidocaine patch, oxy 5mg q8PRN for moderate pain, oxy 10mg q6PRN for severe pain  - toradol held due to mildly elevated creatinine  - consider CT lower extremity scheduled for today

## 2024-07-09 NOTE — PROGRESS NOTE ADULT - PROBLEM SELECTOR PLAN 7
As per patient has history of epilepsy  States she takes Keppra but unsure of dose, follows with Neurologist at [facility]  Per Jennifer, previously prescribed Keppra 1000mg bid.   - order Keppra 500mg bid x 2 doses for now, confirm doses.

## 2024-07-09 NOTE — PROGRESS NOTE ADULT - PROBLEM SELECTOR PLAN 8
Per patient's shelter, she was on insulin at some point, unclear of when she was taking it or history of diabetes.  - HbA1c 5.1  - no need for management of this  - BG well controlled inpatient

## 2024-07-09 NOTE — PROGRESS NOTE ADULT - ASSESSMENT
{\rtf1\dbszww58603\ansi\lmdxrik5136\ftnbj\uc1\deff0  {\fonttbl{\f0 \fnil Segoe UI;}{\f1 \fnil \fcharset0 Segoe UI;}{\f2 \fnil Times New Ronald;}}  {\colortbl ;\mgt073\eioks725\mrlw989 ;\red0\green0\blue0 ;\red0\green0\blue0 ;}  {\stylesheet{\f0\fs20 Normal;}{\cs1 Default Paragraph Font;}{\cs2\f0\fs16 Line Number;}{\cs3\f2\fs24 Hyperlink;}}  {\*\revtbl{Unknown;}}  \qcmwev70140\ohkwpg66475\ldhwg6617\ibkal2876\pmqaw7059\cpbdi4317\lhroidv724\rrqbnau082\nogrowautofit\pzwkrq004\formshade\nofeaturethrottle1\dntblnsbdb\fet4\aendnotes\aftnnrlc\pgbrdrhead\pgbrdrfoot  \sectd\paxzfd28250\znhecy89286\guttersxn0\rczkclva0209\enylfklq6550\mtxltulu1103\vbjmviws9398\obfedsj566\uykbbdl357\sbkpage\pgncont\pgndec  \plain\plain\f0\fs24\ql\plain\f0\fs24\plain\f0\fs20\audv2510\hich\f0\dbch\f0\loch\f0\fs20\par  \par  I M\par  \par  59 y o F with past medical history of IV drug use on 160mg methadone, anemia, epilepsy who presented with sharp LLE pain that progressively worsened over the past month. CT of LLE in ED showed loculated fluid in L iliacus muscle, iliopsoas bursitis of   infectious or inflammatory etiology. Patient underwent IR drainage procedure and tolerated the procedure well. Patient was found to be negative for endocarditis. She is now being treated with IV cefazolin for 4-6 weeks. \par  \plain\f1\fs20\ubua8614\hich\f1\dbch\f1\loch\f1\cf2\fs20\strike\plain\f1\fs20\xuka1755\hich\f1\dbch\f1\loch\f1\cf2\fs20\plain\f0\fs20\nsgw6463\hich\f0\dbch\f0\loch\f0\fs20\par  \plain\f1\fs20\vwkm6833\hich\f1\dbch\f1\loch\f1\cf2\fs20\ul{\field{\*\fldinst HYPERLINK [id number] }{\fldrslt Nutritional Assessment:}}\plain\f0\fs20\mfgs4162\hich\f0\dbch\f0\loch\f0\fs20\ql\par  \trowd\vpnrjw26\lastrow\ggyjgao79\trpaddfl3\qvvuwyk87\trpaddfr3\trpaddt0\trpaddft3\trpaddb0\trpaddfb3\trleft0  \clvertalt\imrwke74\clpadft3\mdferp56\clpadfr3\clpadl0\clpadfl3\clpadb0\clpadfb3\tmlmd0114  \clvertalt\zgbdbs71\clpadft3\mcmmge88\clpadfr3\clpadl0\clpadfl3\clpadb0\clpadfb3\begcy7082  \pard\intbl\ssparaaux0\s0\fi-120\li120\ql\plain\f0\fs24{\*\bkmkstart hv120547353050}{\*\bkmkend rj522390541841}\plain\f0\fs20\unrk1120\hich\f0\dbch\f0\loch\f0\fs20 \'b7 Nutritional Assessment\cell  \pard\intbl\ssparaaux0\s0\li300\ri300\ql\plain\f0\fs24\plain\f0\fs20\vpxk0693\hich\f0\dbch\f0\loch\f0\fs20 This patient has been assessed with a concern for Malnutrition and has been determined to have a diagnosis/diagnoses of Severe protein-calorie malnutrition.\par  \par  This patient is being managed with: \par  Diet Regular-\par  Supplement Feeding Modality:  Oral\par  Ensure Plus High Protein Cans or Servings Per Day:  1       Frequency:  Daily\par  Entered: Jul 6 2024 12:30PM\par  \par  Diet NPO after Midnight-\par     NPO Start Date: 04-Jul-2024   NPO Start Time: 23:59\par  Except Medications\par  Entered: Jul 5 2024  4:11AM\cell  \intbl\row  \pard\ssparaaux0\s0\ql\plain\f0\fs24\plain\f0\fs20\bcpz5262\hich\f0\dbch\f0\loch\f0\fs20\par  \plain\f1\fs20\nagp6409\hich\f1\dbch\f1\loch\f1\cf2\fs20\ul{\field{\*\fldinst HYPERLINK [id number] }{\fldrslt Problem/Plan - 1:}}\plain\f0\fs20\uxzr4228\hich\f0\dbch\f0\loch\f0\fs20\ql\par  \'b7  {\*\bkmkstart gy54993584880}{\*\bkmkend yg23651695505}Problem: {\*\bkmkstart xf55606730707}{\*\bkmkend fw95811796205}Sepsis due to methicillin susceptible Staphylococcus aureus (MSSA) without acute organ dysfunction. \par  \'b7  {\*\bkmkstart ho54773770448}{\*\bkmkend mx28279870366}Plan: {\*\bkmkstart jm96825362966}{\*\bkmkend uk57080719289}Patient was found to have blood and wound cultures positive for MSSA bacteria\par  - cefazolin 2g q8hs, day 6\par  - per ID recs, cultures from 7/3 have no growth, so there is no need for further surveillance cultures\par  - patient is medically stable for discharge\par  - per ID, patient will need 4-6 weeks of antibiotics, they recommend PICC line.\plain\f1\fs20\umrv9342\hich\f1\dbch\f1\loch\f1\cf2\fs20\strike\plain\f0\fs20\tjze3412\hich\f0\dbch\f0\loch\f0\fs20\par  \par  \plain\f1\fs20\bmes2376\hich\f1\dbch\f1\loch\f1\cf2\fs20\ul{\field{\*\fldinst HYPERLINK [id number] }{\fldrslt Problem/Plan - 2:}}\plain\f0\fs20\gwcr9457\hich\f0\dbch\f0\loch\f0\fs20\ql\par  \'b7  {\*\bkmkstart ul82370057646}{\*\bkmkend wo95700101076}Problem: {\*\bkmkstart as52024868340}{\*\bkmkend bv84226704458}Iliopsoas bursitis of left hip. \par  \'b7  {\*\bkmkstart ka20948587334}{\*\bkmkend ev38325662354}Plan: {\*\bkmkstart li04985783797}{\*\bkmkend ww66590503742}Worsening left leg and thigh pain with radiation to groin\par  CT LE w IV contrast showed loculated fluid in L iliacus muscle, iliopsoas bursitis of infectious or inflammatory etiology\par  ortho consulted, recommended IV antibiotics and IR consult in AM, will continue to follow \par  Given Zosyn and Vanc in ED\par  - blood cultures grew MSSA\par  - cefazolin 2g q8hs, day 6\par  - ESR lateralized\par  - possibly need to re-image LLE tomorrow in setting of worsening leg pain\par  - HepC RNA reactive.\plain\f1\fs20\vezf9635\hich\f1\dbch\f1\loch\f1\cf2\fs20\strike\plain\f0\fs20\quva9618\hich\f0\dbch\f0\loch\f0\fs20\par  \par  \plain\f1\fs20\tcyl9700\hich\f1\dbch\f1\loch\f1\cf2\fs20\ul{\field{\*\fldinst HYPERLINK [id number] }{\fldrslt Problem/Plan - 3:}}\plain\f0\fs20\vwqq3048\hich\f0\dbch\f0\loch\f0\fs20\ql\par  \'b7  {\*\bkmkstart qa09387669573}{\*\bkmkend gy96923795495}Problem: {\*\bkmkstart mq77672379017}{\*\bkmkend wt44279851409}Leg pain, left. \par  \'b7  {\*\bkmkstart gu57088852025}{\*\bkmkend mf22653483190}Plan: {\*\bkmkstart jq63401940222}{\*\bkmkend dw37314411173}Left leg and thigh pain with radiation to groin\par  CT LE w IV contrast showed loculated fluid in L iliacus muscle, iliopsoas bursitis of likely infections etiology\par  s/p IR drainage \par  - duplex of LLE showed no evidence of DVT\par  - pain regimen: standing 975 tylenol q8, lidocaine patch, oxy 5mg q8PRN for moderate pain, oxy 10mg q6PRN for severe pain, toradol 15mg IV push Q6 PRN \par  - consider CT lower extremity tomorrow (7/9) if pain does not improve.\plain\f1\fs20\khxn4539\hich\f1\dbch\f1\loch\f1\cf2\fs20\strike\plain\f0\fs20\swmg1365\hich\f0\dbch\f0\loch\f0\fs20\par  \par  \plain\f1\fs20\obdd0138\hich\f1\dbch\f1\loch\f1\cf2\fs20\ul{\field{\*\fldinst HYPERLINK [id number] }{\fldrslt Problem/Plan - 4:}}\plain\f0\fs20\lfpn2699\hich\f0\dbch\f0\loch\f0\fs20\ql\par  \'b7  {\*\bkmkstart hl00841806276}{\*\bkmkend zv26138609321}Problem: {\*\bkmkstart zs97093572733}{\*\bkmkend bl14138080445}Opioid dependence. \par  \'b7  {\*\bkmkstart gg67628961114}{\*\bkmkend vg36898229150}Plan: {\*\bkmkstart kv18838702164}{\*\bkmkend vj52193241835}Hx of IV drug use\par  Pt states she is chronically on methadone 160 mg 5 days a week since age 19\par  Last heroin injection in L leg on Saturday 6/29\par  - confirmed dose with methadone clinic: 160mg, 5x/week\par  - last dose given on 6/29 for 6/30\par  - monitor COWs.\par  - HIV testing ordered, negative\par  - hepatitis panel reactive\par  - TTE ordered, negative for endocarditis\par  - TED completed 7/5, negative for endocarditis\par  - continue cefazolin 2g IV q8 per ID recs.\par  \par  \plain\f1\fs20\ibce0986\hich\f1\dbch\f1\loch\f1\cf2\fs20\ul{\field{\*\fldinst HYPERLINK [id number] }{\fldrslt Problem/Plan - 5:}}\plain\f0\fs20\ozdv2046\hich\f0\dbch\f0\loch\f0\fs20\ql\par  \'b7  {\*\bkmkstart ok82712604205}{\*\bkmkend ge66299218228}Problem: {\*\bkmkstart ui53579306878}{\*\bkmkend bw84482242101}Anemia. \par  \'b7  {\*\bkmkstart jf42987350944}{\*\bkmkend wq74954224068}Plan: {\*\bkmkstart yf43300888166}{\*\bkmkend cz40289669984}Pt states history of anemia\par  On admission Hg 10.2, Hct 31.0\par  - Continue to monitor H&H\par  - Maintain active type & screen\par  - Hb stable between 7.5 - 9.\par  \par  \plain\f1\fs20\lrob2805\hich\f1\dbch\f1\loch\f1\cf2\fs20\ul{\field{\*\fldinst HYPERLINK [id number] }{\fldrslt Problem/Plan - 6:}}\plain\f0\fs20\yzut9475\hich\f0\dbch\f0\loch\f0\fs20\ql\par  \'b7  {\*\bkmkstart tl06231057437}{\*\bkmkend bb09747401533}Problem: {\*\bkmkstart kp79973585580}{\*\bkmkend gz39323205233}Epilepsy. \par  \'b7  {\*\bkmkstart sq03352805111}{\*\bkmkend ll62556044437}Plan: {\*\bkmkstart ux58657699130}{\*\bkmkend io39381410216}As per patient has history of epilepsy\par  States she takes Keppra but unsure of dose, follows with Neurologist at [facility]\par  Per [employer], previously prescribed Keppra 1000mg bid. \par  - order Keppra 500mg bid x 2 doses for now, confirm doses.\par  \par  \plain\f1\fs20\tzld5669\hich\f1\dbch\f1\loch\f1\cf2\fs20\ul{\field{\*\fldinst HYPERLINK [id number] }{\fldrslt Problem/Plan - 7:}}\plain\f0\fs20\fvlj0264\hich\f0\dbch\f0\loch\f0\fs20\ql\par  \'b7  {\*\bkmkstart rz06703799789}{\*\bkmkend jl92072966355}Problem: {\*\bkmkstart nf32686931847}{\*\bkmkend vr56643606212}Type 2 diabetes mellitus. \par  \'b7  {\*\bkmkstart gi86771274592}{\*\bkmkend jt84645850725}Plan: {\*\bkmkstart bu57179473131}{\*\bkmkend fa04861001244}Per patient's shelter, she was on insulin at some point, unclear of when she was taking it or history of diabetes.\par  - HbA1c 5.1\par  - no need for management of this\par  - BG well controlled inpatient.\plain\f1\fs20\arte2492\hich\f1\dbch\f1\loch\f1\cf2\fs20\strike\plain\f0\fs20\dnde4030\hich\f0\dbch\f0\loch\f0\fs20\par  \par  \plain\f1\fs20\uvoj7708\hich\f1\dbch\f1\loch\f1\cf2\fs20\ul{\field{\*\fldinst HYPERLINK [id number] }{\fldrslt Problem/Plan - 8:}}\plain\f0\fs20\qvei3622\hich\f0\dbch\f0\loch\f0\fs20\ql\par  \'b7  {\*\bkmkstart zp15470491216}{\*\bkmkend uu11960306751}Problem: {\*\bkmkstart ih39089412425}{\*\bkmkend ig40229282290}Severe protein-calorie malnutrition. \par  \'b7  {\*\bkmkstart ox28321715265}{\*\bkmkend md67039994432}Plan: {\*\bkmkstart em54771915465}{\*\bkmkend da53117654142}- ensure HD added per nutrition recs.\par  \par  \plain\f1\fs20\obqc1083\hich\f1\dbch\f1\loch\f1\cf2\fs20\ul{\field{\*\fldinst HYPERLINK [id number] }{\fldrslt Problem/Plan - 9:}}\plain\f0\fs20\lztx0497\hich\f0\dbch\f0\loch\f0\fs20\ql\par  \'b7  {\*\bkmkstart sh56470188513}{\*\bkmkend vh85920823387}Problem: {\*\bkmkstart eo70313879022}{\*\bkmkend pz00763384978}Mood disorder. \par  \'b7  {\*\bkmkstart ty96065106195}{\*\bkmkend wz15556618911}Plan: {\*\bkmkstart wv10044815763}{\*\bkmkend zl21088465303}Patient previously prescribed quetiapine 150mg qd, buspirone 15mg qd, mirtazapine 15mg qd. Unclear what she is currently taking.\par  - attempted to do med rec with shelter, pharmacy, patient, unable to find current doses for her mood medications\par  - she is currently stable without her medications\par  - will re-attempt to med rec on Tuesday.\plain\f1\fs20\yhor4177\hich\f1\dbch\f1\loch\f1\cf2\fs20\strike\plain\f0\fs20\aylp0581\hich\f0\dbch\f0\loch\f0\fs20\par  \par  \plain\f1\fs20\qxzh3442\hich\f1\dbch\f1\loch\f1\cf2\fs20\ul{\field{\*\fldinst HYPERLINK [id number] }{\fldrslt Problem/Plan - 10:}}\plain\f0\fs20\djga3338\hich\f0\dbch\f0\loch\f0\fs20\ql\par  \'b7  {\*\bkmkstart bh53645180485}{\*\bkmkend vo12681176373}Problem: {\*\bkmkstart xm41395371886}{\*\bkmkend pm36192380106}Prophylactic measure. \par  \'b7  {\*\bkmkstart jo36200082030}{\*\bkmkend so43212606475}Plan; {\*\bkmkstart wk17599306351}{\*\bkmkend jk76769327318}F: NS 1L\par  E: replete as needed\par  N: regular diet\par  DVT ppx: given 1 dose of lovenox on 7/2\par  Dispo: RMF.\par  \par  }

## 2024-07-09 NOTE — PROGRESS NOTE ADULT - SUBJECTIVE AND OBJECTIVE BOX
OVERNIGHT EVENTS: No overnight events.    SUBJECTIVE / INTERVAL HPI: Patient seen and examined at bedside. She is still having some pain, however it is decreased from yesterday.     VITAL SIGNS:  Vital Signs Last 24 Hrs  T(C): 36.7 (09 Jul 2024 05:34), Max: 37.3 (08 Jul 2024 15:39)  T(F): 98.1 (09 Jul 2024 05:34), Max: 99.2 (08 Jul 2024 15:39)  HR: 84 (09 Jul 2024 05:34) (81 - 92)  BP: 95/60 (09 Jul 2024 05:34) (90/62 - 104/74)  BP(mean): 70 (09 Jul 2024 05:34) (70 - 70)  RR: 18 (09 Jul 2024 05:34) (17 - 18)  SpO2: 98% (09 Jul 2024 05:34) (95% - 98%)    Parameters below as of 09 Jul 2024 05:34  Patient On (Oxygen Delivery Method): room air      07 Jul 2024 07:01  -  08 Jul 2024 07:00  --------------------------------------------------------  IN: 50 mL / OUT: 0 mL / NET: 50 mL        PHYSICAL EXAM:  General: fatigued, inadequate nutrition.  HEENT: adentulous, NC/AT; PERRL, anicteric sclera; dry mucous membranes  Cardiovascular: +S1/S2; RRR  Respiratory: CTA B/L; no W/R/R  Gastrointestinal: soft, NT/ND; +BSx4  Extremities: mild TTP of LLE, greatest in upper thigh and groin, LLE edema, full ROM of all extremities, track marks in bilateral lower extremities.   Vascular: 2+ radial, DP/PT pulses B/L  Neurological: AAOx3; no focal deficits    MEDICATIONS:  MEDICATIONS  (STANDING):  acetaminophen     Tablet .. 975 milliGRAM(s) Oral every 8 hours  ceFAZolin   IVPB 2000 milliGRAM(s) IV Intermittent every 8 hours  enoxaparin Injectable 40 milliGRAM(s) SubCutaneous every 24 hours  ketorolac   Injectable 15 milliGRAM(s) IV Push every 6 hours  levETIRAcetam 500 milliGRAM(s) Oral two times a day  lidocaine   4% Patch 1 Patch Transdermal daily  melatonin 3 milliGRAM(s) Oral at bedtime  methadone    Tablet 160 milliGRAM(s) Oral daily  mirtazapine 15 milliGRAM(s) Oral daily    MEDICATIONS  (PRN):  naloxone Injectable 1 milliGRAM(s) IV Push every 3 minutes PRN in case of overdose  oxyCODONE    IR 10 milliGRAM(s) Oral every 6 hours PRN Severe Pain (7 - 10)  oxyCODONE    IR 5 milliGRAM(s) Oral every 6 hours PRN Moderate Pain (4 - 6)  polyethylene glycol 3350 17 Gram(s) Oral daily PRN Constipation  saline laxative (FLEET) Rectal Enema 1 Enema Rectal once PRN constipation, severe, please try oral laxatives first  senna 2 Tablet(s) Oral at bedtime PRN Constipation      ALLERGIES:  Allergies    No Known Allergies    Intolerances        LABS:                        8.2    3.84  )-----------( 342      ( 08 Jul 2024 06:59 )             26.8     07-08    138  |  95<L>  |  21  ----------------------------<  92  4.9   |  35<H>  |  1.14    Ca    9.9      08 Jul 2024 06:59  Phos  5.5     07-08  Mg     2.1     07-08    TPro  8.0  /  Alb  3.0<L>  /  TBili  0.2  /  DBili  x   /  AST  29  /  ALT  11  /  AlkPhos  80  07-08      Urinalysis Basic - ( 08 Jul 2024 06:59 )    Color: x / Appearance: x / SG: x / pH: x  Gluc: 92 mg/dL / Ketone: x  / Bili: x / Urobili: x   Blood: x / Protein: x / Nitrite: x   Leuk Esterase: x / RBC: x / WBC x   Sq Epi: x / Non Sq Epi: x / Bacteria: x      CAPILLARY BLOOD GLUCOSE          RADIOLOGY & ADDITIONAL TESTS: Reviewed.   OVERNIGHT EVENTS: No overnight events.    SUBJECTIVE / INTERVAL HPI: Patient seen and examined at bedside. She reports that she still is having LLE pain in the upper thigh / groin region that is mildly improved from yesterday. Denies other symptoms besides this pain including fever, shortness of breath, dizziness, nausea, vomiting      VITAL SIGNS:  Vital Signs Last 24 Hrs  T(C): 36.7 (09 Jul 2024 05:34), Max: 37.3 (08 Jul 2024 15:39)  T(F): 98.1 (09 Jul 2024 05:34), Max: 99.2 (08 Jul 2024 15:39)  HR: 84 (09 Jul 2024 05:34) (81 - 92)  BP: 95/60 (09 Jul 2024 05:34) (90/62 - 104/74)  BP(mean): 70 (09 Jul 2024 05:34) (70 - 70)  RR: 18 (09 Jul 2024 05:34) (17 - 18)  SpO2: 98% (09 Jul 2024 05:34) (95% - 98%)    Parameters below as of 09 Jul 2024 05:34  Patient On (Oxygen Delivery Method): room air      07 Jul 2024 07:01  -  08 Jul 2024 07:00  --------------------------------------------------------  IN: 50 mL / OUT: 0 mL / NET: 50 mL        PHYSICAL EXAM:  General: fatigued, inadequate nutrition.  HEENT: adentulous, NC/AT; PERRL, anicteric sclera; dry mucous membranes  Cardiovascular: +S1/S2; RRR  Respiratory: CTA B/L; no W/R/R  Gastrointestinal: soft, NT/ND; +BSx4  Extremities: mild TTP of LLE, greatest in upper thigh and groin, LLE edema, full ROM of all extremities, track marks in bilateral lower extremities.   Vascular: 2+ radial, DP/PT pulses B/L  Neurological: AAOx3; no focal deficits    MEDICATIONS:  MEDICATIONS  (STANDING):  acetaminophen     Tablet .. 975 milliGRAM(s) Oral every 8 hours  ceFAZolin   IVPB 2000 milliGRAM(s) IV Intermittent every 8 hours  enoxaparin Injectable 40 milliGRAM(s) SubCutaneous every 24 hours  ketorolac   Injectable 15 milliGRAM(s) IV Push every 6 hours  levETIRAcetam 500 milliGRAM(s) Oral two times a day  lidocaine   4% Patch 1 Patch Transdermal daily  melatonin 3 milliGRAM(s) Oral at bedtime  methadone    Tablet 160 milliGRAM(s) Oral daily  mirtazapine 15 milliGRAM(s) Oral daily    MEDICATIONS  (PRN):  naloxone Injectable 1 milliGRAM(s) IV Push every 3 minutes PRN in case of overdose  oxyCODONE    IR 10 milliGRAM(s) Oral every 6 hours PRN Severe Pain (7 - 10)  oxyCODONE    IR 5 milliGRAM(s) Oral every 6 hours PRN Moderate Pain (4 - 6)  polyethylene glycol 3350 17 Gram(s) Oral daily PRN Constipation  saline laxative (FLEET) Rectal Enema 1 Enema Rectal once PRN constipation, severe, please try oral laxatives first  senna 2 Tablet(s) Oral at bedtime PRN Constipation      ALLERGIES:  Allergies    No Known Allergies    Intolerances        LABS:                        8.2    3.84  )-----------( 342      ( 08 Jul 2024 06:59 )             26.8     07-08    138  |  95<L>  |  21  ----------------------------<  92  4.9   |  35<H>  |  1.14    Ca    9.9      08 Jul 2024 06:59  Phos  5.5     07-08  Mg     2.1     07-08    TPro  8.0  /  Alb  3.0<L>  /  TBili  0.2  /  DBili  x   /  AST  29  /  ALT  11  /  AlkPhos  80  07-08      Urinalysis Basic - ( 08 Jul 2024 06:59 )    Color: x / Appearance: x / SG: x / pH: x  Gluc: 92 mg/dL / Ketone: x  / Bili: x / Urobili: x   Blood: x / Protein: x / Nitrite: x   Leuk Esterase: x / RBC: x / WBC x   Sq Epi: x / Non Sq Epi: x / Bacteria: x      CAPILLARY BLOOD GLUCOSE          RADIOLOGY & ADDITIONAL TESTS: Reviewed.

## 2024-07-09 NOTE — PROGRESS NOTE ADULT - PROBLEM SELECTOR PLAN 10
Patient previously prescribed quetiapine 150mg qd, buspirone 15mg qd, mirtazapine 15mg qd. Unclear what she is currently taking.  - attempted to do med rec with shelter, pharmacy, patient, unable to find current doses for her mood medications  - she is currently stable without her medications  - will re-attempt to med rec on Tuesday Patient previously prescribed quetiapine 150mg qd, buspirone 15mg qd, mirtazapine 15mg qd. Unclear what she is currently taking.  - attempted to do med rec with shelter, pharmacy, patient, unable to find current doses for her mood medications  - she is endorsing negative thoughts, difficulty sleeping  - negative si/hi  - started 25 mg seroquel today, titrate up as needed Patient previously prescribed quetiapine 150mg qd, buspirone 15mg qd, mirtazapine 15mg qd. Unclear what she is currently taking.  - attempted to do med rec with shelter, pharmacy, patient, unable to find current doses for her mood medications  - she is endorsing 'ugly' thoughts about relapse, difficulty sleeping  - negative SI/HI  - started 25 mg seroquel today, titrate up as needed

## 2024-07-09 NOTE — PROGRESS NOTE ADULT - ASSESSMENT
59F with hx of IVDU (heroin/cocaine) on methadone, epilepsy who presents for sharp L leg pain progressively worsening over the past month found to have loculated fluid in L iliacus muscle, iliopsoas bursitis c/b MSSA bacteremia. Patient afebrile without leukocytosis. Fluid collection/bursitis likely 2/2 injecting IV drug into LLE. Given MSSA BSI- suspect MSSA as causative pathogen of iliopsoas collection. S/P IR drainage 7/3 - 5 cc purulent fluid aspirated and sent for culture. IR drainage cx grew MSSA.    TTE without vegetation  TED without vegetation.   HIV screen neg  Hep C Ab reactive - Quantitative RNA not detected   bcxs 7/1 - MSSA  bcxs 7/3 -ngtd     Suggest:  -f/u surveillance blood cultures 7/3 --ngtd    -continue cefazolin 2g IV Q8   -Patient will need PICC line   -Duration of antibiotics is tentatively 4-6 weeks. Will need repeat CT LLE with IVC in 4 weeks prior to stopping antibiotics   -Weekly labs: CMP, CBC with diff, ESR, CRP faxed to ID office at [phone]  -Patient to follow up with Dr. He in 2 weeks ([address] [place] [address], [phone]), ID office will call patient to schedule     Team 2 will follow you.    Case d/w primary team.  Final recommendation pending attending note.    Lena Decker, Infectious Diseases PA  Please reach out for any questions 9 am-5pm. For evenings and weekends, please call the ID physician on call.  Work cell: [phone] 59F with hx of IVDU (heroin/cocaine) on methadone, epilepsy who presents for sharp L leg pain progressively worsening over the past month found to have loculated fluid in L iliacus muscle, iliopsoas bursitis c/b MSSA bacteremia. Patient afebrile without leukocytosis. Fluid collection/bursitis likely 2/2 injecting IV drug into LLE. Given MSSA BSI- suspect MSSA as causative pathogen of iliopsoas collection. S/P IR drainage 7/3 - 5 cc purulent fluid aspirated and sent for culture. IR drainage cx grew MSSA.    TTE without vegetation  TED without vegetation.   HIV screen neg  Hep C Ab reactive - Quantitative RNA not detected   bcxs 7/1 - MSSA  bcxs 7/3 -ngtd     Suggest:  -f/u surveillance blood cultures 7/3 --ngtd    -continue cefazolin 2g IV Q8   -Patient will need PICC line   -Duration of antibiotics is tentatively 4-6 weeks. Will need repeat CT LLE with IVC in 4 weeks prior to stopping antibiotics   -Weekly labs: CMP, CBC with diff, ESR, CRP faxed to ID office at [phone]  -Patient to follow up with Dr. He in 2 weeks ([address] [place] [address] 4F, [phone]), ID office will call patient to schedule

## 2024-07-09 NOTE — PROGRESS NOTE ADULT - SUBJECTIVE AND OBJECTIVE BOX
Physical Medicine and Rehabilitation Progress Note :       Patient is a 59y old  Female who presents with a chief complaint of left leg pain (09 Jul 2024 06:54)      HPI:  HPI: Pt is 60 y/o F pmhx significant for IV drug use, anemia, epilepsy who presents for sharp L leg pain progressively worsening over the past month. Pt states her pain began in the left thigh and later radiated to her toes but now has persisted in her entire left leg and groin. Pt reports this began about one month ago and required multiple ER visits. She states she went to [facility] on 5/31 where nothing was done and she was discharged. Most recently last week she returned to [facility] where she states she was given a 7 day course of oral antibiotics which she completed but did not improve her pain. She took Motrin which provided very minimal relief.  Pt last  injected heroin into her left thigh on Saturday 6/29. As per patient, she is chronically on methadone 160 mg 5 days a week since she was 19. She reports subjective fevers. Denies nausea, vomiting, chest pain, shortness of breath, constipation, diarrhea, pain on urination, hematuria, hematemesis.       In the ED:  Initial vital signs: T: 98.7 F, HR: 82, BP: 100/68, RR: 16, SpO2: 96% on RA  Labs: significant for AST 61, CRP 99.5, Hg 10.2, Hct 31.0  CT Lower Extremity with IV Contrast, Left: loculated fluid in L iliacus muscle, iliopsoas bursitis of infectious or inflammatory etiology  US Duplex Venous LE, Left: No evidence of L lower extremity DVT  Medications: Zosyn, Vancomycin, NS, Ibuprofen  Consults:         Ortho- recommended admission for IV antibiotics and IR consult, will continue to follow (01 Jul 2024 18:56)                            7.7    3.72  )-----------( 321      ( 09 Jul 2024 08:20 )             26.4       07-09    136  |  93<L>  |  27<H>  ----------------------------<  114<H>  4.6   |  34<H>  |  1.28    Ca    9.4      09 Jul 2024 08:20  Phos  4.8     07-09  Mg     2.1     07-09    TPro  7.6  /  Alb  2.8<L>  /  TBili  <0.2  /  DBili  x   /  AST  26  /  ALT  6<L>  /  AlkPhos  88  07-09    Vital Signs Last 24 Hrs  T(C): 36.8 (09 Jul 2024 08:50), Max: 37.3 (08 Jul 2024 15:39)  T(F): 98.3 (09 Jul 2024 08:50), Max: 99.2 (08 Jul 2024 15:39)  HR: 84 (09 Jul 2024 08:50) (84 - 92)  BP: 97/64 (09 Jul 2024 08:50) (90/62 - 97/64)  BP(mean): 70 (09 Jul 2024 05:34) (70 - 70)  RR: 18 (09 Jul 2024 08:50) (17 - 18)  SpO2: 98% (09 Jul 2024 08:50) (95% - 98%)    Parameters below as of 09 Jul 2024 08:50  Patient On (Oxygen Delivery Method): room air        MEDICATIONS  (STANDING):  acetaminophen     Tablet .. 975 milliGRAM(s) Oral every 8 hours  ceFAZolin   IVPB 2000 milliGRAM(s) IV Intermittent every 8 hours  enoxaparin Injectable 40 milliGRAM(s) SubCutaneous every 24 hours  levETIRAcetam 500 milliGRAM(s) Oral two times a day  lidocaine   4% Patch 1 Patch Transdermal daily  melatonin 3 milliGRAM(s) Oral at bedtime  methadone    Tablet 160 milliGRAM(s) Oral daily  mirtazapine 15 milliGRAM(s) Oral daily    MEDICATIONS  (PRN):  naloxone Injectable 1 milliGRAM(s) IV Push every 3 minutes PRN in case of overdose  oxyCODONE    IR 10 milliGRAM(s) Oral every 6 hours PRN Severe Pain (7 - 10)  oxyCODONE    IR 5 milliGRAM(s) Oral every 6 hours PRN Moderate Pain (4 - 6)  polyethylene glycol 3350 17 Gram(s) Oral daily PRN Constipation  saline laxative (FLEET) Rectal Enema 1 Enema Rectal once PRN constipation, severe, please try oral laxatives first  senna 2 Tablet(s) Oral at bedtime PRN Constipation      T(C): 36.8 (07-09-24 @ 08:50), Max: 37.3 (07-08-24 @ 15:39)  HR: 84 (07-09-24 @ 08:50) (84 - 92)  BP: 97/64 (07-09-24 @ 08:50) (90/62 - 97/64)  RR: 18 (07-09-24 @ 08:50) (17 - 18)  SpO2: 98% (07-09-24 @ 08:50) (95% - 98%)        Physical Exam:    59 y o woman lying comfortably in semi Andino's position , awake , alert , no acute complaints     Head: normocephalic , atraumatic    Eyes: PERRLA , EOMI , no nystagmus , sclera anicteric    ENT / FACE: neg nasal discharge , uvula midline , no oropharyngeal erythema / exudate    Neck: supple , negative JVD , negative carotid bruits , no thyromegaly    Chest: CTA bilaterally     Cardiovascular: regular rate and rhythm , neg murmurs / rubs / gallops    Abdomen: soft , non distended , no tenderness to palpation in all 4 quadrants ,  normal bowel sounds     Extremities: WWP , neg cyanosis /clubbing / edema     Musculoskeletal: pain w/ L hip flexion ,  in upper thigh and groin but decreased m LLE edema    Skin:     :     Neurologic Exam:     Alert and oriented  x  3     Motor Exam:        > 3+/5 x 4 extremities , LLE pain limited proximally     Sensation:         intact to light touch x 4 extremities                                DTR:           biceps/brachioradialis: equal                            patella/ankle: equal         Functional Status Assessment :   7/8/2024   ervention/Re-Assesssment    Pain Assessment/Number Scale (0-10) Adult  Presence of Pain: complains of pain/discomfort  Body Location: L groin  Pain Rating (0-10): Rest: 4 (moderate pain)  Pain Rating (0-10): Activity: 6 (moderate pain)    Safety      AM-PAC Functional Assessment: Basic Mobility  Type of Assessment: Daily assessment  Turning from your back to your side while in a flat bed without using bedrails?: 4 = No assist / stand by assistance  Moving from lying on your back to sitting on the flat side of a flat bed without using bedrails?: 4 = No assist / stand by assistance  Moving to and from a bed to a chair (including a wheelchair)?: 3 = A little assistance  Standing up from a chair using your arms (e.g. wheelchair or bedside chair)?: 3 = A little assistance  Walking in hospital room?: 3 = A little assistance  Climbing 3-5 steps with a railing?: 2 = A lot of assistance  Score: 19   Row Comment: Ask the patient "How much help from another person do you currently need? (If the patient hasn't done an activity recently, how much help from another person do you think he/she needs if he/she tried?)    Cognitive/Neuro      Cognitive/Neuro/Behavioral  Cognitive/Neuro/Behavioral [WDL Definition: Alert; opens eyes spontaneously; arouses to voice or touch; oriented x 4; follows commands; speech spontaneous, logical; purposeful motor response; behavior appropriate to situation]: WDL  Mood/Behavior: cooperative    Language Assistance  Preferred Language to Address Healthcare Preferred Language to Address Healthcare: English    Therapeutic Interventions      Bed Mobility  Bed Mobility Training Rehab Potential: good, to achieve stated therapy goals  Bed Mobility Training Sit-to-Supine: supervsion  Bed Mobility Training Limitations: decreased strength;  impaired balance    Sit-Stand Transfer Training  Sit-to-Stand Transfer Training Rehab Potential: good, to achieve stated therapy goals  Transfer Training Sit-to-Stand Transfer: contact guard;  1 person assist;  nonverbal cues (demo/gestures);  verbal cues;  full weight-bearing   rolling walker  Transfer Training Stand-to-Sit Transfer: contact guard;  1 person assist;  nonverbal cues (demo/gestures);  verbal cues;  full weight-bearing   rolling walker  Sit-to-Stand Transfer Training Transfer Safety Analysis: decreased strength;  impaired balance;  decreased flexibility;  rolling walker    Gait Training  Gait Training Rehab Potential: good, to achieve stated therapy goals  Gait Training: contact guard;  1 person assist;  nonverbal cues (demo/gestures);  verbal cues;  full weight-bearing   rolling walker;  25'x2  Gait Analysis: decreased angie;  crouch;  increased time in double stance;  decreased step length;  decreased flexibility;  decreased strength;  impaired balance;  25'x2 limited by L groin pains;  rolling walker          PM&R Impression : as above    Current disposition plan recommendation :    subacute rehab placement

## 2024-07-09 NOTE — PROGRESS NOTE ADULT - ATTENDING COMMENTS
59-year-old female with a PMHx of IVDU (on Methadone), anemia and epilepsy who presented with LLE pain, found to have loculated fluid collection of left iliacus muscle and iliopsoas bursitis c/b MSSA bacteremia.  BCx (7/1) MSSA, BCx (7/3) NGTD.        Plan:       -IR consulted, s/p drainage, Cx with staph aureus       -TTE and TED negative for endocarditis      -ID consulted, continue with Cefazolin, tentative plan for 4–6 week course    -obtain repeat CT-LLE given persistent pain despite pain medications and ABx, will give IVFs pre and post contrast as Cr slowly up-trending   -ortho consulted, no acute surgical intervention      -PT recommends [facility], no acceptances to date    -SW consulted     Rest of plan as per resident note.

## 2024-07-09 NOTE — PROGRESS NOTE ADULT - ASSESSMENT
59F with hx of IVDU (heroin/cocaine) on methadone, epilepsy who presents for sharp L leg pain progressively worsening over the past month found to have loculated fluid in L iliacus muscle, iliopsoas bursitis c/b MSSA bacteremia. Patient afebrile without leukocytosis. Fluid collection/bursitis likely 2/2 injecting IV drug into LLE. Given MSSA BSI- suspect MSSA as causative pathogen of iliopsoas collection. S/P IR drainage 7/3 - 5 cc purulent fluid aspirated and sent for culture. IR drainage cx grew MSSA.    TTE without vegetation  TED without vegetation.   HIV screen neg  Hep C Ab reactive - Quantitative RNA not detected   bcxs 7/1 - MSSA  bcxs 7/3 -no growth    Suggest:  -f/u repeat CT LLE with IVC (patient still endorsing LLE pain)   -consult psych   -continue cefazolin 2g IV Q8   -Patient will need PICC line   -Duration of antibiotics is tentatively 4-6 weeks. Will need repeat CT LLE with IVC in 4 weeks prior to stopping antibiotics   -Weekly labs: CMP, CBC with diff, ESR, CRP faxed to ID office at [phone]  -Patient to follow up with Dr. He in 2 weeks ([address] [place] [address], [phone]), ID office will call patient to schedule     Team 2 will follow you.    Case d/w primary team.  Final recommendation pending attending note.    Lena Decker, Infectious Diseases PA  Please reach out for any questions 9 am-5pm. For evenings and weekends, please call the ID physician on call.  Work cell: [phone]

## 2024-07-10 LAB
ANION GAP SERPL CALC-SCNC: 6 MMOL/L — SIGNIFICANT CHANGE UP (ref 5–17)
BASOPHILS # BLD AUTO: 0.02 K/UL — SIGNIFICANT CHANGE UP (ref 0–0.2)
BASOPHILS NFR BLD AUTO: 0.5 % — SIGNIFICANT CHANGE UP (ref 0–2)
BUN SERPL-MCNC: 23 MG/DL — SIGNIFICANT CHANGE UP (ref 7–23)
CALCIUM SERPL-MCNC: 10 MG/DL — SIGNIFICANT CHANGE UP (ref 8.4–10.5)
CHLORIDE SERPL-SCNC: 95 MMOL/L — LOW (ref 96–108)
CO2 SERPL-SCNC: 35 MMOL/L — HIGH (ref 22–31)
CREAT SERPL-MCNC: 1.07 MG/DL — SIGNIFICANT CHANGE UP (ref 0.5–1.3)
EGFR: 61 ML/MIN/1.73M2 — SIGNIFICANT CHANGE UP
EOSINOPHIL # BLD AUTO: 0.18 K/UL — SIGNIFICANT CHANGE UP (ref 0–0.5)
EOSINOPHIL NFR BLD AUTO: 4.2 % — SIGNIFICANT CHANGE UP (ref 0–6)
GLUCOSE SERPL-MCNC: 116 MG/DL — HIGH (ref 70–99)
HCT VFR BLD CALC: 25.5 % — LOW (ref 34.5–45)
HGB BLD-MCNC: 8 G/DL — LOW (ref 11.5–15.5)
IMM GRANULOCYTES NFR BLD AUTO: 0.9 % — SIGNIFICANT CHANGE UP (ref 0–0.9)
LYMPHOCYTES # BLD AUTO: 1.3 K/UL — SIGNIFICANT CHANGE UP (ref 1–3.3)
LYMPHOCYTES # BLD AUTO: 30.7 % — SIGNIFICANT CHANGE UP (ref 13–44)
MAGNESIUM SERPL-MCNC: 2.3 MG/DL — SIGNIFICANT CHANGE UP (ref 1.6–2.6)
MCHC RBC-ENTMCNC: 27.6 PG — SIGNIFICANT CHANGE UP (ref 27–34)
MCHC RBC-ENTMCNC: 31.4 GM/DL — LOW (ref 32–36)
MCV RBC AUTO: 87.9 FL — SIGNIFICANT CHANGE UP (ref 80–100)
MONOCYTES # BLD AUTO: 0.53 K/UL — SIGNIFICANT CHANGE UP (ref 0–0.9)
MONOCYTES NFR BLD AUTO: 12.5 % — SIGNIFICANT CHANGE UP (ref 2–14)
NEUTROPHILS # BLD AUTO: 2.17 K/UL — SIGNIFICANT CHANGE UP (ref 1.8–7.4)
NEUTROPHILS NFR BLD AUTO: 51.2 % — SIGNIFICANT CHANGE UP (ref 43–77)
NRBC # BLD: 0 /100 WBCS — SIGNIFICANT CHANGE UP (ref 0–0)
PHOSPHATE SERPL-MCNC: 4.9 MG/DL — HIGH (ref 2.5–4.5)
PLATELET # BLD AUTO: 336 K/UL — SIGNIFICANT CHANGE UP (ref 150–400)
POTASSIUM SERPL-MCNC: 5.3 MMOL/L — SIGNIFICANT CHANGE UP (ref 3.5–5.3)
POTASSIUM SERPL-SCNC: 5.3 MMOL/L — SIGNIFICANT CHANGE UP (ref 3.5–5.3)
RBC # BLD: 2.9 M/UL — LOW (ref 3.8–5.2)
RBC # FLD: 14.8 % — HIGH (ref 10.3–14.5)
SODIUM SERPL-SCNC: 136 MMOL/L — SIGNIFICANT CHANGE UP (ref 135–145)
WBC # BLD: 4.24 K/UL — SIGNIFICANT CHANGE UP (ref 3.8–10.5)
WBC # FLD AUTO: 4.24 K/UL — SIGNIFICANT CHANGE UP (ref 3.8–10.5)

## 2024-07-10 PROCEDURE — 99232 SBSQ HOSP IP/OBS MODERATE 35: CPT

## 2024-07-10 PROCEDURE — 93010 ELECTROCARDIOGRAM REPORT: CPT

## 2024-07-10 PROCEDURE — 99232 SBSQ HOSP IP/OBS MODERATE 35: CPT | Mod: GC

## 2024-07-10 RX ADMIN — OXYCODONE HYDROCHLORIDE 5 MILLIGRAM(S): 30 TABLET ORAL at 17:22

## 2024-07-10 RX ADMIN — OXYCODONE HYDROCHLORIDE 10 MILLIGRAM(S): 30 TABLET ORAL at 20:50

## 2024-07-10 RX ADMIN — OXYCODONE HYDROCHLORIDE 10 MILLIGRAM(S): 30 TABLET ORAL at 14:53

## 2024-07-10 RX ADMIN — OXYCODONE HYDROCHLORIDE 5 MILLIGRAM(S): 30 TABLET ORAL at 16:22

## 2024-07-10 RX ADMIN — Medication 100 MILLIGRAM(S): at 13:53

## 2024-07-10 RX ADMIN — Medication 975 MILLIGRAM(S): at 10:24

## 2024-07-10 RX ADMIN — Medication 100 MILLIGRAM(S): at 07:16

## 2024-07-10 RX ADMIN — LIDOCAINE 5% 1 PATCH: 5 CREAM TOPICAL at 11:11

## 2024-07-10 RX ADMIN — OXYCODONE HYDROCHLORIDE 10 MILLIGRAM(S): 30 TABLET ORAL at 01:54

## 2024-07-10 RX ADMIN — Medication 975 MILLIGRAM(S): at 01:24

## 2024-07-10 RX ADMIN — OXYCODONE HYDROCHLORIDE 10 MILLIGRAM(S): 30 TABLET ORAL at 21:50

## 2024-07-10 RX ADMIN — Medication 975 MILLIGRAM(S): at 17:11

## 2024-07-10 RX ADMIN — OXYCODONE HYDROCHLORIDE 5 MILLIGRAM(S): 30 TABLET ORAL at 05:13

## 2024-07-10 RX ADMIN — LIDOCAINE 5% 1 PATCH: 5 CREAM TOPICAL at 18:13

## 2024-07-10 RX ADMIN — OXYCODONE HYDROCHLORIDE 10 MILLIGRAM(S): 30 TABLET ORAL at 07:50

## 2024-07-10 RX ADMIN — ENOXAPARIN SODIUM 40 MILLIGRAM(S): 120 INJECTION SUBCUTANEOUS at 21:56

## 2024-07-10 RX ADMIN — OXYCODONE HYDROCHLORIDE 10 MILLIGRAM(S): 30 TABLET ORAL at 01:24

## 2024-07-10 RX ADMIN — OXYCODONE HYDROCHLORIDE 10 MILLIGRAM(S): 30 TABLET ORAL at 08:20

## 2024-07-10 RX ADMIN — Medication 3 MILLIGRAM(S): at 21:56

## 2024-07-10 RX ADMIN — Medication 25 MILLIGRAM(S): at 21:56

## 2024-07-10 RX ADMIN — Medication 160 MILLIGRAM(S): at 11:11

## 2024-07-10 RX ADMIN — Medication 975 MILLIGRAM(S): at 09:24

## 2024-07-10 RX ADMIN — LEVETIRACETAM 500 MILLIGRAM(S): 1000 TABLET, FILM COATED ORAL at 17:11

## 2024-07-10 RX ADMIN — Medication 15 MILLIGRAM(S): at 11:11

## 2024-07-10 RX ADMIN — OXYCODONE HYDROCHLORIDE 5 MILLIGRAM(S): 30 TABLET ORAL at 05:43

## 2024-07-10 RX ADMIN — Medication 975 MILLIGRAM(S): at 18:11

## 2024-07-10 RX ADMIN — LEVETIRACETAM 500 MILLIGRAM(S): 1000 TABLET, FILM COATED ORAL at 07:16

## 2024-07-10 RX ADMIN — OXYCODONE HYDROCHLORIDE 10 MILLIGRAM(S): 30 TABLET ORAL at 13:53

## 2024-07-10 NOTE — PROGRESS NOTE ADULT - PROBLEM SELECTOR PLAN 7
As per patient has history of epilepsy  States she takes Keppra but unsure of dose, follows with Neurologist at [facility]  Per Jennifer, previously prescribed Keppra 1000mg bid.   - order Keppra 500mg bid x 2 doses for now, confirm doses.

## 2024-07-10 NOTE — PROGRESS NOTE ADULT - ASSESSMENT
59F with hx of IVDU (heroin/cocaine) on methadone, epilepsy who presents for sharp L leg pain progressively worsening over the past month found to have loculated fluid in L iliacus muscle, iliopsoas bursitis c/b MSSA bacteremia. Patient afebrile without leukocytosis. Fluid collection/bursitis likely 2/2 injecting IV drug into LLE. Given MSSA BSI- suspect MSSA as causative pathogen of iliopsoas collection. S/P IR drainage 7/3 - 5 cc purulent fluid aspirated and sent for culture. IR drainage cx grew MSSA.    TTE without vegetation  TED without vegetation.   HIV screen neg  Hep C Ab reactive - Quantitative RNA not detected   bcxs 7/1 - MSSA  bcxs 7/3 -no growth    Suggest:  -f/u repeat CT LLE with IVC (patient still endorsing LLE pain)   -continue cefazolin 2g IV Q8   -Patient will need PICC line   -Duration of antibiotics is tentatively 4-6 weeks (7/3- 8/13). Will need repeat CT LLE with IVC in 4 weeks prior to stopping antibiotics   -Weekly labs: CMP, CBC with diff, ESR, CRP faxed to ID office at [phone]  -Patient to follow up with Dr. He in 2 weeks ([address] [place] [address] 4F, [phone]), ID office will call patient to schedule          59F with hx of IVDU (heroin/cocaine) on methadone, epilepsy who presents for sharp L leg pain progressively worsening over the past month found to have loculated fluid in L iliacus muscle, iliopsoas bursitis c/b MSSA bacteremia. Patient afebrile without leukocytosis. Fluid collection/bursitis likely 2/2 injecting IV drug into LLE. Given MSSA BSI- suspect MSSA as causative pathogen of iliopsoas collection. S/P IR drainage 7/3 - 5 cc purulent fluid aspirated and sent for culture. IR drainage cx grew MSSA.    TTE without vegetation  TED without vegetation.   HIV screen neg  Hep C Ab reactive - Quantitative RNA not detected   bcxs 7/1 - MSSA  bcxs 7/3 -no growth    Suggest:  -f/u repeat CT LLE with IVC (patient still endorsing LLE pain)   -continue cefazolin 2g IV Q8   -if patient decides to leave AMA, can give dicloxacillin 500mg PO Q6 for same planned duration.   -Patient will need PICC line   -Duration of antibiotics is tentatively 4-6 weeks (7/3- 8/13). Will need repeat CT LLE with IVC in 4 weeks prior to stopping antibiotics   -Weekly labs: CMP, CBC with diff, ESR, CRP faxed to ID office at [phone]  -Patient to follow up with Dr. He in 2 weeks ([address] [place] [address], [phone]), ID office will call patient to schedule     Team 2 will sign off. Thank you for your consultation   Please reconsult with questions or if repeat CT LLE shows sizable collection     Case d/w primary team.  Final recommendation pending attending note.    Lena Decker, Infectious Diseases PA  Please reach out for any questions 9 am-5pm. For evenings and weekends, please call the ID physician on call.  Work cell: [phone]

## 2024-07-10 NOTE — PROGRESS NOTE ADULT - SUBJECTIVE AND OBJECTIVE BOX
[note incomplete] Patient is a 59y old  Female who presents with a chief complaint of left leg pain (10 Jul 2024 13:55)       INTERVAL HPI/OVERNIGHT EVENTS: No acute events overnight.    SUBJECTIVE: Pt seen and examined this morning. Patient reports persistent Left groin pain and intermittent chest discomfort. She denies chest pain but describes that occasionally, her heart feels heavier and "pounding". She denies palpitations. She denies SOB and lightheadedness. She additionally denies fevers, abdominal pain, N/V, and diarrhea.     All other review of systems was negative except otherwise noted in HPI above.    MEDICATIONS  (STANDING):  acetaminophen     Tablet .. 975 milliGRAM(s) Oral every 8 hours  ceFAZolin   IVPB 2000 milliGRAM(s) IV Intermittent every 8 hours  enoxaparin Injectable 40 milliGRAM(s) SubCutaneous every 24 hours  levETIRAcetam 500 milliGRAM(s) Oral two times a day  lidocaine   4% Patch 1 Patch Transdermal daily  melatonin 3 milliGRAM(s) Oral at bedtime  methadone    Tablet 160 milliGRAM(s) Oral daily  mirtazapine 15 milliGRAM(s) Oral daily  QUEtiapine 25 milliGRAM(s) Oral at bedtime    MEDICATIONS  (PRN):  naloxone Injectable 1 milliGRAM(s) IV Push every 3 minutes PRN in case of overdose  oxyCODONE    IR 10 milliGRAM(s) Oral every 6 hours PRN Severe Pain (7 - 10)  oxyCODONE    IR 5 milliGRAM(s) Oral every 6 hours PRN Moderate Pain (4 - 6)  polyethylene glycol 3350 17 Gram(s) Oral daily PRN Constipation  saline laxative (FLEET) Rectal Enema 1 Enema Rectal once PRN constipation, severe, please try oral laxatives first  senna 2 Tablet(s) Oral at bedtime PRN Constipation      Allergies    No Known Allergies    Intolerances    Vital Signs Last 24 Hrs  T(C): 37 (10 Jul 2024 14:45), Max: 37.1 (09 Jul 2024 21:32)  T(F): 98.6 (10 Jul 2024 14:45), Max: 98.8 (09 Jul 2024 21:32)  HR: 94 (10 Jul 2024 14:45) (78 - 94)  BP: 106/66 (10 Jul 2024 14:45) (96/59 - 114/73)  BP(mean): --  RR: 18 (10 Jul 2024 14:45) (18 - 18)  SpO2: 96% (10 Jul 2024 14:45) (96% - 98%)    Parameters below as of 10 Jul 2024 14:45  Patient On (Oxygen Delivery Method): room air    PHYSICAL EXAM:  Constitutional - NAD, Complaining of pain  HEENT - NCAT, EOMI  Neck - No limited ROM  Chest - Breathing comfortably on room air, CTAB   Cardio - Warm and well perfused, RRR  Abdomen -  Soft, NTND  Extremities - No peripheral edema. (+) Left groin without overlying erythema, swelling, or tenderness to palpation. Old IV puncture sites visible on the thighs.  Neurologic Exam: Awake and alert. Speaking fluently without dysarthria    LABS:                        8.0    4.24  )-----------( 336      ( 10 Jul 2024 05:30 )             25.5     10 Jul 2024 05:30    136    |  95     |  23     ----------------------------<  116    5.3     |  35     |  1.07     Ca    10.0       10 Jul 2024 05:30  Phos  4.9       10 Jul 2024 05:30  Mg     2.3       10 Jul 2024 05:30      RADIOLOGY & ADDITIONAL TESTS: None

## 2024-07-10 NOTE — PROGRESS NOTE ADULT - ATTENDING COMMENTS
59-year-old female with a PMHx of IVDU (on Methadone), anemia and epilepsy who presented with LLE pain, found to have loculated fluid collection of left iliacus muscle and iliopsoas bursitis c/b MSSA bacteremia.  BCx (7/1) MSSA, BCx (7/3) NGTD.         Plan:        -IR consulted, s/p drainage, Cx with staph aureus        -TTE and TED negative for endocarditis       -ID consulted, continue with Cefazolin, tentative plan for 4–6 week course     -obtain repeat CT-LLE given persistent pain despite pain medications and ABx  -ortho consulted, no acute surgical intervention       -PT recommends [facility], no acceptances to date     -SW consulted     Rest of plan as per resident note.

## 2024-07-10 NOTE — PROGRESS NOTE ADULT - PROBLEM SELECTOR PLAN 10
Patient previously prescribed quetiapine 150mg qd, buspirone 15mg qd, mirtazapine 15mg qd. Unclear what she is currently taking.  - attempted to do med rec with shelter, pharmacy, patient, unable to find current doses for her mood medications  - she is endorsing 'ugly' thoughts about relapse, difficulty sleeping  - negative SI/HI  - started 25 mg seroquel 7/9, titrate up as needed

## 2024-07-10 NOTE — PROGRESS NOTE ADULT - PROBLEM SELECTOR PLAN 4
Cr 1.28, BUN 29, GFR 49  - discontinued toradol  - scheduled for CT LE with IV contrast  - encouraged PO fluid intake  - started on 1L NS 100cc/hour --> completed

## 2024-07-10 NOTE — PROGRESS NOTE ADULT - PROBLEM SELECTOR PLAN 3
Left leg and thigh pain with radiation to groin  CT LE w IV contrast showed loculated fluid in L iliacus muscle, iliopsoas bursitis of likely infections etiology  s/p IR drainage   - duplex of LLE showed no evidence of DVT  - pain regimen: standing 975 tylenol q8, lidocaine patch, oxy 5mg q8PRN for moderate pain, oxy 10mg q6PRN for severe pain  - toradol held due to mildly elevated creatinine  - F/u CT as above

## 2024-07-10 NOTE — PROGRESS NOTE ADULT - PROBLEM SELECTOR PLAN 11
F: NS 1L  E: replete as needed  N: regular diet  DVT ppx: given 1 dose of lovenox on 7/2  Dispo: [facility].
F: NS 1L  E: replete as needed  N: regular diet  DVT ppx: given 1 dose of lovenox on 7/2  Dispo: [facility].

## 2024-07-10 NOTE — PROGRESS NOTE ADULT - ASSESSMENT
60 y/o F pmhx significant for IV drug use, anemia, epilepsy who presents for sharp L leg pain progressively worsening over the past month, likely 2/2 iliopsoas bursitis i/s/o IVDU, s/p IR aspiration on 7/3, on cefazolin 2 g for MSSA bacteremia.

## 2024-07-10 NOTE — PROGRESS NOTE ADULT - PROBLEM SELECTOR PLAN 1
Patient was found to have blood and wound cultures positive for MSSA bacteria  - cefazolin 2g q8hs, day 7  - per ID recs, cultures from 7/3 have no growth, so there is no need for further surveillance cultures  - patient is medically stable  - per ID, patient will need 4-6 weeks of antibiotics, they recommend PICC line  - In event of early discharge before completion of IV antibiotics, contingency plan per ID is PO Dicloxacillin 500mg q6h for 6 weeks

## 2024-07-10 NOTE — PROGRESS NOTE ADULT - SUBJECTIVE AND OBJECTIVE BOX
INFECTIOUS DISEASES CONSULT FOLLOW-UP NOTE    INTERVAL HPI/OVERNIGHT EVENTS:    Patient seen and examined at bedside. JAKE. Afebrile. States LLE pain improved today    ROS:   Constitutional, eyes, ENT, cardiovascular, respiratory, gastrointestinal, genitourinary, integumentary, neurological, psychiatric and heme/lymph are otherwise negative other than noted above       ANTIBIOTICS/RELEVANT:    MEDICATIONS  (STANDING):  acetaminophen     Tablet .. 975 milliGRAM(s) Oral every 8 hours  ceFAZolin   IVPB 2000 milliGRAM(s) IV Intermittent every 8 hours  enoxaparin Injectable 40 milliGRAM(s) SubCutaneous every 24 hours  levETIRAcetam 500 milliGRAM(s) Oral two times a day  lidocaine   4% Patch 1 Patch Transdermal daily  melatonin 3 milliGRAM(s) Oral at bedtime  methadone    Tablet 160 milliGRAM(s) Oral daily  mirtazapine 15 milliGRAM(s) Oral daily  QUEtiapine 25 milliGRAM(s) Oral at bedtime    MEDICATIONS  (PRN):  naloxone Injectable 1 milliGRAM(s) IV Push every 3 minutes PRN in case of overdose  oxyCODONE    IR 10 milliGRAM(s) Oral every 6 hours PRN Severe Pain (7 - 10)  oxyCODONE    IR 5 milliGRAM(s) Oral every 6 hours PRN Moderate Pain (4 - 6)  polyethylene glycol 3350 17 Gram(s) Oral daily PRN Constipation  saline laxative (FLEET) Rectal Enema 1 Enema Rectal once PRN constipation, severe, please try oral laxatives first  senna 2 Tablet(s) Oral at bedtime PRN Constipation        Vital Signs Last 24 Hrs  T(C): 36.9 (10 Jul 2024 08:50), Max: 37.1 (09 Jul 2024 21:32)  T(F): 98.5 (10 Jul 2024 08:50), Max: 98.8 (09 Jul 2024 21:32)  HR: 87 (10 Jul 2024 08:50) (78 - 93)  BP: 96/59 (10 Jul 2024 08:50) (90/60 - 114/73)  BP(mean): --  RR: 18 (10 Jul 2024 08:50) (18 - 18)  SpO2: 97% (10 Jul 2024 08:50) (94% - 98%)    Parameters below as of 10 Jul 2024 08:50  Patient On (Oxygen Delivery Method): room air        PHYSICAL EXAM:  Constitutional: alert, NAD  Eyes: the sclera and conjunctiva were normal.   ENT: the ears and nose were normal in appearance.   Neck: the appearance of the neck was normal and the neck was supple.   Pulmonary: no respiratory distress and lungs were clear to auscultation bilaterally.   Heart: heart rate was normal and rhythm regular, normal S1 and S2  Vascular:. there was no peripheral edema  Abdomen: normal bowel sounds, soft, non-tender  Extremities : no LLE thigh tenderness to palpation . Palpated c/t/l spine, b/l shoulders/elbows/wrists/knees/ankles - no tenderness.   Neurological: no focal deficits.   Psychiatric: the affect was normal          LABS:                        8.0    4.24  )-----------( 336      ( 10 Jul 2024 05:30 )             25.5     07-10    136  |  95<L>  |  23  ----------------------------<  116<H>  5.3   |  35<H>  |  1.07    Ca    10.0      10 Jul 2024 05:30  Phos  4.9     07-10  Mg     2.3     07-10    TPro  7.6  /  Alb  2.8<L>  /  TBili  <0.2  /  DBili  x   /  AST  26  /  ALT  6<L>  /  AlkPhos  88  07-09      Urinalysis Basic - ( 10 Jul 2024 05:30 )    Color: x / Appearance: x / SG: x / pH: x  Gluc: 116 mg/dL / Ketone: x  / Bili: x / Urobili: x   Blood: x / Protein: x / Nitrite: x   Leuk Esterase: x / RBC: x / WBC x   Sq Epi: x / Non Sq Epi: x / Bacteria: x        MICROBIOLOGY:    Culture - Blood (collected 07-03-24 @ 22:42)  Source: .Blood Blood  Final Report (07-09-24 @ 02:00):    No growth at 5 days    Culture - Body Fluid with Gram Stain (collected 07-03-24 @ 14:00)  Source: .Body Fluid left psoas tendon fluid  Gram Stain (07-04-24 @ 18:59):    Moderate polymorphonuclear leukocytes per low power field    No organisms seen per oil power field  Final Report (07-08-24 @ 16:58):    Rare Staphylococcus aureus  Organism: Staphylococcus aureus (07-08-24 @ 16:58)  Organism: Staphylococcus aureus (07-08-24 @ 16:58)      Method Type: BHRAAT      -  Clindamycin: R <=0.25 This isolate is presumed to be clindamycin resistant based on detection of inducible resistance. Clindamycin may still be effective in some patients.      -  Erythromycin: R >4      -  Gentamicin: S <=1 Should not be used as monotherapy      -  Oxacillin: S <=0.25 Oxacillin predicts susceptibility for dicloxacillin, methicillin, and nafcillin      -  Penicillin: R 4      -  Rifampin: S <=1 Should not be used as monotherapy      -  Tetracycline: S <=1      -  Trimethoprim/Sulfamethoxazole: S <=0.5/9.5      -  Vancomycin: S 2        RADIOLOGY & ADDITIONAL STUDIES:  Reviewed

## 2024-07-10 NOTE — PROGRESS NOTE ADULT - PROBLEM SELECTOR PLAN 2
Worsening left leg and thigh pain with radiation to groin  CT LE w IV contrast showed loculated fluid in L iliacus muscle, iliopsoas bursitis of infectious or inflammatory etiology  ortho consulted, recommended IV antibiotics and IR consult in AM, will continue to follow   Given Zosyn and Vanc in ED  - blood cultures grew MSSA  - cefazolin 2g q8hs, day 7  - ESR lateralized  - HepC RNA reactive  - Plan for CT of Left LE w/ IV contrast

## 2024-07-11 LAB
ALBUMIN SERPL ELPH-MCNC: 3 G/DL — LOW (ref 3.3–5)
ALP SERPL-CCNC: 89 U/L — SIGNIFICANT CHANGE UP (ref 40–120)
ALT FLD-CCNC: <5 U/L — LOW (ref 10–45)
ANION GAP SERPL CALC-SCNC: 7 MMOL/L — SIGNIFICANT CHANGE UP (ref 5–17)
AST SERPL-CCNC: 23 U/L — SIGNIFICANT CHANGE UP (ref 10–40)
BASOPHILS # BLD AUTO: 0.03 K/UL — SIGNIFICANT CHANGE UP (ref 0–0.2)
BASOPHILS NFR BLD AUTO: 0.7 % — SIGNIFICANT CHANGE UP (ref 0–2)
BILIRUB SERPL-MCNC: 0.2 MG/DL — SIGNIFICANT CHANGE UP (ref 0.2–1.2)
BUN SERPL-MCNC: 18 MG/DL — SIGNIFICANT CHANGE UP (ref 7–23)
CALCIUM SERPL-MCNC: 9.8 MG/DL — SIGNIFICANT CHANGE UP (ref 8.4–10.5)
CHLORIDE SERPL-SCNC: 97 MMOL/L — SIGNIFICANT CHANGE UP (ref 96–108)
CO2 SERPL-SCNC: 35 MMOL/L — HIGH (ref 22–31)
CREAT SERPL-MCNC: 1.04 MG/DL — SIGNIFICANT CHANGE UP (ref 0.5–1.3)
EGFR: 63 ML/MIN/1.73M2 — SIGNIFICANT CHANGE UP
EOSINOPHIL # BLD AUTO: 0.15 K/UL — SIGNIFICANT CHANGE UP (ref 0–0.5)
EOSINOPHIL NFR BLD AUTO: 3.3 % — SIGNIFICANT CHANGE UP (ref 0–6)
GLUCOSE SERPL-MCNC: 104 MG/DL — HIGH (ref 70–99)
HCT VFR BLD CALC: 27 % — LOW (ref 34.5–45)
HGB BLD-MCNC: 8.3 G/DL — LOW (ref 11.5–15.5)
IMM GRANULOCYTES NFR BLD AUTO: 0.4 % — SIGNIFICANT CHANGE UP (ref 0–0.9)
LYMPHOCYTES # BLD AUTO: 1.6 K/UL — SIGNIFICANT CHANGE UP (ref 1–3.3)
LYMPHOCYTES # BLD AUTO: 35.3 % — SIGNIFICANT CHANGE UP (ref 13–44)
MAGNESIUM SERPL-MCNC: 2.1 MG/DL — SIGNIFICANT CHANGE UP (ref 1.6–2.6)
MCHC RBC-ENTMCNC: 27.5 PG — SIGNIFICANT CHANGE UP (ref 27–34)
MCHC RBC-ENTMCNC: 30.7 GM/DL — LOW (ref 32–36)
MCV RBC AUTO: 89.4 FL — SIGNIFICANT CHANGE UP (ref 80–100)
MONOCYTES # BLD AUTO: 0.62 K/UL — SIGNIFICANT CHANGE UP (ref 0–0.9)
MONOCYTES NFR BLD AUTO: 13.7 % — SIGNIFICANT CHANGE UP (ref 2–14)
NEUTROPHILS # BLD AUTO: 2.11 K/UL — SIGNIFICANT CHANGE UP (ref 1.8–7.4)
NEUTROPHILS NFR BLD AUTO: 46.6 % — SIGNIFICANT CHANGE UP (ref 43–77)
NRBC # BLD: 0 /100 WBCS — SIGNIFICANT CHANGE UP (ref 0–0)
PHOSPHATE SERPL-MCNC: 4.8 MG/DL — HIGH (ref 2.5–4.5)
PLATELET # BLD AUTO: 314 K/UL — SIGNIFICANT CHANGE UP (ref 150–400)
POTASSIUM SERPL-MCNC: 5 MMOL/L — SIGNIFICANT CHANGE UP (ref 3.5–5.3)
POTASSIUM SERPL-SCNC: 5 MMOL/L — SIGNIFICANT CHANGE UP (ref 3.5–5.3)
PROT SERPL-MCNC: 8 G/DL — SIGNIFICANT CHANGE UP (ref 6–8.3)
RBC # BLD: 3.02 M/UL — LOW (ref 3.8–5.2)
RBC # FLD: 14.9 % — HIGH (ref 10.3–14.5)
SODIUM SERPL-SCNC: 139 MMOL/L — SIGNIFICANT CHANGE UP (ref 135–145)
WBC # BLD: 4.53 K/UL — SIGNIFICANT CHANGE UP (ref 3.8–10.5)
WBC # FLD AUTO: 4.53 K/UL — SIGNIFICANT CHANGE UP (ref 3.8–10.5)

## 2024-07-11 PROCEDURE — 73701 CT LOWER EXTREMITY W/DYE: CPT | Mod: 26,LT

## 2024-07-11 PROCEDURE — 93010 ELECTROCARDIOGRAM REPORT: CPT

## 2024-07-11 PROCEDURE — 76937 US GUIDE VASCULAR ACCESS: CPT | Mod: 26,59

## 2024-07-11 PROCEDURE — 99232 SBSQ HOSP IP/OBS MODERATE 35: CPT | Mod: GC

## 2024-07-11 PROCEDURE — 36569 INSJ PICC 5 YR+ W/O IMAGING: CPT

## 2024-07-11 RX ORDER — LORATADINE 10 MG
17 TABLET,DISINTEGRATING ORAL DAILY
Refills: 0 | Status: DISCONTINUED | OUTPATIENT
Start: 2024-07-11 | End: 2024-07-13

## 2024-07-11 RX ORDER — ASPIRIN 325 MG
5 TABLET ORAL DAILY
Refills: 0 | Status: DISCONTINUED | OUTPATIENT
Start: 2024-07-11 | End: 2024-07-25

## 2024-07-11 RX ADMIN — OXYCODONE HYDROCHLORIDE 10 MILLIGRAM(S): 30 TABLET ORAL at 18:17

## 2024-07-11 RX ADMIN — Medication 975 MILLIGRAM(S): at 00:53

## 2024-07-11 RX ADMIN — OXYCODONE HYDROCHLORIDE 10 MILLIGRAM(S): 30 TABLET ORAL at 03:45

## 2024-07-11 RX ADMIN — Medication 17 GRAM(S): at 11:16

## 2024-07-11 RX ADMIN — Medication 5 MILLIGRAM(S): at 09:55

## 2024-07-11 RX ADMIN — Medication 15 MILLIGRAM(S): at 11:16

## 2024-07-11 RX ADMIN — Medication 975 MILLIGRAM(S): at 09:12

## 2024-07-11 RX ADMIN — Medication 100 MILLIGRAM(S): at 16:33

## 2024-07-11 RX ADMIN — ENOXAPARIN SODIUM 40 MILLIGRAM(S): 120 INJECTION SUBCUTANEOUS at 22:43

## 2024-07-11 RX ADMIN — Medication 3 MILLIGRAM(S): at 22:43

## 2024-07-11 RX ADMIN — Medication 975 MILLIGRAM(S): at 10:12

## 2024-07-11 RX ADMIN — OXYCODONE HYDROCHLORIDE 10 MILLIGRAM(S): 30 TABLET ORAL at 11:15

## 2024-07-11 RX ADMIN — OXYCODONE HYDROCHLORIDE 5 MILLIGRAM(S): 30 TABLET ORAL at 22:25

## 2024-07-11 RX ADMIN — OXYCODONE HYDROCHLORIDE 5 MILLIGRAM(S): 30 TABLET ORAL at 21:25

## 2024-07-11 RX ADMIN — Medication 975 MILLIGRAM(S): at 17:17

## 2024-07-11 RX ADMIN — OXYCODONE HYDROCHLORIDE 10 MILLIGRAM(S): 30 TABLET ORAL at 12:15

## 2024-07-11 RX ADMIN — OXYCODONE HYDROCHLORIDE 5 MILLIGRAM(S): 30 TABLET ORAL at 08:20

## 2024-07-11 RX ADMIN — LIDOCAINE 5% 1 PATCH: 5 CREAM TOPICAL at 11:16

## 2024-07-11 RX ADMIN — OXYCODONE HYDROCHLORIDE 10 MILLIGRAM(S): 30 TABLET ORAL at 03:17

## 2024-07-11 RX ADMIN — Medication 25 MILLIGRAM(S): at 22:43

## 2024-07-11 RX ADMIN — LEVETIRACETAM 500 MILLIGRAM(S): 1000 TABLET, FILM COATED ORAL at 17:17

## 2024-07-11 RX ADMIN — OXYCODONE HYDROCHLORIDE 5 MILLIGRAM(S): 30 TABLET ORAL at 06:51

## 2024-07-11 RX ADMIN — OXYCODONE HYDROCHLORIDE 10 MILLIGRAM(S): 30 TABLET ORAL at 17:17

## 2024-07-11 RX ADMIN — Medication 975 MILLIGRAM(S): at 02:00

## 2024-07-11 RX ADMIN — LEVETIRACETAM 500 MILLIGRAM(S): 1000 TABLET, FILM COATED ORAL at 06:09

## 2024-07-11 RX ADMIN — LIDOCAINE 5% 1 PATCH: 5 CREAM TOPICAL at 18:59

## 2024-07-11 RX ADMIN — Medication 100 MILLIGRAM(S): at 22:42

## 2024-07-11 RX ADMIN — Medication 160 MILLIGRAM(S): at 11:15

## 2024-07-11 RX ADMIN — Medication 975 MILLIGRAM(S): at 18:17

## 2024-07-11 NOTE — PROGRESS NOTE ADULT - PROBLEM SELECTOR PLAN 10
F: S/p NS 1L  E: replete as needed  N: regular diet, no concentrated phosphorus  DVT ppx: Lovenox 40mg subq  Dispo: [facility].

## 2024-07-11 NOTE — PROGRESS NOTE ADULT - PROBLEM SELECTOR PLAN 5
Cr 1.28, BUN 29, GFR 49  - discontinued toradol  - encouraged PO fluid intake  - started on 1L NS 100cc/hour --> completed

## 2024-07-11 NOTE — PROCEDURE NOTE - NSPOSTCAREGUIDE_GEN_A_CORE
Verbal/written post procedure instructions were given to patient/caregiver/Instructed patient/caregiver regarding signs and symptoms of infection/Keep the cast/splint/dressing clean and dry/Care for catheter as per unit/ICU protocols
Verbal/written post procedure instructions were given to patient/caregiver/Instructed patient/caregiver to follow-up with primary care physician/Instructed patient/caregiver regarding signs and symptoms of infection/Keep the cast/splint/dressing clean and dry/Care for catheter as per unit/ICU protocols
Verbal/written post procedure instructions were given to patient/caregiver

## 2024-07-11 NOTE — PROGRESS NOTE ADULT - PROBLEM SELECTOR PLAN 7
As per patient has history of epilepsy  States she takes Keppra but unsure of dose, follows with Neurologist at [facility]  Per Jennifer, previously prescribed Keppra 1000mg bid.   - Order Keppra 500mg bid x 2 doses for now, confirm doses.

## 2024-07-11 NOTE — PROGRESS NOTE ADULT - PROBLEM SELECTOR PLAN 2
Worsening left leg and thigh pain with radiation to groin. Duplex LLE negative for DVT. CT LE w IV contrast showed loculated fluid in L iliacus muscle, iliopsoas bursitis of infectious or inflammatory etiology s/p IR aspiration on 7/3.  - Initial BCx on 7/1 positive for MSSA. Plan as above  - Pain regimen: standing 975 tylenol q8, lidocaine patch, oxy 5mg q8PRN for moderate pain, oxy 10mg q6PRN for severe pain  - Toradol held due to mildly elevated creatinine  - Plan for CT of Left LE w/ IV contrast on 7/12, now that IV access reestablished

## 2024-07-11 NOTE — PROCEDURE NOTE - NSPROCDETAILS_GEN_ALL_CORE
location identified, draped/prepped, sterile technique used/blood seen on insertion/dressing applied/flushes easily/secured in place/sterile technique, catheter placed
location identified, draped/prepped, sterile technique used/blood seen on insertion/dressing applied/flushes easily/secured in place/sterile technique, catheter placed
location identified, draped/prepped, sterile technique used/sterile dressing applied/sterile technique, catheter placed/supine position/ultrasound guidance

## 2024-07-11 NOTE — PROGRESS NOTE ADULT - ASSESSMENT
58 y/o F pmhx significant for IV drug use, anemia, epilepsy who presents for sharp L leg pain progressively worsening over the past month, likely 2/2 iliopsoas bursitis i/s/o IVDU, s/p IR aspiration on 7/3, on cefazolin 2 g for MSSA bacteremia.

## 2024-07-11 NOTE — PROGRESS NOTE ADULT - ATTENDING COMMENTS
59-year-old female with a PMHx of IVDU (on Methadone), anemia and epilepsy who presented with LLE pain, found to have loculated fluid collection of left iliacus muscle and iliopsoas bursitis c/b MSSA bacteremia.  BCx (7/1) MSSA, BCx (7/3) NGTD.          Plan:         -IR consulted, s/p drainage, Cx with staph aureus         -TTE and TED negative for endocarditis        -ID consulted, continue with Cefazolin, tentative plan for 4–6 week course      -obtain repeat CT-LLE given persistent pain despite pain medications and ABx   -ortho consulted, no acute surgical intervention        -PT recommends [facility], no acceptances to date        Rest of plan as per resident note.

## 2024-07-11 NOTE — PROGRESS NOTE ADULT - PROBLEM SELECTOR PLAN 8
Per patient's shelter, she was on insulin at some point, unclear of when she was taking it or history of diabetes.  - HbA1c 5.1  - BG well controlled inpatient

## 2024-07-11 NOTE — PROGRESS NOTE ADULT - PROBLEM SELECTOR PLAN 1
Patient was found to have blood and wound cultures positive for MSSA bacteria on 7/1  - per ID recs, cultures from 7/3 have no growth, so there is no need for further surveillance cultures  - Patient will need 4-6 weeks of IV Cefazolin 2g q8h. ID recommends PICC line  - In event of early discharge before completion of IV antibiotics, contingency plan per ID is PO Dicloxacillin 500mg q6h for 6 weeks  - Midline placed in Left UE by IR PICC team on 7/11

## 2024-07-11 NOTE — PROGRESS NOTE ADULT - PROBLEM SELECTOR PLAN 4
89y Female is under our care for     MEDS:  vancomycin  IVPB 1000 milliGRAM(s) IV Intermittent daily    ALLERGIES: Allergies    No Known Allergies    Intolerances        REVIEW OF SYSTEMS:  [  ] Not able to illicit  General:	  Chest:	  GI:	  :  Skin:	  Musculoskeletal:	  Neuro:	    VITALS:  Vital Signs Last 24 Hrs  T(C): 37.3 (20 Nov 2021 12:30), Max: 37.3 (20 Nov 2021 12:30)  T(F): 99.2 (20 Nov 2021 12:30), Max: 99.2 (20 Nov 2021 12:30)  HR: 98 (20 Nov 2021 12:30) (87 - 98)  BP: 165/84 (20 Nov 2021 12:30) (130/56 - 165/84)  BP(mean): --  RR: 22 (20 Nov 2021 12:30) (20 - 25)  SpO2: 100% (20 Nov 2021 12:30) (98% - 100%)    PHYSICAL EXAM:  HEENT:  Neck:  Respiratory:  Cardiovascular:  Gastrointestinal:  Extremities:  Skin:  Ortho:  Neuro:    LABS/DIAGNOSTIC TESTS:                        8.9    10.32 )-----------( 465      ( 19 Nov 2021 07:17 )             28.6     WBC Count: 10.32 K/uL (11-19 @ 07:17)  WBC Count: 10.03 K/uL (11-18 @ 06:39)  WBC Count: 12.17 K/uL (11-17 @ 08:14)  WBC Count: 9.82 K/uL (11-16 @ 07:11)    11-19    136  |  105  |  19<H>  ----------------------------<  104<H>  4.5   |  27  |  0.56    Ca    8.3<L>      19 Nov 2021 07:17  Phos  3.4     11-19  Mg     2.4     11-19    TPro  5.9<L>  /  Alb  1.4<L>  /  TBili  0.2  /  DBili  x   /  AST  30  /  ALT  31  /  AlkPhos  88  11-19      CULTURES:   .Blood Blood-Peripheral  11-17 @ 13:16   No growth to date.  --  --      Catheterized Catheterized  11-16 @ 18:43   No growth  --  --      .Blood Blood  11-03 @ 10:22   No Growth Final  --  --      Clean Catch Clean Catch (Midstream)  11-02 @ 21:01   No growth  --  --      .Sputum Sputum  11-01 @ 21:18   Moderate Streptococcus agalactiae (Group B) isolated  Group B streptococci are susceptible to ampicillin,  penicillin and cefazolin, but may be resistant to  erythromycin and clindamycin.  Recommendations for intrapartum prophylaxis for Group B  streptococci are penicillin or ampicillin.  Normal Respiratory [place] present  --    Rare polymorphonuclear leukocytes per low power field  No Squamous epithelial cells per low power field  Rare Yeast like cells seen per oil power field  Rare Gram Positive Rods seen per oil power field  Rare Gram positive cocci in pairs seen per oil power field      .Blood Blood-Peripheral  11-01 @ 03:56   No Growth Final  --  Blood Culture PCR  Escherichia coli        RADIOLOGY:  no new studies 89y Female is under our care for prior fevers with unclear source. Patient is in same disposition. No recurrent fevers for the past 3 days and wbc count is normal from yesterday. Will keep vanco for now.     MEDS:  vancomycin  IVPB 1000 milliGRAM(s) IV Intermittent daily    ALLERGIES: Allergies    No Known Allergies    Intolerances      REVIEW OF SYSTEMS:  [ X ] Not able to illicit    VITALS:  Vital Signs Last 24 Hrs  T(C): 37.3 (20 Nov 2021 12:30), Max: 37.3 (20 Nov 2021 12:30)  T(F): 99.2 (20 Nov 2021 12:30), Max: 99.2 (20 Nov 2021 12:30)  HR: 98 (20 Nov 2021 12:30) (87 - 98)  BP: 165/84 (20 Nov 2021 12:30) (130/56 - 165/84)  BP(mean): --  RR: 22 (20 Nov 2021 12:30) (20 - 25)  SpO2: 100% (20 Nov 2021 12:30) (98% - 100%)    PHYSICAL EXAM:  HEENT: +vent via trach  Neck: supple no LN's   Respiratory: scattered few rhonchi bilaterally, no rales or wheezes +right CT  Cardiovascular: S1 S2 reg no murmurs  Gastrointestinal: +BS with soft, nondistended abdomen; nontender +peg  Extremities: bilateral hand edema  Skin: no rashes  Ortho: n/a  Neuro: AAO x 0      LABS/DIAGNOSTIC TESTS:  No new labs                         8.9    10.32 )-----------( 465      ( 19 Nov 2021 07:17 )             28.6     WBC Count: 10.32 K/uL (11-19 @ 07:17)  WBC Count: 10.03 K/uL (11-18 @ 06:39)  WBC Count: 12.17 K/uL (11-17 @ 08:14)  WBC Count: 9.82 K/uL (11-16 @ 07:11)    11-19    136  |  105  |  19<H>  ----------------------------<  104<H>  4.5   |  27  |  0.56    Ca    8.3<L>      19 Nov 2021 07:17  Phos  3.4     11-19  Mg     2.4     11-19    TPro  5.9<L>  /  Alb  1.4<L>  /  TBili  0.2  /  DBili  x   /  AST  30  /  ALT  31  /  AlkPhos  88  11-19      CULTURES:   .Blood Blood-Peripheral  11-17 @ 13:16   No growth to date.  --  --      Catheterized Catheterized  11-16 @ 18:43   No growth  --  --      .Blood Blood  11-03 @ 10:22   No Growth Final  --  --      Clean Catch Clean Catch (Midstream)  11-02 @ 21:01   No growth  --  --      .Sputum Sputum  11-01 @ 21:18   Moderate Streptococcus agalactiae (Group B) isolated  Group B streptococci are susceptible to ampicillin,  penicillin and cefazolin, but may be resistant to  erythromycin and clindamycin.  Recommendations for intrapartum prophylaxis for Group B  streptococci are penicillin or ampicillin.  Normal Respiratory [place] present  --    Rare polymorphonuclear leukocytes per low power field  No Squamous epithelial cells per low power field  Rare Yeast like cells seen per oil power field  Rare Gram Positive Rods seen per oil power field  Rare Gram positive cocci in pairs seen per oil power field      .Blood Blood-Peripheral  11-01 @ 03:56   No Growth Final  --  Blood Culture PCR  Escherichia coli        RADIOLOGY:  no new studies Hx of IV drug use (heroin) on Methadone 160mg 5 days a week since age 19, confirmed with Methadone clinic. Last heroin injection in L leg on Saturday 6/29. Hep C positive, HIV negative. TTE 7/3 and TED 7/5 negative for endocarditis.  - Monitor COWs

## 2024-07-11 NOTE — PROGRESS NOTE ADULT - ASSESSMENT
I M    59 y o F pmhx significant for IV drug use, anemia, epilepsy who presents for sharp L leg pain progressively worsening over the past month, likely 2/2 iliopsoas bursitis i/s/o IVDU, s/p IR aspiration on 7/3, on cefazolin 2 g for MSSA bacteremia.       Nutritional Assessment:  · Nutritional Assessment	This patient has been assessed with a concern for Malnutrition and has been determined to have a diagnosis/diagnoses of Severe protein-calorie malnutrition.    This patient is being managed with:   Diet Regular-  Supplement Feeding Modality:  Oral  Ensure Plus High Protein Cans or Servings Per Day:  1       Frequency:  Daily  Entered: Jul 6 2024 12:30PM    Diet NPO after Midnight-     NPO Start Date: 04-Jul-2024   NPO Start Time: 23:59  Except Medications  Entered: Jul 5 2024  4:11AM    Problem/Plan - 1:  ·  Problem: Sepsis due to methicillin susceptible Staphylococcus aureus (MSSA) without acute organ dysfunction.   ·  Plan: Patient was found to have blood and wound cultures positive for MSSA bacteria  - cefazolin 2g q8hs, day 7  - per ID recs, cultures from 7/3 have no growth, so there is no need for further surveillance cultures  - patient is medically stable  - per ID, patient will need 4-6 weeks of antibiotics, they recommend PICC line  - In event of early discharge before completion of IV antibiotics, contingency plan per ID is PO Dicloxacillin 500mg q6h for 6 weeks.    Problem/Plan - 2:  ·  Problem: Iliopsoas bursitis of left hip.   ·  Plan: Worsening left leg and thigh pain with radiation to groin  CT LE w IV contrast showed loculated fluid in L iliacus muscle, iliopsoas bursitis of infectious or inflammatory etiology  ortho consulted, recommended IV antibiotics and IR consult in AM, will continue to follow   Given Zosyn and Vanc in ED  - blood cultures grew MSSA  - cefazolin 2g q8hs, day 7  - ESR lateralized  - HepC RNA reactive  - Plan for CT of Left LE w/ IV contrast.    Problem/Plan - 3:  ·  Problem: Leg pain, left.   ·  Plan: Left leg and thigh pain with radiation to groin  CT LE w IV contrast showed loculated fluid in L iliacus muscle, iliopsoas bursitis of likely infections etiology  s/p IR drainage   - duplex of LLE showed no evidence of DVT  - pain regimen: standing 975 tylenol q8, lidocaine patch, oxy 5mg q8PRN for moderate pain, oxy 10mg q6PRN for severe pain  - toradol held due to mildly elevated creatinine  - F/u CT as above.    Problem/Plan - 4:  ·  Problem: BOB (acute kidney injury).   ·  Plan: Cr 1.28, BUN 29, GFR 49  - discontinued toradol  - scheduled for CT LE with IV contrast  - encouraged PO fluid intake  - started on 1L NS 100cc/hour --> completed.    Problem/Plan - 5:  ·  Problem: Opioid dependence.   ·  Plan: Hx of IV drug use  Pt states she is chronically on methadone 160 mg 5 days a week since age 19  Last heroin injection in L leg on Saturday 6/29  - confirmed dose with methadone clinic: 160mg, 5x/week  - last dose given on 6/29 for 6/30  - monitor COWs.  - HIV testing ordered, negative  - hepatitis panel reactive  - TTE ordered, negative for endocarditis  - TED completed 7/5, negative for endocarditis  - continue cefazolin 2g IV q8 per ID recs.    Problem/Plan - 6:  ·  Problem: Anemia.   ·  Plan: Pt states history of anemia  On admission Hg 10.2, Hct 31.0  - Continue to monitor H&H  - Maintain active type & screen  - Hb stable between 7.5 - 9.    Problem/Plan - 7:  ·  Problem: Epilepsy.   ·  Plan: As per patient has history of epilepsy  States she takes Keppra but unsure of dose, follows with Neurologist at [facility]  Per [facility] [place] [facility]", previously prescribed Keppra 1000mg bid.   - order Keppra 500mg bid x 2 doses for now, confirm doses.    Problem/Plan - 8:  ·  Problem: Type 2 diabetes mellitus.   ·  Plan: Per patient's shelter, she was on insulin at some point, unclear of when she was taking it or history of diabetes.  - HbA1c 5.1  - no need for management of this  - BG well controlled inpatient.    Problem/Plan - 9:  ·  Problem: Severe protein-calorie malnutrition.   ·  Plan: - Ensure HD added per nutrition recs.    Problem/Plan - 10:  ·  Problem: Mood disorder.   ·  Plan; Patient previously prescribed quetiapine 150mg qd, buspirone 15mg qd, mirtazapine 15mg qd. Unclear what she is currently taking.  - attempted to do med rec with shelter, pharmacy, patient, unable to find current doses for her mood medications  - she is endorsing 'ugly' thoughts about relapse, difficulty sleeping  - negative SI/HI  - started 25 mg seroquel 7/9, titrate up as needed.    Problem/Plan - 11:  ·  Problem: Prophylactic measure.   ·  Plan: F: NS 1L  E: replete as needed  N: regular diet  DVT ppx: given 1 dose of lovenox on 7/2  Dispo: F.

## 2024-07-11 NOTE — PROGRESS NOTE ADULT - PROBLEM SELECTOR PLAN 3
Patient reports history of bipolar disorder with psychotic features. Previously prescribed quetiapine 150mg qd, buspirone 15mg qd, mirtazapine 15mg qd. Unclear what she is currently taking.  - Attempted to do med rec with shelter, pharmacy, patient, unable to find current doses for her mood medications  - She is endorsing 'ugly' thoughts about relapse, difficulty sleeping, visual hallucinations  - Negative SI/HI  - Started 25 mg Seroquel 7/9  - Worsening psychotic symptoms on  7/11. Re-attempted to contact shelter for med rec but unable to get through. Will aim to determine mood medication doses before making changes to regimen

## 2024-07-11 NOTE — PROGRESS NOTE ADULT - SUBJECTIVE AND OBJECTIVE BOX
Physical Medicine and Rehabilitation Progress Note :       Patient is a 59y old  Female who presents with a chief complaint of left leg pain (11 Jul 2024 06:12)      HPI:  HPI: Pt is 58 y/o F pmhx significant for IV drug use, anemia, epilepsy who presents for sharp L leg pain progressively worsening over the past month. Pt states her pain began in the left thigh and later radiated to her toes but now has persisted in her entire left leg and groin. Pt reports this began about one month ago and required multiple ER visits. She states she went to [place] on 5/31 where nothing was done and she was discharged. Most recently last week she returned to [place] where she states she was given a 7 day course of oral antibiotics which she completed but did not improve her pain. She took Motrin which provided very minimal relief.  Pt last  injected heroin into her left thigh on Saturday 6/29. As per patient, she is chronically on methadone 160 mg 5 days a week since she was 19. She reports subjective fevers. Denies nausea, vomiting, chest pain, shortness of breath, constipation, diarrhea, pain on urination, hematuria, hematemesis.       In the ED:  Initial vital signs: T: 98.7 F, HR: 82, BP: 100/68, RR: 16, SpO2: 96% on RA  Labs: significant for AST 61, CRP 99.5, Hg 10.2, Hct 31.0  CT Lower Extremity with IV Contrast, Left: loculated fluid in L iliacus muscle, iliopsoas bursitis of infectious or inflammatory etiology  US Duplex Venous LE, Left: No evidence of L lower extremity DVT  Medications: Zosyn, Vancomycin, NS, Ibuprofen  Consults:         Ortho- recommended admission for IV antibiotics and IR consult, will continue to follow (01 Jul 2024 18:56)                            8.3    4.53  )-----------( 314      ( 11 Jul 2024 05:30 )             27.0       07-11    139  |  97  |  18  ----------------------------<  104<H>  5.0   |  35<H>  |  1.04    Ca    9.8      11 Jul 2024 05:30  Phos  4.8     07-11  Mg     2.1     07-11    TPro  8.0  /  Alb  3.0<L>  /  TBili  0.2  /  DBili  x   /  AST  23  /  ALT  <5<L>  /  AlkPhos  89  07-11    Vital Signs Last 24 Hrs  T(C): 36.8 (11 Jul 2024 09:00), Max: 37.4 (10 Jul 2024 20:42)  T(F): 98.2 (11 Jul 2024 09:00), Max: 99.3 (10 Jul 2024 20:42)  HR: 83 (11 Jul 2024 09:00) (77 - 94)  BP: 117/86 (11 Jul 2024 09:00) (96/65 - 117/86)  BP(mean): --  RR: 14 (11 Jul 2024 09:00) (14 - 18)  SpO2: 94% (11 Jul 2024 09:00) (94% - 98%)    Parameters below as of 11 Jul 2024 09:00  Patient On (Oxygen Delivery Method): room air        MEDICATIONS  (STANDING):  acetaminophen     Tablet .. 975 milliGRAM(s) Oral every 8 hours  bisacodyl 5 milliGRAM(s) Oral daily  ceFAZolin   IVPB 2000 milliGRAM(s) IV Intermittent every 8 hours  enoxaparin Injectable 40 milliGRAM(s) SubCutaneous every 24 hours  levETIRAcetam 500 milliGRAM(s) Oral two times a day  lidocaine   4% Patch 1 Patch Transdermal daily  melatonin 3 milliGRAM(s) Oral at bedtime  methadone    Tablet 160 milliGRAM(s) Oral daily  mirtazapine 15 milliGRAM(s) Oral daily  polyethylene glycol 3350 17 Gram(s) Oral daily  QUEtiapine 25 milliGRAM(s) Oral at bedtime    MEDICATIONS  (PRN):  naloxone Injectable 1 milliGRAM(s) IV Push every 3 minutes PRN in case of overdose  oxyCODONE    IR 10 milliGRAM(s) Oral every 6 hours PRN Severe Pain (7 - 10)  oxyCODONE    IR 5 milliGRAM(s) Oral every 6 hours PRN Moderate Pain (4 - 6)  saline laxative (FLEET) Rectal Enema 1 Enema Rectal once PRN constipation, severe, please try oral laxatives first  senna 2 Tablet(s) Oral at bedtime PRN Constipation      T(C): 36.8 (07-11-24 @ 09:00), Max: 37.4 (07-10-24 @ 20:42)  HR: 83 (07-11-24 @ 09:00) (77 - 94)  BP: 117/86 (07-11-24 @ 09:00) (96/65 - 117/86)  RR: 14 (07-11-24 @ 09:00) (14 - 18)  SpO2: 94% (07-11-24 @ 09:00) (94% - 98%)        Physical Exam:    59 y o woman lying comfortably in semi Andino's position , awake , alert , no acute complaints     Head: normocephalic , atraumatic    Eyes: PERRLA , EOMI , no nystagmus , sclera anicteric    ENT / FACE: neg nasal discharge , uvula midline , no oropharyngeal erythema / exudate    Neck: supple , negative JVD , negative carotid bruits , no thyromegaly    Chest: CTA bilaterally     Cardiovascular: regular rate and rhythm , neg murmurs / rubs / gallops    Abdomen: soft , non distended , no tenderness to palpation in all 4 quadrants ,  normal bowel sounds     Extremities: WWP , neg cyanosis /clubbing / edema     Musculoskeletal: pain w/ L hip flexion ,  in upper thigh and groin but decreased , no erythema     Skin:     :     Neurologic Exam:     Alert and oriented  x  3     Motor Exam:        > 3+/5 x 4 extremities , LLE pain limited proximally     Sensation:         intact to light touch x 4 extremities                                DTR:           biceps/brachioradialis: equal                            patella/ankle: equal         Functional Status Assessment :   7/10/2024     Pain Assessment/Number Scale (0-10) Adult  Presence of Pain: complains of pain/discomfort  Body Location: L groin pain  Radiation To Radiation To: pt did not quantify     Safety      AM-PAC Functional Assessment: Basic Mobility  Type of Assessment: Daily assessment  Turning from your back to your side while in a flat bed without using bedrails?: 4 = No assist / stand by assistance  Moving from lying on your back to sitting on the flat side of a flat bed without using bedrails?: 3 = A little assistance  Moving to and from a bed to a chair (including a wheelchair)?: 3 = A little assistance  Standing up from a chair using your arms (e.g. wheelchair or bedside chair)?: 3 = A little assistance  Walking in hospital room?: 3 = A little assistance  Climbing 3-5 steps with a railing?: 2 = A lot of assistance  Score: 18   Row Comment: Ask the patient "How much help from another person do you currently need? (If the patient hasn't done an activity recently, how much help from another person do you think he/she needs if he/she tried?)    Cognitive/Neuro      Cognitive/Neuro/Behavioral  Cognitive/Neuro/Behavioral [WDL Definition: Alert; opens eyes spontaneously; arouses to voice or touch; oriented x 4; follows commands; speech spontaneous, logical; purposeful motor response; behavior appropriate to situation]: WDL    Language Assistance  Preferred Language to Address Healthcare Preferred Language to Address Healthcare: English    Therapeutic Interventions      Bed Mobility  Bed Mobility Training Supine-to-Sit: received standing    Gait Training  Gait Training Rehab Potential: good, to achieve stated therapy goals  Gait Training: contact guard;  1 person assist;  nonverbal cues (demo/gestures);  verbal cues;  full weight-bearing   rolling walker;  15'x3  Gait Analysis: decreased angie;  increased time in double stance;  decreased step length;  antalgic gait;  decreased toe clearance;  decreased flexibility;  decreased strength;  impaired balance;  pain;  15'x2;  rolling walker    Therapeutic Exercise  Therapeutic Exercise Detail: pt performed standing marching, hip ext/flex/abd x10 each w/ RW and CGA             PM&R Impression : as above    Current disposition plan recommendation :    subacute rehab placement

## 2024-07-11 NOTE — PROGRESS NOTE ADULT - SUBJECTIVE AND OBJECTIVE BOX
[note incomplete] Patient is a 59y old  Female who presents with a chief complaint of left leg pain (11 Jul 2024 12:29)       INTERVAL HPI/OVERNIGHT EVENTS: No acute events overnight. [facility] attempted to re-establish IV access overnight but was unsuccessful.    SUBJECTIVE: Pt seen and examined this morning. She reports persistent Left groin pain, unchanged. She also endorses worsening visual hallucinations, particularly of people coming into her room to hurt her. She also feels like "things are closing in". She is otherwise eating and drinking well. She denies N/V and diarrhea. Her last BM was 2 days ago, which she described as small pellets. She denies shortness of breath, chest pain, and fevers.    All other review of systems negative except otherwise noted in HPI above.    MEDICATIONS  (STANDING):  acetaminophen     Tablet .. 975 milliGRAM(s) Oral every 8 hours  bisacodyl 5 milliGRAM(s) Oral daily  ceFAZolin   IVPB 2000 milliGRAM(s) IV Intermittent every 8 hours  enoxaparin Injectable 40 milliGRAM(s) SubCutaneous every 24 hours  levETIRAcetam 500 milliGRAM(s) Oral two times a day  lidocaine   4% Patch 1 Patch Transdermal daily  melatonin 3 milliGRAM(s) Oral at bedtime  methadone    Tablet 160 milliGRAM(s) Oral daily  mirtazapine 15 milliGRAM(s) Oral daily  polyethylene glycol 3350 17 Gram(s) Oral daily  QUEtiapine 25 milliGRAM(s) Oral at bedtime    MEDICATIONS  (PRN):  naloxone Injectable 1 milliGRAM(s) IV Push every 3 minutes PRN in case of overdose  oxyCODONE    IR 5 milliGRAM(s) Oral every 6 hours PRN Moderate Pain (4 - 6)  oxyCODONE    IR 10 milliGRAM(s) Oral every 6 hours PRN Severe Pain (7 - 10)  saline laxative (FLEET) Rectal Enema 1 Enema Rectal once PRN constipation, severe, please try oral laxatives first  senna 2 Tablet(s) Oral at bedtime PRN Constipation      Allergies    No Known Allergies    Intolerances      Vital Signs Last 24 Hrs  T(C): 36.8 (11 Jul 2024 15:27), Max: 37.4 (10 Jul 2024 20:42)  T(F): 98.3 (11 Jul 2024 15:27), Max: 99.3 (10 Jul 2024 20:42)  HR: 90 (11 Jul 2024 15:27) (77 - 90)  BP: 108/73 (11 Jul 2024 15:27) (96/65 - 117/86)  BP(mean): --  RR: 18 (11 Jul 2024 15:27) (14 - 18)  SpO2: 95% (11 Jul 2024 15:27) (94% - 98%)    Parameters below as of 11 Jul 2024 15:27  Patient On (Oxygen Delivery Method): room air      PHYSICAL EXAM:  Constitutional - NAD, Comfortable  HEENT - NCAT, EOMI  Neck - No limited ROM  Chest - Breathing comfortably on room air, CTAB   Cardio - Warm and well perfused, RRR  Abdomen -  Soft, NTND  Extremities - No peripheral edema. Midline in place on Left UE  Neurologic Exam: Awake and alert. Moving all extremities, speaking fluently      LABS:                        8.3    4.53  )-----------( 314      ( 11 Jul 2024 05:30 )             27.0     11 Jul 2024 05:30    139    |  97     |  18     ----------------------------<  104    5.0     |  35     |  1.04     Ca    9.8        11 Jul 2024 05:30  Phos  4.8       11 Jul 2024 05:30  Mg     2.1       11 Jul 2024 05:30    TPro  8.0    /  Alb  3.0    /  TBili  0.2    /  DBili  x      /  AST  23     /  ALT  <5     /  AlkPhos  89     11 Jul 2024 05:30      BLOOD CULTURES  Culture - Blood (07.03.24 @ 22:42)   No growth at 5 days    RADIOLOGY & ADDITIONAL TESTS: None

## 2024-07-12 PROCEDURE — 99232 SBSQ HOSP IP/OBS MODERATE 35: CPT | Mod: GC

## 2024-07-12 RX ADMIN — LEVETIRACETAM 500 MILLIGRAM(S): 1000 TABLET, FILM COATED ORAL at 17:43

## 2024-07-12 RX ADMIN — OXYCODONE HYDROCHLORIDE 10 MILLIGRAM(S): 30 TABLET ORAL at 16:08

## 2024-07-12 RX ADMIN — Medication 5 MILLIGRAM(S): at 11:14

## 2024-07-12 RX ADMIN — LIDOCAINE 5% 1 PATCH: 5 CREAM TOPICAL at 11:14

## 2024-07-12 RX ADMIN — OXYCODONE HYDROCHLORIDE 10 MILLIGRAM(S): 30 TABLET ORAL at 01:48

## 2024-07-12 RX ADMIN — OXYCODONE HYDROCHLORIDE 5 MILLIGRAM(S): 30 TABLET ORAL at 07:19

## 2024-07-12 RX ADMIN — OXYCODONE HYDROCHLORIDE 10 MILLIGRAM(S): 30 TABLET ORAL at 23:20

## 2024-07-12 RX ADMIN — Medication 975 MILLIGRAM(S): at 18:43

## 2024-07-12 RX ADMIN — OXYCODONE HYDROCHLORIDE 10 MILLIGRAM(S): 30 TABLET ORAL at 08:34

## 2024-07-12 RX ADMIN — Medication 160 MILLIGRAM(S): at 11:15

## 2024-07-12 RX ADMIN — LEVETIRACETAM 500 MILLIGRAM(S): 1000 TABLET, FILM COATED ORAL at 06:30

## 2024-07-12 RX ADMIN — Medication 100 MILLIGRAM(S): at 06:30

## 2024-07-12 RX ADMIN — ENOXAPARIN SODIUM 40 MILLIGRAM(S): 120 INJECTION SUBCUTANEOUS at 22:20

## 2024-07-12 RX ADMIN — Medication 975 MILLIGRAM(S): at 08:34

## 2024-07-12 RX ADMIN — OXYCODONE HYDROCHLORIDE 5 MILLIGRAM(S): 30 TABLET ORAL at 13:17

## 2024-07-12 RX ADMIN — LIDOCAINE 5% 1 PATCH: 5 CREAM TOPICAL at 18:30

## 2024-07-12 RX ADMIN — Medication 15 MILLIGRAM(S): at 11:16

## 2024-07-12 RX ADMIN — Medication 975 MILLIGRAM(S): at 01:18

## 2024-07-12 RX ADMIN — OXYCODONE HYDROCHLORIDE 10 MILLIGRAM(S): 30 TABLET ORAL at 09:34

## 2024-07-12 RX ADMIN — Medication 25 MILLIGRAM(S): at 22:21

## 2024-07-12 RX ADMIN — OXYCODONE HYDROCHLORIDE 10 MILLIGRAM(S): 30 TABLET ORAL at 02:50

## 2024-07-12 RX ADMIN — OXYCODONE HYDROCHLORIDE 5 MILLIGRAM(S): 30 TABLET ORAL at 12:17

## 2024-07-12 RX ADMIN — Medication 975 MILLIGRAM(S): at 00:17

## 2024-07-12 RX ADMIN — Medication 3 MILLIGRAM(S): at 22:21

## 2024-07-12 RX ADMIN — Medication 975 MILLIGRAM(S): at 09:34

## 2024-07-12 RX ADMIN — Medication 100 MILLIGRAM(S): at 14:22

## 2024-07-12 RX ADMIN — OXYCODONE HYDROCHLORIDE 5 MILLIGRAM(S): 30 TABLET ORAL at 06:30

## 2024-07-12 RX ADMIN — Medication 975 MILLIGRAM(S): at 17:43

## 2024-07-12 RX ADMIN — Medication 100 MILLIGRAM(S): at 22:20

## 2024-07-12 RX ADMIN — OXYCODONE HYDROCHLORIDE 10 MILLIGRAM(S): 30 TABLET ORAL at 22:21

## 2024-07-12 RX ADMIN — OXYCODONE HYDROCHLORIDE 10 MILLIGRAM(S): 30 TABLET ORAL at 15:08

## 2024-07-12 NOTE — DISCHARGE NOTE PROVIDER - HOSPITAL COURSE
#Discharge: do not delete    Patient is __ yo M/F with past medical history of _____ presented with _____, found to have _____ (one liner)    Hospital course (by problem):     Patient was discharged to: (home/MARY/acute rehab/hospice, etc, and with what services – home health PT/RN? Home O2?)    New medications:   Changes to old medications:  Medications that were stopped:    Items to follow up as outpatient:    Physical exam at the time of discharge:       ***NOTE INCOMPLETE***  #Discharge    60 y/o F pmhx significant for IV drug use, anemia, epilepsy who presents for sharp L leg pain progressively worsening over the past month, likely 2/2 iliopsoas bursitis i/s/o IVDU, s/p IR aspiration on 7/3, on cefazolin 2 g for MSSA bacteremia.     Hospital course (by problem):     Problem/Plan - 1:  ·  Problem: Sepsis due to methicillin susceptible Staphylococcus aureus (MSSA) without acute organ dysfunction.   ·  Plan: Patient was found to have blood and wound cultures positive for MSSA bacteria on 7/1. Per ID recs, cultures from 7/3 have no growth, so there is no need for further surveillance cultures. ID recommends PICC line. Midline placed in Left UE by IR PICC team on 7/11. Patient will need 4-6 weeks of antibiotic therapy (7/3 - 8/13)  IV Cefazolin 2g q8h started 7/3 --> discontinued 7/17 per ID recs, as patient demonstrated lab findings of agranulocytosis (neut# 5.96 --> 1.42)  - Continue IV antibiotics through LUE midline  - In event of early discharge before completion of IV antibiotics, contingency plan per ID is PO Keflex 500mg Q6 or Cefadroxil 1g PO Q12 for same planned duration  - IV Oxacillin 2g q4h (7/17- )    Problem/Plan - 2:  ·  Problem: Iliopsoas bursitis of left hip.   ·  Plan: Worsening left leg and thigh pain with radiation to groin. Duplex LLE negative for DVT. CT LE w IV contrast showed loculated fluid in L iliacus muscle, iliopsoas bursitis of infectious or inflammatory etiology s/p IR aspiration on 7/3.  - Initial BCx on 7/1 positive for MSSA. Plan as above  - Pain regimen: standing 975 tylenol q8, lidocaine patch, oxy 5mg q8PRN for moderate pain, oxy 10mg q6PRN for severe pain  - Toradol held due to mildly elevated creatinine  - CT of Left LE w/ IV contrast on 7/11 re-demonstrating iliopsoas bursitis and surrounding fluid collection, mildly improved compared to 7/3 study  - MR of pelvis/hip no longer needed as patient seems to be improving clinically  - Per ID recs, obtain repeat CT of the Left LE w/ IV contrast after 4 weeks of abx are completed (7/31).    Problem/Plan - 3:  ·  Problem: Mood disorder.   ·  Plan: Patient reports history of bipolar disorder with psychotic features. Previously prescribed quetiapine 150mg qd, buspirone 15mg qd, mirtazapine 15mg qd. Unclear what she is currently taking.  - Attempted to do med rec with shelter, pharmacy, patient, unable to find current doses for her mood medications  - She is endorsing 'ugly' thoughts about relapse, difficulty sleeping, visual hallucinations  - Negative SI/HI  - Started 25 mg Seroquel 7/9  - Worsening psychotic symptoms on 7/11. Re-attempted to contact shelter for med rec but unable to get through. Will aim to determine mood medication doses before making changes to regimen  - Psych recs appreciated - decreased Seroquel from 100mg to 75mg on 7/18 as patient reported daytime drowsiness. Tolerating well.    Problem/Plan - 4:  ·  Problem: Opioid dependence.   ·  Plan: Hx of IV drug use (heroin) on Methadone 160mg 5 days a week since age 19, confirmed with Methadone clinic. Last heroin injection in L leg on Saturday 6/29. Hep C positive, HIV negative. TTE 7/3 and TED 7/5 negative for endocarditis.  - Monitor COWs.    Problem/Plan - 5:  ·  Problem: BOB (acute kidney injury).   ·  Plan: Cr 1.28, BUN 29, GFR 49  - discontinued Toradol  - encouraged PO fluid intake  - started on 1L NS 100cc/hour --> completed.    Problem/Plan - 6:  ·  Problem: Anemia.   ·  Plan: Pt states history of anemia  On admission Hg 10.2, Hct 31.0  - Continue to monitor H&H  - Maintain active type & screen  - Hb stable between 7.5 - 9.    Problem/Plan - 7:  ·  Problem: Epilepsy.   ·  Plan: As per patient has history of epilepsy  States she takes Keppra but unsure of dose, follows with Neurologist at [facility]  Per [employer], previously prescribed Keppra 1000mg bid.   - Order Keppra 500mg bid x 2 doses for now, confirm doses.    Problem/Plan - 8:  ·  Problem: Type 2 diabetes mellitus.   ·  Plan: Per patient's shelter, she was on insulin at some point, unclear of when she was taking it or history of diabetes.  - HbA1c 5.1  - BG well controlled inpatient.    Problem/Plan - 9:  ·  Problem: Severe protein-calorie malnutrition.   ·  Plan: - Ensure HD added per nutrition recs.    Patient was discharged to: (home/MARY/acute rehab/hospice, etc, and with what services – home health PT/RN? Home O2?)    New medications:   Changes to old medications:  Medications that were stopped:    Items to follow up as outpatient:    Physical exam at the time of discharge:       ***NOTE INCOMPLETE***  #Discharge    58 y/o female with PMHx significant for IV drug use, anemia, epilepsy who presents for sharp L leg pain progressively worsening over the past month, likely 2/2 iliopsoas bursitis i/s/o IVDU, s/p IR aspiration on 7/3, on cefazolin 2 g for MSSA bacteremia.     Hospital course (by problem):     Problem/Plan - 1:  ·  Problem: Sepsis due to methicillin susceptible Staphylococcus aureus (MSSA) without acute organ dysfunction.   ·  Plan: Patient was found to have blood and wound cultures positive for MSSA bacteria on 7/1. Per ID recs, cultures from 7/3 have no growth, so there is no need for further surveillance cultures. ID recommends PICC line. Midline placed in Left UE by IR PICC team on 7/11. Patient will need 4-6 weeks of antibiotic therapy (7/3 - 8/13)  IV Cefazolin 2g q8h started 7/3 --> discontinued 7/17 per ID recs, as patient demonstrated lab findings of agranulocytosis (neut# 5.96 --> 1.42)  - Continue IV antibiotics through LUE midline  - In event of early discharge before completion of IV antibiotics, contingency plan per ID is PO Keflex 500mg Q6 or Cefadroxil 1g PO Q12 for same planned duration  - IV Oxacillin 2g q4h (7/17- )    Problem/Plan - 2:  ·  Problem: Iliopsoas bursitis of left hip.   ·  Plan: Worsening left leg and thigh pain with radiation to groin. Duplex LLE negative for DVT. CT LE w IV contrast showed loculated fluid in L iliacus muscle, iliopsoas bursitis of infectious or inflammatory etiology s/p IR aspiration on 7/3. Initial BCx on 7/1 positive for MSSA. CT of Left LE w/ IV contrast on 7/11 re-demonstrating iliopsoas bursitis and surrounding fluid collection, mildly improved compared to 7/3 study. MR of pelvis/hip no longer needed as patient seems to be improving clinically. MR of pelvis/hip no longer needed as patient seems to be improving clinically  Inpatient pain regimen: Standing 975 tylenol q8, lidocaine patch, oxy 5mg q8PRN for moderate pain, oxy 10mg q6PRN for severe pain  - Per ID recs, obtain repeat CT of the Left LE w/ IV contrast after 4 weeks of abx are completed (7/31)    Problem/Plan - 3:  ·  Problem: Mood disorder.   ·  Plan: Patient reports history of bipolar disorder with psychotic features. Previously prescribed quetiapine 150mg qd, buspirone 15mg qd, mirtazapine 15mg qd. Unclear what she is currently taking. Attempted to do med rec with shelter, pharmacy, patient, unable to find current doses for her mood medications. Early in hospital course, patient endorsed "ugly" thoughts about relapse, difficulty sleeping, visual hallucinations--which improved after increasing inpatient Seroquel dose from 25mg to 100mg per psych consult recommendations. Negative SI/HI.    Problem/Plan - 4:  ·  Problem: Opioid dependence.   ·  Plan: Hx of IV drug use (heroin) on Methadone 160mg 5 days a week since age 19, confirmed with Methadone clinic. Last heroin injection in L leg on Saturday 6/29. Hep C positive, HIV negative. TTE 7/3 and TED 7/5 negative for endocarditis. Stable on daily Methadone 160mg.  - Continue consistent follow-up at methadone clinic    Problem/Plan - 5:  ·  Problem: BOB (acute kidney injury), resolved  ·  Plan: Initial Cr 1.28, BUN 29, GFR 49 --> resolved after 1L NS 100cc/hr, discontinuing Toradol, encouraging PO fluid intake    Problem/Plan - 6:  ·  Problem: Anemia.   ·  Plan: Pt states history of anemia. On admission Hg 10.2, Hct 31.0. During hospital course, Hgb stable between 7.5-9  - Follow up with PCP    Problem/Plan - 7:  ·  Problem: Epilepsy.   ·  Plan: As per patient has history of epilepsy. States she takes Keppra but unsure of dose, follows with Neurologist at [facility]. Per [employer], previously prescribed Keppra 1000mg bid. During admission, patient received Keppra 500mg bid. No breakthrough seizures.    Problem/Plan - 8:  ·  Problem: Type 2 diabetes mellitus.   ·  Plan: Per patient's shelter, she was on insulin at some point, unclear of when she was taking it or history of diabetes. HbA1c 5.1 on 7/2. Blood glucose well-controlled during admission    Problem/Plan - 9:  ·  Problem: Severe protein-calorie malnutrition.   ·  Plan: During admission, Ensure HD added per nutrition recs.    Patient was discharged to: (home/MARY/acute rehab/hospice, etc, and with what services – home health PT/RN? Home O2?)    New medications:   Changes to old medications: Keppra 500mg BID  Medications that were stopped:    Items to follow up as outpatient: PCP, ID    Physical exam at the time of discharge:       ***NOTE INCOMPLETE***  #Discharge    This is a 59 year old female with a history of IV drug use, anemia, and epilepsy who presented with worsening left leg pain for one month. Her pain is likely due to iliopsoas bursitis, potentially from her IV drug use. She underwent aspiration on 7/3. She developed MSSA bacteremia and required 4-6 weeks of antibiotic therapy. Her initial antibiotic, Cefazolin, was switched to Oxacillin due to agranulocytosis. Her hospital course was also complicated by acute kidney injury which resolved, and mood instability which improved with medication adjustments. She will follow up with her PCP and ID as an outpatient.    Hospital course (by problem):     #Sepsis due to methicillin susceptible Staphylococcus aureus (MSSA) without acute organ dysfunction.   Patient was found to have blood and wound cultures positive for MSSA bacteria on 7/1. Per ID recs, cultures from 7/3 have no growth, so there is no need for further surveillance cultures. ID recommends PICC line. Midline placed in Left UE by IR PICC team on 7/11. Patient will need 4-6 weeks of antibiotic therapy (7/3 - 8/13).  IV Cefazolin 2g q8h started 7/3 --> discontinued 7/17 per ID recs, as patient demonstrated lab findings of agranulocytosis (neut# 5.96 --> 1.42). Provided IV Oxacillin 2g q4h (7/17- 7/29)    Plan:  PO Keflex 500mg Q6 or Cefadroxil 1g PO Q12 for same planned duration      Iliopsoas bursitis of left hip.   Worsening left leg and thigh pain with radiation to groin. Duplex LLE negative for DVT. CT LE w IV contrast showed loculated fluid in L iliacus muscle, iliopsoas bursitis of infectious or inflammatory etiology s/p IR aspiration on 7/3. Initial BCx on 7/1 positive for MSSA. CT of Left LE w/ IV contrast on 7/11 re-demonstrating iliopsoas bursitis and surrounding fluid collection, mildly improved compared to 7/3 study. MR of pelvis/hip no longer needed as patient seems to be improving clinically. MR of pelvis/hip no longer needed as patient seems to be improving clinically  Inpatient pain regimen: Standing 975 tylenol q8, lidocaine patch, oxy 5mg q8PRN for moderate pain, oxy 10mg q6PRN for severe pain.  Repeat CT of the Left LE w/ IV contrast after 4 weeks of abx showed: ********  Plan:   - continue pain management with 975 tylenol as needed do not exceed more than 3.5g daily      Mood disorder.   Patient reports history of bipolar disorder with psychotic features. Previously prescribed quetiapine 150mg qd, buspirone 15mg qd, mirtazapine 15mg qd. Unclear what she is currently taking. Attempted to do med rec with shelter, pharmacy, patient, unable to find current doses for her mood medications. Early in hospital course, patient endorsed "ugly" thoughts about relapse, difficulty sleeping, visual hallucinations--which improved after increasing inpatient Seroquel dose from 25mg to 100mg per psych consult recommendations. Negative SI/HI.  Plan:  C/w seroquel 75mg at night time    Opioid dependence.   Hx of IV drug use (heroin) on Methadone 160mg 5 days a week since age 19, confirmed with Methadone clinic. Last heroin injection in L leg on Saturday 6/29. Hep C positive, HIV negative. TTE 7/3 and TED 7/5 negative for endocarditis. Stable on daily Methadone 160mg.  - Continue consistent follow-up at methadone clinic    BOB (acute kidney injury), resolved  ·  Plan: Initial Cr 1.28, BUN 29, GFR 49 --> resolved after 1L NS 100cc/hr, discontinuing Toradol, encouraging PO fluid intake    Anemia.   ·  Plan: Pt states history of anemia. On admission Hg 10.2, Hct 31.0. During hospital course, Hgb stable between 7.5-9  - Follow up with PCP    Epilepsy.   As per patient has history of epilepsy. States she takes Keppra but unsure of dose, follows with Neurologist at [facility]. Per [employer], previously prescribed Keppra 1000mg bid. During admission, patient received Keppra 500mg bid. No breakthrough seizures.  Plan:   - c/w keppra 500mg  -  f/u with PCP for further management    Type 2 diabetes mellitus.   ·  Plan: Per patient's shelter, she was on insulin at some point, unclear of when she was taking it or history of diabetes. HbA1c 5.1 on 7/2. Blood glucose well-controlled during admission  PLan:  f/u with PCP to monitor blood glucose routinely    Patient was discharged to: Shelter       New medications: ADD PO ABx choice  Changes to old medications: Keppra 500mg BID. Seroquel 75mg at bedtime  Medications that were stopped: n/a    Items to follow up as outpatient: PCP, ID    Physical exam at the time of discharge:  PHYSICAL EXAM:  Constitutional: resting comfortably in bed; NAD  Head: NC/AT  Eyes: PERRL, EOMI, anicteric sclera  ENT: no nasal discharge; MMM  Respiratory: CTA B/L; no W/R/R, no retractions  Cardiac: +S1/S2; RRR; no M/R/G  Gastrointestinal: soft, NT/ND; no rebound or guarding; +BSx4  Extremities: WWP, no clubbing or cyanosis; no peripheral edema. Capillary refill <2 sec, no rash or skin changes in the left ankle or lower extremity. Pain on palpation of Left hip but no bruising or swelling.  Musculoskeletal: NROM x4; no joint swelling, tenderness or erythema  Vascular: 2+ radial, DP/PT pulses B/L  Neurologic: AAOx3; CNII-XII grossly intact; no focal deficits  Psychiatric: affect and characteristics of appearance, verbalizations, behaviors are appropriate     #Discharge    This is a 59 year old female with a history of IV drug use, anemia, and epilepsy who presented with worsening left leg pain for one month. Her pain is likely due to iliopsoas bursitis, potentially from her IV drug use. She underwent aspiration on 7/3. She developed MSSA bacteremia and required 4-6 weeks of antibiotic therapy. Her initial antibiotic, Cefazolin, was switched to Oxacillin due to agranulocytosis. Her hospital course was also complicated by acute kidney injury which resolved, and mood instability which improved with medication adjustments. She will follow up with her PCP and ID as an outpatient.    Hospital course (by problem):     #Sepsis due to methicillin susceptible Staphylococcus aureus (MSSA) without acute organ dysfunction.   Patient was found to have blood and wound cultures positive for MSSA bacteria on 7/1. Per ID recs, cultures from 7/3 have no growth, so there is no need for further surveillance cultures. ID recommends PICC line. Midline placed in Left UE by IR PICC team on 7/11. Patient will need 4-6 weeks of antibiotic therapy (7/3 - 8/13).  IV Cefazolin 2g q8h started 7/3 --> discontinued 7/17 per ID recs, as patient demonstrated lab findings of agranulocytosis (neut# 5.96 --> 1.42). Provided IV Oxacillin 2g q4h (7/17- 7/29)    Plan:  PO Keflex 500mg Q6 for same planned duration  F/u with ID outpatient: Dr. He in 2 weeks after discharge ([address] [place] [address], [phone])      Iliopsoas bursitis of left hip.   Worsening left leg and thigh pain with radiation to groin. Duplex LLE negative for DVT. CT LE w IV contrast showed loculated fluid in L iliacus muscle, iliopsoas bursitis of infectious or inflammatory etiology s/p IR aspiration on 7/3. Initial BCx on 7/1 positive for MSSA. CT of Left LE w/ IV contrast on 7/11 re-demonstrating iliopsoas bursitis and surrounding fluid collection, mildly improved compared to 7/3 study. MR of pelvis/hip no longer needed as patient seems to be improving clinically. MR of pelvis/hip no longer needed as patient seems to be improving clinically  Inpatient pain regimen: Standing 975 tylenol q8, lidocaine patch, oxy 5mg q8PRN for moderate pain, oxy 10mg q6PRN for severe pain.  Repeat CT of the Left LE w/ IV contrast after 4 weeks of abx showed: ********  Plan:   - continue pain management with 975 tylenol as needed do not exceed more than 3.5g daily      Mood disorder.   Patient reports history of bipolar disorder with psychotic features. Previously prescribed quetiapine 150mg qd, buspirone 15mg qd, mirtazapine 15mg qd. Unclear what she is currently taking. Attempted to do med rec with shelter, pharmacy, patient, unable to find current doses for her mood medications. Early in hospital course, patient endorsed "ugly" thoughts about relapse, difficulty sleeping, visual hallucinations--which improved after increasing inpatient Seroquel dose from 25mg to 100mg per psych consult recommendations. Negative SI/HI.  Plan:  C/w seroquel 75mg at night time    Opioid dependence.   Hx of IV drug use (heroin) on Methadone 160mg 5 days a week since age 19, confirmed with Methadone clinic. Last heroin injection in L leg on Saturday 6/29. Hep C positive, HIV negative. TTE 7/3 and TED 7/5 negative for endocarditis. Stable on daily Methadone 160mg.  - Continue consistent follow-up at methadone clinic    BOB (acute kidney injury), resolved  ·  Plan: Initial Cr 1.28, BUN 29, GFR 49 --> resolved after 1L NS 100cc/hr, discontinuing Toradol, encouraging PO fluid intake    Anemia.   ·  Plan: Pt states history of anemia. On admission Hg 10.2, Hct 31.0. During hospital course, Hgb stable between 7.5-9  - Follow up with PCP    Epilepsy.   As per patient has history of epilepsy. States she takes Keppra but unsure of dose, follows with Neurologist at [facility]. Per [employer], previously prescribed Keppra 1000mg bid. During admission, patient received Keppra 500mg bid. No breakthrough seizures.  Plan:   - c/w keppra 500mg  -  f/u with PCP for further management    Type 2 diabetes mellitus.   ·  Plan: Per patient's shelter, she was on insulin at some point, unclear of when she was taking it or history of diabetes. HbA1c 5.1 on 7/2. Blood glucose well-controlled during admission  PLan:  f/u with PCP to monitor blood glucose routinely    Patient was discharged to: Shelter       New medications: PO Keflex 500mg Q6 for 14 days  Changes to old medications: Keppra 500mg BID. Seroquel 75mg at bedtime  Medications that were stopped: n/a    Items to follow up as outpatient: PCP, ID    Physical exam at the time of discharge:  PHYSICAL EXAM:  Constitutional: resting comfortably in bed; NAD  Head: NC/AT  Eyes: PERRL, EOMI, anicteric sclera  ENT: no nasal discharge; MMM  Respiratory: CTA B/L; no W/R/R, no retractions  Cardiac: +S1/S2; RRR; no M/R/G  Gastrointestinal: soft, NT/ND; no rebound or guarding; +BSx4  Extremities: WWP, no clubbing or cyanosis; no peripheral edema. Capillary refill <2 sec, no rash or skin changes in the left ankle or lower extremity. Pain on palpation of Left hip but no bruising or swelling.  Musculoskeletal: NROM x4; no joint swelling, tenderness or erythema  Vascular: 2+ radial, DP/PT pulses B/L  Neurologic: AAOx3; CNII-XII grossly intact; no focal deficits  Psychiatric: affect and characteristics of appearance, verbalizations, behaviors are appropriate

## 2024-07-12 NOTE — DISCHARGE NOTE PROVIDER - NSDCCPCAREPLAN_GEN_ALL_CORE_FT
PRINCIPAL DISCHARGE DIAGNOSIS  Diagnosis: MSSA bacteremia  Assessment and Plan of Treatment:        PRINCIPAL DISCHARGE DIAGNOSIS  Diagnosis: MSSA bacteremia  Assessment and Plan of Treatment: You have been diagnosed with Methicillin-Sensitive Staphylococcus Aureus (MSSA) bacteremia, which means you have a bloodstream infection caused by the Staphylococcus aureus bacteria. While this bacteria is commonly found on the skin, it can sometimes enter the bloodstream, leading to infection. To combat this infection, you will require a course of intravenous (IV) antibiotics, which are highly effective against MSSA. The duration of IV antibiotic treatment is typically several weeks to ensure complete eradication of the bacteria from your bloodstream. It is crucial to adhere to the full course of antibiotics prescribed by your physician, even if you begin to feel better before completing the entire course. Premature discontinuation of antibiotics can result in incomplete eradication of the bacteria, potentially leading to a recurrence of the infection or the development of antibiotic resistance. During your recovery, it is essential to maintain good hygiene practices to prevent further infection. This includes frequent handwashing with soap and water, especially before meals and after using the restroom. Additionally, ensure that any wounds are kept clean and covered with appropriate dressings to minimize the risk of bacterial entry. If you have any concerns or questions regarding your condition or treatment plan, please do not hesitate to discuss them with your healthcare provider.

## 2024-07-12 NOTE — DISCHARGE NOTE PROVIDER - NSDCCPTREATMENT_GEN_ALL_CORE_FT
PRINCIPAL PROCEDURE  Procedure: CTA leg left w contrast  Findings and Treatment: IMPRESSION:  CT of the left lower extremity from the iliac crests to the mid femur demonstrates significantly decreased in size to near resolution of the fluid collection deep to the iliopsoas muscle.

## 2024-07-12 NOTE — PROGRESS NOTE ADULT - PROBLEM SELECTOR PLAN 7
As per patient has history of epilepsy  States she takes Keppra but unsure of dose, follows with Neurologist at [facility]  Per Jennifer, previously prescribed Keppra 1000mg bid.   - Order Keppra 500mg bid x 2 doses for now, confirm doses.

## 2024-07-12 NOTE — PROGRESS NOTE ADULT - PROBLEM SELECTOR PLAN 4
Hx of IV drug use (heroin) on Methadone 160mg 5 days a week since age 19, confirmed with Methadone clinic. Last heroin injection in L leg on Saturday 6/29. Hep C positive, HIV negative. TTE 7/3 and TED 7/5 negative for endocarditis.  - Monitor COWs

## 2024-07-12 NOTE — DISCHARGE NOTE PROVIDER - ATTENDING DISCHARGE PHYSICAL EXAMINATION:
Constitutional: resting comfortably in bed; NAD  Head: NC/AT  Eyes: PERRL, EOMI, anicteric sclera  ENT: no nasal discharge; MMM  Respiratory: CTA B/L; no W/R/R, no retractions  Cardiac: +S1/S2; RRR; no M/R/G  Gastrointestinal: soft, NT/ND; no rebound or guarding; +BSx4  Extremities: WWP, no clubbing or cyanosis; no peripheral edema. Capillary refill <2 sec, no rash or skin changes in the left ankle or lower extremity. Pain on palpation of Left hip but no bruising or swelling.  Musculoskeletal: NROM x4; no joint swelling, tenderness or erythema  Vascular: 2+ radial, DP/PT pulses B/L  Neurologic: AAOx3; CNII-XII grossly intact; no focal deficits  Psychiatric: affect and characteristics of appearance, verbalizations, behaviors are appropriate

## 2024-07-12 NOTE — DISCHARGE NOTE PROVIDER - PROVIDER TOKENS
PROVIDER:[TOKEN:[[id number]],SCHEDULEDAPPT:[08/19/2024],SCHEDULEDAPPTTIME:[09:45 AM]] PROVIDER:[TOKEN:[[id number]],SCHEDULEDAPPT:[08/19/2024],SCHEDULEDAPPTTIME:[09:30 AM]] PROVIDER:[TOKEN:[[id number]],SCHEDULEDAPPT:[08/19/2024],SCHEDULEDAPPTTIME:[09:30 AM]],PROVIDER:[TOKEN:[[id number]],SCHEDULEDAPPT:[08/15/2024],SCHEDULEDAPPTTIME:[02:00 PM]]

## 2024-07-12 NOTE — DISCHARGE NOTE PROVIDER - NSDCFUSCHEDAPPT_GEN_ALL_CORE_FT
[facility]  INTMED [address]  Scheduled Appointment: 08/19/2024

## 2024-07-12 NOTE — PROGRESS NOTE ADULT - SUBJECTIVE AND OBJECTIVE BOX
[note incomplete] Patient is a 59y old  Female who presents with a chief complaint of left leg pain (12 Jul 2024 05:59)       INTERVAL HPI/OVERNIGHT EVENTS: No acute events overnight. Patient reported poor sleep due to Left lower extremity/groin pain.    SUBJECTIVE: Patient seen and examined this morning. This morning, patient reported having 2 bowel movements yesterday evening after receiving Miralax and now feels less backed up. She remains motivated to participate in her care plan and was asking for her to be restarted on her home psych meds due to her increasing anxiety and paranoia about her illness. She reports feeling overwhelmed but wants to get better. She reports persistent Left lower extremity/groin pain but is unsure if it is coming from the iliopsoas bursitis or from the arthritis in her left hip. She denies shortness of breath, chest pain, abdominal pain, N/V, and diarrhea.    All other review of systems negative except otherwise noted in HPI above.    MEDICATIONS  (STANDING):  acetaminophen     Tablet .. 975 milliGRAM(s) Oral every 8 hours  bisacodyl 5 milliGRAM(s) Oral daily  ceFAZolin   IVPB 2000 milliGRAM(s) IV Intermittent every 8 hours  enoxaparin Injectable 40 milliGRAM(s) SubCutaneous every 24 hours  levETIRAcetam 500 milliGRAM(s) Oral two times a day  lidocaine   4% Patch 1 Patch Transdermal daily  melatonin 3 milliGRAM(s) Oral at bedtime  methadone    Tablet 160 milliGRAM(s) Oral daily  mirtazapine 15 milliGRAM(s) Oral daily  polyethylene glycol 3350 17 Gram(s) Oral daily  QUEtiapine 25 milliGRAM(s) Oral at bedtime    MEDICATIONS  (PRN):  naloxone Injectable 1 milliGRAM(s) IV Push every 3 minutes PRN in case of overdose  oxyCODONE    IR 10 milliGRAM(s) Oral every 6 hours PRN Severe Pain (7 - 10)  oxyCODONE    IR 5 milliGRAM(s) Oral every 6 hours PRN Moderate Pain (4 - 6)  saline laxative (FLEET) Rectal Enema 1 Enema Rectal once PRN constipation, severe, please try oral laxatives first  senna 2 Tablet(s) Oral at bedtime PRN Constipation    Allergies    No Known Allergies    Intolerances      Vital Signs Last 24 Hrs  T(C): 37.1 (12 Jul 2024 14:33), Max: 37.1 (11 Jul 2024 21:51)  T(F): 98.8 (12 Jul 2024 14:33), Max: 98.8 (12 Jul 2024 14:33)  HR: 87 (12 Jul 2024 14:33) (77 - 87)  BP: 97/72 (12 Jul 2024 14:33) (97/72 - 106/70)  BP(mean): --  RR: 15 (12 Jul 2024 14:33) (15 - 16)  SpO2: 93% (12 Jul 2024 14:33) (93% - 98%)    Parameters below as of 12 Jul 2024 14:33  Patient On (Oxygen Delivery Method): room air      PHYSICAL EXAM:  Constitutional - NAD, Comfortable  HEENT - NCAT, EOMI  Neck - No limited ROM  Chest - Breathing comfortably on room air, CTAB   Cardio - Warm and well perfused, RRR  Abdomen -  Soft, NTND  Extremities - No peripheral edema. Midline in place in LUE.  Psychiatric - Anxious but conversing pleasantly    LABS: None      RADIOLOGY & ADDITIONAL TESTS:    CT Lower Extremity w/ IV Cont, Left (07.11.24 @ 19:43)  IMPRESSION:  Findings of moderate left iliopsoas bursitis with the additional finding   of irregular fluid collection deep to the left iliacus muscle as seen on   previous CT imaging, mildly improved compared to the most recent study of   7/3/2024, as discussed above. The findings may be infectious in etiology,   or posttraumatic or inflammatory.    Findings may be better evaluated by MRI of the musculoskeletal pelvis,   which should be considered.

## 2024-07-12 NOTE — DISCHARGE NOTE PROVIDER - NSDCMRMEDTOKEN_GEN_ALL_CORE_FT
dicloxacillin 500 mg oral capsule: 1 cap(s) orally every 6 hours take 1 dose (tablet) of the medication every 6 hours for 14 days.  levETIRAcetam 500 mg oral tablet: 1 tab(s) orally 2 times a day  melatonin 3 mg oral tablet: 1 tab(s) orally once a day (at bedtime)  methadone 10 mg oral tablet: 16 tab(s) orally every 24 hours  QUEtiapine 25 mg oral tablet: 3 tab(s) orally once a day (at bedtime)   dicloxacillin 250 mg oral capsule: 2 cap(s) orally every 6 hours Please take 500mg of Dicloxacillin (2 tabs) every 6 hours for 14 days. Note each tablet is 250mg  levETIRAcetam 500 mg oral tablet: 1 tab(s) orally 2 times a day  melatonin 3 mg oral tablet: 1 tab(s) orally once a day (at bedtime)  methadone 10 mg oral tablet: 16 tab(s) orally every 24 hours  QUEtiapine 25 mg oral tablet: 3 tab(s) orally once a day (at bedtime)

## 2024-07-12 NOTE — DISCHARGE NOTE PROVIDER - CARE PROVIDER_API CALL
Im, Joellen SCHWARTZ)  Internal Medicine  [address]  [place] [address]  Phone: [phone]  Fax: [phone]  Scheduled Appointment: 08/19/2024 09:45 AM   Im, Joellen SCHWARTZ)  Internal Medicine  [address]  [place] [address]  Phone: [phone]  Fax: [phone]  Scheduled Appointment: 08/19/2024 09:30 AM   Joellen Rodriguez)  Internal Medicine  [address]  [place] [address]  Phone: [phone]  Fax: [phone]  Scheduled Appointment: 08/19/2024 09:30 AM    Kamila He  Infectious Disease  [address]  [place] [address]  Phone: [phone]  Fax: [phone]  Scheduled Appointment: 08/15/2024 02:00 PM

## 2024-07-12 NOTE — DISCHARGE NOTE PROVIDER - CARE PROVIDERS DIRECT ADDRESSES
,[email or web address]

## 2024-07-12 NOTE — PROGRESS NOTE ADULT - PROBLEM SELECTOR PLAN 3
Patient reports history of bipolar disorder with psychotic features. Previously prescribed quetiapine 150mg qd, buspirone 15mg qd, mirtazapine 15mg qd. Unclear what she is currently taking.  - Attempted to do med rec with shelter, pharmacy, patient, unable to find current doses for her mood medications  - She is endorsing 'ugly' thoughts about relapse, difficulty sleeping, visual hallucinations  - Negative SI/HI  - Started 25 mg Seroquel 7/9  - Worsening psychotic symptoms on  7/11. Re-attempted to contact shelter for med rec but unable to get through. Will aim to determine mood medication doses before making changes to regimen  - Psych consult for medication management

## 2024-07-12 NOTE — PROGRESS NOTE ADULT - PROBLEM SELECTOR PLAN 1
Patient was found to have blood and wound cultures positive for MSSA bacteria on 7/1  - per ID recs, cultures from 7/3 have no growth, so there is no need for further surveillance cultures  - Patient will need 4-6 weeks of IV Cefazolin 2g q8h. ID recommends PICC line  - In event of early discharge before completion of IV antibiotics, contingency plan per ID is PO Dicloxacillin 500mg q6h for 6 weeks  - Midline placed in Left UE by IR PICC team on 7/11  - Continue IV antibiotics through LUE midline

## 2024-07-12 NOTE — PROGRESS NOTE ADULT - ATTENDING COMMENTS
59-year-old female with a PMHx of IVDU (on Methadone), anemia and epilepsy who presented with LLE pain, found to have loculated fluid collection of left iliacus muscle and iliopsoas bursitis c/b MSSA bacteremia.  BCx (7/1) MSSA, BCx (7/3) NGTD.           Plan:          -IR consulted, s/p drainage, Cx with staph aureus          -TTE and TED negative for endocarditis         -ID consulted, continue with Cefazolin, tentative plan for 4–6 week course       -obtain repeat MRI-LLE given persistent pain despite pain medications and ABx, repeat CT with residual bursitis and fluid collection    -ortho consulted, no acute surgical intervention         -psych consult for help with medication management  -PT recommends [facility], no acceptances to date         Rest of plan as per resident note.

## 2024-07-12 NOTE — PROGRESS NOTE ADULT - PROBLEM SELECTOR PLAN 2
Worsening left leg and thigh pain with radiation to groin. Duplex LLE negative for DVT. CT LE w IV contrast showed loculated fluid in L iliacus muscle, iliopsoas bursitis of infectious or inflammatory etiology s/p IR aspiration on 7/3.  - Initial BCx on 7/1 positive for MSSA. Plan as above  - Pain regimen: standing 975 tylenol q8, lidocaine patch, oxy 5mg q8PRN for moderate pain, oxy 10mg q6PRN for severe pain  - Toradol held due to mildly elevated creatinine  - CT of Left LE w/ IV contrast on 7/11 re-demonstrating iliopsoas bursitis and surrounding fluid collection, mildly improved compared to 7/3 study  - Will obtain f/u MRI of Left hip

## 2024-07-12 NOTE — DISCHARGE NOTE PROVIDER - NSDCFUADDAPPT_GEN_ALL_CORE_FT
Contact [facility] for PCP appt  Contact [facility] for PCP appt.    Please bring your Insurance card, Photo ID and Discharge paperwork to the following appointment:    (1) Please follow up with [facility], Dr. Henrietta Lui on behalf of Dr. Joellen Rodriguez at [address] [place] [address], [address], [place] [address] on 8/19/2024 at 9:45am.    Appointment was confirmed by Ms. YOKO Solorzano, Referral Coordinator.   Contact [facility] for PCP appt.    Please bring your Insurance card, Photo ID and Discharge paperwork to the following appointment:    (1) Please follow up with [facility] Provider, Dr. Henrietta Lui on behalf of Dr. Joellen Rodriguez at [address] [place] [address], [address], [place] [address] on 8/19/2024 at 9:45am.    Appointment was confirmed by Ms. YOKO Solorzano, Referral Coordinator.       Contact [facility] for PCP appt.    Please bring your Insurance card, Photo ID and Discharge paperwork to the following appointment:    (1) Please follow up with [facility] Care Provider, Dr. Henrietta Lui on behalf of Dr. Joellen Rodriguez at [address] [place] [address], [address], [place] [address] on 8/19/2024 at 9:45am.    Appointment was confirmed by Ms. YOKO Solorzano, Referral Coordinator.    Dr. He   [address] [place] [address]  [phone]  Appt time: 8/15/2024  Appt Date: 2pm    Please bring your Insurance card, Photo ID and Discharge paperwork to the following appointment.

## 2024-07-13 LAB
ALBUMIN SERPL ELPH-MCNC: 2.8 G/DL — LOW (ref 3.3–5)
ALP SERPL-CCNC: 92 U/L — SIGNIFICANT CHANGE UP (ref 40–120)
ALT FLD-CCNC: <5 U/L — LOW (ref 10–45)
ANION GAP SERPL CALC-SCNC: 9 MMOL/L — SIGNIFICANT CHANGE UP (ref 5–17)
AST SERPL-CCNC: 27 U/L — SIGNIFICANT CHANGE UP (ref 10–40)
BASOPHILS # BLD AUTO: 0.04 K/UL — SIGNIFICANT CHANGE UP (ref 0–0.2)
BASOPHILS NFR BLD AUTO: 0.9 % — SIGNIFICANT CHANGE UP (ref 0–2)
BILIRUB SERPL-MCNC: 0.2 MG/DL — SIGNIFICANT CHANGE UP (ref 0.2–1.2)
BUN SERPL-MCNC: 18 MG/DL — SIGNIFICANT CHANGE UP (ref 7–23)
CALCIUM SERPL-MCNC: 9.7 MG/DL — SIGNIFICANT CHANGE UP (ref 8.4–10.5)
CHLORIDE SERPL-SCNC: 94 MMOL/L — LOW (ref 96–108)
CO2 SERPL-SCNC: 32 MMOL/L — HIGH (ref 22–31)
CREAT SERPL-MCNC: 1.01 MG/DL — SIGNIFICANT CHANGE UP (ref 0.5–1.3)
EGFR: 65 ML/MIN/1.73M2 — SIGNIFICANT CHANGE UP
EOSINOPHIL # BLD AUTO: 0.22 K/UL — SIGNIFICANT CHANGE UP (ref 0–0.5)
EOSINOPHIL NFR BLD AUTO: 4.9 % — SIGNIFICANT CHANGE UP (ref 0–6)
GLUCOSE SERPL-MCNC: 77 MG/DL — SIGNIFICANT CHANGE UP (ref 70–99)
HCT VFR BLD CALC: 27 % — LOW (ref 34.5–45)
HGB BLD-MCNC: 8.1 G/DL — LOW (ref 11.5–15.5)
IMM GRANULOCYTES NFR BLD AUTO: 0.9 % — SIGNIFICANT CHANGE UP (ref 0–0.9)
LYMPHOCYTES # BLD AUTO: 1.65 K/UL — SIGNIFICANT CHANGE UP (ref 1–3.3)
LYMPHOCYTES # BLD AUTO: 36.5 % — SIGNIFICANT CHANGE UP (ref 13–44)
MAGNESIUM SERPL-MCNC: 2.1 MG/DL — SIGNIFICANT CHANGE UP (ref 1.6–2.6)
MCHC RBC-ENTMCNC: 27.6 PG — SIGNIFICANT CHANGE UP (ref 27–34)
MCHC RBC-ENTMCNC: 30 GM/DL — LOW (ref 32–36)
MCV RBC AUTO: 92.2 FL — SIGNIFICANT CHANGE UP (ref 80–100)
MONOCYTES # BLD AUTO: 0.66 K/UL — SIGNIFICANT CHANGE UP (ref 0–0.9)
MONOCYTES NFR BLD AUTO: 14.6 % — HIGH (ref 2–14)
NEUTROPHILS # BLD AUTO: 1.91 K/UL — SIGNIFICANT CHANGE UP (ref 1.8–7.4)
NEUTROPHILS NFR BLD AUTO: 42.2 % — LOW (ref 43–77)
NRBC # BLD: 0 /100 WBCS — SIGNIFICANT CHANGE UP (ref 0–0)
PHOSPHATE SERPL-MCNC: 4.7 MG/DL — HIGH (ref 2.5–4.5)
PLATELET # BLD AUTO: 326 K/UL — SIGNIFICANT CHANGE UP (ref 150–400)
POTASSIUM SERPL-MCNC: 4.8 MMOL/L — SIGNIFICANT CHANGE UP (ref 3.5–5.3)
POTASSIUM SERPL-SCNC: 4.8 MMOL/L — SIGNIFICANT CHANGE UP (ref 3.5–5.3)
PROT SERPL-MCNC: 8 G/DL — SIGNIFICANT CHANGE UP (ref 6–8.3)
RBC # BLD: 2.93 M/UL — LOW (ref 3.8–5.2)
RBC # FLD: 15.1 % — HIGH (ref 10.3–14.5)
SODIUM SERPL-SCNC: 135 MMOL/L — SIGNIFICANT CHANGE UP (ref 135–145)
WBC # BLD: 4.52 K/UL — SIGNIFICANT CHANGE UP (ref 3.8–10.5)
WBC # FLD AUTO: 4.52 K/UL — SIGNIFICANT CHANGE UP (ref 3.8–10.5)

## 2024-07-13 PROCEDURE — 99222 1ST HOSP IP/OBS MODERATE 55: CPT

## 2024-07-13 PROCEDURE — 99232 SBSQ HOSP IP/OBS MODERATE 35: CPT | Mod: GC

## 2024-07-13 RX ORDER — LORATADINE 10 MG
17 TABLET,DISINTEGRATING ORAL
Refills: 0 | Status: DISCONTINUED | OUTPATIENT
Start: 2024-07-13 | End: 2024-07-25

## 2024-07-13 RX ADMIN — Medication 15 MILLIGRAM(S): at 11:25

## 2024-07-13 RX ADMIN — LIDOCAINE 5% 1 PATCH: 5 CREAM TOPICAL at 19:35

## 2024-07-13 RX ADMIN — Medication 17 GRAM(S): at 17:10

## 2024-07-13 RX ADMIN — LIDOCAINE 5% 1 PATCH: 5 CREAM TOPICAL at 11:25

## 2024-07-13 RX ADMIN — LIDOCAINE 5% 1 PATCH: 5 CREAM TOPICAL at 00:49

## 2024-07-13 RX ADMIN — ENOXAPARIN SODIUM 40 MILLIGRAM(S): 120 INJECTION SUBCUTANEOUS at 22:06

## 2024-07-13 RX ADMIN — Medication 100 MILLIGRAM(S): at 13:13

## 2024-07-13 RX ADMIN — LIDOCAINE 5% 1 PATCH: 5 CREAM TOPICAL at 22:06

## 2024-07-13 RX ADMIN — LEVETIRACETAM 500 MILLIGRAM(S): 1000 TABLET, FILM COATED ORAL at 05:57

## 2024-07-13 RX ADMIN — Medication 5 MILLIGRAM(S): at 11:25

## 2024-07-13 RX ADMIN — Medication 160 MILLIGRAM(S): at 11:26

## 2024-07-13 RX ADMIN — OXYCODONE HYDROCHLORIDE 10 MILLIGRAM(S): 30 TABLET ORAL at 19:29

## 2024-07-13 RX ADMIN — OXYCODONE HYDROCHLORIDE 10 MILLIGRAM(S): 30 TABLET ORAL at 14:48

## 2024-07-13 RX ADMIN — OXYCODONE HYDROCHLORIDE 10 MILLIGRAM(S): 30 TABLET ORAL at 13:48

## 2024-07-13 RX ADMIN — Medication 975 MILLIGRAM(S): at 01:20

## 2024-07-13 RX ADMIN — Medication 25 MILLIGRAM(S): at 22:06

## 2024-07-13 RX ADMIN — OXYCODONE HYDROCHLORIDE 10 MILLIGRAM(S): 30 TABLET ORAL at 20:29

## 2024-07-13 RX ADMIN — LEVETIRACETAM 500 MILLIGRAM(S): 1000 TABLET, FILM COATED ORAL at 17:10

## 2024-07-13 RX ADMIN — OXYCODONE HYDROCHLORIDE 10 MILLIGRAM(S): 30 TABLET ORAL at 06:51

## 2024-07-13 RX ADMIN — OXYCODONE HYDROCHLORIDE 10 MILLIGRAM(S): 30 TABLET ORAL at 05:57

## 2024-07-13 RX ADMIN — Medication 100 MILLIGRAM(S): at 22:06

## 2024-07-13 RX ADMIN — Medication 100 MILLIGRAM(S): at 05:57

## 2024-07-13 RX ADMIN — Medication 975 MILLIGRAM(S): at 09:47

## 2024-07-13 RX ADMIN — Medication 975 MILLIGRAM(S): at 17:10

## 2024-07-13 RX ADMIN — Medication 3 MILLIGRAM(S): at 22:06

## 2024-07-13 RX ADMIN — Medication 975 MILLIGRAM(S): at 02:12

## 2024-07-13 NOTE — BH CONSULTATION LIAISON ASSESSMENT NOTE - NSICDXBHSECONDARYDX_PSY_ALL_CORE
Post traumatic stress disorder (PTSD)   F43.10  Polysubstance use disorder   F19.90  Opioid use disorder, severe, on maintenance therapy   F11.20  Borderline personality disorder   F60.3

## 2024-07-13 NOTE — BH CONSULTATION LIAISON ASSESSMENT NOTE - NSBHCHARTREVIEWVS_PSY_A_CORE FT
Vital Signs Last 24 Hrs  T(C): 36.8 (13 Jul 2024 08:38), Max: 37.1 (12 Jul 2024 14:33)  T(F): 98.2 (13 Jul 2024 08:38), Max: 98.8 (12 Jul 2024 14:33)  HR: 84 (13 Jul 2024 08:38) (82 - 89)  BP: 111/76 (13 Jul 2024 08:38) (97/72 - 111/76)  BP(mean): --  RR: 18 (13 Jul 2024 08:38) (15 - 18)  SpO2: 97% (13 Jul 2024 08:38) (93% - 97%)    Parameters below as of 13 Jul 2024 08:38  Patient On (Oxygen Delivery Method): room air

## 2024-07-13 NOTE — BH CONSULTATION LIAISON ASSESSMENT NOTE - NSBHCONSULTFOLLOWAFTERCARE_PSY_A_CORE FT
• [facility]  o Walk in services, Monday – Friday: 7:30am – 1pm   o [address]  [place] [address] [phone]    • [facility] located at [address] [facility], Ground Floor, [address] [place] [address] [phone]    • [facility] ([email or web address])  ?[address] [place] [address] Phone: [phone]  ? [address] [facility] [address] [place] [address] Phone: [phone]  ? [address] [facility] [address] [place] [address] Phone: [phone]      • [facility] ([email or web address])  o Comprehensive addiction treatment services, including psychopharm, psychotherapy, drug testing, as well as eating disorder treatment, and specialized treatment for LGBTQ, [employer] populations Walk Ins Mon-Fri 9am-2pm.   ? [address] [facility] [address] [phone]  ? [address] [place] [address] [phone]

## 2024-07-13 NOTE — BH CONSULTATION LIAISON ASSESSMENT NOTE - NSCOMMENTSUICRISKFACT_PSY_ALL_CORE
Chronically elevated risk for suicide/self-harm given previous psychiatric diagnoses of Bipolar Disorder vs SIMD, Borderline Personality Disorder, MDD, history of substance use, history of treatment noncompliance, history of trauma but no acute risk factors are identified at this time.

## 2024-07-13 NOTE — BH CONSULTATION LIAISON ASSESSMENT NOTE - NSBHCHARTREVIEWLAB_PSY_A_CORE FT
CBC:            8.1    4.52  )-----------( 326      ( 07-13-24 @ 05:30 )             27.0     Chem:         ( 07-13-24 @ 05:30 )  135  |  94<L>  |  18  ----------------------------<  77  4.8   |  32<H>  |  1.01      Liver Functions: ( 07-13-24 @ 05:30 )  Alb: 2.8 g/dL / Pro: 8.0 g/dL / ALK PHOS: 92 U/L / ALT: <5 U/L / AST: 27 U/L / GGT: x         Type & Screen:   Bilirubin: (07-13-24 @ 05:30)  Direct: x  / Indirect: x  / Total: 0.2

## 2024-07-13 NOTE — PROGRESS NOTE ADULT - PROBLEM SELECTOR PLAN 7
As per patient has history of epilepsy  States she takes Keppra but unsure of dose, follows with Neurologist at [facility]  Per Jennifer, previously prescribed Keppra 1000mg bid.   - Order Keppra 500mg bid x 2 doses for now, confirm doses.

## 2024-07-13 NOTE — BH CONSULTATION LIAISON ASSESSMENT NOTE - NSICDXNOPASTSURGICALHX_GEN_ALL_CORE
Subjective:       Patient ID: Sonido Millan is a 58 y.o. male.    Chief Complaint: Annual Exam    Mr. Millan comes to clinic today for annual physical. He is fasting today. He is due to update his colonoscopy. He had a colonoscopy in 2012; repeat was recommended 5 years later. The patient does complain of some bleeding from his rectum from hemorrhoids. He has had this problem in the past. This happens intermittently. The patient has no additional complaints today.     Review of Systems   Constitutional: Negative for activity change, appetite change and fever.   HENT: Negative for postnasal drip, rhinorrhea and sinus pressure.    Eyes: Negative for visual disturbance.   Respiratory: Negative for cough and shortness of breath.    Cardiovascular: Negative for chest pain.   Gastrointestinal: Negative for abdominal distention and abdominal pain.   Genitourinary: Negative for difficulty urinating and dysuria.   Musculoskeletal: Negative for arthralgias and myalgias.   Neurological: Negative for headaches.   Hematological: Negative for adenopathy.   Psychiatric/Behavioral: The patient is not nervous/anxious.        Objective:      Physical Exam   Constitutional: He is oriented to person, place, and time.   HENT:   Mouth/Throat: Oropharynx is clear and moist. No oropharyngeal exudate.   Eyes: Pupils are equal, round, and reactive to light. Conjunctivae are normal.   Cardiovascular: Normal rate and regular rhythm.   Pulmonary/Chest: Effort normal and breath sounds normal. He has no wheezes.   Abdominal: Soft. Bowel sounds are normal. There is no tenderness.   Musculoskeletal: He exhibits no edema.   Lymphadenopathy:     He has no cervical adenopathy.   Neurological: He is alert and oriented to person, place, and time.   Skin: No erythema.   Psychiatric: His behavior is normal.       Assessment:       1. Annual physical exam    2. Grade II hemorrhoids    3. History of colon polyps    4. Colon cancer screening        Plan:       Sonido was seen today for annual exam.    Diagnoses and all orders for this visit:    Annual physical exam  -     Comprehensive metabolic panel; Future  -     CBC auto differential; Future  -     Lipid panel; Future  -     PSA, Screening; Future  -     Hemoglobin A1c; Future    Grade II hemorrhoids  -     Ambulatory referral to Gastroenterology    History of colon polyps  -     Ambulatory referral to Gastroenterology    Colon cancer screening  -     Ambulatory referral to Gastroenterology      Patient will be notified of results when they return.    Patient to follow up on 08/19/19 for pre op appt.      <-- Click to add NO significant Past Surgical History

## 2024-07-13 NOTE — BH CONSULTATION LIAISON ASSESSMENT NOTE - SUMMARY
59 year old female, single, domiciled in a shelter, non-caregiver, employed with [employer] Anemia, Epilepsy, DM2, recent BOB, history of Intravenous Drug Use and PPH Schizoaffective Disorder vs Schizophrenia vs Bipolar Disorder vs SIMD, PTSD, MDD, DENISE, Anxiety Disorder Unspecified, Borderline Personality Disorder, Polysubstance Use Disorder (Tobacco, Opioid, Cocaine, Cannabis, Hallucinogens, Sedative/Hypnotics, Inhalants), history of Learning Disability, history of multiple prior inpatient psychiatric hospitalizations most recently at [facility] (04/08/24 – 04/18/24) for Schizoaffective Disorder Unspecified, history of multiple ED presentations most recently at [facility] (03/31/24) for Adjustment Disorder Unspecified, most recently in outpatient treatment at [facility] (06/21/23 – 06/13/24) for Schizoaffective Disorder Unspecified and [facility] (11/15/21 – 03/04/24) for Opioid Abuse Uncomplicated, no history of SA/NSSIB/Violence, history of Opioid Use Disorder (IV Heroin) and currently on Methadone 160 mg PO daily five days a week since the age of 19, who presented for sharp L leg pain progressively worsening over the past month, likely 2/2 iliopsoas bursitis i/s/o IVDU, s/p IR aspiration on 7/3, and prescribed Cefazolin 2 g for MSSA bacteremia without acute organ dysfunction.  Psychiatry consulted for management of Bipolar Disorder.       59 year old female, single, domiciled in a shelter, non-caregiver, employed with [employer] Anemia, Epilepsy, DM2, recent BOB, history of Intravenous Drug Use and PPH Schizoaffective Disorder vs Schizophrenia vs Bipolar Disorder vs SIMD, PTSD, MDD, DENISE, Anxiety Disorder Unspecified, Borderline Personality Disorder, Polysubstance Use Disorder (Tobacco, Opioid, Cocaine, Cannabis, Hallucinogens, Sedative/Hypnotics, Inhalants), history of Learning Disability, history of multiple prior inpatient psychiatric hospitalizations most recently at [facility] (04/08/24 – 04/18/24) for Schizoaffective Disorder Unspecified, history of multiple ED presentations most recently at [facility] (03/31/24) for Adjustment Disorder Unspecified, most recently in outpatient treatment at [facility] (06/21/23 – 06/13/24) for Schizoaffective Disorder Unspecified and [employer] [facility] (11/15/21 – 03/04/24) for Opioid Abuse Uncomplicated, no history of SA/NSSIB/Violence, history of Opioid Use Disorder (IV Heroin) and currently on Methadone 160 mg PO daily five days a week since the age of 19, who presented for sharp L leg pain progressively worsening over the past month, likely 2/2 iliopsoas bursitis i/s/o IVDU, s/p IR aspiration on 7/3, and prescribed Cefazolin 2 g for MSSA bacteremia without acute organ dysfunction.  Psychiatry consulted for management of Bipolar Disorder.      On evaluation, the patient is initially irritable but becomes calm, cooperative, fairly related with good eye contact, euthymic affect and demonstrating good behavioral control and linear thought processes.  The patient reports a chronic history of mood dysregulation in the setting of poor treatment compliance, multiple medical co-morbidities, and substance use.  The patient contributes minimally to today's interview and chart review and PSYCKES indicates a history of a multitude of psychiatric diagnoses.  Differential remains broad including Schizoaffective Disorder vs Bipolar Disorder vs SIMD vs SIP, Borderline Personality Disorder and Trauma & Stressor Related Disorder.  The patient would benefit for resumption of outpatient medication quetiapine 100 mg PO qHs for insomnia and mood stabilization and additional quetiapine 25 mg PO q6h PRN can be utilized for mild agitation.  The patient expresses an interest in re-engaging with outpatient psychiatry and she states that she has had two intake interviews prior to her current hospitalization.  She states that she is interested in additional referrals which are provided below.       RECOMMENDATIONS:  - No indication for inpatient psychiatric admission  - No psychiatric indication for CO 1:1, will defer observation status to primary treatment team and nursing  - Can increase quetiapine (Seroquel) to 100 mg PO qhs  - For mild agitation, can give quetiapine (Seroquel) 25 mg PO q6h PRN  - For agitation unresponsive to verbal redirection can give haloperidol 5 mg PO/IM, lorazepam 2 mg PO/IM, diphenhydramine 50 mg PO/IM q6h PRN for agitation; hold for qtc > 500 ms  - Continue methadone 160 mg PO daily for opioid use disorder  -  Psychiatry will continue to follow.  Please call with any questions.

## 2024-07-13 NOTE — BH CONSULTATION LIAISON ASSESSMENT NOTE - RISK ASSESSMENT
Although the patient has a chronically elevated risk for suicide/self-harm given previous psychiatric diagnoses of Bipolar Disorder vs Schizoaffective Disorder vs SIMD/SIP, Borderline Personality Disorder, MDD, PTSD, DENISE, history of substance use, history of treatment noncompliance, and history of trauma, no acute risk factors are identified at this time and protective factors that mitigate this risk abound including a capacity to advocate for self, future-oriented, help-seeking, domiciled, employed, evidence of resilience, no access to fire arms or weapons, no current symptoms of psychosis or amando, no history of SA/NSSIB, currently denying SI, intent and plan.  Given that the patient is not an acute risk to self or others, an inpatient psychiatric hospitalization is not warranted at this time.  The patient is appropriate to continue psychiatric treatment in the outpatient setting.

## 2024-07-13 NOTE — BH CONSULTATION LIAISON ASSESSMENT NOTE - NSBHREFERDETAILS_PSY_A_CORE_FT
60yo woman, undomiciled, with reported history of bipolar disorder with psychotic features, OUD on methadone maintenance, anemia, epilepsy admitted x10 days for MSSA bacteremia. Consult is for med management of bipolar disorder – team reports pt endorsing “ugly thoughts” and hallucinations including visual hallucinations, and they have been unable to confirm her med rec with her shelter. She has been receiving home methadone (160mg), Seroquel 25mg QHS and Remeron 15mg QHS with limited effect. No reported agitation, SI/HI.  RFC: medication management for bipolar disorder w/ psychotic features. 59 year old woman, undomiciled, with reported history of bipolar disorder with psychotic features, OUD on methadone maintenance, anemia, epilepsy admitted x10 days for MSSA bacteremia. Consult is for med management of bipolar disorder – team reports pt endorsing “ugly thoughts” and hallucinations including visual hallucinations, and they have been unable to confirm her med rec with her shelter. She has been receiving home methadone (160mg), Seroquel 25mg QHS and Remeron 15mg QHS with limited effect. No reported agitation, SI/HI.  RFC: medication management for bipolar disorder w/ psychotic features.

## 2024-07-13 NOTE — BH CONSULTATION LIAISON ASSESSMENT NOTE - ADDITIONAL PSYCHIATRIC MEDICATIONS
Patient reports currently taking quetiapine (Seroquel) 100 mg PO qhs and Methadone 160 mg PO daily.  The patient states that she was previously on sertraline (Zoloft) but she discontinued the medication.  Per PSYCKES, the patient was most recently prescribed Hydroxyzine 50 mg PO daily (06/10/24), Methadone Maintenance (07/13/19 – 02/27/24), Levetiracetam 750 mg PO BID (05/23/24), Naloxone HCL 4 mg/0.1ml (05/23/24), Quetiapine Fumarate  mg PO daily (10/27/23 - 12/15/23).
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
## 2024-07-13 NOTE — BH CONSULTATION LIAISON ASSESSMENT NOTE - DIFFERENTIAL
Schizoaffective Disorder vs Bipolar Disorder vs Substance Induced Mood Disorder vs Substance Induced Psychosis  Borderline Personality Disorder  Polysubstance Use Disorder  Trauma & Stressor Related Disorder

## 2024-07-13 NOTE — BH CONSULTATION LIAISON ASSESSMENT NOTE - NSBHCHARTREVIEWINVESTIGATE_PSY_A_CORE FT
ACC: [id number] EXAM:  ESOPHAGEAL ECHO (CARDIOL)                          PROCEDURE DATE:  07/05/2024          INTERPRETATION:  -------------------------------------  TRANSESOPHAGEAL ECHOCARDIOGRAM REPORT      ---------------------------------------------------------------------------  -----  Patient Name:   LEONILA COHN Date of Exam:        7/5/2024  Medical Rec #:  [id number]      Ht/Wt:               63.0 in/179.0 lb  Account #:      [id number]     BSA:                 1.84 m²  YOB: 1965    BP:                  113/59 mmHg  Patient Age:    59 years     HR:                  105 bpm  Patient Gender: F            Sonographer:         Joi Triplettmi                               Referring Physician: LEILANI CELAYA      CPT:                ECHO TRANSESOPH - 02013; - [id number].m; DOPPLER ECHO   COMP W                      SPECT - 36202; - 0602307.m; 3D RECONST W/O WKSTATION -                      18861; - 6983477.m  Indication(s):      I33.9 - Acute and subacute endocarditis, unspecified  Procedure:          A complete two-dimensional transesophageal   echocardiogram                      was performed: 2D, spectral and color flow Doppler.   Real                      time and full volume 3-dimensional imaging performed.   The                      patient was brought to the echocardiographic   laboratory in a                      fasting state. Informed consent for Transesophageal                      Echocardiogram, and use of a contrast agent as   needed, was                      obtained prior to procedure. The transesophageal   probe was                      passed without difficulty. The patient's vitals signs,                      including blood pressure, heart rate, pulse oximetry   and                      cardiac rhythm were monitored throughout the   procedure and                      remained stable.    Study Quality:      Good.  Conscious Sedation: IV conscious sedation was administered using 6mg of   Versed                      and 75mcg of Fentanyl.  Study Comments:     The patient tolerated the procedure well without   evidence of                      oropharyngeal or esophageal trauma. There were no                      complications.      ---------------------------------------------------------------------------  -----    CONCLUSIONS:     1. Normal left ventricular systolic function.   2. Normal right ventricular systolic function.   3. No LA/RA/MACKENZIE/RAA thrombus seen.   4. Intracardiac shunt is present; most consistent with a patent foramen   ovale.   5. No significant valvular disease.   6. No echocardiographic evidence of vegetations.   7. No pericardial effusion.    ---------------------------------------------------------------------------  -----  2D AND M-MODE MEASUREMENTS (normal ranges within parentheses):    Aorta/Left Atrium:         Normal - Men Normal - Women  Aortic Root (2D):  3.40 cm  (2.4-3.7)    LA Volume A/L:    Right Ventricle:      ---------------------------------------------------------------------------  -----  FINDINGS:    Left Ventricle:  Normal left ventricular systolic function.    Right Ventricle:  Right ventricular systolic function is normal.    Left Atrium:  The left atrium is dilated. No thrombus seen in the left atrium or in the   left atrial appendage.    Right Atrium:  The right atrium is normal in size. No thrombus seen in the right atrium   or right atrial appendage.    Interatrial Septum:  There is an interatrial septal aneurysm. Color flow Doppler reveals   evidence of a patent foramen ovale.    Aortic Valve:  The aortic valve is tricuspid with normal structure and function without   stenosis. There is trace aortic regurgitation.    Mitral Valve:  Structurally normal mitral valve with normal leaflet excursion. There is   trace mitral regurgitation.    Tricuspid Valve:  Structurally normal tricuspid valve with normal leaflet excursion. There   is trace tricuspid regurgitation. There was insufficient tricuspid   regurgitation detected from which to calculate pulmonary artery systolic   pressure.    Pulmonic Valve:  Structurally normal pulmonic valve with normal leaflet excursion. There   is no evidence of pulmonic regurgitation.    Aorta:  The aortic root and proximal ascending aorta are normal in size. There is   minimal plaque seen in the visualized portion of the descending aorta.   There is no significant plaque seen in the visualized portion of the   aortic arch.    Pericardium:  No pericardial effusion is seen.    ---------------------------------------------------------------------------  -----  Ewelina Mock MD    Electronically signed by Ewelina Mock MD  Signature Date/Time: 7/5/2024/4:20:03 PM        *** Final ***

## 2024-07-13 NOTE — BH CONSULTATION LIAISON ASSESSMENT NOTE - OTHER PAST PSYCHIATRIC HISTORY (INCLUDE DETAILS REGARDING ONSET, COURSE OF ILLNESS, INPATIENT/OUTPATIENT TREATMENT)
PSYCKES:  MEDICAID ID: [id number]  [place] FOR SI: SI x32 (first on 09/08/10) most recently 04/18/24 at [facility]patient[facility]  SOCIAL DETERMINANTS OF HEALTH (SDOH): Unemployment, unspecified; Homelessness Sheltered homelessness Homelessness unspecified Unsheltered homelessness Food insecurity  QUALITY FLAGS: Adherence - Antipsychotic (Schiz) No Metabolic Monitoring (LDL-C) on Antipsychotic; Overdue for Breast Cancer Screening; High Utilization - Inpt/ER; Low Antipsychotic Medication Adherence - Schizophrenia No Follow Up After  ED Visit - 7 Days; Mental Health Placement Consideration; Readmission Post-Discharge from any Hospital  BEHAVIORAL HEALTH DIAGNOSES: Opioid related disorders Schizoaffective Disorder Tobacco related disorder Schizophrenia Unspecified/Other Anxiety Disorder Major Depressive Disorder Unspecified/Other Bipolar Borderline Personality Disorder Cocaine related disorders Other psychoactive substance related disorders Cannabis related disorders Alcohol related disorders Substance-Induced Depressive Disorder Bipolar I PTSD Unspecified/Other Personality Disorder Unspecified/Other Psychotic Disorders Substance-Induced Psychotic Disorder Unspecified/Other Depressive Disorder Generalized Anxiety Disorder Hallucinogen related disorders Sedative, hypnotic, or anxiolytic related disorders Specific Learning Disorder Acute Stress Disorder Adjustment Disorder Inhalant related disorders (ICD10 only) Other Mental Disorders Unspecified/Other Impulse Control  CARE COORDINATION: [facility] (08/01/19 – 03/31/24), Health Home Enrolled with [facility] (08/01/19 – 02/01/24), [facility] (10/01/20 – 10/31/20)  BEHAVIORAL HEALTH MEDICATIONS: Hydroxyzine 50 mg PO daily (06/10/24), Methadone Maintenance (07/13/19 – 02/27/24), Levetiracetam 750 mg PO BID (05/23/24), Naloxone HCL 4 mg/0.1ml (05/23/24), Quetiapine Fumarate  mg PO daily (10/27/23 - 12/15/23), Buspirone HCl 15 mg PO daily (09/12/23 – 12/14/23), Mirtazapine 15 mg PO qHs (10/27/23 – 11/08/23), Olanzapine 2.5 mg PO daily (02/24/22 – 10/18/22), Venlafaxine HCl ER 75 mg Po daily (09/22/22 – 10/18/22), Risperidone 1 mg PO daily (06/23/22 – 09/09/22), Hydroxyzine 25 mg PO TID (11/17/21 – 12/13/21), Buprenorphine HCl-Naloxone HCl (Suboxone) 2-0.5mg (11/17/21), Acamprosate Calcium 333 mg 6/day (11/17/21), Trazodone HCl 100 mg PO qhs (11/17/21), Clonidine HCL 0.1 mg PO daily (06/09/21 – 07/20/21), Gabapentin 300 mg PO TID (06/09/21 – 07/14/21), Paliperidone ER 9 mg PO daily (01/26/21 – 02/11/21), Clonazepam 1 mg PO TID (01/30/20), Ziprasidone HCL 40 mg PO BID (08/21/20), Sertraline HCl 50 mg PO daily (05/18/20), Perphenazine 16 mg PO BID (09/03/19 – 01/30/20).   OUTPATIENT: [employer] (06/21/23 – 06/13/24) for Schizoaffective Disorder Unspecified, [employer] (03/22/24) for Opioid Dependence Uncomplicated, [facility] (11/15/21 – 03/04/24) for Opioid Abuse Uncomplicated, [facility] (12/05/22) for Schizophrenia Unspecified,   INPATIENT: MH x11 most recently at [facility] (04/08/24 – 04/18/24) for Schizoaffective Disorder Unspecified, [facility] (10/09/21 – 10/20/21) for Schizoaffective Disorder Bipolar Type HERRING x 5 most recently at [facility] (03/25/24 – 03/31/24) for Opioid Dependence Uncomplicated, [facility] (10/29/21 – 11/18/21) for Alcohol Dependence Uncomplicated, [facility] (01/31/95 – 05/01/95) for Combinations of Drug Dependence Excluding Opioid Type Drug Unspecified.  ED: MH x 21 most recently at [facility] (03/31/24) for Adjustment Disorder Unspecified, [facility] (07/24/23 - 07/25/23) for Schizoaffective Disorder Depressive Type, [facility] (09/27/21) for Schizoaffective Disorder Unspecified; HERRING x 5 most recently at [facility] (10/05/21) for Cannabis Use Unspecified with Intoxication Unspecified

## 2024-07-13 NOTE — PROGRESS NOTE ADULT - SUBJECTIVE AND OBJECTIVE BOX
SUBJECTIVE:  Patient seen and examined at bedside. She reports persistent pain in the left inguinal region that prevented her from sleeping throughout the night, still awaiting MRI. States she had 1 BM last night. Denies any fevers, chills, rashes, SOB, CP, abdominal pain, diarrhea.    Vital Signs Last 12 Hrs  T(F): 98.7 (07-13-24 @ 21:23), Max: 98.7 (07-13-24 @ 21:23)  HR: 80 (07-13-24 @ 21:23) (80 - 93)  BP: 102/61 (07-13-24 @ 21:23) (102/61 - 109/67)  BP(mean): --  RR: 18 (07-13-24 @ 21:23) (18 - 19)  SpO2: 95% (07-13-24 @ 21:23) (95% - 95%)  I&O's Summary    12 Jul 2024 07:01  -  13 Jul 2024 07:00  --------------------------------------------------------  IN: 50 mL / OUT: 0 mL / NET: 50 mL        PHYSICAL EXAM:  Constitutional - NAD, Comfortable  HEENT - NCAT, EOMI  Neck - No limited ROM  Chest - Breathing comfortably on room air, CTAB   Cardio - Warm and well perfused, RRR  Abdomen -  Soft, NTND  Extremities - No peripheral edema. Midline in place in LUE.  Psychiatric - Anxious but conversing pleasantly      LABS:                        8.1    4.52  )-----------( 326      ( 13 Jul 2024 05:30 )             27.0     07-13    135  |  94<L>  |  18  ----------------------------<  77  4.8   |  32<H>  |  1.01    Ca    9.7      13 Jul 2024 05:30  Phos  4.7     07-13  Mg     2.1     07-13    TPro  8.0  /  Alb  2.8<L>  /  TBili  0.2  /  DBili  x   /  AST  27  /  ALT  <5<L>  /  AlkPhos  92  07-13      Urinalysis Basic - ( 13 Jul 2024 05:30 )    Color: x / Appearance: x / SG: x / pH: x  Gluc: 77 mg/dL / Ketone: x  / Bili: x / Urobili: x   Blood: x / Protein: x / Nitrite: x   Leuk Esterase: x / RBC: x / WBC x   Sq Epi: x / Non Sq Epi: x / Bacteria: x          RADIOLOGY & ADDITIONAL TESTS:    MEDICATIONS  (STANDING):  acetaminophen     Tablet .. 975 milliGRAM(s) Oral every 8 hours  bisacodyl 5 milliGRAM(s) Oral daily  ceFAZolin   IVPB 2000 milliGRAM(s) IV Intermittent every 8 hours  enoxaparin Injectable 40 milliGRAM(s) SubCutaneous every 24 hours  levETIRAcetam 500 milliGRAM(s) Oral two times a day  lidocaine   4% Patch 1 Patch Transdermal daily  melatonin 3 milliGRAM(s) Oral at bedtime  methadone    Tablet 160 milliGRAM(s) Oral daily  mirtazapine 15 milliGRAM(s) Oral daily  polyethylene glycol 3350 17 Gram(s) Oral two times a day  QUEtiapine 25 milliGRAM(s) Oral at bedtime    MEDICATIONS  (PRN):  naloxone Injectable 1 milliGRAM(s) IV Push every 3 minutes PRN in case of overdose  oxyCODONE    IR 10 milliGRAM(s) Oral every 6 hours PRN Severe Pain (7 - 10)  oxyCODONE    IR 5 milliGRAM(s) Oral every 6 hours PRN Moderate Pain (4 - 6)  saline laxative (FLEET) Rectal Enema 1 Enema Rectal once PRN constipation, severe, please try oral laxatives first  senna 2 Tablet(s) Oral at bedtime PRN Constipation

## 2024-07-13 NOTE — PROGRESS NOTE ADULT - ATTENDING COMMENTS
Patient was seen and examined at bedside on 7/13/2024 at 135 pm. Patient feels well; has no acute complaints. Denies SOB, CP, abdominal pain. ROS is otherwise negative. Vitals, labwork and pertinent imaging reviewed. Physical exam - NAD, AAO x 4, PERRLA, EOMI, supple neck, chest - CTA b/l, CV - irregular, no m/r/g, abd - soft, + BS, ext - wwp, psych - normal affect, skin - no rash, pt appears cachectic and malnourished    Plan  -PT/OT rec MARY - will have them reevaluate as per nursing patient is now mostly independent and has been seen ambulating  -Pain control  -c/w abx, cultures have cleared, repeat CT showing decrease in size of previous collection  -Patient is medically ready for d/c

## 2024-07-13 NOTE — PROGRESS NOTE ADULT - ASSESSMENT
I M    59 y o F pmhx significant for IV drug use, anemia, epilepsy who presents for sharp L leg pain progressively worsening over the past month, likely 2/2 iliopsoas bursitis i/s/o IVDU, s/p IR aspiration on 7/3, on cefazolin 2 g for MSSA bacteremia.       Nutritional Assessment:  · Nutritional Assessment	This patient has been assessed with a concern for Malnutrition and has been determined to have a diagnosis/diagnoses of Severe protein-calorie malnutrition.    This patient is being managed with:   Diet Regular-  No Concentrated Phosphorus  Supplement Feeding Modality:  Oral  Ensure Plus High Protein Cans or Servings Per Day:  1       Frequency:  Daily  Entered: Jul 10 2024  5:44PM    Diet NPO after Midnight-     NPO Start Date: 04-Jul-2024   NPO Start Time: 23:59  Except Medications  Entered: Jul 5 2024  4:11AM    Problem/Plan - 1:  ·  Problem: Sepsis due to methicillin susceptible Staphylococcus aureus (MSSA) without acute organ dysfunction.   ·  Plan: Patient was found to have blood and wound cultures positive for MSSA bacteria on 7/1  - per ID recs, cultures from 7/3 have no growth, so there is no need for further surveillance cultures  - Patient will need 4-6 weeks of IV Cefazolin 2g q8h. ID recommends PICC line  - In event of early discharge before completion of IV antibiotics, contingency plan per ID is PO Dicloxacillin 500mg q6h for 6 weeks  - Midline placed in Left UE by IR PICC team on 7/11  - Continue IV antibiotics through LUE midline.    Problem/Plan - 2:  ·  Problem: Iliopsoas bursitis of left hip.   ·  Plan: Worsening left leg and thigh pain with radiation to groin. Duplex LLE negative for DVT. CT LE w IV contrast showed loculated fluid in L iliacus muscle, iliopsoas bursitis of infectious or inflammatory etiology s/p IR aspiration on 7/3.  - Initial BCx on 7/1 positive for MSSA. Plan as above  - Pain regimen: standing 975 tylenol q8, lidocaine patch, oxy 5mg q8PRN for moderate pain, oxy 10mg q6PRN for severe pain  - Toradol held due to mildly elevated creatinine  - CT of Left LE w/ IV contrast on 7/11 re-demonstrating iliopsoas bursitis and surrounding fluid collection, mildly improved compared to 7/3 study  - Will obtain f/u MRI of Left hip.    Problem/Plan - 3:  ·  Problem: Mood disorder.   ·  Plan: Patient reports history of bipolar disorder with psychotic features. Previously prescribed quetiapine 150mg qd, buspirone 15mg qd, mirtazapine 15mg qd. Unclear what she is currently taking.  - Attempted to do med rec with shelter, pharmacy, patient, unable to find current doses for her mood medications  - She is endorsing 'ugly' thoughts about relapse, difficulty sleeping, visual hallucinations  - Negative SI/HI  - Started 25 mg Seroquel 7/9  - Worsening psychotic symptoms on  7/11. Re-attempted to contact shelter for med rec but unable to get through. Will aim to determine mood medication doses before making changes to regimen  - Psych consult for medication management.    Problem/Plan - 4:  ·  Problem: Opioid dependence.   ·  Plan: Hx of IV drug use (heroin) on Methadone 160mg 5 days a week since age 19, confirmed with Methadone clinic. Last heroin injection in L leg on Saturday 6/29. Hep C positive, HIV negative. TTE 7/3 and TED 7/5 negative for endocarditis.  - Monitor COWs.    Problem/Plan - 5:  ·  Problem: BOB (acute kidney injury).   ·  Plan: Cr 1.28, BUN 29, GFR 49  - discontinued toradol  - encouraged PO fluid intake  - started on 1L NS 100cc/hour --> completed.    Problem/Plan - 6:  ·  Problem: Anemia.   ·  Plan: Pt states history of anemia  On admission Hg 10.2, Hct 31.0  - Continue to monitor H&H  - Maintain active type & screen  - Hb stable between 7.5 - 9.    Problem/Plan - 7:  ·  Problem: Epilepsy.   ·  Plan: As per patient has history of epilepsy  States she takes Keppra but unsure of dose, follows with Neurologist at [facility]  Per [employer], previously prescribed Keppra 1000mg bid.   - Order Keppra 500mg bid x 2 doses for now, confirm doses.    Problem/Plan - 8:  ·  Problem: Type 2 diabetes mellitus.   ·  Plan: Per patient's shelter, she was on insulin at some point, unclear of when she was taking it or history of diabetes.  - HbA1c 5.1  - BG well controlled inpatient.    Problem/Plan - 9:  ·  Problem: Severe protein-calorie malnutrition.   ·  Plan: - Ensure HD added per nutrition recs.    Problem/Plan - 10:  ·  Problem: Prophylactic measure.   ·  Plan; F: S/p NS 1L  E: replete as needed  N: regular diet, no concentrated phosphorus  DVT ppx: Lovenox 40mg subq  Dispo: [facility].

## 2024-07-13 NOTE — BH CONSULTATION LIAISON ASSESSMENT NOTE - PAST PSYCHOTROPIC MEDICATION
Hydroxyzine 50 mg PO daily, Methadone Maintenance, Levetiracetam 750 mg PO BID, Naloxone HCL 4 mg/0.1ml, Quetiapine Fumarate  mg PO daily, Buspirone HCl 15 mg PO daily, Mirtazapine 15 mg PO qHs, Olanzapine 2.5 mg PO daily, Venlafaxine HCl ER 75 mg PO daily, Risperidone 1 mg PO daily, Buprenorphine HCl-Naloxone HCl (Suboxone) 2-0.5mg, Acamprosate Calcium 333 mg 6/day, Trazodone HCl 100 mg PO qhs, Clonidine HCL 0.1 mg PO daily, Gabapentin 300 mg PO TID, Paliperidone ER 9 mg PO daily, Clonazepam 1 mg PO TID, Ziprasidone HCL 40 mg PO BID, Sertraline HCl 50 mg PO daily, Perphenazine 16 mg PO BID

## 2024-07-13 NOTE — BH CONSULTATION LIAISON ASSESSMENT NOTE - NSUNABLEASSESSPROTRISKCOMMENT_PSY_ALL_CORE
Subjective:      Joanne Pardo is a 26 y.o. female being seen today for her obstetrical visit. She is at 30w0d gestation. Patient reports URI sx. Fetal movement: normal.    Menstrual History:  OB History        1    Para        Term                AB        Living           SAB        IAB        Ectopic        Multiple        Live Births                    Menarche age:    Patient's last menstrual period was 2021.       The following portions of the patient's history were reviewed and updated as appropriate: allergies, current medications, past family history, past medical history, past social history, past surgical history and problem list.    Review of Systems  Pertinent items are noted in HPI.     Objective:      /80   Wt 78.9 kg (174 lb)   LMP 2021   BMI 30.34 kg/m²   FHT:  152 BPM   Uterine Size: size equals dates   Presentation: unsure        Assessment:      Pregnancy 30 and 0/7 weeks     Plan:      28-week labs reviewed, 3hr normal  discussed URI Rx  Follow up in 2 Weeks.        
Protective factors that mitigate this risk include a capacity to advocate for self, future-oriented, help-seeking, domiciled, employed, no current symptoms of psychosis or amando, no history of SA/NSSIB, currently denying SI, intent and plan.

## 2024-07-13 NOTE — BH CONSULTATION LIAISON ASSESSMENT NOTE - NSBHCONSULTMEDAGITATION_PSY_A_CORE FT
For agitation unresponsive to verbal redirection can give haloperidol 5 mg PO/IM, lorazepam 2 mg PO/IM, diphenhydramine 50 mg PO/IM q6h PRN for agitation; hold for qtc > 500 ms

## 2024-07-13 NOTE — BH CONSULTATION LIAISON ASSESSMENT NOTE - HPI (INCLUDE ILLNESS QUALITY, SEVERITY, DURATION, TIMING, CONTEXT, MODIFYING FACTORS, ASSOCIATED SIGNS AND SYMPTOMS)
59 year old female, single,    with PPH Schizoaffective Disorder vs Schizophrenia vs Bipolar Disorder vs SIMD, PTSD, MDD, DENISE, Anxiety Disorder Unspecified, Borderline Personality Disorder, , Polysubstance Use Disorder (Tobacco, Opioid, Cocaine, Cannabis, Hallucinogens, Sedative/Hypnotics, Inhalants), history of Learning Disability, history of multiple prior inpatient psychiatric hospitalizations most recently at [facility] (04/08/24 – 04/18/24) for Schizoaffective Disorder Unspecified, history of multiple ED presentations most recently at [facility] (03/31/24) for Adjustment Disorder Unspecified, most recently in outpatient treatment at [facility] (06/21/23 – 06/13/24) for Schizoaffective Disorder Unspecified and [facility] (11/15/21 – 03/04/24) for Opioid Abuse Uncomplicated,     PMH IV drug use, anemia, epilepsy who presents for sharp L leg pain progressively worsening over the past month, likely 2/2 iliopsoas bursitis i/s/o IVDU, s/p IR aspiration on 7/3, on cefazolin 2 g for MSSA bacteremia.       [place] most recent placement 04/19/24 and exited on 07/05/24 for unknown reason   59 year old female, single, domiciled in a shelter, non-caregiver, employed with PMH Anemia, Epilepsy, DM2, recent BOB, history of Intravenous Drug Use and PPH Schizoaffective Disorder vs Schizophrenia vs Bipolar Disorder vs SIMD, PTSD, MDD, DENISE, Anxiety Disorder Unspecified, Borderline Personality Disorder, Polysubstance Use Disorder (Tobacco, Opioid, Cocaine, Cannabis, Hallucinogens, Sedative/Hypnotics, Inhalants), history of Learning Disability, history of multiple prior inpatient psychiatric hospitalizations most recently at [facility] (04/08/24 – 04/18/24) for Schizoaffective Disorder Unspecified, history of multiple ED presentations most recently at [facility] (03/31/24) for Adjustment Disorder Unspecified, most recently in outpatient treatment at [facility] (06/21/23 – 06/13/24) for Schizoaffective Disorder Unspecified and [facility] (11/15/21 – 03/04/24) for Opioid Abuse Uncomplicated, no history of SA/NSSIB/Violence, history of Opioid Use Disorder (IV Heroin) and currently on Methadone 160 mg PO daily five days a week since the age of 19, who presented for sharp L leg pain progressively worsening over the past month, likely 2/2 iliopsoas bursitis i/s/o IVDU, s/p IR aspiration on 7/3, and prescribed Cefazolin 2 g for MSSA bacteremia without acute organ dysfunction.  Psychiatry consulted for management of Bipolar Disorder.       59 year old female, single, domiciled in a shelter, non-caregiver, employed with PMH Anemia, Epilepsy, DM2, recent BOB, history of Intravenous Drug Use and PPH Schizoaffective Disorder vs Schizophrenia vs Bipolar Disorder vs SIMD, SIP, PTSD, MDD, DENISE, Anxiety Disorder Unspecified, Borderline Personality Disorder, Polysubstance Use Disorder (Tobacco, Opioid, Cocaine, Cannabis, Hallucinogens, Sedative/Hypnotics, Inhalants), history of Learning Disability, history of multiple prior inpatient psychiatric hospitalizations most recently at [facility] (04/08/24 – 04/18/24) for Schizoaffective Disorder Unspecified, history of multiple ED presentations most recently at [facility] (03/31/24) for Adjustment Disorder Unspecified, most recently in outpatient treatment at [facility] (06/21/23 – 06/13/24) for Schizoaffective Disorder Unspecified and [facility] (11/15/21 – 03/04/24) for Opioid Abuse Uncomplicated, no history of SA/NSSIB/Violence, history of Opioid Use Disorder (IV Heroin) and currently on Methadone 160 mg PO daily five days a week since the age of 19, who presented for sharp L leg pain progressively worsening over the past month, likely 2/2 iliopsoas bursitis i/s/o IVDU, s/p IR aspiration on 7/3, and prescribed Cefazolin 2 g for MSSA bacteremia without acute organ dysfunction.  Psychiatry consulted for management of Bipolar Disorder.      On evaluation, the patient is initially irritable but her mood and affect improve over the course of the abbreviated interaction.  The patient is overall cooperative but states that she does not want to speak with this writer at the moment given her uncontrolled pain and she requests that psychiatry return earlier in the morning when she typically feels better.  She ultimately agrees to answer some of this writer's questions and she comments that the conversation was pleasant and distracted her from her pain.  She reports a previous diagnosis of Bipolar-Schizophrenic and states that she has "different personalities."  When asked to elaborate, she states that "one minute [she] is angry and cursing and the next [she] is okay."  The patient speaks at length about her current pain which she states began approximately 16 days ago.  She states that she has a history of heroin and crack cocaine use and she thought that perhaps she "lost a needle in a vein" but when she presented for evaluations she was told that the work-up was negative.  The patient states that she has been told that she has an infection which is being treated.  She then tells this writer that she is "calm now" but her thoughts are "getting very vicious and very out there."  She requests that the interview be terminated at this time.  She tells this writer that when she is angry or hurting she sometimes makes suicidal statements but she wants to be clear that she is not actually suicidal and she wants this writer to inform her team of this fact.  She denies SI/HI, intent, plan and AVH.  All questions and concerns addressed.

## 2024-07-13 NOTE — BH CONSULTATION LIAISON ASSESSMENT NOTE - VIOLENCE RISK FACTORS:
Substance abuse/Affective dysregulation/Impulsivity/History of being victimized/traumatized/Noncompliance with treatment/Irritability

## 2024-07-14 DIAGNOSIS — F60.3 BORDERLINE PERSONALITY DISORDER: ICD-10-CM

## 2024-07-14 DIAGNOSIS — F11.20 OPIOID DEPENDENCE, UNCOMPLICATED: ICD-10-CM

## 2024-07-14 DIAGNOSIS — F19.90 OTHER PSYCHOACTIVE SUBSTANCE USE, UNSPECIFIED, UNCOMPLICATED: ICD-10-CM

## 2024-07-14 DIAGNOSIS — F43.10 POST-TRAUMATIC STRESS DISORDER, UNSPECIFIED: ICD-10-CM

## 2024-07-14 LAB
ANION GAP SERPL CALC-SCNC: 8 MMOL/L — SIGNIFICANT CHANGE UP (ref 5–17)
BUN SERPL-MCNC: 26 MG/DL — HIGH (ref 7–23)
CALCIUM SERPL-MCNC: 9.8 MG/DL — SIGNIFICANT CHANGE UP (ref 8.4–10.5)
CHLORIDE SERPL-SCNC: 94 MMOL/L — LOW (ref 96–108)
CO2 SERPL-SCNC: 33 MMOL/L — HIGH (ref 22–31)
CREAT SERPL-MCNC: 1.06 MG/DL — SIGNIFICANT CHANGE UP (ref 0.5–1.3)
EGFR: 61 ML/MIN/1.73M2 — SIGNIFICANT CHANGE UP
GLUCOSE SERPL-MCNC: 86 MG/DL — SIGNIFICANT CHANGE UP (ref 70–99)
HCT VFR BLD CALC: 26.6 % — LOW (ref 34.5–45)
HGB BLD-MCNC: 8.5 G/DL — LOW (ref 11.5–15.5)
MAGNESIUM SERPL-MCNC: 2.1 MG/DL — SIGNIFICANT CHANGE UP (ref 1.6–2.6)
MCHC RBC-ENTMCNC: 28 PG — SIGNIFICANT CHANGE UP (ref 27–34)
MCHC RBC-ENTMCNC: 32 GM/DL — SIGNIFICANT CHANGE UP (ref 32–36)
MCV RBC AUTO: 87.5 FL — SIGNIFICANT CHANGE UP (ref 80–100)
NRBC # BLD: 0 /100 WBCS — SIGNIFICANT CHANGE UP (ref 0–0)
PHOSPHATE SERPL-MCNC: 5.2 MG/DL — HIGH (ref 2.5–4.5)
PLATELET # BLD AUTO: 356 K/UL — SIGNIFICANT CHANGE UP (ref 150–400)
POTASSIUM SERPL-MCNC: 4.7 MMOL/L — SIGNIFICANT CHANGE UP (ref 3.5–5.3)
POTASSIUM SERPL-SCNC: 4.7 MMOL/L — SIGNIFICANT CHANGE UP (ref 3.5–5.3)
RBC # BLD: 3.04 M/UL — LOW (ref 3.8–5.2)
RBC # FLD: 15.5 % — HIGH (ref 10.3–14.5)
SODIUM SERPL-SCNC: 135 MMOL/L — SIGNIFICANT CHANGE UP (ref 135–145)
WBC # BLD: 4.7 K/UL — SIGNIFICANT CHANGE UP (ref 3.8–10.5)
WBC # FLD AUTO: 4.7 K/UL — SIGNIFICANT CHANGE UP (ref 3.8–10.5)

## 2024-07-14 PROCEDURE — 93010 ELECTROCARDIOGRAM REPORT: CPT

## 2024-07-14 PROCEDURE — 99232 SBSQ HOSP IP/OBS MODERATE 35: CPT | Mod: GC

## 2024-07-14 RX ORDER — BENZONATATE 100 MG/1
100 CAPSULE, LIQUID FILLED ORAL ONCE
Refills: 0 | Status: DISCONTINUED | OUTPATIENT
Start: 2024-07-14 | End: 2024-07-29

## 2024-07-14 RX ORDER — SEVELAMER CARBONATE 800 MG/1
800 TABLET, FILM COATED ORAL
Refills: 0 | Status: DISCONTINUED | OUTPATIENT
Start: 2024-07-14 | End: 2024-07-14

## 2024-07-14 RX ADMIN — LIDOCAINE 5% 1 PATCH: 5 CREAM TOPICAL at 22:09

## 2024-07-14 RX ADMIN — OXYCODONE HYDROCHLORIDE 10 MILLIGRAM(S): 30 TABLET ORAL at 07:26

## 2024-07-14 RX ADMIN — Medication 5 MILLIGRAM(S): at 11:08

## 2024-07-14 RX ADMIN — Medication 100 MILLIGRAM(S): at 13:46

## 2024-07-14 RX ADMIN — Medication 975 MILLIGRAM(S): at 17:10

## 2024-07-14 RX ADMIN — Medication 100 MILLIGRAM(S): at 21:29

## 2024-07-14 RX ADMIN — Medication 975 MILLIGRAM(S): at 18:10

## 2024-07-14 RX ADMIN — OXYCODONE HYDROCHLORIDE 5 MILLIGRAM(S): 30 TABLET ORAL at 17:15

## 2024-07-14 RX ADMIN — OXYCODONE HYDROCHLORIDE 5 MILLIGRAM(S): 30 TABLET ORAL at 16:15

## 2024-07-14 RX ADMIN — OXYCODONE HYDROCHLORIDE 10 MILLIGRAM(S): 30 TABLET ORAL at 14:46

## 2024-07-14 RX ADMIN — Medication 15 MILLIGRAM(S): at 11:07

## 2024-07-14 RX ADMIN — OXYCODONE HYDROCHLORIDE 10 MILLIGRAM(S): 30 TABLET ORAL at 01:26

## 2024-07-14 RX ADMIN — LEVETIRACETAM 500 MILLIGRAM(S): 1000 TABLET, FILM COATED ORAL at 06:37

## 2024-07-14 RX ADMIN — OXYCODONE HYDROCHLORIDE 10 MILLIGRAM(S): 30 TABLET ORAL at 09:12

## 2024-07-14 RX ADMIN — OXYCODONE HYDROCHLORIDE 10 MILLIGRAM(S): 30 TABLET ORAL at 20:45

## 2024-07-14 RX ADMIN — Medication 17 GRAM(S): at 17:10

## 2024-07-14 RX ADMIN — Medication 975 MILLIGRAM(S): at 11:25

## 2024-07-14 RX ADMIN — LIDOCAINE 5% 1 PATCH: 5 CREAM TOPICAL at 18:23

## 2024-07-14 RX ADMIN — OXYCODONE HYDROCHLORIDE 10 MILLIGRAM(S): 30 TABLET ORAL at 02:30

## 2024-07-14 RX ADMIN — Medication 3 MILLIGRAM(S): at 22:32

## 2024-07-14 RX ADMIN — LIDOCAINE 5% 1 PATCH: 5 CREAM TOPICAL at 11:07

## 2024-07-14 RX ADMIN — Medication 100 MILLIGRAM(S): at 06:37

## 2024-07-14 RX ADMIN — OXYCODONE HYDROCHLORIDE 10 MILLIGRAM(S): 30 TABLET ORAL at 19:46

## 2024-07-14 RX ADMIN — Medication 975 MILLIGRAM(S): at 10:25

## 2024-07-14 RX ADMIN — Medication 975 MILLIGRAM(S): at 00:51

## 2024-07-14 RX ADMIN — Medication 160 MILLIGRAM(S): at 11:08

## 2024-07-14 RX ADMIN — Medication 25 MILLIGRAM(S): at 21:28

## 2024-07-14 RX ADMIN — ENOXAPARIN SODIUM 40 MILLIGRAM(S): 120 INJECTION SUBCUTANEOUS at 21:28

## 2024-07-14 RX ADMIN — LEVETIRACETAM 500 MILLIGRAM(S): 1000 TABLET, FILM COATED ORAL at 17:10

## 2024-07-14 RX ADMIN — OXYCODONE HYDROCHLORIDE 10 MILLIGRAM(S): 30 TABLET ORAL at 13:46

## 2024-07-14 NOTE — PROGRESS NOTE ADULT - PROBLEM SELECTOR PLAN 7
As per patient has history of epilepsy  States she takes Keppra but unsure of dose, follows with Neurologist at [facility]  Per Jennifer, previously prescribed Keppra 1000mg bid.   - Order Keppra 500mg bid x 2 doses for now, confirm doses.

## 2024-07-14 NOTE — PROGRESS NOTE ADULT - SUBJECTIVE AND OBJECTIVE BOX
O/N events: JAKE    SUBJECTIVE:  Patient seen and examined at bedside. She reports persistent pain in the left inguinal region that prevented her from sleeping throughout the night, still awaiting MRI. States she had 1 BM last night. Denies any fevers, chills, rashes, SOB, CP, abdominal pain, diarrhea.      Vital Signs Last 24 Hrs  T(C): 36.3 (14 Jul 2024 05:19), Max: 37.1 (13 Jul 2024 21:23)  T(F): 97.3 (14 Jul 2024 05:19), Max: 98.7 (13 Jul 2024 21:23)  HR: 100 (14 Jul 2024 05:19) (80 - 100)  BP: 103/70 (14 Jul 2024 05:19) (102/61 - 109/67)  BP(mean): --  RR: 18 (14 Jul 2024 05:19) (18 - 19)  SpO2: 96% (14 Jul 2024 05:19) (95% - 96%)    Parameters below as of 13 Jul 2024 21:23  Patient On (Oxygen Delivery Method): room air            PHYSICAL EXAM:  Constitutional - NAD, Comfortable  HEENT - NCAT, EOMI  Neck - No limited ROM  Chest - Breathing comfortably on room air, CTAB   Cardio - Warm and well perfused, RRR  Abdomen -  Soft, NTND  Extremities - No peripheral edema. Midline in place in LUE.  Psychiatric - Anxious but conversing pleasantly  Patient appears cachectic and malnourished       LABS:                                   8.5    4.70  )-----------( 356      ( 14 Jul 2024 05:30 )             26.6   07-14    135  |  94<L>  |  26<H>  ----------------------------<  86  4.7   |  33<H>  |  1.06    Ca    9.8      14 Jul 2024 05:30  Phos  5.2     07-14  Mg     2.1     07-14    TPro  8.0  /  Alb  2.8<L>  /  TBili  0.2  /  DBili  x   /  AST  27  /  ALT  <5<L>  /  AlkPhos  92  07-13            RADIOLOGY & ADDITIONAL TESTS:    MEDICATIONS  (STANDING):  acetaminophen     Tablet .. 975 milliGRAM(s) Oral every 8 hours  bisacodyl 5 milliGRAM(s) Oral daily  ceFAZolin   IVPB 2000 milliGRAM(s) IV Intermittent every 8 hours  enoxaparin Injectable 40 milliGRAM(s) SubCutaneous every 24 hours  levETIRAcetam 500 milliGRAM(s) Oral two times a day  lidocaine   4% Patch 1 Patch Transdermal daily  melatonin 3 milliGRAM(s) Oral at bedtime  methadone    Tablet 160 milliGRAM(s) Oral daily  mirtazapine 15 milliGRAM(s) Oral daily  polyethylene glycol 3350 17 Gram(s) Oral two times a day  QUEtiapine 25 milliGRAM(s) Oral at bedtime  sevelamer carbonate 800 milliGRAM(s) Oral three times a day with meals    MEDICATIONS  (PRN):  benzonatate 100 milliGRAM(s) Oral once PRN Cough  naloxone Injectable 1 milliGRAM(s) IV Push every 3 minutes PRN in case of overdose  oxyCODONE    IR 10 milliGRAM(s) Oral every 6 hours PRN Severe Pain (7 - 10)  oxyCODONE    IR 5 milliGRAM(s) Oral every 6 hours PRN Moderate Pain (4 - 6)  saline laxative (FLEET) Rectal Enema 1 Enema Rectal once PRN constipation, severe, please try oral laxatives first  senna 2 Tablet(s) Oral at bedtime PRN Constipation

## 2024-07-14 NOTE — OCCUPATIONAL THERAPY INITIAL EVALUATION ADULT - GENERAL OBSERVATIONS, REHAB EVAL
Patient A&Ox4, agreeable and tolerated session fairly. Patient received semisupine in bed +heplock IV, room air, c/o of 8/10 RLE pain throughout.

## 2024-07-14 NOTE — OCCUPATIONAL THERAPY INITIAL EVALUATION ADULT - DIAGNOSIS, OT EVAL
Patient brought to [facility] 2/2 LLE pain presents with LLE weakness, kyphotic posture, decreased balance, postural control, activity tolerance impacting independence with functional activities and mobility.

## 2024-07-14 NOTE — OCCUPATIONAL THERAPY INITIAL EVALUATION ADULT - ADDITIONAL COMMENTS
Patient reports living in an [facility] with elevator access. Patient states she was independent with all ADL's, IADL's and functional mobility utilizing SC prior to admission. Patient is R hand dominant.

## 2024-07-14 NOTE — BH CONSULTATION LIAISON PROGRESS NOTE - NSBHASSESSMENTFT_PSY_ALL_CORE
59 year old female, undomiciled, with reported history of bipolar disorder with psychotic features, OUD on methadone maintenance, anemia, epilepsy admitted x10 days for MSSA bacteremia. Consult is for med management of bipolar disorder – team reports pt endorsing “ugly thoughts” and hallucinations including visual hallucinations, and they have been unable to confirm her med rec with her shelter. She has been receiving home methadone (160mg), Seroquel 25mg QHS and Remeron 15mg QHS with limited effect. No reported agitation, SI/HI.  Psychiatry consult requested for bipolar disorder w/ psychotic features & med mgmt. Some improvement in sleep with higher quetiapine dose.    RECOMMENDATIONS:  - ADD hydroxyzine 25 mg BID PRN anxiety  - Continue Seroquel 100 mg qHS  - Continue methadone 160 mg daily  - Continue Remeron 15 mg qHS

## 2024-07-14 NOTE — BH CONSULTATION LIAISON PROGRESS NOTE - NSBHCHARTREVIEWINVESTIGATE_PSY_A_CORE FT
ACC: [id number] EXAM:  ESOPHAGEAL ECHO (CARDIOL)                          PROCEDURE DATE:  07/05/2024          INTERPRETATION:  -------------------------------------  TRANSESOPHAGEAL ECHOCARDIOGRAM REPORT      ---------------------------------------------------------------------------  -----  Patient Name:   LEONILA COHN Date of Exam:        7/5/2024  Medical Rec #:  [id number]      Ht/Wt:               63.0 in/179.0 lb  Account #:      [id number]     BSA:                 1.84 m²  YOB: 1965    BP:                  113/59 mmHg  Patient Age:    59 years     HR:                  105 bpm  Patient Gender: F            Sonographer:         Joi Triplettmi                               Referring Physician: LEILANI CELAYA      CPT:                ECHO TRANSESOPH - 31614; - [id number].m; DOPPLER ECHO   COMP W                      SPECT - 57277; - [id number].m; 3D RECONST W/O WKSTATION -                      26729; - [id number].m  Indication(s):      I33.9 - Acute and subacute endocarditis, unspecified  Procedure:          A complete two-dimensional transesophageal   echocardiogram                      was performed: 2D, spectral and color flow Doppler.   Real                      time and full volume 3-dimensional imaging performed.   The                      patient was brought to the echocardiographic   laboratory in a                      fasting state. Informed consent for Transesophageal                      Echocardiogram, and use of a contrast agent as   needed, was                      obtained prior to procedure. The transesophageal   probe was                      passed without difficulty. The patient's vitals signs,                      including blood pressure, heart rate, pulse oximetry   and                      cardiac rhythm were monitored throughout the   procedure and                      remained stable.    Study Quality:      Good.  Conscious Sedation: IV conscious sedation was administered using 6mg of   Versed                      and 75mcg of Fentanyl.  Study Comments:     The patient tolerated the procedure well without   evidence of                      oropharyngeal or esophageal trauma. There were no                      complications.      ---------------------------------------------------------------------------  -----    CONCLUSIONS:     1. Normal left ventricular systolic function.   2. Normal right ventricular systolic function.   3. No LA/RA/MACKENZIE/RAA thrombus seen.   4. Intracardiac shunt is present; most consistent with a patent foramen   ovale.   5. No significant valvular disease.   6. No echocardiographic evidence of vegetations.   7. No pericardial effusion.    ---------------------------------------------------------------------------  -----  2D AND M-MODE MEASUREMENTS (normal ranges within parentheses):    Aorta/Left Atrium:         Normal - Men Normal - Women  Aortic Root (2D):  3.40 cm  (2.4-3.7)    LA Volume A/L:    Right Ventricle:      ---------------------------------------------------------------------------  -----  FINDINGS:    Left Ventricle:  Normal left ventricular systolic function.    Right Ventricle:  Right ventricular systolic function is normal.    Left Atrium:  The left atrium is dilated. No thrombus seen in the left atrium or in the   left atrial appendage.    Right Atrium:  The right atrium is normal in size. No thrombus seen in the right atrium   or right atrial appendage.    Interatrial Septum:  There is an interatrial septal aneurysm. Color flow Doppler reveals   evidence of a patent foramen ovale.    Aortic Valve:  The aortic valve is tricuspid with normal structure and function without   stenosis. There is trace aortic regurgitation.    Mitral Valve:  Structurally normal mitral valve with normal leaflet excursion. There is   trace mitral regurgitation.    Tricuspid Valve:  Structurally normal tricuspid valve with normal leaflet excursion. There   is trace tricuspid regurgitation. There was insufficient tricuspid   regurgitation detected from which to calculate pulmonary artery systolic   pressure.    Pulmonic Valve:  Structurally normal pulmonic valve with normal leaflet excursion. There   is no evidence of pulmonic regurgitation.    Aorta:  The aortic root and proximal ascending aorta are normal in size. There is   minimal plaque seen in the visualized portion of the descending aorta.   There is no significant plaque seen in the visualized portion of the   aortic arch.    Pericardium:  No pericardial effusion is seen.    ---------------------------------------------------------------------------  -----  Ewelina Mock MD    Electronically signed by Ewelina Mock MD  Signature Date/Time: 7/5/2024/4:20:03 PM        *** Final ***

## 2024-07-14 NOTE — OCCUPATIONAL THERAPY INITIAL EVALUATION ADULT - MODIFIED CLINICAL TEST OF SENSORY INTEGRATION IN BALANCE TEST
Patient functionally ambulated into the hallway with RW and CGA. Patient noted with kyphotic posture, decreased weight shifting- advancing RLE, deficits with step length and activity tolerance limited by pain and motivation requiring min verbal cues throughout for proper positioning and safety. Patient functionally ambulated into the hallway with RW and CGA. Patient noted with kyphotic posture, decreased weight shifting- advancing LLE, deficits with step length and activity tolerance limited by pain and motivation requiring min verbal cues throughout for proper positioning and safety.

## 2024-07-15 PROCEDURE — 99232 SBSQ HOSP IP/OBS MODERATE 35: CPT | Mod: GC

## 2024-07-15 PROCEDURE — 99232 SBSQ HOSP IP/OBS MODERATE 35: CPT

## 2024-07-15 RX ORDER — METHADONE HCL 10 MG
160 TABLET ORAL DAILY
Refills: 0 | Status: DISCONTINUED | OUTPATIENT
Start: 2024-07-15 | End: 2024-07-15

## 2024-07-15 RX ORDER — OXYCODONE HYDROCHLORIDE 30 MG/1
5 TABLET ORAL EVERY 6 HOURS
Refills: 0 | Status: DISCONTINUED | OUTPATIENT
Start: 2024-07-15 | End: 2024-07-21

## 2024-07-15 RX ORDER — METHADONE HCL 10 MG
160 TABLET ORAL EVERY 24 HOURS
Refills: 0 | Status: DISCONTINUED | OUTPATIENT
Start: 2024-07-16 | End: 2024-07-23

## 2024-07-15 RX ORDER — OXYCODONE HYDROCHLORIDE 30 MG/1
10 TABLET ORAL EVERY 6 HOURS
Refills: 0 | Status: DISCONTINUED | OUTPATIENT
Start: 2024-07-15 | End: 2024-07-21

## 2024-07-15 RX ADMIN — Medication 975 MILLIGRAM(S): at 19:15

## 2024-07-15 RX ADMIN — LIDOCAINE 5% 1 PATCH: 5 CREAM TOPICAL at 14:05

## 2024-07-15 RX ADMIN — Medication 15 MILLIGRAM(S): at 11:06

## 2024-07-15 RX ADMIN — OXYCODONE HYDROCHLORIDE 10 MILLIGRAM(S): 30 TABLET ORAL at 03:29

## 2024-07-15 RX ADMIN — LIDOCAINE 5% 1 PATCH: 5 CREAM TOPICAL at 19:21

## 2024-07-15 RX ADMIN — OXYCODONE HYDROCHLORIDE 10 MILLIGRAM(S): 30 TABLET ORAL at 22:15

## 2024-07-15 RX ADMIN — OXYCODONE HYDROCHLORIDE 10 MILLIGRAM(S): 30 TABLET ORAL at 17:10

## 2024-07-15 RX ADMIN — OXYCODONE HYDROCHLORIDE 10 MILLIGRAM(S): 30 TABLET ORAL at 04:30

## 2024-07-15 RX ADMIN — Medication 160 MILLIGRAM(S): at 11:06

## 2024-07-15 RX ADMIN — Medication 100 MILLIGRAM(S): at 14:05

## 2024-07-15 RX ADMIN — OXYCODONE HYDROCHLORIDE 10 MILLIGRAM(S): 30 TABLET ORAL at 21:25

## 2024-07-15 RX ADMIN — OXYCODONE HYDROCHLORIDE 10 MILLIGRAM(S): 30 TABLET ORAL at 10:06

## 2024-07-15 RX ADMIN — LEVETIRACETAM 500 MILLIGRAM(S): 1000 TABLET, FILM COATED ORAL at 05:06

## 2024-07-15 RX ADMIN — Medication 5 MILLIGRAM(S): at 11:06

## 2024-07-15 RX ADMIN — OXYCODONE HYDROCHLORIDE 10 MILLIGRAM(S): 30 TABLET ORAL at 16:10

## 2024-07-15 RX ADMIN — ENOXAPARIN SODIUM 40 MILLIGRAM(S): 120 INJECTION SUBCUTANEOUS at 23:40

## 2024-07-15 RX ADMIN — Medication 100 MILLIGRAM(S): at 05:06

## 2024-07-15 RX ADMIN — Medication 100 MILLIGRAM(S): at 23:39

## 2024-07-15 RX ADMIN — Medication 3 MILLIGRAM(S): at 23:39

## 2024-07-15 RX ADMIN — LEVETIRACETAM 500 MILLIGRAM(S): 1000 TABLET, FILM COATED ORAL at 18:07

## 2024-07-15 RX ADMIN — Medication 17 GRAM(S): at 18:07

## 2024-07-15 RX ADMIN — OXYCODONE HYDROCHLORIDE 10 MILLIGRAM(S): 30 TABLET ORAL at 11:06

## 2024-07-15 RX ADMIN — Medication 975 MILLIGRAM(S): at 07:31

## 2024-07-15 NOTE — BH CONSULTATION LIAISON PROGRESS NOTE - NSBHCONSULTMEDAGITATION_PSY_A_CORE FT
For agitation unresponsive to verbal redirection can give haloperidol 5 mg PO/IM, lorazepam 2 mg PO/IM, diphenhydramine 50 mg PO/IM q6h PRN for agitation; hold for qtc > 500 ms
For agitation unresponsive to verbal redirection can give haloperidol 5 mg PO/IM, lorazepam 2 mg PO/IM, diphenhydramine 50 mg PO/IM q6h PRN for agitation; hold for qtc > 500 ms

## 2024-07-15 NOTE — PROGRESS NOTE ADULT - PROBLEM SELECTOR PLAN 3
Patient reports history of bipolar disorder with psychotic features. Previously prescribed quetiapine 150mg qd, buspirone 15mg qd, mirtazapine 15mg qd. Unclear what she is currently taking.  - Attempted to do med rec with shelter, pharmacy, patient, unable to find current doses for her mood medications  - She is endorsing 'ugly' thoughts about relapse, difficulty sleeping, visual hallucinations  - Negative SI/HI  - Started 25 mg Seroquel 7/9  - Worsening psychotic symptoms on 7/11. Re-attempted to contact shelter for med rec but unable to get through. Will aim to determine mood medication doses before making changes to regimen  - Psych recs appreciated - increased Seroquel from 25mg to 100mg qhs on 7/15

## 2024-07-15 NOTE — PROGRESS NOTE ADULT - ATTENDING COMMENTS
59-year-old female with a PMHx of IVDU (on Methadone), anemia and epilepsy who presented with LLE pain, found to have loculated fluid collection of left iliacus muscle and iliopsoas bursitis c/b MSSA bacteremia.  BCx (7/1) MSSA, BCx (7/3) NGTD.             Plan:            -IR consulted, s/p drainage, Cx with staph aureus            -TTE and TED negative for endocarditis           -ID consulted, continue with Cefazolin, tentative plan for 4–6 week course         -obtain repeat MRI-LLE given persistent pain despite pain medications and ABx, repeat CT with residual bursitis and fluid collection      -ortho consulted, no acute surgical intervention           -psych consulted, continue with Seroquel, Remeron and Methadone     -PT recommends [facility], no acceptances to date           Rest of plan as per resident note.

## 2024-07-15 NOTE — PROGRESS NOTE ADULT - PROBLEM SELECTOR PLAN 7
As per patient has history of epilepsy  States she takes Keppra but unsure of dose, follows with Neurologist at [facility]  Per Jennifer, previously prescribed Keppra 1000mg bid.   - Order Keppra 500mg bid x 2 doses for now, confirm doses.

## 2024-07-15 NOTE — PROGRESS NOTE ADULT - SUBJECTIVE AND OBJECTIVE BOX
[note incomplete] Patient is a 59y old  Female who presents with a chief complaint of left leg pain (15 Jul 2024 12:31)       INTERVAL HPI/OVERNIGHT EVENTS: No acute events overnight.    SUBJECTIVE: Patient seen and examined this morning. Patient reports sleeping okay, and her Left groin/leg pain is slightly better than yesterday. She has been able to walk in the unit with her rolling walker. She is eating and drinking well, and she has been having daily full bowel movements. Last bowel movement was 7/14 morning. Her main concern today is whether or not her leg is improving. She denies shortness of breath, chest pain, abdominal pain, N/V, diarrhea, and urinary discomfort.     All other review of systems negative except otherwise noted in HPI above.    MEDICATIONS  (STANDING):  acetaminophen     Tablet .. 975 milliGRAM(s) Oral every 8 hours  bisacodyl 5 milliGRAM(s) Oral daily  ceFAZolin   IVPB 2000 milliGRAM(s) IV Intermittent every 8 hours  enoxaparin Injectable 40 milliGRAM(s) SubCutaneous every 24 hours  levETIRAcetam 500 milliGRAM(s) Oral two times a day  lidocaine   4% Patch 1 Patch Transdermal daily  melatonin 3 milliGRAM(s) Oral at bedtime  mirtazapine 15 milliGRAM(s) Oral daily  polyethylene glycol 3350 17 Gram(s) Oral two times a day  QUEtiapine 100 milliGRAM(s) Oral at bedtime    MEDICATIONS  (PRN):  benzonatate 100 milliGRAM(s) Oral once PRN Cough  naloxone Injectable 1 milliGRAM(s) IV Push every 3 minutes PRN in case of overdose  oxyCODONE    IR 5 milliGRAM(s) Oral every 6 hours PRN Moderate Pain (4 - 6)  oxyCODONE    IR 10 milliGRAM(s) Oral every 6 hours PRN Severe Pain (7 - 10)  saline laxative (FLEET) Rectal Enema 1 Enema Rectal once PRN constipation, severe, please try oral laxatives first  senna 2 Tablet(s) Oral at bedtime PRN Constipation    Allergies    No Known Allergies    Intolerances      Vital Signs Last 24 Hrs  T(C): 36.4 (15 Jul 2024 08:45), Max: 37.3 (14 Jul 2024 20:59)  T(F): 97.6 (15 Jul 2024 08:45), Max: 99.1 (14 Jul 2024 20:59)  HR: 69 (15 Jul 2024 08:45) (69 - 95)  BP: 110/75 (15 Jul 2024 08:45) (92/57 - 114/75)  BP(mean): 71 (14 Jul 2024 20:59) (71 - 71)  RR: 18 (15 Jul 2024 08:45) (18 - 18)  SpO2: 98% (15 Jul 2024 08:45) (95% - 98%)    Parameters below as of 15 Jul 2024 08:45  Patient On (Oxygen Delivery Method): room air      PHYSICAL EXAM:  Constitutional - NAD, Comfortable  HEENT - NCAT, EOMI  Neck - No limited ROM  Chest - Breathing comfortably on room air, CTAB   Cardio - Warm and well perfused, RRR  Abdomen -  Soft, NTND  Extremities - No peripheral edema  Neurologic Exam: Awake and alert, speaking fluently  Psychiatric - Anxious affect  Skin on admission:    LABS:      Ca    9.8        14 Jul 2024 05:30        CAPILLARY BLOOD GLUCOSE        BLOOD CULTURE    RADIOLOGY & ADDITIONAL TESTS:    Imaging Personally Reviewed:  [ ] YES     Consultant(s) Notes Reviewed:      Care Discussed with Consultants/Other Providers:

## 2024-07-15 NOTE — PROGRESS NOTE ADULT - ASSESSMENT
I M    59 y o F pmhx significant for IV drug use, anemia, epilepsy who presents for sharp L leg pain progressively worsening over the past month, likely 2/2 iliopsoas bursitis i/s/o IVDU, s/p IR aspiration on 7/3, on cefazolin 2 g for MSSA bacteremia.       Nutritional Assessment:  · Nutritional Assessment	This patient has been assessed with a concern for Malnutrition and has been determined to have a diagnosis/diagnoses of Severe protein-calorie malnutrition.    This patient is being managed with:   Diet Regular-  No Concentrated Phosphorus  Supplement Feeding Modality:  Oral  Ensure Plus High Protein Cans or Servings Per Day:  1       Frequency:  Daily  Entered: Jul 10 2024  5:44PM    Problem/Plan - 1:  ·  Problem: Sepsis due to methicillin susceptible Staphylococcus aureus (MSSA) without acute organ dysfunction.   ·  Plan: Patient was found to have blood and wound cultures positive for MSSA bacteria on 7/1  - per ID recs, cultures from 7/3 have no growth, so there is no need for further surveillance cultures  - Patient will need 4-6 weeks of IV Cefazolin 2g q8h. ID recommends PICC line  - In event of early discharge before completion of IV antibiotics, contingency plan per ID is PO Dicloxacillin 500mg q6h for 6 weeks  - Midline placed in Left UE by IR PICC team on 7/11  - Continue IV antibiotics through LUE midline.    Problem/Plan - 2:  ·  Problem: Iliopsoas bursitis of left hip.   ·  Plan: Worsening left leg and thigh pain with radiation to groin. Duplex LLE negative for DVT. CT LE w IV contrast showed loculated fluid in L iliacus muscle, iliopsoas bursitis of infectious or inflammatory etiology s/p IR aspiration on 7/3.  - Initial BCx on 7/1 positive for MSSA. Plan as above  - Pain regimen: standing 975 tylenol q8, lidocaine patch, oxy 5mg q8PRN for moderate pain, oxy 10mg q6PRN for severe pain  - Toradol held due to mildly elevated creatinine  - CT of Left LE w/ IV contrast on 7/11 re-demonstrating iliopsoas bursitis and surrounding fluid collection, mildly improved compared to 7/3 study  - Will obtain f/u MRI of Left hip.    Problem/Plan - 3:  ·  Problem: Mood disorder.   ·  Plan: Patient reports history of bipolar disorder with psychotic features. Previously prescribed quetiapine 150mg qd, buspirone 15mg qd, mirtazapine 15mg qd. Unclear what she is currently taking.  - Attempted to do med rec with shelter, pharmacy, patient, unable to find current doses for her mood medications  - She is endorsing 'ugly' thoughts about relapse, difficulty sleeping, visual hallucinations  - Negative SI/HI  - Started 25 mg Seroquel 7/9  - Worsening psychotic symptoms on  7/11. Re-attempted to contact shelter for med rec but unable to get through. Will aim to determine mood medication doses before making changes to regimen  - Psych consult for medication management.    Problem/Plan - 4:  ·  Problem: Opioid dependence.   ·  Plan: Hx of IV drug use (heroin) on Methadone 160mg 5 days a week since age 19, confirmed with Methadone clinic. Last heroin injection in L leg on Saturday 6/29. Hep C positive, HIV negative. TTE 7/3 and TED 7/5 negative for endocarditis.  - Monitor COWs.    Problem/Plan - 5:  ·  Problem: BOB (acute kidney injury).   ·  Plan: Cr 1.28, BUN 29, GFR 49  - discontinued toradol  - encouraged PO fluid intake  - started on 1L NS 100cc/hour --> completed.    Problem/Plan - 6:  ·  Problem: Anemia.   ·  Plan: Pt states history of anemia  On admission Hg 10.2, Hct 31.0  - Continue to monitor H&H  - Maintain active type & screen  - Hb stable between 7.5 - 9.    Problem/Plan - 7:  ·  Problem: Epilepsy.   ·  Plan: As per patient has history of epilepsy  States she takes Keppra but unsure of dose, follows with Neurologist at [facility]  Per FrancoSckarlene, previously prescribed Keppra 1000mg bid.   - Order Keppra 500mg bid x 2 doses for now, confirm doses.    Problem/Plan - 8:  ·  Problem: Type 2 diabetes mellitus.   ·  Plan: Per patient's shelter, she was on insulin at some point, unclear of when she was taking it or history of diabetes.  - HbA1c 5.1  - BG well controlled inpatient.    Problem/Plan - 9:  ·  Problem: Severe protein-calorie malnutrition.   ·  Plan: - Ensure HD added per nutrition recs.    Problem/Plan - 10:  ·  Problem: Prophylactic measure.   ·  Plan; F: S/p NS 1L  E: replete as needed  N: regular diet, no concentrated phosphorus  DVT ppx: Lovenox 40mg subq  Dispo: [facility].

## 2024-07-15 NOTE — PROGRESS NOTE ADULT - SUBJECTIVE AND OBJECTIVE BOX
{\rtf1\pswtbt00606\ansi\vzghwsx8872\ftnbj\uc1\deff0  {\fonttbl{\f0 \fnil Segoe UI;}{\f1 \fnil \fcharset0 Segoe UI;}{\f2 \fnil Times New Ronald;}}  {\colortbl ;\efw501\xebgo861\silo309 ;\red0\green0\blue0 ;\red0\green0\blue0 ;}  {\stylesheet{\f0\fs20 Normal;}{\cs1 Default Paragraph Font;}{\cs2\f0\fs16 Line Number;}{\cs3\f2\fs24 Hyperlink;}}  {\*\revtbl{Unknown;}}  \olqnhz61291\fxdqlo20622\sgvmp1395\opied8107\ynfdi0775\jielf4328\yifcszy221\nydckrk801\nogrowautofit\yscchu129\formshade\nofeaturethrottle1\dntblnsbdb\fet4\aendnotes\aftnnrlc\pgbrdrhead\pgbrdrfoot  \sectd\vduhix43341\qvfnze56919\guttersxn0\mjirjrdp8840\ysgcxblf1972\qskwlbso5697\efomhvid8303\fhuhafn255\qsigxyy762\sbkpage\pgncont\pgndec  \plain\plain\f0\fs24\pard\plain\f0\fs24\plain\f0\fs20\erkh5136\hich\f0\dbch\f0\loch\f0\fs20\par  Physical Medicine and Rehabilitation Progress Note :   \par  \par  Patient is a 59y old  Female who presents with a chief complaint of left leg pain (15 Jul 2024 06:23)\par  \par  \par  HPI:\par  HPI: Pt is 60 y/o F pmhx significant for IV drug use, anemia, epilepsy who presents for sharp L leg pain progressively worsening over the past month. Pt states her pain began in the left thigh and later radiated to her toes but now has persisted in her   entire left leg and groin. Pt reports this began about one month ago and required multiple ER visits. She states she went to [place] on 5/31 where nothing was done and she was discharged. Most recently last week she returned to [place] where she   states she was given a 7 day course of oral antibiotics which she completed but did not improve her pain. She took Motrin which provided very minimal relief.  Pt last  injected heroin into her left thigh on Saturday 6/29. As per patient, she is chronically   on methadone 160 mg 5 days a week since she was 19. She reports subjective fevers. Denies nausea, vomiting, chest pain, shortness of breath, constipation, diarrhea, pain on urination, hematuria, hematemesis. \par  \par  \par  In the ED:\par  Initial vital signs: T: 98.7 F, HR: 82, BP: 100/68, RR: 16, SpO2: 96% on RA\par  Labs: significant for AST 61, CRP 99.5, Hg 10.2, Hct 31.0\par  CT Lower Extremity with IV Contrast, Left: loculated fluid in L iliacus muscle, iliopsoas bursitis of infectious or inflammatory etiology\par  US Duplex Venous LE, Left: No evidence of L lower extremity DVT\par  Medications: Zosyn, Vancomycin, NS, Ibuprofen\par  Consults: \par        Ortho- recommended admission for IV antibiotics and IR consult, will continue to follow (01 Jul 2024 18:56)\par  \par  \par           \par             8.5  \par  4.70  )-----------( 356      ( 14 Jul 2024 05:30 )\par             26.6 \par  \par  \par  07-14\par  \par  135  |  94<L>  |  26<H>\par  ----------------------------<  86\par  4.7   |  33<H>  |  1.06\par  \par  Ca    9.8      14 Jul 2024 05:30\par  Phos  5.2     07-14\par  Mg     2.1     07-14\par  \par  \par  Vital Signs Last 24 Hrs\par  T(C): 36.4 (15 Jul 2024 08:45), Max: 37.3 (14 Jul 2024 20:59)\par  T(F): 97.6 (15 Jul 2024 08:45), Max: 99.1 (14 Jul 2024 20:59)\par  HR: 69 (15 Jul 2024 08:45) (69 - 95)\par  BP: 110/75 (15 Jul 2024 08:45) (92/57 - 114/75)\par  BP(mean): 71 (14 Jul 2024 20:59) (71 - 71)\par  RR: 18 (15 Jul 2024 08:45) (18 - 18)\par  SpO2: 98% (15 Jul 2024 08:45) (95% - 98%)\par  \par  Parameters below as of 15 Jul 2024 08:45\par  Patient On (Oxygen Delivery Method): room air\par  \par  \par  \par  MEDICATIONS  (STANDING):\par  acetaminophen     Tablet .. 975 milliGRAM(s) Oral every 8 hours\par  bisacodyl 5 milliGRAM(s) Oral daily\par  ceFAZolin   IVPB 2000 milliGRAM(s) IV Intermittent every 8 hours\par  enoxaparin Injectable 40 milliGRAM(s) SubCutaneous every 24 hours\par  levETIRAcetam 500 milliGRAM(s) Oral two times a day\par  lidocaine   4% Patch 1 Patch Transdermal daily\par  melatonin 3 milliGRAM(s) Oral at bedtime\par  mirtazapine 15 milliGRAM(s) Oral daily\par  polyethylene glycol 3350 17 Gram(s) Oral two times a day\par  QUEtiapine 25 milliGRAM(s) Oral at bedtime\par  \par  MEDICATIONS  (PRN):\par  benzonatate 100 milliGRAM(s) Oral once PRN Cough\par  naloxone Injectable 1 milliGRAM(s) IV Push every 3 minutes PRN in case of overdose\par  oxyCODONE    IR 10 milliGRAM(s) Oral every 6 hours PRN Severe Pain (7 - 10)\par  oxyCODONE    IR 5 milliGRAM(s) Oral every 6 hours PRN Moderate Pain (4 - 6)\par  saline laxative (FLEET) Rectal Enema 1 Enema Rectal once PRN constipation, severe, please try oral laxatives first\par  senna 2 Tablet(s) Oral at bedtime PRN Constipation\par  \par  \par  T(C): 36.4 (07-15-24 @ 08:45), Max: 37.3 (07-14-24 @ 20:59)\par  HR: 69 (07-15-24 @ 08:45) (69 - 95)\par  BP: 110/75 (07-15-24 @ 08:45) (92/57 - 114/75)\par  RR: 18 (07-15-24 @ 08:45) (18 - 18)\par  SpO2: 98% (07-15-24 @ 08:45) (95% - 98%)\par  \par  \par  \par  \ql\plain\f0\fs24\plain\f0\fs20\vsff8586\hich\f0\dbch\f0\loch\f0\fs20 Physical Exam:     59 y o woman lying comfortably in semi Andino's position , awake , alert , no acute complaints \par  \par  Head: normocephalic , atraumatic\par  \par  Eyes: PERRLA , EOMI , no nystagmus , sclera anicteric\par  \par  ENT / FACE: neg nasal discharge , uvula midline , no oropharyngeal erythema / exudate\par  \par  Neck: supple , negative JVD , negative carotid bruits , no thyromegaly\par  \par  Chest: CTA bilaterally \par  \par  Cardiovascular: regular rate and rhythm , neg murmurs / rubs / gallops\par  \par  Abdomen: soft , non distended , no tenderness to palpation in all 4 quadrants ,  normal bowel sounds \par  \par  Extremities: WWP , neg cyanosis /clubbing / edema \par  \par  Musculoskeletal: pain w/ L hip flexion ,  in upper thigh and groin but decreased , no erythema \par  \par  Skin: \par  \par  : \par  \par  Neurologic Exam: \par  \par  Alert and oriented  x  3 \par  \par  Motor Exam:        > 3+/5 x 4 extremities , LLE pain limited proximally \par  \par  Sensation:         intact to light touch x 4 extremities\par                            \par  \par  DTR: \par  \par        biceps/brachioradialis: equal                  \par  \par        patella/ankle: equal \par  \pard\plain\f0\fs24\plain\f0\fs20\xwff9133\hich\f0\dbch\f0\loch\f0\fs20\par  \par  Functional Status Assessment :   7/14/2024 \par  \ql\plain\f0\fs24{\*\bkmkstart [id number]}{\*\bkmkend [id number]}\plain\f0\fs20\jzrk6184\hich\f0\dbch\f0\loch\f0\fs20 Previous Level of Function: \par  \par  \trowd\glrghm47\rfpehrq47\trpaddfl3\xoycgzu40\trpaddfr3\trpaddt0\trpaddft3\trpaddb0\trpaddfb3\trleft0  \clvertalt\jvzdbj03\clpadft3\kujjbh82\clpadfr3\clpadl0\clpadfl3\clpadb0\clpadfb3\aoyph4230  \clvertalt\bhduuw37\clpadft3\oqhvmk88\clpadfr3\clpadl0\clpadfl3\clpadb0\clpadfb3\cfkjt2921  \pard\intbl\ssparaaux0\s0\fi-120\li120\ql\plain\f0\fs24{\*\bkmkstart [id number]}{\*\bkmkend [id number]}\plain\f0\fs20\bunq8533\hich\f0\dbch\f0\loch\f0\fs20 \'b7 Bed Mobility/Transfers\cell  \pard\intbl\ssparaaux0\s0\li300\ri300\ql\plain\f0\fs24\plain\f0\fs20\ncoy0583\hich\f0\dbch\f0\loch\f0\fs20 independent\cell  \intbl\row  \pard\intbl\ssparaaux0\s0\fi-120\li120\ql\plain\f0\fs24{\*\bkmkstart [id number]}{\*\bkmkend qn8124155728}\plain\f0\fs20\fxyu1111\hich\f0\dbch\f0\loch\f0\fs20 \'b7 Bathing\cell  \pard\intbl\ssparaaux0\s0\li300\ri300\ql\plain\f0\fs24\plain\f0\fs20\gobw8311\hich\f0\dbch\f0\loch\f0\fs20 independent\cell  \intbl\row  \pard\intbl\ssparaaux0\s0\fi-120\li120\ql\plain\f0\fs24{\*\bkmkstart [id number]}{\*\bkmkend qz9089375624}\plain\f0\fs20\peck3081\hich\f0\dbch\f0\loch\f0\fs20 \'b7 Upper Body Dressing\cell  \pard\intbl\ssparaaux0\s0\li300\ri300\ql\plain\f0\fs24\plain\f0\fs20\jwbx5971\hich\f0\dbch\f0\loch\f0\fs20 independent\cell  \intbl\row  \pard\intbl\ssparaaux0\s0\fi-120\li120\ql\plain\f0\fs24{\*\bkmkstart [id number]}{\*\bkmkend kn5107552929}\plain\f0\fs20\vfme4997\hich\f0\dbch\f0\loch\f0\fs20 \'b7 Lower Body Dressing\cell  \pard\intbl\ssparaaux0\s0\li300\ri300\ql\plain\f0\fs24\plain\f0\fs20\vfgd1661\hich\f0\dbch\f0\loch\f0\fs20 independent\cell  \intbl\row  \pard\intbl\ssparaaux0\s0\fi-120\li120\ql\plain\f0\fs24{\*\bkmkstart [id number]}{\*\bkmkend qg1476144322}\plain\f0\fs20\qpfr6267\hich\f0\dbch\f0\loch\f0\fs20 \'b7 Grooming\cell  \pard\intbl\ssparaaux0\s0\li300\ri300\ql\plain\f0\fs24\plain\f0\fs20\ajxf3067\hich\f0\dbch\f0\loch\f0\fs20 independent\cell  \intbl\row  \pard\intbl\ssparaaux0\s0\fi-120\li120\ql\plain\f0\fs24{\*\bkmkstart [id number]}{\*\bkmkend dc1804130559}\plain\f0\fs20\ftbj8567\hich\f0\dbch\f0\loch\f0\fs20 \'b7 Toileting\cell  \pard\intbl\ssparaaux0\s0\li300\ri300\ql\plain\f0\fs24\plain\f0\fs20\mfaf4713\hich\f0\dbch\f0\loch\f0\fs20 independent\cell  \intbl\row  \pard\intbl\ssparaaux0\s0\fi-120\li120\ql\plain\f0\fs24{\*\bkmkstart [id number]}{\*\bkmkend tt5240623248}\plain\f0\fs20\qacr0312\hich\f0\dbch\f0\loch\f0\fs20 \'b7 Eating\cell  \pard\intbl\ssparaaux0\s0\li300\ri300\ql\plain\f0\fs24\plain\f0\fs20\evga3287\hich\f0\dbch\f0\loch\f0\fs20 independent\cell  \intbl\row  \trowd\mqapba64\lastrow\nymncrq72\trpaddfl3\tywoblo05\trpaddfr3\trpaddt0\trpaddft3\trpaddb0\trpaddfb3\trleft0  \clvertalt\hwnprs93\clpadft3\zchdzq18\clpadfr3\clpadl0\clpadfl3\clpadb0\clpadfb3\vujbl9782  \clvertalt\fqmbfo07\clpadft3\kvbfyi88\clpadfr3\clpadl0\clpadfl3\clpadb0\clpadfb3\bhgev6317  \pard\intbl\ssparaaux0\s0\fi-120\li120\ql\plain\f0\fs24{\*\bkmkstart [id number]}{\*\bkmkend ov54332526462}\plain\f0\fs20\tmwv0120\hich\f0\dbch\f0\loch\f0\fs20 \'b7 Additional Comments\cell  \pard\intbl\ssparaaux0\s0\li300\ri300\ql\plain\f0\fs24\plain\f0\fs20\abac3373\hich\f0\dbch\f0\loch\f0\fs20 Patient reports living in an [place] [facility] with elevator access. Patient states she was independent with all ADL's, IADL's and functional mobility   utilizing SC prior to admission. Patient is R hand dominant.\cell  \intbl\row  \pard\ssparaaux0\s0\ql\plain\f0\fs24\plain\f0\fs20\rqtc8840\hich\f0\dbch\f0\loch\f0\fs20\par  \trowd\mapvox41\venpmro34\trpaddfl3\romxymm98\trpaddfr3\trpaddt0\trpaddft3\trpaddb0\trpaddfb3\trleft0  \clvertalt\sdhwmf20\clpadft3\swgwmd22\clpadfr3\clpadl0\clpadfl3\clpadb0\clpadfb3\lpkbn2109  \clvertalt\wsawgz17\clpadft3\hiurri90\clpadfr3\clpadl0\clpadfl3\clpadb0\clpadfb3\iqrqk5677  \pard\intbl\ssparaaux0\s0\fi-120\li120\ql\plain\f0\fs24{\*\bkmkstart kk0629422162}{\*\bkmkend mz0089499903}\plain\f0\fs20\nntn1592\hich\f0\dbch\f0\loch\f0\fs20 \'b7 Ambulatory Devices Needed\cell  \pard\intbl\ssparaaux0\s0\li300\ri300\ql\plain\f0\fs24\plain\f0\fs20\kxdl5391\hich\f0\dbch\f0\loch\f0\fs20 yes  SC\cell  \intbl\row  \trowd\ujjirp13\lastrow\ybmrmua07\trpaddfl3\yttigtu11\trpaddfr3\trpaddt0\trpaddft3\trpaddb0\trpaddfb3\trleft0  \clvertalt\zirmaj42\clpadft3\feocgu92\clpadfr3\clpadl0\clpadfl3\clpadb0\clpadfb3\aamct3853  \clvertalt\owafex55\clpadft3\jqzfzg30\clpadfr3\clpadl0\clpadfl3\clpadb0\clpadfb3\ttfhn6940  \pard\intbl\ssparaaux0\s0\fi-120\li120\ql\plain\f0\fs24{\*\bkmkstart kx9593401953}{\*\bkmkend tk9069950254}\plain\f0\fs20\pjap9615\hich\f0\dbch\f0\loch\f0\fs20 \'b7 Adaptive Equipment Needed\cell  \pard\intbl\ssparaaux0\s0\li300\ri300\ql\plain\f0\fs24\plain\f0\fs20\nzti9977\hich\f0\dbch\f0\loch\f0\fs20 no\cell  \intbl\row  \pard\ssparaaux0\s0\ql\plain\f0\fs24\plain\f0\fs20\hyvw8765\hich\f0\dbch\f0\loch\f0\fs20\par  {\*\bkmkstart bn5057301955}{\*\bkmkend pb9334169117}Cognitive Status Examination: \par  \trowd\kcviin78\njvgxxe35\trpaddfl3\bmmesus14\trpaddfr3\trpaddt0\trpaddft3\trpaddb0\trpaddfb3\trleft0  \clvertalt\jmuurh35\clpadft3\fjigsw07\clpadfr3\clpadl0\clpadfl3\clpadb0\clpadfb3\ysyvt8632  \clvertalt\bfvgju82\clpadft3\vdtysv88\clpadfr3\clpadl0\clpadfl3\clpadb0\clpadfb3\hhskk0685  \pard\intbl\ssparaaux0\s0\fi-120\li120\ql\plain\f0\fs24{\*\bkmkstart au9815055176}{\*\bkmkend cg9332254652}\plain\f0\fs20\ucpm6730\hich\f0\dbch\f0\loch\f0\fs20 \'b7 Level of Consciousness\cell  \pard\intbl\ssparaaux0\s0\li300\ri300\ql\plain\f0\fs24\plain\f0\fs20\oels2876\hich\f0\dbch\f0\loch\f0\fs20 alert\cell  \intbl\row  \pard\intbl\ssparaaux0\s0\fi-120\li120\ql\plain\f0\fs24{\*\bkmkstart uc0684740936}{\*\bkmkend ky0960562110}\plain\f0\fs20\pkag3914\hich\f0\dbch\f0\loch\f0\fs20 \'b7 Orientation\cell  \pard\intbl\ssparaaux0\s0\li300\ri300\ql\plain\f0\fs24\plain\f0\fs20\rxfq2262\hich\f0\dbch\f0\loch\f0\fs20 oriented to person, place, time and situation\cell  \intbl\row  \pard\intbl\ssparaaux0\s0\fi-120\li120\ql\plain\f0\fs24{\*\bkmkstart [id number]}{\*\bkmkend nu8480764309}\plain\f0\fs20\yzql1715\hich\f0\dbch\f0\loch\f0\fs20 \'b7 Follow Commands/Answers Questions\cell  \pard\intbl\ssparaaux0\s0\li300\ri300\ql\plain\f0\fs24\plain\f0\fs20\fpxz6983\hich\f0\dbch\f0\loch\f0\fs20 100% of the time\cell  \intbl\row  \pard\intbl\ssparaaux0\s0\fi-120\li120\ql\plain\f0\fs24{\*\bkmkstart ua2828228695}{\*\bkmkend xe1988191336}\plain\f0\fs20\mhox5015\hich\f0\dbch\f0\loch\f0\fs20 \'b7 Personal Safety and Judgment\cell  \pard\intbl\ssparaaux0\s0\li300\ri300\ql\plain\f0\fs24\plain\f0\fs20\derk3523\hich\f0\dbch\f0\loch\f0\fs20 intact\cell  \intbl\row  \pard\intbl\ssparaaux0\s0\fi-120\li120\ql\plain\f0\fs24{\*\bkmkstart eb1631151656}{\*\bkmkend ex7924702882}\plain\f0\fs20\vdkh4477\hich\f0\dbch\f0\loch\f0\fs20 \'b7 Short Term Memory\cell  \pard\intbl\ssparaaux0\s0\li300\ri300\ql\plain\f0\fs24\plain\f0\fs20\jpuz3033\hich\f0\dbch\f0\loch\f0\fs20 intact\cell  \intbl\row  \trowd\rlbjjr52\lastrow\fhwqoyd00\trpaddfl3\bvyqgvp32\trpaddfr3\trpaddt0\trpaddft3\trpaddb0\trpaddfb3\trleft0  \clvertalt\wbbzgt71\clpadft3\gqmabl07\clpadfr3\clpadl0\clpadfl3\clpadb0\clpadfb3\aynpb7437  \clvertalt\mjemjv49\clpadft3\znshae63\clpadfr3\clpadl0\clpadfl3\clpadb0\clpadfb3\bqmwh0693  \pard\intbl\ssparaaux0\s0\fi-120\li120\ql\plain\f0\fs24{\*\bkmkstart zp8210377691}{\*\bkmkend ye9428657021}\plain\f0\fs20\lwmf2398\hich\f0\dbch\f0\loch\f0\fs20 \'b7 Long Term Memory\cell  \pard\intbl\ssparaaux0\s0\li300\ri300\ql\plain\f0\fs24\plain\f0\fs20\uyfn0966\hich\f0\dbch\f0\loch\f0\fs20 intact\cell  \intbl\row  \pard\ssparaaux0\s0\ql\plain\f0\fs24\plain\f0\fs20\rlzh2651\hich\f0\dbch\f0\loch\f0\fs20\par  {\*\bkmkstart tp3813752593}{\*\bkmkend se4183144695}Range of Motion Exam: \par  \trowd\xovqzv87\ndcyrek90\trpaddfl3\hkgmlhv44\trpaddfr3\trpaddt0\trpaddft3\trpaddb0\trpaddfb3\trleft0  \clvertalt\ocmjzz34\clpadft3\zlfvqy10\clpadfr3\clpadl0\clpadfl3\clpadb0\clpadfb3\xbfsr1127  \clvertalt\vdxzbc48\clpadft3\oqwlru31\clpadfr3\clpadl0\clpadfl3\clpadb0\clpadfb3\mbtqs4435  \pard\intbl\ssparaaux0\s0\fi-120\li120\ql\plain\f0\fs24{\*\bkmkstart rl4020974366}{\*\bkmkend pj2572287269}\plain\f0\fs20\hlhu8231\hich\f0\dbch\f0\loch\f0\fs20 \'b7 Range of Motion Examination\cell  \pard\intbl\ssparaaux0\s0\li300\ri300\ql\plain\f0\fs24\plain\f0\fs20\szfd3255\hich\f0\dbch\f0\loch\f0\fs20 no Active ROM deficits were identified\cell  \intbl\row  \pard\intbl\ssparaaux0\s0\fi-120\li120\ql\plain\f0\fs24{\*\bkmkstart be7377305313}{\*\bkmkend dq3574748329}\plain\f0\fs20\gxhm3244\hich\f0\dbch\f0\loch\f0\fs20 \'b7 Range of Motion Examination, Upper Extremity\cell  \pard\intbl\ssparaaux0\s0\li300\ri300\ql\plain\f0\fs24\plain\f0\fs20\vnwb4609\hich\f0\dbch\f0\loch\f0\fs20 bilateral UE Active ROM was WFL  (within functional limits)\cell  \intbl\row  \trowd\ldajug88\lastrow\nbpapph04\trpaddfl3\jsovpsy26\trpaddfr3\trpaddt0\trpaddft3\trpaddb0\trpaddfb3\trleft0  \clvertalt\jpjnwt03\clpadft3\xijdct57\clpadfr3\clpadl0\clpadfl3\clpadb0\clpadfb3\xgniy6524  \clvertalt\keahjq13\clpadft3\tstqhi66\clpadfr3\clpadl0\clpadfl3\clpadb0\clpadfb3\ddkmh9772  \pard\intbl\ssparaaux0\s0\fi-120\li120\ql\plain\f0\fs24{\*\bkmkstart qe3942888127}{\*\bkmkend di4331979173}\plain\f0\fs20\dkrh3426\hich\f0\dbch\f0\loch\f0\fs20 \'b7 Range of Motion Examination, Lower Extremity\cell  \pard\intbl\ssparaaux0\s0\li300\ri300\ql\plain\f0\fs24\plain\f0\fs20\voak4389\hich\f0\dbch\f0\loch\f0\fs20 bilateral LE Active ROM was WFL  (within functional limits)\cell  \intbl\row  \pard\ssparaaux0\s0\ql\plain\f0\fs24\plain\f0\fs20\txjh0340\hich\f0\dbch\f0\loch\f0\fs20\par  {\*\bkmkstart jb1907797289}{\*\bkmkend fy0838133658}Manual Muscle Testing: \par  \trowd\ldlivt32\lastrow\mkgjigj49\trpaddfl3\raujnmm85\trpaddfr3\trpaddt0\trpaddft3\trpaddb0\trpaddfb3\trleft0  \clvertalt\trtbyc62\clpadft3\vilkgm61\clpadfr3\clpadl0\clpadfl3\clpadb0\clpadfb3\jfvvl2123  \clvertalt\njxlyq69\clpadft3\khdsdo22\clpadfr3\clpadl0\clpadfl3\clpadb0\clpadfb3\xalqi4471  \pard\intbl\ssparaaux0\s0\fi-120\li120\ql\plain\f0\fs24{\*\bkmkstart pv6749963349}{\*\bkmkend qa3841303481}\plain\f0\fs20\afez3992\hich\f0\dbch\f0\loch\f0\fs20 \'b7 Manual Muscle Testing Results\cell  \pard\intbl\ssparaaux0\s0\li300\ri300\ql\plain\f0\fs24\plain\f0\fs20\homl0735\hich\f0\dbch\f0\loch\f0\fs20 BUE/BLE at least 3+/5 grossly based on functional mobility assessment against gravity\cell  \intbl\row  \pard\ssparaaux0\s0\ql\plain\f0\fs24\plain\f0\fs20\lmhi3662\hich\f0\dbch\f0\loch\f0\fs20\par  {\*\bkmkstart zf8977818631}{\*\bkmkend cy1599956056}Muscle Tone Assessment: \par  \trowd\tavkvh84\lastrow\kepggrp43\trpaddfl3\agdhpku24\trpaddfr3\trpaddt0\trpaddft3\trpaddb0\trpaddfb3\trleft0  \clvertalt\kuofpb20\clpadft3\wpyhhf67\clpadfr3\clpadl0\clpadfl3\clpadb0\clpadfb3\nlffk8270  \clvertalt\bchehz78\clpadft3\hjfzyf03\clpadfr3\clpadl0\clpadfl3\clpadb0\clpadfb3\vwsla9493  \pard\intbl\ssparaaux0\s0\fi-120\li120\ql\plain\f0\fs24{\*\bkmkstart [id number]}{\*\bkmkend kx0095798676}\plain\f0\fs20\uisb0702\hich\f0\dbch\f0\loch\f0\fs20 \'b7 Muscle Tone Assessment\cell  \pard\intbl\ssparaaux0\s0\li300\ri300\ql\plain\f0\fs24\plain\f0\fs20\iiuv6159\hich\f0\dbch\f0\loch\f0\fs20 bilateral lower extremities; bilateral upper extremities; normal\cell  \intbl\row  \pard\ssparaaux0\s0\ql\plain\f0\fs24\plain\f0\fs20\cycu3043\hich\f0\dbch\f0\loch\f0\fs20\par  {\*\bkmkstart [id number]}{\*\bkmkend [id number]}Bed Mobility: Rolling/Turning: \par  \par  \trowd\awwrfu64\lastrow\sodbivt41\trpaddfl3\omezlii84\trpaddfr3\trpaddt0\trpaddft3\trpaddb0\trpaddfb3\trleft0  \clvertalt\sniwks04\clpadft3\qmorbr90\clpadfr3\clpadl0\clpadfl3\clpadb0\clpadfb3\elfti5290  \clvertalt\fexixj42\clpadft3\rxibpg15\clpadfr3\clpadl0\clpadfl3\clpadb0\clpadfb3\btrue8854  \pard\intbl\ssparaaux0\s0\fi-120\li120\ql\plain\f0\fs24{\*\bkmkstart [id number]}{\*\bkmkend [id number]}\plain\f0\fs20\togf0976\hich\f0\dbch\f0\loch\f0\fs20 \'b7 Level of Otoe\cell  \pard\intbl\ssparaaux0\s0\li300\ri300\ql\plain\f0\fs24\plain\f0\fs20\fsmr0605\hich\f0\dbch\f0\loch\f0\fs20 independent\cell  \intbl\row  \pard\ssparaaux0\s0\ql\plain\f0\fs24\plain\f0\fs20\ipwz1503\hich\f0\dbch\f0\loch\f0\fs20\par  {\*\bkmkstart [id number]}{\*\bkmkend [id number]}Bed Mobility: Scooting/Bridging: \par  \par  \trowd\uupxhg29\lastrow\isksmqj88\trpaddfl3\oqofnip93\trpaddfr3\trpaddt0\trpaddft3\trpaddb0\trpaddfb3\trleft0  \clvertalt\eechze63\clpadft3\ngyvev42\clpadfr3\clpadl0\clpadfl3\clpadb0\clpadfb3\dunpr9190  \clvertalt\cmexta50\clpadft3\ghapyr74\clpadfr3\clpadl0\clpadfl3\clpadb0\clpadfb3\jieal6637  \pard\intbl\ssparaaux0\s0\fi-120\li120\ql\plain\f0\fs24{\*\bkmkstart [id number]}{\*\bkmkend kj8332612367}\plain\f0\fs20\tpob4177\hich\f0\dbch\f0\loch\f0\fs20 \'b7 Level of Otoe\cell  \pard\intbl\ssparaaux0\s0\li300\ri300\ql\plain\f0\fs24\plain\f0\fs20\plgq2764\hich\f0\dbch\f0\loch\f0\fs20 independent\cell  \intbl\row  \pard\ssparaaux0\s0\ql\plain\f0\fs24\plain\f0\fs20\wvqz5663\hich\f0\dbch\f0\loch\f0\fs20\par  {\*\bkmkstart [id number]}{\*\bkmkend [id number]}Bed Mobility: Sit to Supine: \par  \par  \trowd\wtuqeq27\lastrow\tcxkqfe05\trpaddfl3\ipjqyga42\trpaddfr3\trpaddt0\trpaddft3\trpaddb0\trpaddfb3\trleft0  \clvertalt\jqrbnp95\clpadft3\veqrrg64\clpadfr3\clpadl0\clpadfl3\clpadb0\clpadfb3\tatvb0114  \clvertalt\sspuwv60\clpadft3\drhonn27\clpadfr3\clpadl0\clpadfl3\clpadb0\clpadfb3\rmexd9018  \pard\intbl\ssparaaux0\s0\fi-120\li120\ql\plain\f0\fs24{\*\bkmkstart [id number]}{\*\bkmkend wa3273094023}\plain\f0\fs20\jtxd4998\hich\f0\dbch\f0\loch\f0\fs20 \'b7 Level of Otoe\cell  \pard\intbl\ssparaaux0\s0\li300\ri300\ql\plain\f0\fs24\plain\f0\fs20\ihcm3883\hich\f0\dbch\f0\loch\f0\fs20 Patient left seated at EOB with Psych\cell  \intbl\row  \pard\ssparaaux0\s0\ql\plain\f0\fs24\plain\f0\fs20\rjyh6488\hich\f0\dbch\f0\loch\f0\fs20\par  {\*\bkmkstart [id number]}{\*\bkmkend [id number]}Bed Mobility: Supine to Sit: \par  \par  \trowd\qafjpc14\lastrow\prkkqzq81\trpaddfl3\jxptkge06\trpaddfr3\trpaddt0\trpaddft3\trpaddb0\trpaddfb3\trleft0  \clvertalt\htmury24\clpadft3\vgnepw11\clpadfr3\clpadl0\clpadfl3\clpadb0\clpadfb3\liklz0504  \clvertalt\zlzcjo21\clpadft3\jkncwx64\clpadfr3\clpadl0\clpadfl3\clpadb0\clpadfb3\raugm6166  \pard\intbl\ssparaaux0\s0\fi-120\li120\ql\plain\f0\fs24{\*\bkmkstart [id number]}{\*\bkmkend [id number]}\plain\f0\fs20\diik9872\hich\f0\dbch\f0\loch\f0\fs20 \'b7 Level of Otoe\cell  \pard\intbl\ssparaaux0\s0\li300\ri300\ql\plain\f0\fs24\plain\f0\fs20\ssgd1278\hich\f0\dbch\f0\loch\f0\fs20 independent\cell  \intbl\row  \pard\ssparaaux0\s0\ql\plain\f0\fs24\plain\f0\fs20\ltxc4194\hich\f0\dbch\f0\loch\f0\fs20\par  {\*\bkmkstart [id number]}{\*\bkmkend dj0755536652}Transfer: Sit to Stand: \par  \par  \trowd\vkcbwz61\nqffrih74\trpaddfl3\iprstmg61\trpaddfr3\trpaddt0\trpaddft3\trpaddb0\trpaddfb3\trleft0  \clvertalt\ozgmsr08\clpadft3\malzta96\clpadfr3\clpadl0\clpadfl3\clpadb0\clpadfb3\wodbm5384  \clvertalt\owokhh92\clpadft3\sxwmxr24\clpadfr3\clpadl0\clpadfl3\clpadb0\clpadfb3\gmhcp2774  \pard\intbl\ssparaaux0\s0\fi-120\li120\ql\plain\f0\fs24{\*\bkmkstart [id number]}{\*\bkmkend hn3370755154}\plain\f0\fs20\dcpm9456\hich\f0\dbch\f0\loch\f0\fs20 \'b7 Level of Otoe\cell  \pard\intbl\ssparaaux0\s0\li300\ri300\ql\plain\f0\fs24\plain\f0\fs20\arkf0821\hich\f0\dbch\f0\loch\f0\fs20 contact guard\cell  \intbl\row  \pard\intbl\ssparaaux0\s0\fi-120\li120\ql\plain\f0\fs24{\*\bkmkstart iv0799298496}{\*\bkmkend [id number]}\plain\f0\fs20\dydm5223\hich\f0\dbch\f0\loch\f0\fs20 \'b7 Physical Assist/Nonphysical Assist\cell  \pard\intbl\ssparaaux0\s0\li300\ri300\ql\plain\f0\fs24\plain\f0\fs20\lwcr3422\hich\f0\dbch\f0\loch\f0\fs20 verbal cues\cell  \intbl\row  \pard\intbl\ssparaaux0\s0\fi-120\li120\ql\plain\f0\fs24{\*\bkmkstart vu9588137858}{\*\bkmkend zs2820038346}\plain\f0\fs20\wpdw8092\hich\f0\dbch\f0\loch\f0\fs20 \'b7 Weight-Bearing Restrictions\cell  \pard\intbl\ssparaaux0\s0\li300\ri300\ql\plain\f0\fs24\plain\f0\fs20\xrjj5284\hich\f0\dbch\f0\loch\f0\fs20 weight-bearing as tolerated\cell  \intbl\row  \trowd\naakez55\lastrow\mhwqbrs76\trpaddfl3\vhvypzu81\trpaddfr3\trpaddt0\trpaddft3\trpaddb0\trpaddfb3\trleft0  \clvertalt\drprvh67\clpadft3\fluzbo32\clpadfr3\clpadl0\clpadfl3\clpadb0\clpadfb3\efway5167  \clvertalt\qezqws61\clpadft3\vpoont35\clpadfr3\clpadl0\clpadfl3\clpadb0\clpadfb3\vixol0832  \pard\intbl\ssparaaux0\s0\fi-120\li120\ql\plain\f0\fs24{\*\bkmkstart [id number]}{\*\bkmkend [id number]}\plain\f0\fs20\dcat9716\hich\f0\dbch\f0\loch\f0\fs20 \'b7 Assistive Device\cell  \pard\intbl\ssparaaux0\s0\li300\ri300\ql\plain\f0\fs24\plain\f0\fs20\qngj1199\hich\f0\dbch\f0\loch\f0\fs20 rolling walker\cell  \intbl\row  \pard\ssparaaux0\s0\ql\plain\f0\fs24\plain\f0\fs20\olbg7086\hich\f0\dbch\f0\loch\f0\fs20\par  {\*\bkmkstart [id number]}{\*\bkmkend [id number]}Transfer: Stand to Sit: \par  \par  \trowd\zcnbqz75\lrhdmxv09\trpaddfl3\gilemeg11\trpaddfr3\trpaddt0\trpaddft3\trpaddb0\trpaddfb3\trleft0  \clvertalt\nytoph72\clpadft3\bsnkmf85\clpadfr3\clpadl0\clpadfl3\clpadb0\clpadfb3\zqtwd6033  \clvertalt\rtowad12\clpadft3\jglixl89\clpadfr3\clpadl0\clpadfl3\clpadb0\clpadfb3\gogrc5368  \pard\intbl\ssparaaux0\s0\fi-120\li120\ql\plain\f0\fs24{\*\bkmkstart [id number]}{\*\bkmkend dw7001982630}\plain\f0\fs20\hdqj2213\hich\f0\dbch\f0\loch\f0\fs20 \'b7 Level of Otoe\cell  \pard\intbl\ssparaaux0\s0\li300\ri300\ql\plain\f0\fs24\plain\f0\fs20\uxio0551\hich\f0\dbch\f0\loch\f0\fs20 contact guard\cell  \intbl\row  \pard\intbl\ssparaaux0\s0\fi-120\li120\ql\plain\f0\fs24{\*\bkmkstart [id number]}{\*\bkmkend eg4422069012}\plain\f0\fs20\gskp9987\hich\f0\dbch\f0\loch\f0\fs20 \'b7 Physical Assist/Nonphysical Assist\cell  \pard\intbl\ssparaaux0\s0\li300\ri300\ql\plain\f0\fs24\plain\f0\fs20\mpmh9837\hich\f0\dbch\f0\loch\f0\fs20 verbal cues\cell  \intbl\row  \pard\intbl\ssparaaux0\s0\fi-120\li120\ql\plain\f0\fs24{\*\bkmkstart [id number]}{\*\bkmkend bf9731519491}\plain\f0\fs20\bqup6455\hich\f0\dbch\f0\loch\f0\fs20 \'b7 Weight-Bearing Restrictions\cell  \pard\intbl\ssparaaux0\s0\li300\ri300\ql\plain\f0\fs24\plain\f0\fs20\xqov3658\hich\f0\dbch\f0\loch\f0\fs20 weight-bearing as tolerated\cell  \intbl\row  \trowd\jrrcsk16\lastrow\nyvofmr70\trpaddfl3\kztotxc57\trpaddfr3\trpaddt0\trpaddft3\trpaddb0\trpaddfb3\trleft0  \clvertalt\hreofz11\clpadft3\xsvxbh39\clpadfr3\clpadl0\clpadfl3\clpadb0\clpadfb3\nkuav1842  \clvertalt\ffmbmd44\clpadft3\fcdhab47\clpadfr3\clpadl0\clpadfl3\clpadb0\clpadfb3\spedg3110  \pard\intbl\ssparaaux0\s0\fi-120\li120\ql\plain\f0\fs24{\*\bkmkstart [id number]}{\*\bkmkend [id number]}\plain\f0\fs20\mmev7372\hich\f0\dbch\f0\loch\f0\fs20 \'b7 Assistive Device\cell  \pard\intbl\ssparaaux0\s0\li300\ri300\ql\plain\f0\fs24\plain\f0\fs20\dlnt4797\hich\f0\dbch\f0\loch\f0\fs20 rolling walker\cell  \intbl\row  \pard\ssparaaux0\s0\ql\plain\f0\fs24\plain\f0\fs20\pgfo9297\hich\f0\dbch\f0\loch\f0\fs20\par  {\*\bkmkstart [id number]}{\*\bkmkend [id number]}Sit/Stand Transfer Safety Analysis: \par  \par  \trowd\ywnidm46\eyxhnxf47\trpaddfl3\driugdp73\trpaddfr3\trpaddt0\trpaddft3\trpaddb0\trpaddfb3\trleft0  \clvertalt\yxiqep92\clpadft3\rxcnwr69\clpadfr3\clpadl0\clpadfl3\clpadb0\clpadfb3\bbcui1501  \clvertalt\rxqawf51\clpadft3\hbozlo03\clpadfr3\clpadl0\clpadfl3\clpadb0\clpadfb3\wtkxs6325  \pard\intbl\ssparaaux0\s0\fi-120\li120\ql\plain\f0\fs24{\*\bkmkstart [id number]}{\*\bkmkend da5999399826}\plain\f0\fs20\nlrz1447\hich\f0\dbch\f0\loch\f0\fs20 \'b7 Transfer Safety Concerns Noted\cell  \pard\intbl\ssparaaux0\s0\li300\ri300\ql\plain\f0\fs24\plain\f0\fs20\xfoh1514\hich\f0\dbch\f0\loch\f0\fs20 decreased weight-shifting ability; decreased balance during turns\cell  \intbl\row  \trowd\zgfghw28\lastrow\jjevzfc95\trpaddfl3\jxrkciv65\trpaddfr3\trpaddt0\trpaddft3\trpaddb0\trpaddfb3\trleft0  \clvertalt\qvvwrc93\clpadft3\jibprh83\clpadfr3\clpadl0\clpadfl3\clpadb0\clpadfb3\fqmin2689  \clvertalt\moyeer99\clpadft3\jguidu14\clpadfr3\clpadl0\clpadfl3\clpadb0\clpadfb3\wxwge7936  \pard\intbl\ssparaaux0\s0\fi-120\li120\ql\plain\f0\fs24{\*\bkmkstart [id number]}{\*\bkmkend [id number]}\plain\f0\fs20\mewh8026\hich\f0\dbch\f0\loch\f0\fs20 \'b7 Impairments Contributing to Impaired Transfers\cell  \pard\intbl\ssparaaux0\s0\li300\ri300\ql\plain\f0\fs24\plain\f0\fs20\opdb0875\hich\f0\dbch\f0\loch\f0\fs20 impaired balance; decreased flexibility; impaired postural control; decreased ROM; decreased strength\cell  \intbl\row  \pard\ssparaaux0\s0\ql\plain\f0\fs24\plain\f0\fs20\vxgi7074\hich\f0\dbch\f0\loch\f0\fs20\par  {\*\bkmkstart [id number]}{\*\bkmkend [id number]}Transfer: Toilet Transfer: \par  \par  \trowd\mxhvhu61\mlvguqu77\trpaddfl3\rnogjfn23\trpaddfr3\trpaddt0\trpaddft3\trpaddb0\trpaddfb3\trleft0  \clvertalt\bswtxc71\clpadft3\rtrwil95\clpadfr3\clpadl0\clpadfl3\clpadb0\clpadfb3\xudmb4170  \clvertalt\giytyl09\clpadft3\npemlc43\clpadfr3\clpadl0\clpadfl3\clpadb0\clpadfb3\hzwmd8566  \pard\intbl\ssparaaux0\s0\fi-120\li120\ql\plain\f0\fs24{\*\bkmkstart [id number]}{\*\bkmkend is5906410020}\plain\f0\fs20\bvpm4640\hich\f0\dbch\f0\loch\f0\fs20 \'b7 Level of Otoe\cell  \pard\intbl\ssparaaux0\s0\li300\ri300\ql\plain\f0\fs24\plain\f0\fs20\scbi2254\hich\f0\dbch\f0\loch\f0\fs20 contact guard\cell  \intbl\row  \pard\intbl\ssparaaux0\s0\fi-120\li120\ql\plain\f0\fs24{\*\bkmkstart [id number]}{\*\bkmkend bl2753820197}\plain\f0\fs20\ahef1329\hich\f0\dbch\f0\loch\f0\fs20 \'b7 Physical Assist/Nonphysical Assist\cell  \pard\intbl\ssparaaux0\s0\li300\ri300\ql\plain\f0\fs24\plain\f0\fs20\kvhd8692\hich\f0\dbch\f0\loch\f0\fs20 verbal cues\cell  \intbl\row  \pard\intbl\ssparaaux0\s0\fi-120\li120\ql\plain\f0\fs24{\*\bkmkstart ip0672582332}{\*\bkmkend vc3868290587}\plain\f0\fs20\cezb4337\hich\f0\dbch\f0\loch\f0\fs20 \'b7 Weight-Bearing Restrictions\cell  \pard\intbl\ssparaaux0\s0\li300\ri300\ql\plain\f0\fs24\plain\f0\fs20\huwy3632\hich\f0\dbch\f0\loch\f0\fs20 weight-bearing as tolerated\cell  \intbl\row  \trowd\oaxwns02\lastrow\ycrmwyx50\trpaddfl3\omoivyc97\trpaddfr3\trpaddt0\trpaddft3\trpaddb0\trpaddfb3\trleft0  \clvertalt\wvczcg93\clpadft3\krxfnb61\clpadfr3\clpadl0\clpadfl3\clpadb0\clpadfb3\gjucb5885  \clvertalt\dqnwrj99\clpadft3\fksgjt98\clpadfr3\clpadl0\clpadfl3\clpadb0\clpadfb3\vgsfj8301  \pard\intbl\ssparaaux0\s0\fi-120\li120\ql\plain\f0\fs24{\*\bkmkstart [id number]}{\*\bkmkend ne5162784113}\plain\f0\fs20\olwm4183\hich\f0\dbch\f0\loch\f0\fs20 \'b7 Assistive Device\cell  \pard\intbl\ssparaaux0\s0\li300\ri300\ql\plain\f0\fs24\plain\f0\fs20\ofuo2625\hich\f0\dbch\f0\loch\f0\fs20 rolling walker\cell  \intbl\row  \pard\ssparaaux0\s0\ql\plain\f0\fs24\plain\f0\fs20\wbln9478\hich\f0\dbch\f0\loch\f0\fs20\par  {\*\bkmkstart [id number]}{\*\bkmkend vb8174604673}Toilet Transfer Safety Analysis: \par  \par  \trowd\tswasf56\valolfd63\trpaddfl3\jmlwouv88\trpaddfr3\trpaddt0\trpaddft3\trpaddb0\trpaddfb3\trleft0  \clvertalt\lycjmm29\clpadft3\mqimqw87\clpadfr3\clpadl0\clpadfl3\clpadb0\clpadfb3\qnfku4313  \clvertalt\ljmwmi21\clpadft3\kdngjm76\clpadfr3\clpadl0\clpadfl3\clpadb0\clpadfb3\qpfir5641  \pard\intbl\ssparaaux0\s0\fi-120\li120\ql\plain\f0\fs24{\*\bkmkstart [id number]}{\*\bkmkend if2983425787}\plain\f0\fs20\vbfe2214\hich\f0\dbch\f0\loch\f0\fs20 \'b7 Transfer Safety Concerns Noted\cell  \pard\intbl\ssparaaux0\s0\li300\ri300\ql\plain\f0\fs24\plain\f0\fs20\lbjc5947\hich\f0\dbch\f0\loch\f0\fs20 decreased weight-shifting ability; decreased balance during turns\cell  \intbl\row  \trowd\mysnvx08\lastrow\lszxovc73\trpaddfl3\eigaump21\trpaddfr3\trpaddt0\trpaddft3\trpaddb0\trpaddfb3\trleft0  \clvertalt\cmztit22\clpadft3\hkmufc08\clpadfr3\clpadl0\clpadfl3\clpadb0\clpadfb3\tpgpz3172  \clvertalt\ideelr59\clpadft3\zefkir70\clpadfr3\clpadl0\clpadfl3\clpadb0\clpadfb3\mpyfg1636  \pard\intbl\ssparaaux0\s0\fi-120\li120\ql\plain\f0\fs24{\*\bkmkstart [id number]}{\*\bkmkend zy1865060579}\plain\f0\fs20\thzn0013\hich\f0\dbch\f0\loch\f0\fs20 \'b7 Impairments Contributing to Impaired Transfers\cell  \pard\intbl\ssparaaux0\s0\li300\ri300\ql\plain\f0\fs24\plain\f0\fs20\xenb8746\hich\f0\dbch\f0\loch\f0\fs20 impaired balance; decreased flexibility; impaired postural control; pain; decreased ROM; decreased strength\cell  \intbl\row  \pard\ssparaaux0\s0\ql\plain\f0\fs24\plain\f0\fs20\zjel6121\hich\f0\dbch\f0\loch\f0\fs20\par  {\*\bkmkstart [id number]}{\*\bkmkend [id number]}Balance Skills Assessment: \par  \par  \trowd\vlkujv98\auuiqkl21\trpaddfl3\ygqjdxt24\trpaddfr3\trpaddt0\trpaddft3\trpaddb0\trpaddfb3\trleft0  \clvertalt\emddre21\clpadft3\vvgnql18\clpadfr3\clpadl0\clpadfl3\clpadb0\clpadfb3\qbrub8334  \clvertalt\uchppe17\clpadft3\ytjfpi91\clpadfr3\clpadl0\clpadfl3\clpadb0\clpadfb3\smjma2692  \pard\intbl\ssparaaux0\s0\fi-120\li120\ql\plain\f0\fs24{\*\bkmkstart [id number]}{\*\bkmkend [id number]}\plain\f0\fs20\judj1108\hich\f0\dbch\f0\loch\f0\fs20 \'b7 Sitting Balance: Static\cell  \pard\intbl\ssparaaux0\s0\li300\ri300\ql\plain\f0\fs24\plain\f0\fs20\omyz0976\hich\f0\dbch\f0\loch\f0\fs20 good balance\cell  \intbl\row  \pard\intbl\ssparaaux0\s0\fi-120\li120\ql\plain\f0\fs24{\*\bkmkstart tn0945500181}{\*\bkmkend bg0466355900}\plain\f0\fs20\dekb1930\hich\f0\dbch\f0\loch\f0\fs20 \'b7 Sitting Balance: Dynamic\cell  \pard\intbl\ssparaaux0\s0\li300\ri300\ql\plain\f0\fs24\plain\f0\fs20\mdyi1239\hich\f0\dbch\f0\loch\f0\fs20 fair plus\cell  \intbl\row  \pard\intbl\ssparaaux0\s0\fi-120\li120\ql\plain\f0\fs24{\*\bkmkstart zl4050878354}{\*\bkmkend jh3348646647}\plain\f0\fs20\bzkl0830\hich\f0\dbch\f0\loch\f0\fs20 \'b7 Sit-to-Stand Balance\cell  \pard\intbl\ssparaaux0\s0\li300\ri300\ql\plain\f0\fs24\plain\f0\fs20\mypi8528\hich\f0\dbch\f0\loch\f0\fs20 fair balance\cell  \intbl\row  \pard\intbl\ssparaaux0\s0\fi-120\li120\ql\plain\f0\fs24{\*\bkmkstart [id number]}{\*\bkmkend rl9578921340}\plain\f0\fs20\xavf0032\hich\f0\dbch\f0\loch\f0\fs20 \'b7 Standing Balance: Static\cell  \pard\intbl\ssparaaux0\s0\li300\ri300\ql\plain\f0\fs24\plain\f0\fs20\kwbj6453\hich\f0\dbch\f0\loch\f0\fs20 fair balance\cell  \intbl\row  \pard\intbl\ssparaaux0\s0\fi-120\li120\ql\plain\f0\fs24{\*\bkmkstart [id number]}{\*\bkmkend mn1237982768}\plain\f0\fs20\mrty7398\hich\f0\dbch\f0\loch\f0\fs20 \'b7 Standing Balance: Dynamic\cell  \pard\intbl\ssparaaux0\s0\li300\ri300\ql\plain\f0\fs24\plain\f0\fs20\oqce6722\hich\f0\dbch\f0\loch\f0\fs20 fair balance\cell  \intbl\row  \pard\intbl\ssparaaux0\s0\fi-120\li120\ql\plain\f0\fs24{\*\bkmkstart [id number]}{\*\bkmkend zs4829927531}\plain\f0\fs20\wryg8517\hich\f0\dbch\f0\loch\f0\fs20 \'b7 Systems Impairment Contributing to Balance Disturbance\cell  \pard\intbl\ssparaaux0\s0\li300\ri300\ql\plain\f0\fs24\plain\f0\fs20\xgbv0561\hich\f0\dbch\f0\loch\f0\fs20 musculoskeletal\cell  \intbl\row  \trowd\zqdemo80\lastrow\graeged19\trpaddfl3\nubvftg78\trpaddfr3\trpaddt0\trpaddft3\trpaddb0\trpaddfb3\trleft0  \clvertalt\mqwzes18\clpadft3\ysezmh26\clpadfr3\clpadl0\clpadfl3\clpadb0\clpadfb3\rnkei1179  \clvertalt\uectwd66\clpadft3\eakdaw62\clpadfr3\clpadl0\clpadfl3\clpadb0\clpadfb3\exszx4285  \pard\intbl\ssparaaux0\s0\fi-120\li120\ql\plain\f0\fs24{\*\bkmkstart [id number]}{\*\bkmkend py6473390114}\plain\f0\fs20\hziu3408\hich\f0\dbch\f0\loch\f0\fs20 \'b7 Identified Impairments Contributing to Balance Disturbance\cell  \pard\intbl\ssparaaux0\s0\li300\ri300\ql\plain\f0\fs24\plain\f0\fs20\ekya7898\hich\f0\dbch\f0\loch\f0\fs20 decreased strength; decreased ROM; impaired postural control; pain\cell  \intbl\row  \pard\ssparaaux0\s0\ql\plain\f0\fs24\plain\f0\fs20\inmd2177\hich\f0\dbch\f0\loch\f0\fs20\par  {\*\bkmkstart [id number]}{\*\bkmkend sh3756606108}Sensory Examination: \par  \trowd\wocnhr69\ehunvvl37\trpaddfl3\ohinbwc70\trpaddfr3\trpaddt0\trpaddft3\trpaddb0\trpaddfb3\trleft0  \clvertalt\kztgof50\clpadft3\usbnsv96\clpadfr3\clpadl0\clpadfl3\clpadb0\clpadfb3\rgqlx1017  \clvertalt\enkskh86\clpadft3\kqhvjx16\clpadfr3\clpadl0\clpadfl3\clpadb0\clpadfb3\fqbmb8223  \pard\intbl\ssparaaux0\s0\fi-120\li120\ql\plain\f0\fs24{\*\bkmkstart [id number]}{\*\bkmkend on7571032432}\plain\f0\fs20\bgqp8995\hich\f0\dbch\f0\loch\f0\fs20 \'b7 Balance Skills\cell  \pard\intbl\ssparaaux0\s0\li300\ri300\ql\plain\f0\fs24\plain\f0\fs20\fevp2538\hich\f0\dbch\f0\loch\f0\fs20 Patient functionally ambulated into the hallway with RW and CGA. Patient noted with kyphotic posture, decreased weight shifting- advancing LLE,   deficits with step length and activity tolerance limited by pain and motivation requiring min verbal cues throughout for proper positioning and safety.\cell  \intbl\row  \trowd\pkwlbd05\lastrow\eelpjwa99\trpaddfl3\ldzcvva14\trpaddfr3\trpaddt0\trpaddft3\trpaddb0\trpaddfb3\trleft0  \clvertalt\gkufvb43\clpadft3\wsfokm95\clpadfr3\clpadl0\clpadfl3\clpadb0\clpadfb3\tnhyx8906  \clvertalt\hsxtxb49\clpadft3\mdvzsd99\clpadfr3\clpadl0\clpadfl3\clpadb0\clpadfb3\vwbgf7867  \pard\intbl\ssparaaux0\s0\li300\ri300\ql\plain\f0\fs24\plain\f0\fs20\vebl9423\hich\f0\dbch\f0\loch\f0\fs20\cell  \pard\intbl\ssparaaux0\s0\li300\ri300\ql\plain\f0\fs24\plain\f1\fs20\plxs1982\hich\f1\dbch\f1\loch\f1\cf2\fs20\strike\plain\f0\fs20\jeig0449\hich\f0\dbch\f0\loch\f0\fs20\cell  \intbl\row  \pard\ssparaaux0\s0\ql\plain\f0\fs24\plain\f0\fs20\hvxu5553\hich\f0\dbch\f0\loch\f0\fs20\par  \par  {\*\bkmkstart [id number]}{\*\bkmkend iq2765831055}Fine Motor Coordination: \par  \par  \plain\f1\fs20\mmzd4628\hich\f1\dbch\f1\loch\f1\cf2\fs20\ul Grossly Intact:\plain\f0\fs20\fltw4602\hich\f0\dbch\f0\loch\f0\fs20  \par  \trowd\rbfvza27\lastrow\yxmlitk92\trpaddfl3\psqwatk90\trpaddfr3\trpaddt0\trpaddft3\trpaddb0\trpaddfb3\trleft0  \clvertalt\cnckge22\clpadft3\dkugdd72\clpadfr3\clpadl0\clpadfl3\clpadb0\clpadfb3\szofl3205  \clvertalt\vrxinn79\clpadft3\nqybpg91\clpadfr3\clpadl0\clpadfl3\clpadb0\clpadfb3\ijfft9315  \pard\intbl\ssparaaux0\s0\fi-120\li120\ql\plain\f0\fs24{\*\bkmkstart yf4219171779}{\*\bkmkend ul9946884033}\plain\f0\fs20\usfg3759\hich\f0\dbch\f0\loch\f0\fs20 \'b7 Grossly Intact\cell  \pard\intbl\ssparaaux0\s0\li300\ri300\ql\plain\f0\fs24\plain\f0\fs20\dclk7050\hich\f0\dbch\f0\loch\f0\fs20 Left UE; Right UE\cell  \intbl\row  \pard\ssparaaux0\s0\ql\plain\f0\fs24\plain\f0\fs20\oyli1170\hich\f0\dbch\f0\loch\f0\fs20\par  \par  \plain\f1\fs20\gvtg7296\hich\f1\dbch\f1\loch\f1\cf2\fs20\ul Fine Motor Coordination:\plain\f0\fs20\myxs7978\hich\f0\dbch\f0\loch\f0\fs20  \par  \trowd\xaqhzo44\evdyvuw10\trpaddfl3\acachkj59\trpaddfr3\trpaddt0\trpaddft3\trpaddb0\trpaddfb3\trleft0  \clvertalt\urlmvr26\clpadft3\djaill39\clpadfr3\clpadl0\clpadfl3\clpadb0\clpadfb3\yqgmu3332  \clvertalt\pepzdl69\clpadft3\moprms95\clpadfr3\clpadl0\clpadfl3\clpadb0\clpadfb3\mnxtk0578  \pard\intbl\ssparaaux0\s0\fi-120\li120\ql\plain\f0\fs24{\*\bkmkstart go4725152650}{\*\bkmkend kg8369936754}\plain\f0\fs20\uqdz2600\hich\f0\dbch\f0\loch\f0\fs20 \'b7 Left Hand, Manipulation of Objects\cell  \pard\intbl\ssparaaux0\s0\li300\ri300\ql\plain\f0\fs24\plain\f0\fs20\zbit8471\hich\f0\dbch\f0\loch\f0\fs20 normal performance\cell  \intbl\row  \trowd\dlhauj52\lastrow\bxgooxv16\trpaddfl3\yancyzs15\trpaddfr3\trpaddt0\trpaddft3\trpaddb0\trpaddfb3\trleft0  \clvertalt\zmjrsm26\clpadft3\hnjfxx14\clpadfr3\clpadl0\clpadfl3\clpadb0\clpadfb3\uvmik3553  \clvertalt\faqube76\clpadft3\wtsxpu61\clpadfr3\clpadl0\clpadfl3\clpadb0\clpadfb3\johwn0641  \pard\intbl\ssparaaux0\s0\fi-120\li120\ql\plain\f0\fs24{\*\bkmkstart [id number]}{\*\bkmkend ag9430026480}\plain\f0\fs20\viqx6638\hich\f0\dbch\f0\loch\f0\fs20 \'b7 Right Hand, Manipulation of Objects\cell  \pard\intbl\ssparaaux0\s0\li300\ri300\ql\plain\f0\fs24\plain\f0\fs20\ngnp0301\hich\f0\dbch\f0\loch\f0\fs20 normal performance\cell  \intbl\row  \pard\ssparaaux0\s0\ql\plain\f0\fs24\plain\f0\fs20\ajcz5012\hich\f0\dbch\f0\loch\f0\fs20\par  {\*\bkmkstart [id number]}{\*\bkmkend dk9425494394}Toilet Hygiene Training: \par  \par  \trowd\dbgrhq58\mlvmcmd22\trpaddfl3\xxklgds44\trpaddfr3\trpaddt0\trpaddft3\trpaddb0\trpaddfb3\trleft0  \clvertalt\ziwkuo34\clpadft3\vvbbuq48\clpadfr3\clpadl0\clpadfl3\clpadb0\clpadfb3\xmpwe4518  \clvertalt\gscivc60\clpadft3\uyvrxe29\clpadfr3\clpadl0\clpadfl3\clpadb0\clpadfb3\upfvg2022  \pard\intbl\ssparaaux0\s0\fi-120\li120\ql\plain\f0\fs24{\*\bkmkstart kq8745023524}{\*\bkmkend zj5470354856}\plain\f0\fs20\hmhg6053\hich\f0\dbch\f0\loch\f0\fs20 \'b7 Level of Otoe\cell  \pard\intbl\ssparaaux0\s0\li300\ri300\ql\plain\f0\fs24\plain\f0\fs20\mvun0538\hich\f0\dbch\f0\loch\f0\fs20 contact guard; perineal hygiene\cell  \intbl\row  \trowd\spkzxe58\lastrow\pxeidvp68\trpaddfl3\gndeduf89\trpaddfr3\trpaddt0\trpaddft3\trpaddb0\trpaddfb3\trleft0  \clvertalt\chwtlo15\clpadft3\nqbbpf46\clpadfr3\clpadl0\clpadfl3\clpadb0\clpadfb3\dxovg8788  \clvertalt\urezgu48\clpadft3\oqnojf84\clpadfr3\clpadl0\clpadfl3\clpadb0\clpadfb3\kdpgr2715  \pard\intbl\ssparaaux0\s0\fi-120\li120\ql\plain\f0\fs24{\*\bkmkstart [id number]}{\*\bkmkend ve5668202494}\plain\f0\fs20\mwzf9501\hich\f0\dbch\f0\loch\f0\fs20 \'b7 Physical Assist/Nonphysical Assist\cell  \pard\intbl\ssparaaux0\s0\li300\ri300\ql\plain\f0\fs24\plain\f0\fs20\igzg9296\hich\f0\dbch\f0\loch\f0\fs20 verbal cues\cell  \intbl\row  \pard\ssparaaux0\s0\ql\plain\f0\fs24\plain\f0\fs20\ctck6630\hich\f0\dbch\f0\loch\f0\fs20\par  {\*\bkmkstart [id number]}{\*\bkmkend [id number]}Grooming Training: \par  \par  \trowd\vouzkn27\lastrow\pqrkrgh58\trpaddfl3\rdhmxby80\trpaddfr3\trpaddt0\trpaddft3\trpaddb0\trpaddfb3\trleft0  \clvertalt\oulohd89\clpadft3\fugtxb12\clpadfr3\clpadl0\clpadfl3\clpadb0\clpadfb3\nevqa2383  \clvertalt\eylmoo56\clpadft3\usibnd39\clpadfr3\clpadl0\clpadfl3\clpadb0\clpadfb3\qaaoc1742  \pard\intbl\ssparaaux0\s0\fi-120\li120\ql\plain\f0\fs24{\*\bkmkstart [id number]}{\*\bkmkend [id number]}\plain\f0\fs20\ydrx3579\hich\f0\dbch\f0\loch\f0\fs20 \'b7 Level of Otoe\cell  \pard\intbl\ssparaaux0\s0\li300\ri300\ql\plain\f0\fs24\plain\f0\fs20\lfaj1348\hich\f0\dbch\f0\loch\f0\fs20 supervision; brushing teeth/washing face while standing at sinkside\cell  \intbl\row  \pard\plain\f0\fs24\plain\f0\fs20\gjzj5512\hich\f0\dbch\f0\loch\f0\fs20\par  \par  \par  \par  PM&R Impression : as above\par  \par  \ql\plain\f0\fs24\plain\f0\fs20\hvjx7127\hich\f0\dbch\f0\loch\f0\fs20 Current disposition plan recommendation :    subacute rehab placement    \par  \par  \par  \par  \par  \par  }

## 2024-07-15 NOTE — PROGRESS NOTE ADULT - PROBLEM SELECTOR PLAN 10
F: S/p NS 1L  E: replete as needed  N: regular diet, no concentrated phosphorus, Suplena supplement shake daily  DVT ppx: Lovenox 40mg subq  Dispo: [facility]

## 2024-07-15 NOTE — BH CONSULTATION LIAISON PROGRESS NOTE - NSBHASSESSMENTFT_PSY_ALL_CORE
59 year old female, undomiciled, with reported history of bipolar disorder with psychotic features vs schizoaffective, OUD on methadone maintenance overlapping active use of heroin and cocaine, history of trauma and PMH of anemia, epilepsy       Plan:  -please increase the seroquel to 100mg qhs   - Continue methadone 160 mg daily  - Continue Remeron 15 mg qHS 59 year old female, undomiciled, with reported history of bipolar disorder with psychotic features vs schizoaffective, OUD on methadone maintenance overlapping active use of heroin and cocaine, history of trauma and PMH of anemia, epilepsy, who presents for sharp L leg pain progressively worsening over the past month, likely 2/2 iliopsoas bursitis i/s/o IVDU, s/p IR aspiration on 7/3, now on RMF for cefazolin 2 g for MSSA bacteremia. Psychiatry was consulted to provide recommendations for med management of mood and psychotic symptoms. On the initial evaluation and follow up, patient reported chronic mood and psychotic symptoms, denied any active SI/HI and admitted to recent use of substances.   On re-assessment today patient again reports chronic mood sx and delusional content but states that she is able to cope with them. She endorses insomnia and multiple somatic sx (including limited pain control and breakthrough withdrawal-likely due to high tolerance to substances). She denies any SI/HI and remains cooperative with her medical care.       Plan:  -please increase the seroquel to 100mg qhs     - no current indication for 1:1 observation nor inpatient psychiatric admission; will continue to evaluate, re-assess and provide psychoeducation on outpatient mental health follow up and substance use treatment options    -if the patient becomes agitated, can offer haldol 5mg po/im q6h, prn agitation (monitor for qtc prolongation)    - Continue methadone 160 mg daily  - Continue Remeron 15 mg qHS  -CL to follow as needed, please call with questions/concerns 59 year old female, undomiciled, with reported history of bipolar disorder with psychotic features vs schizoaffective, OUD on methadone maintenance overlapping active use of heroin and cocaine, history of trauma and PMH of anemia, epilepsy, who presents for sharp L leg pain progressively worsening over the past month, likely 2/2 iliopsoas bursitis i/s/o IVDU, s/p IR aspiration on 7/3, now on RMF for cefazolin 2 g for MSSA bacteremia. Psychiatry was consulted to provide recommendations for med management of mood and psychotic symptoms. On the initial evaluation and follow up, patient reported chronic mood and psychotic symptoms, denied any active SI/HI and admitted to recent use of substances.   On re-assessment today patient again reports chronic mood sx and delusional content but states that she is able to cope with them. She endorses insomnia and multiple somatic sx (including limited pain control and breakthrough withdrawal-likely due to high tolerance to substances). She denies any SI/HI and remains cooperative with her medical care.       Plan:  -please increase the seroquel to 100mg qhs     - no current indication for 1:1 observation nor inpatient psychiatric admission; will continue to evaluate, re-assess and provide psychoeducation on outpatient mental health follow up and substance use treatment options    -if the patient becomes agitated, can offer haldol 5mg po/im q6h, prn agitation (monitor for qtc prolongation)    - Continue with home methadone 160 mg daily  - Continue Remeron 15 mg qHS  -CL to follow as needed, please call with questions/concerns

## 2024-07-16 LAB
ANION GAP SERPL CALC-SCNC: 9 MMOL/L — SIGNIFICANT CHANGE UP (ref 5–17)
BASOPHILS # BLD AUTO: 0.04 K/UL — SIGNIFICANT CHANGE UP (ref 0–0.2)
BASOPHILS NFR BLD AUTO: 1 % — SIGNIFICANT CHANGE UP (ref 0–2)
BUN SERPL-MCNC: 24 MG/DL — HIGH (ref 7–23)
CALCIUM SERPL-MCNC: 9.8 MG/DL — SIGNIFICANT CHANGE UP (ref 8.4–10.5)
CHLORIDE SERPL-SCNC: 96 MMOL/L — SIGNIFICANT CHANGE UP (ref 96–108)
CO2 SERPL-SCNC: 33 MMOL/L — HIGH (ref 22–31)
CREAT SERPL-MCNC: 1.1 MG/DL — SIGNIFICANT CHANGE UP (ref 0.5–1.3)
EGFR: 59 ML/MIN/1.73M2 — LOW
EOSINOPHIL # BLD AUTO: 0.26 K/UL — SIGNIFICANT CHANGE UP (ref 0–0.5)
EOSINOPHIL NFR BLD AUTO: 6.7 % — HIGH (ref 0–6)
GLUCOSE SERPL-MCNC: 88 MG/DL — SIGNIFICANT CHANGE UP (ref 70–99)
HCT VFR BLD CALC: 27.8 % — LOW (ref 34.5–45)
HGB BLD-MCNC: 8.6 G/DL — LOW (ref 11.5–15.5)
IMM GRANULOCYTES NFR BLD AUTO: 1.3 % — HIGH (ref 0–0.9)
LYMPHOCYTES # BLD AUTO: 1.37 K/UL — SIGNIFICANT CHANGE UP (ref 1–3.3)
LYMPHOCYTES # BLD AUTO: 35.3 % — SIGNIFICANT CHANGE UP (ref 13–44)
MAGNESIUM SERPL-MCNC: 2.1 MG/DL — SIGNIFICANT CHANGE UP (ref 1.6–2.6)
MCHC RBC-ENTMCNC: 27.7 PG — SIGNIFICANT CHANGE UP (ref 27–34)
MCHC RBC-ENTMCNC: 30.9 GM/DL — LOW (ref 32–36)
MCV RBC AUTO: 89.4 FL — SIGNIFICANT CHANGE UP (ref 80–100)
MONOCYTES # BLD AUTO: 0.51 K/UL — SIGNIFICANT CHANGE UP (ref 0–0.9)
MONOCYTES NFR BLD AUTO: 13.1 % — SIGNIFICANT CHANGE UP (ref 2–14)
NEUTROPHILS # BLD AUTO: 1.65 K/UL — LOW (ref 1.8–7.4)
NEUTROPHILS NFR BLD AUTO: 42.6 % — LOW (ref 43–77)
NRBC # BLD: 0 /100 WBCS — SIGNIFICANT CHANGE UP (ref 0–0)
PHOSPHATE SERPL-MCNC: 5.2 MG/DL — HIGH (ref 2.5–4.5)
PLATELET # BLD AUTO: 389 K/UL — SIGNIFICANT CHANGE UP (ref 150–400)
POTASSIUM SERPL-MCNC: 4.6 MMOL/L — SIGNIFICANT CHANGE UP (ref 3.5–5.3)
POTASSIUM SERPL-SCNC: 4.6 MMOL/L — SIGNIFICANT CHANGE UP (ref 3.5–5.3)
RBC # BLD: 3.11 M/UL — LOW (ref 3.8–5.2)
RBC # FLD: 15.9 % — HIGH (ref 10.3–14.5)
SODIUM SERPL-SCNC: 138 MMOL/L — SIGNIFICANT CHANGE UP (ref 135–145)
WBC # BLD: 3.88 K/UL — SIGNIFICANT CHANGE UP (ref 3.8–10.5)
WBC # FLD AUTO: 3.88 K/UL — SIGNIFICANT CHANGE UP (ref 3.8–10.5)

## 2024-07-16 PROCEDURE — 99233 SBSQ HOSP IP/OBS HIGH 50: CPT

## 2024-07-16 PROCEDURE — 99232 SBSQ HOSP IP/OBS MODERATE 35: CPT | Mod: GC

## 2024-07-16 RX ADMIN — Medication 100 MILLIGRAM(S): at 22:05

## 2024-07-16 RX ADMIN — LIDOCAINE 5% 1 PATCH: 5 CREAM TOPICAL at 12:48

## 2024-07-16 RX ADMIN — Medication 975 MILLIGRAM(S): at 03:10

## 2024-07-16 RX ADMIN — OXYCODONE HYDROCHLORIDE 5 MILLIGRAM(S): 30 TABLET ORAL at 13:47

## 2024-07-16 RX ADMIN — Medication 975 MILLIGRAM(S): at 10:10

## 2024-07-16 RX ADMIN — LEVETIRACETAM 500 MILLIGRAM(S): 1000 TABLET, FILM COATED ORAL at 18:44

## 2024-07-16 RX ADMIN — OXYCODONE HYDROCHLORIDE 10 MILLIGRAM(S): 30 TABLET ORAL at 05:22

## 2024-07-16 RX ADMIN — OXYCODONE HYDROCHLORIDE 5 MILLIGRAM(S): 30 TABLET ORAL at 19:26

## 2024-07-16 RX ADMIN — LEVETIRACETAM 500 MILLIGRAM(S): 1000 TABLET, FILM COATED ORAL at 07:03

## 2024-07-16 RX ADMIN — Medication 5 MILLIGRAM(S): at 12:56

## 2024-07-16 RX ADMIN — ENOXAPARIN SODIUM 40 MILLIGRAM(S): 120 INJECTION SUBCUTANEOUS at 22:05

## 2024-07-16 RX ADMIN — LIDOCAINE 5% 1 PATCH: 5 CREAM TOPICAL at 02:00

## 2024-07-16 RX ADMIN — Medication 975 MILLIGRAM(S): at 04:00

## 2024-07-16 RX ADMIN — Medication 975 MILLIGRAM(S): at 11:10

## 2024-07-16 RX ADMIN — LIDOCAINE 5% 1 PATCH: 5 CREAM TOPICAL at 19:29

## 2024-07-16 RX ADMIN — OXYCODONE HYDROCHLORIDE 5 MILLIGRAM(S): 30 TABLET ORAL at 12:56

## 2024-07-16 RX ADMIN — Medication 100 MILLIGRAM(S): at 07:03

## 2024-07-16 RX ADMIN — OXYCODONE HYDROCHLORIDE 10 MILLIGRAM(S): 30 TABLET ORAL at 06:00

## 2024-07-16 RX ADMIN — Medication 975 MILLIGRAM(S): at 18:44

## 2024-07-16 RX ADMIN — OXYCODONE HYDROCHLORIDE 5 MILLIGRAM(S): 30 TABLET ORAL at 20:30

## 2024-07-16 RX ADMIN — Medication 100 MILLIGRAM(S): at 22:06

## 2024-07-16 RX ADMIN — Medication 3 MILLIGRAM(S): at 22:06

## 2024-07-16 RX ADMIN — Medication 100 MILLIGRAM(S): at 14:01

## 2024-07-16 RX ADMIN — Medication 160 MILLIGRAM(S): at 10:11

## 2024-07-16 RX ADMIN — Medication 17 GRAM(S): at 18:44

## 2024-07-16 RX ADMIN — Medication 975 MILLIGRAM(S): at 19:44

## 2024-07-16 RX ADMIN — Medication 15 MILLIGRAM(S): at 12:49

## 2024-07-16 NOTE — PROGRESS NOTE ADULT - SUBJECTIVE AND OBJECTIVE BOX
[note incomplete] Patient is a 59y old  Female who presents with a chief complaint of left leg pain (16 Jul 2024 06:23)       INTERVAL HPI/OVERNIGHT EVENTS: No acute events overnight.    SUBJECTIVE: Patient seen and examined this morning. She reports sleeping well and is happy with the change in Seroquel dose. She also reports that her Left groin/leg pain is slightly better and is happy with her overall progress. She continues to eat and drink well and has been having regular bowel movements. She denies fevers/chills, chest pain, shortness of breath, abdominal pain, N/V, and diarrhea.    All other review of systems negative except otherwise noted in HPI above.    MEDICATIONS  (STANDING):  acetaminophen     Tablet .. 975 milliGRAM(s) Oral every 8 hours  bisacodyl 5 milliGRAM(s) Oral daily  ceFAZolin   IVPB 2000 milliGRAM(s) IV Intermittent every 8 hours  enoxaparin Injectable 40 milliGRAM(s) SubCutaneous every 24 hours  levETIRAcetam 500 milliGRAM(s) Oral two times a day  lidocaine   4% Patch 1 Patch Transdermal daily  melatonin 3 milliGRAM(s) Oral at bedtime  methadone    Tablet 160 milliGRAM(s) Oral every 24 hours  mirtazapine 15 milliGRAM(s) Oral daily  polyethylene glycol 3350 17 Gram(s) Oral two times a day  QUEtiapine 100 milliGRAM(s) Oral at bedtime    MEDICATIONS  (PRN):  benzonatate 100 milliGRAM(s) Oral once PRN Cough  naloxone Injectable 1 milliGRAM(s) IV Push every 3 minutes PRN in case of overdose  oxyCODONE    IR 5 milliGRAM(s) Oral every 6 hours PRN Moderate Pain (4 - 6)  oxyCODONE    IR 10 milliGRAM(s) Oral every 6 hours PRN Severe Pain (7 - 10)  saline laxative (FLEET) Rectal Enema 1 Enema Rectal once PRN constipation, severe, please try oral laxatives first  senna 2 Tablet(s) Oral at bedtime PRN Constipation    Allergies    No Known Allergies    Intolerances      Vital Signs Last 24 Hrs  T(C): 37.1 (16 Jul 2024 10:06), Max: 37.1 (16 Jul 2024 10:06)  T(F): 98.8 (16 Jul 2024 10:06), Max: 98.8 (16 Jul 2024 10:06)  HR: 83 (16 Jul 2024 10:06) (79 - 85)  BP: 101/67 (16 Jul 2024 10:06) (92/63 - 101/67)  BP(mean): --  RR: 18 (16 Jul 2024 10:06) (18 - 18)  SpO2: 95% (16 Jul 2024 10:06) (94% - 96%)    Parameters below as of 16 Jul 2024 10:06  Patient On (Oxygen Delivery Method): room air      PHYSICAL EXAM:  Constitutional - NAD, Comfortable  HEENT - NCAT, EOMI  Neck - No limited ROM  Chest - Breathing comfortably on room air, CTAB   Cardio - Warm and well perfused, RRR  Abdomen - Soft, NTND  Extremities - No peripheral edema  Neurologic Exam: Awake, alert, conversing fluently      LABS:                        8.6    3.88  )-----------( 389      ( 16 Jul 2024 05:30 )             27.8     16 Jul 2024 05:30    138    |  96     |  24     ----------------------------<  88     4.6     |  33     |  1.10     Ca    9.8        16 Jul 2024 05:30  Phos  5.2       16 Jul 2024 05:30  Mg     2.1       16 Jul 2024 05:30      RADIOLOGY & ADDITIONAL TESTS: None

## 2024-07-16 NOTE — PROGRESS NOTE ADULT - PROBLEM SELECTOR PLAN 7
As per patient has history of epilepsy  States she takes Keppra but unsure of dose, follows with Neurologist at [facility]  Per Jennifer, previously prescribed Keppra 1000mg bid.   - Order Keppra 500mg bid x 2 doses for now, confirm doses.

## 2024-07-16 NOTE — PROGRESS NOTE ADULT - ATTENDING COMMENTS
59-year-old female with a PMHx of IVDU (on Methadone), anemia and epilepsy who presented with LLE pain, found to have loculated fluid collection of left iliacus muscle and iliopsoas bursitis c/b MSSA bacteremia.  BCx (7/1) MSSA, BCx (7/3) NGTD.               Plan:              -IR consulted, s/p drainage, Cx with staph aureus              -TTE and TED negative for endocarditis             -ID consulted, continue with Cefazolin, tentative plan for 4–6 week course           -ortho consulted, no acute surgical intervention             -psych consulted, continue with Seroquel, Remeron and Methadone       -PT recommends [facility], no acceptances to date       Rest of plan as outlined above.

## 2024-07-16 NOTE — PROGRESS NOTE ADULT - PROBLEM SELECTOR PLAN 3
Patient reports history of bipolar disorder with psychotic features. Previously prescribed quetiapine 150mg qd, buspirone 15mg qd, mirtazapine 15mg qd. Unclear what she is currently taking.  - Attempted to do med rec with shelter, pharmacy, patient, unable to find current doses for her mood medications  - She is endorsing 'ugly' thoughts about relapse, difficulty sleeping, visual hallucinations  - Negative SI/HI  - Started 25 mg Seroquel 7/9  - Worsening psychotic symptoms on 7/11. Re-attempted to contact shelter for med rec but unable to get through. Will aim to determine mood medication doses before making changes to regimen  - Psych recs appreciated - increased Seroquel from 25mg to 100mg qhs on 7/15. Tolerating well

## 2024-07-16 NOTE — PROGRESS NOTE ADULT - PROBLEM SELECTOR PLAN 2
Worsening left leg and thigh pain with radiation to groin. Duplex LLE negative for DVT. CT LE w IV contrast showed loculated fluid in L iliacus muscle, iliopsoas bursitis of infectious or inflammatory etiology s/p IR aspiration on 7/3.  - Initial BCx on 7/1 positive for MSSA. Plan as above  - Pain regimen: standing 975 tylenol q8, lidocaine patch, oxy 5mg q8PRN for moderate pain, oxy 10mg q6PRN for severe pain  - Toradol held due to mildly elevated creatinine  - CT of Left LE w/ IV contrast on 7/11 re-demonstrating iliopsoas bursitis and surrounding fluid collection, mildly improved compared to 7/3 study  - MR of pelvis/hip no longer needed as patient seems to be improving clinically

## 2024-07-16 NOTE — PROGRESS NOTE ADULT - PROBLEM SELECTOR PLAN 1
Patient has been tolerating allergy immunotherapy injections without incident.  Recommend advancing Prescription Treatment Set to the next stronger concentration.  Recommend continuing on the Escalated Build Up.  Please advise.   Patient was found to have blood and wound cultures positive for MSSA bacteria on 7/1  - per ID recs, cultures from 7/3 have no growth, so there is no need for further surveillance cultures  - Patient will need 4-6 weeks of IV Cefazolin 2g q8h. ID recommends PICC line  - In event of early discharge before completion of IV antibiotics, contingency plan per ID is PO Dicloxacillin 500mg q6h for 6 weeks  - Midline placed in Left UE by IR PICC team on 7/11  - Continue IV antibiotics through LUE midline

## 2024-07-16 NOTE — BH CONSULTATION LIAISON PROGRESS NOTE - NSBHASSESSMENTFT_PSY_ALL_CORE
59 year old female, undomiciled, with reported history of bipolar disorder with psychotic features vs schizoaffective, OUD on methadone maintenance overlapping active use of heroin and cocaine, history of trauma and PMH of anemia, epilepsy, who presents for sharp L leg pain progressively worsening over the past month, likely 2/2 iliopsoas bursitis i/s/o IVDU, s/p IR aspiration on 7/3, now on RMF for cefazolin 2 g for MSSA bacteremia. Psychiatry was consulted to provide recommendations for med management of mood and psychotic symptoms. On the initial evaluation and follow up, patient reported chronic mood and psychotic symptoms, denied any active SI/HI and admitted to recent use of substances.   On re-assessment today patient reports improvement in her sleep and mood sx. She is less perseverative on her physical sx (pain) and her chronic intrusive thoughts. No SI/HI. Motivated for outpatient follow up.      Plan:  -continue seroquel 100mg qhs     - no current indication for 1:1 observation nor inpatient psychiatric admission; will continue to evaluate, re-assess and provide psychoeducation on outpatient mental health follow up and substance use treatment options    -if the patient becomes agitated, can offer haldol 5mg po/im q6h, prn agitation (monitor for qtc prolongation)    - Continue with home methadone 160 mg daily  - Continue Remeron 15 mg qHS  -CL to follow as needed, please call with questions/concerns

## 2024-07-17 LAB
BASOPHILS # BLD AUTO: 0.03 K/UL — SIGNIFICANT CHANGE UP (ref 0–0.2)
BASOPHILS NFR BLD AUTO: 1.1 % — SIGNIFICANT CHANGE UP (ref 0–2)
EOSINOPHIL # BLD AUTO: 0.19 K/UL — SIGNIFICANT CHANGE UP (ref 0–0.5)
EOSINOPHIL NFR BLD AUTO: 6.7 % — HIGH (ref 0–6)
HCT VFR BLD CALC: 26 % — LOW (ref 34.5–45)
HGB BLD-MCNC: 8.1 G/DL — LOW (ref 11.5–15.5)
IMM GRANULOCYTES NFR BLD AUTO: 1.1 % — HIGH (ref 0–0.9)
LYMPHOCYTES # BLD AUTO: 0.83 K/UL — LOW (ref 1–3.3)
LYMPHOCYTES # BLD AUTO: 29.1 % — SIGNIFICANT CHANGE UP (ref 13–44)
MCHC RBC-ENTMCNC: 27.7 PG — SIGNIFICANT CHANGE UP (ref 27–34)
MCHC RBC-ENTMCNC: 31.2 GM/DL — LOW (ref 32–36)
MCV RBC AUTO: 89 FL — SIGNIFICANT CHANGE UP (ref 80–100)
MONOCYTES # BLD AUTO: 0.35 K/UL — SIGNIFICANT CHANGE UP (ref 0–0.9)
MONOCYTES NFR BLD AUTO: 12.3 % — SIGNIFICANT CHANGE UP (ref 2–14)
NEUTROPHILS # BLD AUTO: 1.42 K/UL — LOW (ref 1.8–7.4)
NEUTROPHILS NFR BLD AUTO: 49.7 % — SIGNIFICANT CHANGE UP (ref 43–77)
NRBC # BLD: 0 /100 WBCS — SIGNIFICANT CHANGE UP (ref 0–0)
PLATELET # BLD AUTO: 353 K/UL — SIGNIFICANT CHANGE UP (ref 150–400)
RBC # BLD: 2.92 M/UL — LOW (ref 3.8–5.2)
RBC # FLD: 16.2 % — HIGH (ref 10.3–14.5)
WBC # BLD: 2.85 K/UL — LOW (ref 3.8–10.5)
WBC # FLD AUTO: 2.85 K/UL — LOW (ref 3.8–10.5)

## 2024-07-17 PROCEDURE — 99232 SBSQ HOSP IP/OBS MODERATE 35: CPT | Mod: GC

## 2024-07-17 PROCEDURE — 99232 SBSQ HOSP IP/OBS MODERATE 35: CPT

## 2024-07-17 RX ORDER — OXACILLIN 1 G/20ML
2 INJECTION, POWDER, FOR SOLUTION INTRAMUSCULAR; INTRAVENOUS EVERY 4 HOURS
Refills: 0 | Status: DISCONTINUED | OUTPATIENT
Start: 2024-07-17 | End: 2024-07-29

## 2024-07-17 RX ADMIN — OXYCODONE HYDROCHLORIDE 5 MILLIGRAM(S): 30 TABLET ORAL at 07:23

## 2024-07-17 RX ADMIN — OXYCODONE HYDROCHLORIDE 10 MILLIGRAM(S): 30 TABLET ORAL at 01:22

## 2024-07-17 RX ADMIN — LEVETIRACETAM 500 MILLIGRAM(S): 1000 TABLET, FILM COATED ORAL at 18:43

## 2024-07-17 RX ADMIN — Medication 100 MILLIGRAM(S): at 11:20

## 2024-07-17 RX ADMIN — OXYCODONE HYDROCHLORIDE 5 MILLIGRAM(S): 30 TABLET ORAL at 06:23

## 2024-07-17 RX ADMIN — Medication 160 MILLIGRAM(S): at 09:11

## 2024-07-17 RX ADMIN — Medication 975 MILLIGRAM(S): at 03:39

## 2024-07-17 RX ADMIN — LEVETIRACETAM 500 MILLIGRAM(S): 1000 TABLET, FILM COATED ORAL at 06:23

## 2024-07-17 RX ADMIN — Medication 17 GRAM(S): at 06:23

## 2024-07-17 RX ADMIN — OXACILLIN 100 GRAM(S): 1 INJECTION, POWDER, FOR SOLUTION INTRAMUSCULAR; INTRAVENOUS at 21:06

## 2024-07-17 RX ADMIN — OXYCODONE HYDROCHLORIDE 10 MILLIGRAM(S): 30 TABLET ORAL at 13:42

## 2024-07-17 RX ADMIN — ENOXAPARIN SODIUM 40 MILLIGRAM(S): 120 INJECTION SUBCUTANEOUS at 21:51

## 2024-07-17 RX ADMIN — Medication 15 MILLIGRAM(S): at 11:20

## 2024-07-17 RX ADMIN — OXYCODONE HYDROCHLORIDE 10 MILLIGRAM(S): 30 TABLET ORAL at 18:47

## 2024-07-17 RX ADMIN — Medication 100 MILLIGRAM(S): at 06:23

## 2024-07-17 RX ADMIN — OXYCODONE HYDROCHLORIDE 10 MILLIGRAM(S): 30 TABLET ORAL at 12:54

## 2024-07-17 RX ADMIN — Medication 3 MILLIGRAM(S): at 21:51

## 2024-07-17 NOTE — PROGRESS NOTE ADULT - ATTENDING COMMENTS
59-year-old female with a PMHx of IVDU (on Methadone), anemia and epilepsy who presented with LLE pain, found to have loculated fluid collection of left iliacus muscle and iliopsoas bursitis c/b MSSA bacteremia.  BCx (7/1) MSSA, BCx (7/3) NGTD.                Plan:               -IR consulted, s/p drainage, Cx with staph aureus               -TTE and TED negative for endocarditis              -ID consulted, transitioned from Cefazolin to CTX given worsening leukopenia/neutropenia, tentative plan for 4–6 week course            -ortho consulted, no acute surgical intervention              -psych consulted, continue with Seroquel, Remeron and Methadone        -PT recommends [facility], no acceptances to date    Rest of plan as per resident note

## 2024-07-17 NOTE — PROGRESS NOTE ADULT - SUBJECTIVE AND OBJECTIVE BOX
[note incomplete] Patient is a 59y old  Female who presents with a chief complaint of left leg pain (17 Jul 2024 16:55)       INTERVAL HPI/OVERNIGHT EVENTS: No acute events overnight.    SUBJECTIVE: Patient seen and examined this morning. She reports sleeping well with Left groin/leg pain under control. She is looking forward to being discharged but remains motivated to continue receiving her treatment in the hospital. She denies SOB, chest pain, abdominal pain, N/V, and diarrhea.    All other review of systems negative except otherwise noted in HPI above.    MEDICATIONS  (STANDING):  acetaminophen     Tablet .. 975 milliGRAM(s) Oral every 8 hours  bisacodyl 5 milliGRAM(s) Oral daily  enoxaparin Injectable 40 milliGRAM(s) SubCutaneous every 24 hours  levETIRAcetam 500 milliGRAM(s) Oral two times a day  lidocaine   4% Patch 1 Patch Transdermal daily  melatonin 3 milliGRAM(s) Oral at bedtime  methadone    Tablet 160 milliGRAM(s) Oral every 24 hours  mirtazapine 15 milliGRAM(s) Oral daily  polyethylene glycol 3350 17 Gram(s) Oral two times a day  QUEtiapine 100 milliGRAM(s) Oral at bedtime    MEDICATIONS  (PRN):  benzonatate 100 milliGRAM(s) Oral once PRN Cough  naloxone Injectable 1 milliGRAM(s) IV Push every 3 minutes PRN in case of overdose  oxyCODONE    IR 5 milliGRAM(s) Oral every 6 hours PRN Moderate Pain (4 - 6)  oxyCODONE    IR 10 milliGRAM(s) Oral every 6 hours PRN Severe Pain (7 - 10)  saline laxative (FLEET) Rectal Enema 1 Enema Rectal once PRN constipation, severe, please try oral laxatives first  senna 2 Tablet(s) Oral at bedtime PRN Constipation    Allergies    No Known Allergies    Intolerances      Vital Signs Last 24 Hrs  T(C): 36.7 (17 Jul 2024 15:44), Max: 37.4 (16 Jul 2024 22:31)  T(F): 98.1 (17 Jul 2024 15:44), Max: 99.3 (16 Jul 2024 22:31)  HR: 83 (17 Jul 2024 15:44) (82 - 84)  BP: 92/67 (17 Jul 2024 15:44) (92/61 - 104/69)  BP(mean): --  RR: 17 (17 Jul 2024 15:44) (17 - 20)  SpO2: 97% (17 Jul 2024 15:44) (94% - 97%)    Parameters below as of 17 Jul 2024 15:44  Patient On (Oxygen Delivery Method): room air      PHYSICAL EXAM:  Constitutional - NAD, Comfortable  HEENT - NCAT, EOMI  Neck - No limited ROM  Chest - Breathing comfortably on room air, CTAB   Cardio - Warm and well perfused, RRR  Abdomen -  Soft, NTND  Extremities - No peripheral edema  Neurologic Exam: Awake and alert, speaking fluently      LABS:                        8.1    2.85  )-----------( 353      ( 17 Jul 2024 05:30 )             26.0       Ca    9.8        16 Jul 2024 05:30        CAPILLARY BLOOD GLUCOSE        BLOOD CULTURE    RADIOLOGY & ADDITIONAL TESTS: NONE    Consultant(s) Notes Reviewed: Infectious Disease    Care Discussed with Consultants/Other Providers: Infectious Disease

## 2024-07-17 NOTE — PROGRESS NOTE ADULT - SUBJECTIVE AND OBJECTIVE BOX
INFECTIOUS DISEASES CONSULT FOLLOW-UP NOTE    INTERVAL HPI/OVERNIGHT EVENTS:     ID reconsulted for agranulocytosis on cefazolin. Patient seen and examined at bedside. JAKE. Afebrile. Endorses LLE pain       ROS:   Constitutional, eyes, ENT, cardiovascular, respiratory, gastrointestinal, genitourinary, integumentary, neurological, psychiatric and heme/lymph are otherwise negative other than noted above       ANTIBIOTICS/RELEVANT:    MEDICATIONS  (STANDING):  acetaminophen     Tablet .. 975 milliGRAM(s) Oral every 8 hours  bisacodyl 5 milliGRAM(s) Oral daily  cefTRIAXone   IVPB      enoxaparin Injectable 40 milliGRAM(s) SubCutaneous every 24 hours  levETIRAcetam 500 milliGRAM(s) Oral two times a day  lidocaine   4% Patch 1 Patch Transdermal daily  melatonin 3 milliGRAM(s) Oral at bedtime  methadone    Tablet 160 milliGRAM(s) Oral every 24 hours  mirtazapine 15 milliGRAM(s) Oral daily  polyethylene glycol 3350 17 Gram(s) Oral two times a day  QUEtiapine 100 milliGRAM(s) Oral at bedtime    MEDICATIONS  (PRN):  benzonatate 100 milliGRAM(s) Oral once PRN Cough  naloxone Injectable 1 milliGRAM(s) IV Push every 3 minutes PRN in case of overdose  oxyCODONE    IR 5 milliGRAM(s) Oral every 6 hours PRN Moderate Pain (4 - 6)  oxyCODONE    IR 10 milliGRAM(s) Oral every 6 hours PRN Severe Pain (7 - 10)  saline laxative (FLEET) Rectal Enema 1 Enema Rectal once PRN constipation, severe, please try oral laxatives first  senna 2 Tablet(s) Oral at bedtime PRN Constipation        Vital Signs Last 24 Hrs  T(C): 36.7 (17 Jul 2024 15:44), Max: 37.4 (16 Jul 2024 22:31)  T(F): 98.1 (17 Jul 2024 15:44), Max: 99.3 (16 Jul 2024 22:31)  HR: 83 (17 Jul 2024 15:44) (82 - 84)  BP: 92/67 (17 Jul 2024 15:44) (92/61 - 104/69)  BP(mean): --  RR: 17 (17 Jul 2024 15:44) (17 - 20)  SpO2: 97% (17 Jul 2024 15:44) (94% - 97%)    Parameters below as of 17 Jul 2024 15:44  Patient On (Oxygen Delivery Method): room air        PHYSICAL EXAM:  Constitutional: alert, NAD  Eyes: the sclera and conjunctiva were normal.   ENT: the ears and nose were normal in appearance.   Neck: the appearance of the neck was normal and the neck was supple.   Pulmonary: no respiratory distress and lungs were clear to auscultation bilaterally.   Heart: heart rate was normal and rhythm regular, normal S1 and S2  Vascular:. there was no peripheral edema  Abdomen: normal bowel sounds, soft, non-tender  Extremities: LLE ttp at L inguinal fold   Neurological: no focal deficits.   Psychiatric: the affect was normal        LABS:                        8.1    2.85  )-----------( 353      ( 17 Jul 2024 05:30 )             26.0     07-16    138  |  96  |  24<H>  ----------------------------<  88  4.6   |  33<H>  |  1.10    Ca    9.8      16 Jul 2024 05:30  Phos  5.2     07-16  Mg     2.1     07-16        Urinalysis Basic - ( 16 Jul 2024 05:30 )    Color: x / Appearance: x / SG: x / pH: x  Gluc: 88 mg/dL / Ketone: x  / Bili: x / Urobili: x   Blood: x / Protein: x / Nitrite: x   Leuk Esterase: x / RBC: x / WBC x   Sq Epi: x / Non Sq Epi: x / Bacteria: x        MICROBIOLOGY:      RADIOLOGY & ADDITIONAL STUDIES:  Reviewed

## 2024-07-17 NOTE — PROGRESS NOTE ADULT - PROBLEM SELECTOR PLAN 1
Patient was found to have blood and wound cultures positive for MSSA bacteria on 7/1. Per ID recs, cultures from 7/3 have no growth, so there is no need for further surveillance cultures. ID recommends PICC line. Midline placed in Left UE by IR PICC team on 7/11  - Patient will need 4-6 weeks of antibiotic therapy (7/3 - 8/13)  - Continue IV antibiotics through LUE midline  - In event of early discharge before completion of IV antibiotics, contingency plan per ID is PO Keflex 500mg Q6 or Cefadroxil 1g PO Q12 for same planned duration  - IV Cefazolin 2g q8h started 7/3 --> discontinued 7/17 per ID recs, as patient demonstrated lab findings of agranulocytosis (neut# 5.96 --> 1.42)  - IV Oxacillin 2g q4h (7/17- )

## 2024-07-17 NOTE — PROGRESS NOTE ADULT - PROBLEM SELECTOR PLAN 7
As per patient has history of epilepsy  States she takes Keppra but unsure of dose, follows with Neurologist at [facility]  Per Jennifer, previously prescribed Keppra 1000mg bid.   - Order Keppra 500mg bid x 2 doses for now, confirm doses.

## 2024-07-17 NOTE — PROGRESS NOTE ADULT - ASSESSMENT
59F with hx of IVDU (heroin/cocaine) on methadone, epilepsy who presents for sharp L leg pain progressively worsening over the past month found to have loculated fluid in L iliacus muscle, iliopsoas bursitis c/b MSSA bacteremia. Patient afebrile without leukocytosis. Fluid collection/bursitis likely 2/2 injecting IV drug into LLE. Given MSSA BSI- suspect MSSA as causative pathogen of iliopsoas collection. S/P IR drainage 7/3 - 5 cc purulent fluid aspirated and sent for culture. IR drainage cx grew MSSA.    TTE without vegetation  TED without vegetation.   HIV screen neg  Hep C Ab reactive - Quantitative RNA not detected   bcxs 7/1 - MSSA  bcxs 7/3 -no growth    Repeat CT LLE 7/11:  left iliopsoas bursitis with irregular fluid collection deep to the left iliacus muscle as seen on   previous CT imaging, mildly improved compared to the most recent study of 7/3/2024. Patient does not have [facility] acceptance so will be admitted for duration of therapy. Patient has intermittently expressed desire to leave AMA. Today, she said she would be staying at time of visit. WBC 2.85 (3.88), Auto neutrophils 1.42 (1.65). Agranulocytosis likely 2/2 cefazolin     Suggest:  -*See attending attestation for IV regimen   -if patient decides to leave AMA, can give Keflex 500mg PO Q6 or Cefadroxil 1g PO Q12 for same planned duration.   -Duration of antibiotics is tentatively 4-6 weeks (7/3- 8/13). Will need repeat CT LLE with IVC in 4 weeks prior to stopping antibiotics   -Weekly labs: CMP, CBC with diff, ESR, CRP faxed to ID office at [phone]  -Patient to follow up with Dr. He in 2 weeks ([address] [place] [address], [phone]), ID office will call patient to schedule     Team 2 will follow.  Case d/w primary team.  Final recommendation pending attending note.    Lena Decker, Infectious Diseases PA  Please reach out for any questions 9 am-5pm.   For evenings and weekends, please call the ID physician on call.       59F with hx of IVDU (heroin/cocaine) on methadone, epilepsy who presents for sharp L leg pain progressively worsening over the past month found to have loculated fluid in L iliacus muscle, iliopsoas bursitis c/b MSSA bacteremia. Patient afebrile without leukocytosis. Fluid collection/bursitis likely 2/2 injecting IV drug into LLE. Given MSSA BSI- suspect MSSA as causative pathogen of iliopsoas collection. S/P IR drainage 7/3 - 5 cc purulent fluid aspirated and sent for culture. IR drainage cx grew MSSA.    TTE without vegetation  TED without vegetation.   HIV screen neg  Hep C Ab reactive - Quantitative RNA not detected   bcxs 7/1 - MSSA  bcxs 7/3 -no growth    Repeat CT LLE 7/11:  left iliopsoas bursitis with irregular fluid collection deep to the left iliacus muscle as seen on   previous CT imaging, mildly improved compared to the most recent study of 7/3/2024. Patient does not have [facility] acceptance so will be admitted for duration of therapy. Patient has intermittently expressed desire to leave AMA. Today, she said she would be staying at time of visit. WBC 2.85 (3.88), Auto neutrophils 1.42 (1.65). Agranulocytosis likely 2/2 cefazolin     Suggest:  -stop cefazolin   -start oxacillin 2g IV Q4  -if patient decides to leave AMA, can give Keflex 500mg PO Q6 or Cefadroxil 1g PO Q12 for same planned duration.   -Duration of antibiotics is tentatively 4-6 weeks (7/3- 8/13). Will need repeat CT LLE with IVC in 4 weeks prior to stopping antibiotics   -Weekly labs: CMP, CBC with diff, ESR, CRP faxed to ID office at [phone]  -Patient to follow up with Dr. He in 2 weeks ([address] [place] [address], [phone]), ID office will call patient to schedule     Team 2 will follow.  Case d/w primary team.  Final recommendation pending attending note.    Lena Decker, Infectious Diseases PA  Please reach out for any questions 9 am-5pm.   For evenings and weekends, please call the ID physician on call.       59F with hx of IVDU (heroin/cocaine) on methadone, epilepsy who presents for sharp L leg pain progressively worsening over the past month found to have loculated fluid in L iliacus muscle, iliopsoas bursitis c/b MSSA bacteremia. Patient afebrile without leukocytosis. Fluid collection/bursitis likely 2/2 injecting IV drug into LLE. Given MSSA BSI- suspect MSSA as causative pathogen of iliopsoas collection. S/P IR drainage 7/3 - 5 cc purulent fluid aspirated and sent for culture. IR drainage cx grew MSSA.    TTE without vegetation  TED without vegetation.   HIV screen neg  Hep C Ab reactive - Quantitative RNA not detected   bcxs 7/1 - MSSA  bcxs 7/3 -no growth    Repeat CT LLE 7/11:  left iliopsoas bursitis with irregular fluid collection deep to the left iliacus muscle as seen on   previous CT imaging, mildly improved compared to the most recent study of 7/3/2024. Patient does not have [facility] acceptance so will be admitted for duration of therapy. Patient has intermittently expressed desire to leave [place]. Today, she said she would be staying at time of visit. WBC 2.85 (3.88), Auto neutrophils 1.42 (1.65). Agranulocytosis likely 2/2 cefazolin     Suggest:  -stop cefazolin   -start oxacillin 2g IV Q4  -Duration of antibiotics is tentatively 4-6 weeks (7/3- 8/13). Will need repeat CT LLE with IVC in 4 weeks prior to stopping antibiotics   -Weekly labs: CMP, CBC with diff, ESR, CRP faxed to ID office at [phone]  -Patient to follow up with Dr. He in 2 weeks ([address] [place] [address], [phone]), ID office will call patient to schedule     Team 2 will follow.  Case d/w primary team.  Final recommendation pending attending note.    Lena Decker, Infectious Diseases PA  Please reach out for any questions 9 am-5pm.   For evenings and weekends, please call the ID physician on call.

## 2024-07-18 LAB
BASOPHILS # BLD AUTO: 0.04 K/UL — SIGNIFICANT CHANGE UP (ref 0–0.2)
BASOPHILS NFR BLD AUTO: 1.2 % — SIGNIFICANT CHANGE UP (ref 0–2)
BLD GP AB SCN SERPL QL: NEGATIVE — SIGNIFICANT CHANGE UP
EOSINOPHIL # BLD AUTO: 0.2 K/UL — SIGNIFICANT CHANGE UP (ref 0–0.5)
EOSINOPHIL NFR BLD AUTO: 5.8 % — SIGNIFICANT CHANGE UP (ref 0–6)
HCT VFR BLD CALC: 27.9 % — LOW (ref 34.5–45)
HGB BLD-MCNC: 8.5 G/DL — LOW (ref 11.5–15.5)
IMM GRANULOCYTES NFR BLD AUTO: 0.9 % — SIGNIFICANT CHANGE UP (ref 0–0.9)
LYMPHOCYTES # BLD AUTO: 0.9 K/UL — LOW (ref 1–3.3)
LYMPHOCYTES # BLD AUTO: 26.2 % — SIGNIFICANT CHANGE UP (ref 13–44)
MCHC RBC-ENTMCNC: 27.9 PG — SIGNIFICANT CHANGE UP (ref 27–34)
MCHC RBC-ENTMCNC: 30.5 GM/DL — LOW (ref 32–36)
MCV RBC AUTO: 91.5 FL — SIGNIFICANT CHANGE UP (ref 80–100)
MONOCYTES # BLD AUTO: 0.38 K/UL — SIGNIFICANT CHANGE UP (ref 0–0.9)
MONOCYTES NFR BLD AUTO: 11 % — SIGNIFICANT CHANGE UP (ref 2–14)
NEUTROPHILS # BLD AUTO: 1.89 K/UL — SIGNIFICANT CHANGE UP (ref 1.8–7.4)
NEUTROPHILS NFR BLD AUTO: 54.9 % — SIGNIFICANT CHANGE UP (ref 43–77)
NRBC # BLD: 0 /100 WBCS — SIGNIFICANT CHANGE UP (ref 0–0)
PLATELET # BLD AUTO: 335 K/UL — SIGNIFICANT CHANGE UP (ref 150–400)
RBC # BLD: 3.05 M/UL — LOW (ref 3.8–5.2)
RBC # FLD: 16.4 % — HIGH (ref 10.3–14.5)
RH IG SCN BLD-IMP: POSITIVE — SIGNIFICANT CHANGE UP
WBC # BLD: 3.44 K/UL — LOW (ref 3.8–10.5)
WBC # FLD AUTO: 3.44 K/UL — LOW (ref 3.8–10.5)

## 2024-07-18 PROCEDURE — 99232 SBSQ HOSP IP/OBS MODERATE 35: CPT | Mod: GC

## 2024-07-18 PROCEDURE — 99232 SBSQ HOSP IP/OBS MODERATE 35: CPT

## 2024-07-18 RX ADMIN — OXACILLIN 100 GRAM(S): 1 INJECTION, POWDER, FOR SOLUTION INTRAMUSCULAR; INTRAVENOUS at 06:15

## 2024-07-18 RX ADMIN — OXYCODONE HYDROCHLORIDE 10 MILLIGRAM(S): 30 TABLET ORAL at 14:38

## 2024-07-18 RX ADMIN — OXACILLIN 100 GRAM(S): 1 INJECTION, POWDER, FOR SOLUTION INTRAMUSCULAR; INTRAVENOUS at 13:39

## 2024-07-18 RX ADMIN — Medication 75 MILLIGRAM(S): at 22:16

## 2024-07-18 RX ADMIN — LEVETIRACETAM 500 MILLIGRAM(S): 1000 TABLET, FILM COATED ORAL at 18:13

## 2024-07-18 RX ADMIN — OXYCODONE HYDROCHLORIDE 10 MILLIGRAM(S): 30 TABLET ORAL at 13:38

## 2024-07-18 RX ADMIN — OXYCODONE HYDROCHLORIDE 10 MILLIGRAM(S): 30 TABLET ORAL at 00:51

## 2024-07-18 RX ADMIN — OXACILLIN 100 GRAM(S): 1 INJECTION, POWDER, FOR SOLUTION INTRAMUSCULAR; INTRAVENOUS at 18:13

## 2024-07-18 RX ADMIN — OXYCODONE HYDROCHLORIDE 10 MILLIGRAM(S): 30 TABLET ORAL at 20:52

## 2024-07-18 RX ADMIN — OXYCODONE HYDROCHLORIDE 10 MILLIGRAM(S): 30 TABLET ORAL at 01:50

## 2024-07-18 RX ADMIN — LIDOCAINE 5% 1 PATCH: 5 CREAM TOPICAL at 23:49

## 2024-07-18 RX ADMIN — OXACILLIN 100 GRAM(S): 1 INJECTION, POWDER, FOR SOLUTION INTRAMUSCULAR; INTRAVENOUS at 00:51

## 2024-07-18 RX ADMIN — Medication 3 MILLIGRAM(S): at 22:16

## 2024-07-18 RX ADMIN — OXACILLIN 100 GRAM(S): 1 INJECTION, POWDER, FOR SOLUTION INTRAMUSCULAR; INTRAVENOUS at 09:35

## 2024-07-18 RX ADMIN — OXYCODONE HYDROCHLORIDE 10 MILLIGRAM(S): 30 TABLET ORAL at 07:55

## 2024-07-18 RX ADMIN — OXYCODONE HYDROCHLORIDE 10 MILLIGRAM(S): 30 TABLET ORAL at 19:52

## 2024-07-18 RX ADMIN — Medication 15 MILLIGRAM(S): at 12:20

## 2024-07-18 RX ADMIN — LEVETIRACETAM 500 MILLIGRAM(S): 1000 TABLET, FILM COATED ORAL at 06:15

## 2024-07-18 RX ADMIN — ENOXAPARIN SODIUM 40 MILLIGRAM(S): 120 INJECTION SUBCUTANEOUS at 22:16

## 2024-07-18 RX ADMIN — LIDOCAINE 5% 1 PATCH: 5 CREAM TOPICAL at 12:20

## 2024-07-18 RX ADMIN — OXYCODONE HYDROCHLORIDE 10 MILLIGRAM(S): 30 TABLET ORAL at 06:55

## 2024-07-18 RX ADMIN — Medication 160 MILLIGRAM(S): at 09:37

## 2024-07-18 RX ADMIN — OXACILLIN 100 GRAM(S): 1 INJECTION, POWDER, FOR SOLUTION INTRAMUSCULAR; INTRAVENOUS at 22:16

## 2024-07-18 NOTE — PROGRESS NOTE ADULT - SUBJECTIVE AND OBJECTIVE BOX
[note incomplete] Patient is a 59y old  Female who presents with a chief complaint of left leg pain (18 Jul 2024 12:35)       INTERVAL HPI/OVERNIGHT EVENTS: No acute events overnight. Patient declined her bedtime Seroquel dose as she stated it causes her daytime sleepiness the following day.    SUBJECTIVE: Patient seen and examined this morning. She reported that her Left groin/leg pain is well controlled. Discussed with patient about the importance of Seroquel in controlling her mood symptoms and ensuring she sleeps well at night. She was agreeable to trying a reduced dose to reduce the drowsiness adverse effect. She is eating and drinking well and having bowel movements regularly.    All other review of systems negative except otherwise noted in HPI above.    MEDICATIONS  (STANDING):  acetaminophen     Tablet .. 975 milliGRAM(s) Oral every 8 hours  bisacodyl 5 milliGRAM(s) Oral daily  enoxaparin Injectable 40 milliGRAM(s) SubCutaneous every 24 hours  levETIRAcetam 500 milliGRAM(s) Oral two times a day  lidocaine   4% Patch 1 Patch Transdermal daily  melatonin 3 milliGRAM(s) Oral at bedtime  methadone    Tablet 160 milliGRAM(s) Oral every 24 hours  mirtazapine 15 milliGRAM(s) Oral daily  oxacillin IVPB 2 Gram(s) IV Intermittent every 4 hours  polyethylene glycol 3350 17 Gram(s) Oral two times a day  QUEtiapine 75 milliGRAM(s) Oral at bedtime    MEDICATIONS  (PRN):  benzonatate 100 milliGRAM(s) Oral once PRN Cough  naloxone Injectable 1 milliGRAM(s) IV Push every 3 minutes PRN in case of overdose  oxyCODONE    IR 5 milliGRAM(s) Oral every 6 hours PRN Moderate Pain (4 - 6)  oxyCODONE    IR 10 milliGRAM(s) Oral every 6 hours PRN Severe Pain (7 - 10)  saline laxative (FLEET) Rectal Enema 1 Enema Rectal once PRN constipation, severe, please try oral laxatives first  senna 2 Tablet(s) Oral at bedtime PRN Constipation    Allergies    No Known Allergies    Intolerances      Vital Signs Last 24 Hrs  T(C): 37.1 (18 Jul 2024 21:09), Max: 37.2 (17 Jul 2024 21:25)  T(F): 98.7 (18 Jul 2024 21:09), Max: 99 (17 Jul 2024 21:25)  HR: 89 (18 Jul 2024 21:09) (75 - 89)  BP: 92/61 (18 Jul 2024 21:09) (92/61 - 102/67)  BP(mean): 72 (18 Jul 2024 21:09) (72 - 72)  RR: 18 (18 Jul 2024 21:09) (17 - 18)  SpO2: 91% (18 Jul 2024 21:09) (91% - 98%)    Parameters below as of 18 Jul 2024 21:09  Patient On (Oxygen Delivery Method): room air      PHYSICAL EXAM:  Constitutional - NAD, Comfortable, Pleasant  HEENT - NCAT, EOMI  Neck - No limited ROM  Chest - Breathing comfortably on room air, CTAB   Cardio - Warm and well perfused, RRR  Abdomen -  Soft, NTND  Extremities - No peripheral edema  Neurologic Exam: Awake and alert, speaking fluently      LABS:                        8.5    3.44  )-----------( 335      ( 18 Jul 2024 09:26 )             27.9             CAPILLARY BLOOD GLUCOSE        BLOOD CULTURE    RADIOLOGY & ADDITIONAL TESTS: None

## 2024-07-18 NOTE — PROGRESS NOTE ADULT - SUBJECTIVE AND OBJECTIVE BOX
INFECTIOUS DISEASES CONSULT FOLLOW-UP NOTE    INTERVAL HPI/OVERNIGHT EVENTS:    Patient seen and examined at bedside. JAKE. Afebrile       ROS:   Constitutional, eyes, ENT, cardiovascular, respiratory, gastrointestinal, genitourinary, integumentary, neurological, psychiatric and heme/lymph are otherwise negative other than noted above       ANTIBIOTICS/RELEVANT:    MEDICATIONS  (STANDING):  acetaminophen     Tablet .. 975 milliGRAM(s) Oral every 8 hours  bisacodyl 5 milliGRAM(s) Oral daily  enoxaparin Injectable 40 milliGRAM(s) SubCutaneous every 24 hours  levETIRAcetam 500 milliGRAM(s) Oral two times a day  lidocaine   4% Patch 1 Patch Transdermal daily  melatonin 3 milliGRAM(s) Oral at bedtime  methadone    Tablet 160 milliGRAM(s) Oral every 24 hours  mirtazapine 15 milliGRAM(s) Oral daily  oxacillin IVPB 2 Gram(s) IV Intermittent every 4 hours  polyethylene glycol 3350 17 Gram(s) Oral two times a day  QUEtiapine 100 milliGRAM(s) Oral at bedtime    MEDICATIONS  (PRN):  benzonatate 100 milliGRAM(s) Oral once PRN Cough  naloxone Injectable 1 milliGRAM(s) IV Push every 3 minutes PRN in case of overdose  oxyCODONE    IR 5 milliGRAM(s) Oral every 6 hours PRN Moderate Pain (4 - 6)  oxyCODONE    IR 10 milliGRAM(s) Oral every 6 hours PRN Severe Pain (7 - 10)  saline laxative (FLEET) Rectal Enema 1 Enema Rectal once PRN constipation, severe, please try oral laxatives first  senna 2 Tablet(s) Oral at bedtime PRN Constipation        Vital Signs Last 24 Hrs  T(C): 36.8 (18 Jul 2024 08:37), Max: 37.2 (17 Jul 2024 21:25)  T(F): 98.2 (18 Jul 2024 08:37), Max: 99 (17 Jul 2024 21:25)  HR: 78 (18 Jul 2024 08:37) (75 - 86)  BP: 102/67 (18 Jul 2024 08:37) (92/67 - 102/67)  BP(mean): --  RR: 18 (18 Jul 2024 08:37) (17 - 18)  SpO2: 95% (18 Jul 2024 08:37) (95% - 98%)    Parameters below as of 18 Jul 2024 08:37  Patient On (Oxygen Delivery Method): room air        PHYSICAL EXAM:  Constitutional: alert, NAD  Eyes: the sclera and conjunctiva were normal.   ENT: the ears and nose were normal in appearance.   Neck: the appearance of the neck was normal and the neck was supple.   Pulmonary: no respiratory distress and lungs were clear to auscultation bilaterally.   Heart: heart rate was normal and rhythm regular, normal S1 and S2  Vascular:. there was no peripheral edema  Abdomen: normal bowel sounds, soft, non-tender  Extremities: LLE ttp at L inguinal fold   Neurological: no focal deficits.   Psychiatric: the affect was normal          LABS:                        8.5    3.44  )-----------( 335      ( 18 Jul 2024 09:26 )             27.9                 MICROBIOLOGY:      RADIOLOGY & ADDITIONAL STUDIES:  Reviewed

## 2024-07-18 NOTE — PROGRESS NOTE ADULT - ASSESSMENT
I M    59 y o F pmhx significant for IV drug use, anemia, epilepsy who presents for sharp L leg pain progressively worsening over the past month, likely 2/2 iliopsoas bursitis i/s/o IVDU, s/p IR aspiration on 7/3, on cefazolin 2 g for MSSA bacteremia.       Nutritional Assessment:  · Nutritional Assessment	This patient has been assessed with a concern for Malnutrition and has been determined to have a diagnosis/diagnoses of Severe protein-calorie malnutrition.    This patient is being managed with:   Diet Regular-  No Concentrated Phosphorus  Supplement Feeding Modality:  Oral  Suplena Cans or Servings Per Day:  1       Frequency:  Daily  Entered: Jul 15 2024  3:01PM    Problem/Plan - 1:  ·  Problem: Sepsis due to methicillin susceptible Staphylococcus aureus (MSSA) without acute organ dysfunction.   ·  Plan: Patient was found to have blood and wound cultures positive for MSSA bacteria on 7/1. Per ID recs, cultures from 7/3 have no growth, so there is no need for further surveillance cultures. ID recommends PICC line. Midline placed in Left UE by IR PICC team on 7/11  - Patient will need 4-6 weeks of antibiotic therapy (7/3 - 8/13)  - Continue IV antibiotics through LUE midline  - In event of early discharge before completion of IV antibiotics, contingency plan per ID is PO Keflex 500mg Q6 or Cefadroxil 1g PO Q12 for same planned duration  - IV Cefazolin 2g q8h started 7/3 --> discontinued 7/17 per ID recs, as patient demonstrated lab findings of agranulocytosis (neut# 5.96 --> 1.42)  - IV Oxacillin 2g q4h (7/17- ).    Problem/Plan - 2:  ·  Problem: Iliopsoas bursitis of left hip.   ·  Plan: Worsening left leg and thigh pain with radiation to groin. Duplex LLE negative for DVT. CT LE w IV contrast showed loculated fluid in L iliacus muscle, iliopsoas bursitis of infectious or inflammatory etiology s/p IR aspiration on 7/3.  - Initial BCx on 7/1 positive for MSSA. Plan as above  - Pain regimen: standing 975 tylenol q8, lidocaine patch, oxy 5mg q8PRN for moderate pain, oxy 10mg q6PRN for severe pain  - Toradol held due to mildly elevated creatinine  - CT of Left LE w/ IV contrast on 7/11 re-demonstrating iliopsoas bursitis and surrounding fluid collection, mildly improved compared to 7/3 study  - MR of pelvis/hip no longer needed as patient seems to be improving clinically.    Problem/Plan - 3:  ·  Problem: Mood disorder.   ·  Plan: Patient reports history of bipolar disorder with psychotic features. Previously prescribed quetiapine 150mg qd, buspirone 15mg qd, mirtazapine 15mg qd. Unclear what she is currently taking.  - Attempted to do med rec with shelter, pharmacy, patient, unable to find current doses for her mood medications  - She is endorsing 'ugly' thoughts about relapse, difficulty sleeping, visual hallucinations  - Negative SI/HI  - Started 25 mg Seroquel 7/9  - Worsening psychotic symptoms on 7/11. Re-attempted to contact shelter for med rec but unable to get through. Will aim to determine mood medication doses before making changes to regimen  - Psych recs appreciated - increased Seroquel from 25mg to 100mg qhs on 7/15. Tolerating well.    Problem/Plan - 4:  ·  Problem: Opioid dependence.   ·  Plan: Hx of IV drug use (heroin) on Methadone 160mg 5 days a week since age 19, confirmed with Methadone clinic. Last heroin injection in L leg on Saturday 6/29. Hep C positive, HIV negative. TTE 7/3 and TED 7/5 negative for endocarditis.  - Monitor COWs.    Problem/Plan - 5:  ·  Problem: BOB (acute kidney injury).   ·  Plan: Cr 1.28, BUN 29, GFR 49  - discontinued toradol  - encouraged PO fluid intake  - started on 1L NS 100cc/hour --> completed.    Problem/Plan - 6:  ·  Problem: Anemia.   ·  Plan: Pt states history of anemia  On admission Hg 10.2, Hct 31.0  - Continue to monitor H&H  - Maintain active type & screen  - Hb stable between 7.5 - 9.    Problem/Plan - 7:  ·  Problem: Epilepsy.   ·  Plan: As per patient has history of epilepsy  States she takes Keppra but unsure of dose, follows with Neurologist at [facility]  Per [employer], previously prescribed Keppra 1000mg bid.   - Order Keppra 500mg bid x 2 doses for now, confirm doses.    Problem/Plan - 8:  ·  Problem: Type 2 diabetes mellitus.   ·  Plan: Per patient's shelter, she was on insulin at some point, unclear of when she was taking it or history of diabetes.  - HbA1c 5.1  - BG well controlled inpatient.    Problem/Plan - 9:  ·  Problem: Severe protein-calorie malnutrition.   ·  Plan: - Ensure HD added per nutrition recs.    Problem/Plan - 10:  ·  Problem: Prophylactic measure.   ·  Plan; F: S/p NS 1L  E: replete as needed  N: regular diet, no concentrated phosphorus, Suplena supplement shake daily  DVT ppx: Lovenox 40mg subq  Dispo: [facility].    Attestation Statements:   Attestation Statements:  I have personally seen and examined the patient.  I fully participated in the care of this patient.  I have made amendments to the documentation where necessary, and agree with the history, physical exam, and plan as documented by the Resident.     59-year-old female with a PMHx of IVDU (on Methadone), anemia and epilepsy who presented with LLE pain, found to have loculated fluid collection of left iliacus muscle and iliopsoas bursitis c/b MSSA bacteremia.  BCx (7/1) MSSA, BCx (7/3) NGTD.                Plan:               -IR consulted, s/p drainage, Cx with staph aureus               -TTE and TED negative for endocarditis              -ID consulted, transitioned from Cefazolin to CTX given worsening leukopenia/neutropenia, tentative plan for 4–6 week course            -ortho consulted, no acute surgical intervention              -psych consulted, continue with Seroquel, Remeron and Methadone        -PT recommends [facility], no acceptances to date    Rest of plan as per resident note.

## 2024-07-18 NOTE — PROGRESS NOTE ADULT - NS ATTEND AMEND GEN_ALL_CORE FT
Agree with above.  ANC improving off Cefazolin.  Can continue Oxacillin 2 grams IV q4hrs until 8/13/24. Will need repeat CT LLE with IVC in 4 weeks prior to stopping antibiotics.  If patient decides to go AMA, dicloxacillin 500 mg PO q6hrs can used to complete the course.  Will need weekly CBC, CMP, ESR, CRP.  Can follow up with Dr. He outpatient.
Agree w above. 59F w pmhx IVDU, had MSSA bacteremia and was then found to have L iliopsoas bursitis/abscess. No surgical mgmt was done thus far. Was on IV Cefazolin 2 gm q8h. Reconsult today for agranulocytosis w ANC drop noted 2 days in a row. DC Cefazolin, start IV Oxacillin 2gm q4h, monitor cell lines and counts. Primary team asked for PO contingency option in case pt leaves AMA. If she tolerates Oxacillin, could use PO Dicloxacillin 500 mg q6h until EOT 8/13/24.   Had d/w team reg IV CTX while here and PO Cefadroxil upon discharge BUT would favor above regimen with Oxacillin --> Diclox (if leaves AMA).   ID Team 2 will follow.  Dr. Jaime will assume care of this pt starting tomorrow 7/18.
Agree w above. Pt endorsing LLE pain from hip to toes, says unrelieved by current drug regimen, says she is ready to buy street drugs. Awaiting CT LLE w IVC. Abx as above.  ID Team 2 will follow.
Agree w above. Pt seen and examined. C/o LLE pain, insomnia; primary team planning for CT LLE w IVC in 1-2 days.  Denies other somatic complaints. No acute events.   Continue current therapy w IV Cefazolin 2gm q8h, will need 4-6 wk of Abx Rx with weekly OPAT labs incl. CBC w diff, CMP, ESR, CRP.    ID Team 2 will follow.  Case d/w primary team.    Kamila He MD  Infectious Disease Attending  Office phone [phone]  For any questions during evening/weekend/holiday, please page ID physician on call.
Agree w above. Continue IV Cefazolin 2 gm q8h, EOT 8/13/24,  will obtain CT LLE w IVC prior to Abx cessation.   We will sign off at this time, if CT LLE is done,  please call us back once resulted.   Should pt leave AMA suboptimal PO option would be Dicloxacillin 500 mg q6h w EOT 8/13/24.
Agree with above. T max 100F yesterday, other VSS. F/up 7/3 body fluid cx Staph aureus sensis; 7/3 BCx ngtd (am, pm), f/u these.   TED awaited today. Abx as above.  ID Team 2 will follow.    Dr. Chaves will cover this weekend and I will resume care on Monday 7/8.

## 2024-07-18 NOTE — PROGRESS NOTE ADULT - ASSESSMENT
59F with hx of IVDU (heroin/cocaine) on methadone, epilepsy who presents for sharp L leg pain progressively worsening over the past month found to have loculated fluid in L iliacus muscle, iliopsoas bursitis c/b MSSA bacteremia. Patient afebrile without leukocytosis. Fluid collection/bursitis likely 2/2 injecting IV drug into LLE. Given MSSA BSI- suspect MSSA as causative pathogen of iliopsoas collection. S/P IR drainage 7/3 - 5 cc purulent fluid aspirated and sent for culture. IR drainage cx grew MSSA.    TTE without vegetation  TED without vegetation.   HIV screen neg  Hep C Ab reactive - Quantitative RNA not detected   bcxs 7/1 - MSSA  bcxs 7/3 -no growth    Repeat CT LLE 7/11:  left iliopsoas bursitis with irregular fluid collection deep to the left iliacus muscle as seen on   previous CT imaging, mildly improved compared to the most recent study of 7/3/2024. Patient does not have [facility] acceptance so will be admitted for duration of therapy. Patient has intermittently expressed desire to leave AMA. Today, she said she would be staying at time of visit. Developed agranulocytosis likely 2/2 cefazolin now improving after switch to oxacillin.  WBC 3.44 (2.85).      Suggest:  -continue oxacillin 2g IV Q4  -Duration of antibiotics is tentatively 4-6 weeks (7/3- 8/13). Will need repeat CT LLE with IVC in 4 weeks prior to stopping antibiotics   -If patient decides to leave AMA, can give Dicloxacillin 500 mg PO q6h for same duration.   -Weekly labs: CMP, CBC with diff, ESR, CRP faxed to ID office at [phone]  -Patient to follow up with Dr. He in 2 weeks after discharge ([address] [place] [address], [phone]), ID office will call patient to schedule     Team 2 will sign off. Thank you for your consultation   Please reconsult with questions   Case d/w primary team.  Final recommendation pending attending note.    Lena Decker, Infectious Diseases PA  Please reach out for any questions 9 am-5pm.   For evenings and weekends, please call the ID physician on call.

## 2024-07-18 NOTE — PROGRESS NOTE ADULT - PROBLEM SELECTOR PLAN 3
Patient reports history of bipolar disorder with psychotic features. Previously prescribed quetiapine 150mg qd, buspirone 15mg qd, mirtazapine 15mg qd. Unclear what she is currently taking.  - Attempted to do med rec with shelter, pharmacy, patient, unable to find current doses for her mood medications  - She is endorsing 'ugly' thoughts about relapse, difficulty sleeping, visual hallucinations  - Negative SI/HI  - Started 25 mg Seroquel 7/9  - Worsening psychotic symptoms on 7/11. Re-attempted to contact shelter for med rec but unable to get through. Will aim to determine mood medication doses before making changes to regimen  - Psych recs appreciated - decreased Seroquel from 100mg to 75mg on 7/18 as patient reported daytime drowsiness

## 2024-07-18 NOTE — PROGRESS NOTE ADULT - SUBJECTIVE AND OBJECTIVE BOX
Physical Medicine and Rehabilitation Progress Note :       Patient is a 59y old  Female who presents with a chief complaint of left leg pain (18 Jul 2024 12:09)      HPI:  HPI: Pt is 60 y/o F pmhx significant for IV drug use, anemia, epilepsy who presents for sharp L leg pain progressively worsening over the past month. Pt states her pain began in the left thigh and later radiated to her toes but now has persisted in her entire left leg and groin. Pt reports this began about one month ago and required multiple ER visits. She states she went to [place] on 5/31 where nothing was done and she was discharged. Most recently last week she returned to [place] where she states she was given a 7 day course of oral antibiotics which she completed but did not improve her pain. She took Motrin which provided very minimal relief.  Pt last  injected heroin into her left thigh on Saturday 6/29. As per patient, she is chronically on methadone 160 mg 5 days a week since she was 19. She reports subjective fevers. Denies nausea, vomiting, chest pain, shortness of breath, constipation, diarrhea, pain on urination, hematuria, hematemesis.       In the ED:  Initial vital signs: T: 98.7 F, HR: 82, BP: 100/68, RR: 16, SpO2: 96% on RA  Labs: significant for AST 61, CRP 99.5, Hg 10.2, Hct 31.0  CT Lower Extremity with IV Contrast, Left: loculated fluid in L iliacus muscle, iliopsoas bursitis of infectious or inflammatory etiology  US Duplex Venous LE, Left: No evidence of L lower extremity DVT  Medications: Zosyn, Vancomycin, NS, Ibuprofen  Consults:         Ortho- recommended admission for IV antibiotics and IR consult, will continue to follow (01 Jul 2024 18:56)                            8.5    3.44  )-----------( 335      ( 18 Jul 2024 09:26 )             27.9             Vital Signs Last 24 Hrs  T(C): 36.8 (18 Jul 2024 08:37), Max: 37.2 (17 Jul 2024 21:25)  T(F): 98.2 (18 Jul 2024 08:37), Max: 99 (17 Jul 2024 21:25)  HR: 78 (18 Jul 2024 08:37) (75 - 86)  BP: 102/67 (18 Jul 2024 08:37) (92/67 - 102/67)  BP(mean): --  RR: 18 (18 Jul 2024 08:37) (17 - 18)  SpO2: 95% (18 Jul 2024 08:37) (95% - 98%)    Parameters below as of 18 Jul 2024 08:37  Patient On (Oxygen Delivery Method): room air        MEDICATIONS  (STANDING):  acetaminophen     Tablet .. 975 milliGRAM(s) Oral every 8 hours  bisacodyl 5 milliGRAM(s) Oral daily  enoxaparin Injectable 40 milliGRAM(s) SubCutaneous every 24 hours  levETIRAcetam 500 milliGRAM(s) Oral two times a day  lidocaine   4% Patch 1 Patch Transdermal daily  melatonin 3 milliGRAM(s) Oral at bedtime  methadone    Tablet 160 milliGRAM(s) Oral every 24 hours  mirtazapine 15 milliGRAM(s) Oral daily  oxacillin IVPB 2 Gram(s) IV Intermittent every 4 hours  polyethylene glycol 3350 17 Gram(s) Oral two times a day  QUEtiapine 100 milliGRAM(s) Oral at bedtime    MEDICATIONS  (PRN):  benzonatate 100 milliGRAM(s) Oral once PRN Cough  naloxone Injectable 1 milliGRAM(s) IV Push every 3 minutes PRN in case of overdose  oxyCODONE    IR 5 milliGRAM(s) Oral every 6 hours PRN Moderate Pain (4 - 6)  oxyCODONE    IR 10 milliGRAM(s) Oral every 6 hours PRN Severe Pain (7 - 10)  saline laxative (FLEET) Rectal Enema 1 Enema Rectal once PRN constipation, severe, please try oral laxatives first  senna 2 Tablet(s) Oral at bedtime PRN Constipation      T(C): 36.8 (07-18-24 @ 08:37), Max: 37.2 (07-17-24 @ 21:25)  HR: 78 (07-18-24 @ 08:37) (75 - 86)  BP: 102/67 (07-18-24 @ 08:37) (92/67 - 102/67)  RR: 18 (07-18-24 @ 08:37) (17 - 18)  SpO2: 95% (07-18-24 @ 08:37) (95% - 98%)        Physical Exam:   59 y o woman lying comfortably in semi Andino's position , awake , alert , no acute complaints     Head: normocephalic , atraumatic    Eyes: PERRLA , EOMI , no nystagmus , sclera anicteric    ENT / FACE: neg nasal discharge , uvula midline , no oropharyngeal erythema / exudate    Neck: supple , negative JVD , negative carotid bruits , no thyromegaly    Chest: CTA bilaterally     Cardiovascular: regular rate and rhythm , neg murmurs / rubs / gallops    Abdomen: soft , non distended , no tenderness to palpation in all 4 quadrants ,  normal bowel sounds     Extremities: WWP , neg cyanosis /clubbing / edema     Musculoskeletal: pain w/ L hip flexion ,  in upper thigh and groin but decreased , no erythema     Skin:     :     Neurologic Exam:     Alert and oriented  x  3     Motor Exam:        > 3+/5 x 4 extremities , LLE pain limited proximally     Sensation:         intact to light touch x 4 extremities                                DTR:           biceps/brachioradialis: equal                            patella/ankle: equal       7/17/24  Functional Status Assessment :       Pain Assessment/Number Scale (0-10) Adult  Presence of Pain: denies pain/discomfort (Rating = 0)  Pain Rating (0-10): Rest: 0 (no pain/absence of nonverbal indicators of pain)  Pain Rating (0-10): Activity: 0 (no pain/absence of nonverbal indicators of pain)    Safety      AM-PAC Functional Assessment: Basic Mobility  Type of Assessment: Daily assessment  Turning from your back to your side while in a flat bed without using bedrails?: 4 = No assist / stand by assistance  Moving from lying on your back to sitting on the flat side of a flat bed without using bedrails?: 4 = No assist / stand by assistance  Moving to and from a bed to a chair (including a wheelchair)?: 4 = No assist / stand by assistance  Standing up from a chair using your arms (e.g. wheelchair or bedside chair)?: 4 = No assist / stand by assistance  Walking in hospital room?: 3 = A little assistance  Climbing 3-5 steps with a railing?: 3 = A little assistance  Score: 22   Row Comment: Ask the patient "How much help from another person do you currently need? (If the patient hasn't done an activity recently, how much help from another person do you think he/she needs if he/she tried?)    Cognitive/Neuro      Language Assistance  Preferred Language to Address Healthcare Preferred Language to Address Healthcare: English    Therapeutic Interventions      Bed Mobility  Bed Mobility Training Treatment not Performed: Patient received ambulating to bathroom and returned seated in bedside chair    Sit-Stand Transfer Training  Transfer Training Sit-to-Stand Transfer: independent;  verbal cues;  rolling walker  Transfer Training Stand-to-Sit Transfer: independent;  verbal cues;  rolling walker  Sit-to-Stand Transfer Training Transfer Safety Analysis: decreased balance;  decreased weight-shifting ability;  Demo fair eccentric control during descend    Gait Training  Gait Training: contact guard;  supervsion;  verbal cues;  rolling walker;  50 feet;  x2  Gait Analysis: decreased angie;  crouch;  increased time in double stance;  decreased hip/knee flexion;  decreased velocity of limb motion;  decreased step length;  decreased stride length;  decreased toe clearance;  decreased weight-shifting ability;  decreased flexibility;  decreased ROM;  impaired balance;  decreased strength;  50 feet;  x2;  rolling walker            PM&R Impression : as above    Current disposition plan recommendation :    subacute rehab placement

## 2024-07-18 NOTE — PROGRESS NOTE ADULT - ATTENDING COMMENTS
59-year-old female with a PMHx of IVDU (on Methadone), anemia and epilepsy who presented with LLE pain, found to have loculated fluid collection of left iliacus muscle and iliopsoas bursitis c/b MSSA bacteremia.  BCx (7/1) MSSA, BCx (7/3) NGTD.             Plan:                -IR consulted, s/p drainage, Cx with staph aureus                -TTE and TED negative for endocarditis               -ID consulted, transitioned from Cefazolin to Oxacillin given worsening leukopenia/neutropenia, tentative plan for 4–6 week course             -ortho consulted, no acute surgical intervention               -psych consulted, continue with Seroquel, Remeron and Methadone         -PT recommends [facility], no acceptances to date     Rest of plan as per resident note.

## 2024-07-19 PROCEDURE — 99232 SBSQ HOSP IP/OBS MODERATE 35: CPT

## 2024-07-19 PROCEDURE — 99232 SBSQ HOSP IP/OBS MODERATE 35: CPT | Mod: GC

## 2024-07-19 RX ADMIN — Medication 3 MILLIGRAM(S): at 23:17

## 2024-07-19 RX ADMIN — OXYCODONE HYDROCHLORIDE 10 MILLIGRAM(S): 30 TABLET ORAL at 02:45

## 2024-07-19 RX ADMIN — OXYCODONE HYDROCHLORIDE 10 MILLIGRAM(S): 30 TABLET ORAL at 13:53

## 2024-07-19 RX ADMIN — ENOXAPARIN SODIUM 40 MILLIGRAM(S): 120 INJECTION SUBCUTANEOUS at 23:16

## 2024-07-19 RX ADMIN — OXACILLIN 100 GRAM(S): 1 INJECTION, POWDER, FOR SOLUTION INTRAMUSCULAR; INTRAVENOUS at 23:21

## 2024-07-19 RX ADMIN — LIDOCAINE 5% 1 PATCH: 5 CREAM TOPICAL at 13:21

## 2024-07-19 RX ADMIN — OXYCODONE HYDROCHLORIDE 10 MILLIGRAM(S): 30 TABLET ORAL at 07:44

## 2024-07-19 RX ADMIN — OXYCODONE HYDROCHLORIDE 10 MILLIGRAM(S): 30 TABLET ORAL at 14:53

## 2024-07-19 RX ADMIN — LEVETIRACETAM 500 MILLIGRAM(S): 1000 TABLET, FILM COATED ORAL at 18:55

## 2024-07-19 RX ADMIN — OXACILLIN 100 GRAM(S): 1 INJECTION, POWDER, FOR SOLUTION INTRAMUSCULAR; INTRAVENOUS at 18:54

## 2024-07-19 RX ADMIN — OXACILLIN 100 GRAM(S): 1 INJECTION, POWDER, FOR SOLUTION INTRAMUSCULAR; INTRAVENOUS at 01:46

## 2024-07-19 RX ADMIN — LEVETIRACETAM 500 MILLIGRAM(S): 1000 TABLET, FILM COATED ORAL at 07:00

## 2024-07-19 RX ADMIN — Medication 75 MILLIGRAM(S): at 23:17

## 2024-07-19 RX ADMIN — OXYCODONE HYDROCHLORIDE 10 MILLIGRAM(S): 30 TABLET ORAL at 01:45

## 2024-07-19 RX ADMIN — OXACILLIN 100 GRAM(S): 1 INJECTION, POWDER, FOR SOLUTION INTRAMUSCULAR; INTRAVENOUS at 13:21

## 2024-07-19 RX ADMIN — OXACILLIN 100 GRAM(S): 1 INJECTION, POWDER, FOR SOLUTION INTRAMUSCULAR; INTRAVENOUS at 09:52

## 2024-07-19 RX ADMIN — OXYCODONE HYDROCHLORIDE 10 MILLIGRAM(S): 30 TABLET ORAL at 19:54

## 2024-07-19 RX ADMIN — Medication 15 MILLIGRAM(S): at 13:21

## 2024-07-19 RX ADMIN — Medication 160 MILLIGRAM(S): at 09:52

## 2024-07-19 NOTE — BH CONSULTATION LIAISON PROGRESS NOTE - NSBHCONSULTFOLLOWAFTERCARE_PSY_A_CORE FT
• [facility]  o Walk in services, Monday – Friday: 7:30am – 1pm   o [address] [address] [phone]    • [facility] located at [address] [facility], Ground Floor, [address] [place] [address] [phone]    • [facility] (www.[email or web address])  ?[address] [place] [address] Phone: [phone]  ? [address] [facility] [address] [place] [address] Phone: [phone]  ? [address] [facility] [address] [place] [address] Phone: [phone]      • [employer] ([email or web address])  o Comprehensive addiction treatment services, including psychopharm, psychotherapy, drug testing, as well as eating disorder treatment, and specialized treatment for LGBTQ, [employer]ism [employer] populations Walk Ins Mon-Fri 9am-2pm.   ? [address], 7th Floor [facility] [address] [phone]  ? [address] [place] [address] [phone]  
• [facility]  o Walk in services, Monday – Friday: 7:30am – 1pm   o [address]  [place] [address] [phone]    • [facility] located at [address] [facility], Ground Floor, [address] [place] [address] [phone]    • [facility] (www.[facility])  ?[address] [place] [address] Phone: [phone]  ? [address] [facility] [address] [place] [address] Phone: [phone]  ? [address] [facility] [address] [place] [address] Phone: [phone]      • [employer]) ([email or web address])  o Comprehensive addiction treatment services, including psychopharm, psychotherapy, drug testing, as well as eating disorder treatment, and specialized treatment for LGBTQ, L[employer] populations Walk Ins Mon-Fri 9am-2pm.   ? [address], 7th Floor [facility] [address] [phone]  ? [address] [place] [address] [phone]  
• [facility]  o Walk in services, Monday – Friday: 7:30am – 1pm   o [address]  [place] [address] [phone]    • [facility] located at [address] [facility], Ground Floor, [address] [place] [address] [phone]    • [facility] ([email or web address])  ?[address] [place] [address] Phone: [phone]  ? [address] [facility] [address] [place] [address] Phone: [phone]  ? [address] [facility] [address] [place] [address] Phone: [phone]      • [employer] ([email or web address])  o Comprehensive addiction treatment services, including psychopharm, psychotherapy, drug testing, as well as eating disorder treatment, and specialized treatment for LGBTQ, [employer] populations Walk Ins Mon-Fri 9am-2pm.   ? [address], 7th Floor [facility] [address] [phone]  ? [address] [place] [address] [phone]  
• [facility]  o Walk in services, Monday – Friday: 7:30am – 1pm   o [address]  [place] [address] [phone]    • [facility] located at [address] [facility], Ground Floor, [address] [place] [address] [phone]    • [facility] (www.[email or web address]/)  ?[address] [place] [address] Phone: [phone]  ? [address] [facility] [address] [place] [address] Phone: [phone]  ? [address] [facility] [address] [place] [address] Phone: [phone]      • [employer] ([email or web address])  o Comprehensive addiction treatment services, including psychopharm, psychotherapy, drug testing, as well as eating disorder treatment, and specialized treatment for LGBTQ, Y[employer]ism [employer] populations Walk Ins Mon-Fri 9am-2pm.   ? [address], 7th Floor [facility] [address] [phone]  ? [address] [place] [address] [phone]

## 2024-07-19 NOTE — PROGRESS NOTE ADULT - SUBJECTIVE AND OBJECTIVE BOX
[note incomplete] Patient is a 59y old  Female who presents with a chief complaint of left leg pain (19 Jul 2024 06:40)       INTERVAL HPI/OVERNIGHT EVENTS: No acute events overnight.    SUBJECTIVE: Patient seen and examined this morning. Patient slept well after receiving lowered Seroquel dose of 75mg last night. She reports that her Left leg/groin pain is well-controlled. She denies chest pain, shortness of breath, abdominal pain, N/V, and diarrhea.    All other review of systems negative except otherwise noted in HPI above.    MEDICATIONS  (STANDING):  acetaminophen     Tablet .. 975 milliGRAM(s) Oral every 8 hours  bisacodyl 5 milliGRAM(s) Oral daily  enoxaparin Injectable 40 milliGRAM(s) SubCutaneous every 24 hours  levETIRAcetam 500 milliGRAM(s) Oral two times a day  lidocaine   4% Patch 1 Patch Transdermal daily  melatonin 3 milliGRAM(s) Oral at bedtime  methadone    Tablet 160 milliGRAM(s) Oral every 24 hours  mirtazapine 15 milliGRAM(s) Oral daily  oxacillin IVPB 2 Gram(s) IV Intermittent every 4 hours  polyethylene glycol 3350 17 Gram(s) Oral two times a day  QUEtiapine 75 milliGRAM(s) Oral at bedtime    MEDICATIONS  (PRN):  benzonatate 100 milliGRAM(s) Oral once PRN Cough  naloxone Injectable 1 milliGRAM(s) IV Push every 3 minutes PRN in case of overdose  oxyCODONE    IR 5 milliGRAM(s) Oral every 6 hours PRN Moderate Pain (4 - 6)  oxyCODONE    IR 10 milliGRAM(s) Oral every 6 hours PRN Severe Pain (7 - 10)  saline laxative (FLEET) Rectal Enema 1 Enema Rectal once PRN constipation, severe, please try oral laxatives first  senna 2 Tablet(s) Oral at bedtime PRN Constipation    Allergies    No Known Allergies    Intolerances      Vital Signs Last 24 Hrs  T(C): 36.6 (19 Jul 2024 15:42), Max: 37.1 (18 Jul 2024 21:09)  T(F): 97.8 (19 Jul 2024 15:42), Max: 98.7 (18 Jul 2024 21:09)  HR: 78 (19 Jul 2024 15:42) (73 - 89)  BP: 94/- (19 Jul 2024 15:42) (92/61 - 104/71)  BP(mean): 72 (18 Jul 2024 21:09) (72 - 72)  RR: 18 (19 Jul 2024 15:42) (17 - 18)  SpO2: 94% (19 Jul 2024 15:42) (91% - 94%)    Parameters below as of 19 Jul 2024 15:42  Patient On (Oxygen Delivery Method): room air      PHYSICAL EXAM:  Constitutional - NAD, Comfortable  HEENT - NCAT, EOMI  Neck - No limited ROM  Chest - Breathing comfortably on room air, CTAB   Cardio - Warm and well perfused, RRR  Abdomen - Soft, NTND  Extremities - No peripheral edema  Neurologic Exam: Awake and alert, speaking fluently    LABS: None    RADIOLOGY & ADDITIONAL TESTS: None

## 2024-07-19 NOTE — BH CONSULTATION LIAISON PROGRESS NOTE - NSBHTIMEACTIVITIESPERFORMED_PSY_A_CORE
The time documented includes the review of chart, labs, tests and other pertinent documents, interview, collaterals, decision-making, psychoeducation, coordination of care etc. and excludes time spent on separately reportable services/teaching during the encounter.  

## 2024-07-19 NOTE — BH CONSULTATION LIAISON PROGRESS NOTE - CURRENT MEDICATION
MEDICATIONS  (STANDING):  acetaminophen     Tablet .. 975 milliGRAM(s) Oral every 8 hours  bisacodyl 5 milliGRAM(s) Oral daily  ceFAZolin   IVPB 2000 milliGRAM(s) IV Intermittent every 8 hours  enoxaparin Injectable 40 milliGRAM(s) SubCutaneous every 24 hours  levETIRAcetam 500 milliGRAM(s) Oral two times a day  lidocaine   4% Patch 1 Patch Transdermal daily  melatonin 3 milliGRAM(s) Oral at bedtime  mirtazapine 15 milliGRAM(s) Oral daily  polyethylene glycol 3350 17 Gram(s) Oral two times a day  QUEtiapine 25 milliGRAM(s) Oral at bedtime    MEDICATIONS  (PRN):  benzonatate 100 milliGRAM(s) Oral once PRN Cough  naloxone Injectable 1 milliGRAM(s) IV Push every 3 minutes PRN in case of overdose  oxyCODONE    IR 5 milliGRAM(s) Oral every 6 hours PRN Moderate Pain (4 - 6)  oxyCODONE    IR 10 milliGRAM(s) Oral every 6 hours PRN Severe Pain (7 - 10)  saline laxative (FLEET) Rectal Enema 1 Enema Rectal once PRN constipation, severe, please try oral laxatives first  senna 2 Tablet(s) Oral at bedtime PRN Constipation  
MEDICATIONS  (STANDING):  acetaminophen     Tablet .. 975 milliGRAM(s) Oral every 8 hours  bisacodyl 5 milliGRAM(s) Oral daily  ceFAZolin   IVPB 2000 milliGRAM(s) IV Intermittent every 8 hours  enoxaparin Injectable 40 milliGRAM(s) SubCutaneous every 24 hours  levETIRAcetam 500 milliGRAM(s) Oral two times a day  lidocaine   4% Patch 1 Patch Transdermal daily  melatonin 3 milliGRAM(s) Oral at bedtime  methadone    Tablet 160 milliGRAM(s) Oral every 24 hours  mirtazapine 15 milliGRAM(s) Oral daily  polyethylene glycol 3350 17 Gram(s) Oral two times a day  QUEtiapine 100 milliGRAM(s) Oral at bedtime    MEDICATIONS  (PRN):  benzonatate 100 milliGRAM(s) Oral once PRN Cough  naloxone Injectable 1 milliGRAM(s) IV Push every 3 minutes PRN in case of overdose  oxyCODONE    IR 5 milliGRAM(s) Oral every 6 hours PRN Moderate Pain (4 - 6)  oxyCODONE    IR 10 milliGRAM(s) Oral every 6 hours PRN Severe Pain (7 - 10)  saline laxative (FLEET) Rectal Enema 1 Enema Rectal once PRN constipation, severe, please try oral laxatives first  senna 2 Tablet(s) Oral at bedtime PRN Constipation  
MEDICATIONS  (STANDING):  acetaminophen     Tablet .. 975 milliGRAM(s) Oral every 8 hours  bisacodyl 5 milliGRAM(s) Oral daily  enoxaparin Injectable 40 milliGRAM(s) SubCutaneous every 24 hours  levETIRAcetam 500 milliGRAM(s) Oral two times a day  lidocaine   4% Patch 1 Patch Transdermal daily  melatonin 3 milliGRAM(s) Oral at bedtime  methadone    Tablet 160 milliGRAM(s) Oral every 24 hours  mirtazapine 15 milliGRAM(s) Oral daily  oxacillin IVPB 2 Gram(s) IV Intermittent every 4 hours  polyethylene glycol 3350 17 Gram(s) Oral two times a day  QUEtiapine 75 milliGRAM(s) Oral at bedtime    MEDICATIONS  (PRN):  benzonatate 100 milliGRAM(s) Oral once PRN Cough  naloxone Injectable 1 milliGRAM(s) IV Push every 3 minutes PRN in case of overdose  oxyCODONE    IR 5 milliGRAM(s) Oral every 6 hours PRN Moderate Pain (4 - 6)  oxyCODONE    IR 10 milliGRAM(s) Oral every 6 hours PRN Severe Pain (7 - 10)  saline laxative (FLEET) Rectal Enema 1 Enema Rectal once PRN constipation, severe, please try oral laxatives first  senna 2 Tablet(s) Oral at bedtime PRN Constipation  
MEDICATIONS  (STANDING):  acetaminophen     Tablet .. 975 milliGRAM(s) Oral every 8 hours  bisacodyl 5 milliGRAM(s) Oral daily  ceFAZolin   IVPB 2000 milliGRAM(s) IV Intermittent every 8 hours  enoxaparin Injectable 40 milliGRAM(s) SubCutaneous every 24 hours  levETIRAcetam 500 milliGRAM(s) Oral two times a day  lidocaine   4% Patch 1 Patch Transdermal daily  melatonin 3 milliGRAM(s) Oral at bedtime  methadone    Tablet 160 milliGRAM(s) Oral daily  mirtazapine 15 milliGRAM(s) Oral daily  polyethylene glycol 3350 17 Gram(s) Oral two times a day  QUEtiapine 25 milliGRAM(s) Oral at bedtime    MEDICATIONS  (PRN):  benzonatate 100 milliGRAM(s) Oral once PRN Cough  naloxone Injectable 1 milliGRAM(s) IV Push every 3 minutes PRN in case of overdose  oxyCODONE    IR 10 milliGRAM(s) Oral every 6 hours PRN Severe Pain (7 - 10)  oxyCODONE    IR 5 milliGRAM(s) Oral every 6 hours PRN Moderate Pain (4 - 6)  saline laxative (FLEET) Rectal Enema 1 Enema Rectal once PRN constipation, severe, please try oral laxatives first  senna 2 Tablet(s) Oral at bedtime PRN Constipation

## 2024-07-19 NOTE — BH CONSULTATION LIAISON PROGRESS NOTE - NSBHASSESSMENTFT_PSY_ALL_CORE
59 year old female, undomiciled, with reported history of bipolar disorder with psychotic features vs schizoaffective, OUD on methadone maintenance overlapping active use of heroin and cocaine, history of trauma and PMH of anemia, epilepsy, who presents for sharp L leg pain progressively worsening over the past month, likely 2/2 iliopsoas bursitis i/s/o IVDU, s/p IR aspiration on 7/3, now on RMF for cefazolin 2 g for MSSA bacteremia. Psychiatry was consulted to provide recommendations for med management of mood and psychotic symptoms. On the initial evaluation and follow up, patient reported chronic mood and psychotic symptoms, denied any active SI/HI and admitted to recent use of substances.   On re-assessment today patient reports stable mood and sleep. She denies any recent intrusive thoughts/psychosis.     Plan:   -continue seroquel 75mg po qhs for sleep, mood sx and psychosis    - no current indication for 1:1 observation nor inpatient psychiatric admission;    -if the patient becomes agitated, can offer haldol 5mg po/im q6h, prn agitation (monitor for qtc prolongation)    - Continue with home methadone 160 mg daily  - Continue Remeron 15 mg qHS  -CL to follow as needed, please call with questions/concerns

## 2024-07-19 NOTE — BH CONSULTATION LIAISON PROGRESS NOTE - NSBHCONSULTSUBSTANCEOPIATES_PSY_A_CORE FT
Continue outpatient methadone 160 mg PO daily for opioid use disorder

## 2024-07-19 NOTE — PROGRESS NOTE ADULT - PROBLEM SELECTOR PLAN 7
As per patient has history of epilepsy  States she takes Keppra but unsure of dose, follows with Neurologist at [facility]  Per Jennifer, previously prescribed Keppra 1000mg bid.   - Order Keppra 500mg bid x 2 doses for now, confirm doses.

## 2024-07-19 NOTE — BH CONSULTATION LIAISON PROGRESS NOTE - NSBHCHARTREVIEWVS_PSY_A_CORE FT
Vital Signs Last 24 Hrs  T(C): 37.1 (19 Jul 2024 05:05), Max: 37.1 (18 Jul 2024 16:00)  T(F): 98.7 (19 Jul 2024 05:05), Max: 98.8 (18 Jul 2024 16:00)  HR: 73 (19 Jul 2024 05:05) (73 - 89)  BP: 104/71 (19 Jul 2024 05:05) (92/61 - 104/71)  BP(mean): 72 (18 Jul 2024 21:09) (72 - 72)  RR: 17 (19 Jul 2024 05:05) (17 - 18)  SpO2: 93% (19 Jul 2024 05:05) (91% - 94%)    Parameters below as of 18 Jul 2024 21:09  Patient On (Oxygen Delivery Method): room air    
Vital Signs Last 24 Hrs  T(C): 36.4 (15 Jul 2024 08:45), Max: 37.3 (14 Jul 2024 20:59)  T(F): 97.6 (15 Jul 2024 08:45), Max: 99.1 (14 Jul 2024 20:59)  HR: 69 (15 Jul 2024 08:45) (69 - 95)  BP: 110/75 (15 Jul 2024 08:45) (92/57 - 114/75)  BP(mean): 71 (14 Jul 2024 20:59) (71 - 71)  RR: 18 (15 Jul 2024 08:45) (18 - 18)  SpO2: 98% (15 Jul 2024 08:45) (95% - 98%)    Parameters below as of 15 Jul 2024 08:45  Patient On (Oxygen Delivery Method): room air    
Vital Signs Last 24 Hrs  T(C): 36.3 (14 Jul 2024 05:19), Max: 37.1 (13 Jul 2024 21:23)  T(F): 97.3 (14 Jul 2024 05:19), Max: 98.7 (13 Jul 2024 21:23)  HR: 100 (14 Jul 2024 05:19) (80 - 100)  BP: 103/70 (14 Jul 2024 05:19) (102/61 - 103/70)  BP(mean): --  RR: 18 (14 Jul 2024 05:19) (18 - 18)  SpO2: 96% (14 Jul 2024 05:19) (95% - 96%)    Parameters below as of 13 Jul 2024 21:23  Patient On (Oxygen Delivery Method): room air    
Vital Signs Last 24 Hrs  T(C): 37.1 (16 Jul 2024 10:06), Max: 37.1 (16 Jul 2024 10:06)  T(F): 98.8 (16 Jul 2024 10:06), Max: 98.8 (16 Jul 2024 10:06)  HR: 83 (16 Jul 2024 10:06) (72 - 85)  BP: 101/67 (16 Jul 2024 10:06) (92/63 - 104/70)  BP(mean): --  RR: 18 (16 Jul 2024 10:06) (18 - 18)  SpO2: 95% (16 Jul 2024 10:06) (94% - 98%)    Parameters below as of 16 Jul 2024 10:06  Patient On (Oxygen Delivery Method): room air

## 2024-07-19 NOTE — BH CONSULTATION LIAISON PROGRESS NOTE - NSBHCONSULTPRIMARYDISCUSSYES_PSY_A_CORE FT
Discussed recommendations for management of Bipolar Disorder

## 2024-07-19 NOTE — BH CONSULTATION LIAISON PROGRESS NOTE - NSICDXBHSECONDARYDX_PSY_ALL_CORE
Post traumatic stress disorder (PTSD)   F43.10  Polysubstance use disorder   F19.90  Opioid use disorder, severe, on maintenance therapy   F11.20  Borderline personality disorder   F60.3  
-Limit visiting for 8 weeks and avoid public places.

## 2024-07-19 NOTE — BH CONSULTATION LIAISON PROGRESS NOTE - NSICDXBHTERTIARYDX_PSY_ALL_CORE
R/O Substance induced mood disorder   F19.94  

## 2024-07-19 NOTE — PROGRESS NOTE ADULT - PROBLEM SELECTOR PLAN 3
Patient reports history of bipolar disorder with psychotic features. Previously prescribed quetiapine 150mg qd, buspirone 15mg qd, mirtazapine 15mg qd. Unclear what she is currently taking.  - Attempted to do med rec with shelter, pharmacy, patient, unable to find current doses for her mood medications  - She is endorsing 'ugly' thoughts about relapse, difficulty sleeping, visual hallucinations  - Negative SI/HI  - Started 25 mg Seroquel 7/9  - Worsening psychotic symptoms on 7/11. Re-attempted to contact shelter for med rec but unable to get through. Will aim to determine mood medication doses before making changes to regimen  - Psych recs appreciated - decreased Seroquel from 100mg to 75mg on 7/18 as patient reported daytime drowsiness. Tolerating well

## 2024-07-19 NOTE — BH CONSULTATION LIAISON PROGRESS NOTE - NSICDXBHPRIMARYDX_PSY_ALL_CORE
Schizoaffective disorder, bipolar type   F25.0  

## 2024-07-19 NOTE — BH CONSULTATION LIAISON PROGRESS NOTE - NSBHFUPREASONCONS_PSY_A_CORE
psychosis/sleep disturbance/med management
psychosis/med management
psychosis
psychosis/sleep disturbance/med management

## 2024-07-19 NOTE — BH CONSULTATION LIAISON PROGRESS NOTE - NSBHFUPINTERVALHXFT_PSY_A_CORE
Patient was seen at bedside. She presents calm, cooperative, without any over mood or psychotic sx. She denies any recent intrusive thoughts or hallucinations. She has been sleeping well and hasn't experienced any side effects (seroquel was titrated down due to feeling "groggy" in am). No Si/HI.
Patient was seen at bedside. She reports that her mood has been better since the weekend, but she continues to have "ugly thoughts" and   "angry mood" (she gives a disorganized description that references her past traumatic experiences and seeing/talking to "dead family members"). Similarly with prior assessments, she reports that these symptoms are chronic (started during childhood in context of trauma); she is able to control them and can identify her triggers. She currently denies any SI/HI. She reports mild opioid withdrawal sx and relates it to "shooting up a lot of cocaine and heroin" (none observed on exam). 
Patient initially reports that she is doing "better" but then shares that she is feeling depressed, unmotivated, and negative. Describes cognitive distortion that everyone is against her, and that niceties are disingenuous. She feels that the Seroquel has been helpful and reports sleeping 1.5 hours last night, which is "great" compared to her normal baseline of "not sleeping at all." Denies adverse effects. Denies SI. Denies specific HI or VI but says that overnight when in severe pain she sometimes gets so frustrated that she "feels like [she] could hurt someone." 
Patient was seen at bedside. She reported significant improvement in both her physical and psychiatric symptoms. Patient reports sleeping better with the increase in the dosage of seroquel and having "less ugly thoughts". No SI/HI verbalized and not AVH observed. No side effects expressed.

## 2024-07-19 NOTE — BH CONSULTATION LIAISON PROGRESS NOTE - NSBHATTESTBILLING_PSY_A_CORE
52768-Xjknicjlpx OBS or IP - low complexity OR 25-34 mins
10627-Mpbkmqeksm OBS or IP - high complexity OR 50-79 mins
98014-Jrugzbqtus OBS or IP - high complexity OR 50-79 mins
39894-[place] diagnostic evaluation with medical services

## 2024-07-20 PROCEDURE — 99232 SBSQ HOSP IP/OBS MODERATE 35: CPT | Mod: GC

## 2024-07-20 RX ADMIN — OXACILLIN 100 GRAM(S): 1 INJECTION, POWDER, FOR SOLUTION INTRAMUSCULAR; INTRAVENOUS at 09:42

## 2024-07-20 RX ADMIN — OXACILLIN 100 GRAM(S): 1 INJECTION, POWDER, FOR SOLUTION INTRAMUSCULAR; INTRAVENOUS at 22:39

## 2024-07-20 RX ADMIN — OXACILLIN 100 GRAM(S): 1 INJECTION, POWDER, FOR SOLUTION INTRAMUSCULAR; INTRAVENOUS at 13:54

## 2024-07-20 RX ADMIN — Medication 3 MILLIGRAM(S): at 22:40

## 2024-07-20 RX ADMIN — LEVETIRACETAM 500 MILLIGRAM(S): 1000 TABLET, FILM COATED ORAL at 18:22

## 2024-07-20 RX ADMIN — ENOXAPARIN SODIUM 40 MILLIGRAM(S): 120 INJECTION SUBCUTANEOUS at 22:40

## 2024-07-20 RX ADMIN — Medication 75 MILLIGRAM(S): at 22:39

## 2024-07-20 RX ADMIN — OXYCODONE HYDROCHLORIDE 10 MILLIGRAM(S): 30 TABLET ORAL at 14:53

## 2024-07-20 RX ADMIN — Medication 15 MILLIGRAM(S): at 13:53

## 2024-07-20 RX ADMIN — Medication 160 MILLIGRAM(S): at 09:43

## 2024-07-20 RX ADMIN — Medication 17 GRAM(S): at 06:27

## 2024-07-20 RX ADMIN — LEVETIRACETAM 500 MILLIGRAM(S): 1000 TABLET, FILM COATED ORAL at 06:28

## 2024-07-20 RX ADMIN — OXYCODONE HYDROCHLORIDE 10 MILLIGRAM(S): 30 TABLET ORAL at 20:55

## 2024-07-20 RX ADMIN — OXACILLIN 100 GRAM(S): 1 INJECTION, POWDER, FOR SOLUTION INTRAMUSCULAR; INTRAVENOUS at 02:36

## 2024-07-20 RX ADMIN — OXYCODONE HYDROCHLORIDE 10 MILLIGRAM(S): 30 TABLET ORAL at 02:29

## 2024-07-20 RX ADMIN — OXYCODONE HYDROCHLORIDE 10 MILLIGRAM(S): 30 TABLET ORAL at 20:25

## 2024-07-20 RX ADMIN — OXYCODONE HYDROCHLORIDE 10 MILLIGRAM(S): 30 TABLET ORAL at 03:29

## 2024-07-20 RX ADMIN — OXACILLIN 100 GRAM(S): 1 INJECTION, POWDER, FOR SOLUTION INTRAMUSCULAR; INTRAVENOUS at 06:30

## 2024-07-20 RX ADMIN — OXYCODONE HYDROCHLORIDE 10 MILLIGRAM(S): 30 TABLET ORAL at 13:53

## 2024-07-20 RX ADMIN — LIDOCAINE 5% 1 PATCH: 5 CREAM TOPICAL at 13:53

## 2024-07-20 RX ADMIN — OXACILLIN 100 GRAM(S): 1 INJECTION, POWDER, FOR SOLUTION INTRAMUSCULAR; INTRAVENOUS at 18:22

## 2024-07-20 RX ADMIN — Medication 975 MILLIGRAM(S): at 02:29

## 2024-07-20 NOTE — PROGRESS NOTE ADULT - SUBJECTIVE AND OBJECTIVE BOX
***Note in progress***    OVERNIGHT EVENTS: NAEO    SUBJECTIVE / INTERVAL HPI: Patient seen and examined at bedside. Patient denying chest pain, SOB, palpitations, cough. Patient denies fever, chills, HA, Dizziness, N/V, abdominal pain, diarrhea, constipation, hematochezia/melena, dysuria, hematuria, new onset weakness/numbness, LE pain and/or swelling.    Remaining ROS negative       PHYSICAL EXAM:    General:NAD.   HEENT: NC/AT; PERRL, anicteric sclera; MMM  Neck: supple  Cardiovascular: +S1/S2, RRR  Respiratory: CTA B/L; no W/R/R  Gastrointestinal: soft, NT/ND; +BSx4  Extremities: WWP; no edema, clubbing or cyanosis  Vascular: 2+ radial, DP/PT pulses B/L  Neurological: AAOx3; no focal deficits  Psychiatric: pleasant mood and affect  Dermatologic: no appreciable wounds or damage to the skin    VITAL SIGNS:  Vital Signs Last 24 Hrs  T(C): 36.4 (20 Jul 2024 06:02), Max: 36.6 (19 Jul 2024 15:42)  T(F): 97.6 (20 Jul 2024 06:02), Max: 97.8 (19 Jul 2024 15:42)  HR: 86 (20 Jul 2024 06:02) (78 - 86)  BP: 98/66 (20 Jul 2024 06:02) (94/64 - 98/66)  BP(mean): --  RR: 18 (20 Jul 2024 06:02) (18 - 18)  SpO2: 97% (20 Jul 2024 06:02) (94% - 97%)    Parameters below as of 20 Jul 2024 06:02  Patient On (Oxygen Delivery Method): room air        MEDICATIONS:  MEDICATIONS  (STANDING):  acetaminophen     Tablet .. 975 milliGRAM(s) Oral every 8 hours  bisacodyl 5 milliGRAM(s) Oral daily  enoxaparin Injectable 40 milliGRAM(s) SubCutaneous every 24 hours  levETIRAcetam 500 milliGRAM(s) Oral two times a day  lidocaine   4% Patch 1 Patch Transdermal daily  melatonin 3 milliGRAM(s) Oral at bedtime  methadone    Tablet 160 milliGRAM(s) Oral every 24 hours  mirtazapine 15 milliGRAM(s) Oral daily  oxacillin IVPB 2 Gram(s) IV Intermittent every 4 hours  polyethylene glycol 3350 17 Gram(s) Oral two times a day  QUEtiapine 75 milliGRAM(s) Oral at bedtime    MEDICATIONS  (PRN):  benzonatate 100 milliGRAM(s) Oral once PRN Cough  naloxone Injectable 1 milliGRAM(s) IV Push every 3 minutes PRN in case of overdose  oxyCODONE    IR 5 milliGRAM(s) Oral every 6 hours PRN Moderate Pain (4 - 6)  oxyCODONE    IR 10 milliGRAM(s) Oral every 6 hours PRN Severe Pain (7 - 10)  saline laxative (FLEET) Rectal Enema 1 Enema Rectal once PRN constipation, severe, please try oral laxatives first  senna 2 Tablet(s) Oral at bedtime PRN Constipation      ALLERGIES:  Allergies    No Known Allergies    Intolerances        LABS:              CAPILLARY BLOOD GLUCOSE          RADIOLOGY & ADDITIONAL TESTS: Reviewed. OVERNIGHT EVENTS: NAEO    SUBJECTIVE / INTERVAL HPI: Patient seen and examined at bedside. Patient denying chest pain, SOB, palpitations, cough. Patient denies fever, chills, HA, Dizziness, N/V, abdominal pain, diarrhea, constipation, hematochezia/melena, dysuria, hematuria, new onset weakness/numbness, LE pain and/or swelling.    Remaining ROS negative       PHYSICAL EXAM:    General:NAD.   HEENT: NC/AT; PERRL, anicteric sclera; MMM  Neck: supple  Cardiovascular: +S1/S2, RRR  Respiratory: CTA B/L; no W/R/R  Gastrointestinal: soft, NT/ND; +BSx4  Extremities: WWP; no edema, clubbing or cyanosis  Vascular: 2+ radial, DP/PT pulses B/L  Neurological: AAOx3; no focal deficits  Psychiatric: pleasant mood and affect  Dermatologic: no appreciable wounds or damage to the skin    VITAL SIGNS:  Vital Signs Last 24 Hrs  T(C): 36.4 (20 Jul 2024 06:02), Max: 36.6 (19 Jul 2024 15:42)  T(F): 97.6 (20 Jul 2024 06:02), Max: 97.8 (19 Jul 2024 15:42)  HR: 86 (20 Jul 2024 06:02) (78 - 86)  BP: 98/66 (20 Jul 2024 06:02) (94/64 - 98/66)  BP(mean): --  RR: 18 (20 Jul 2024 06:02) (18 - 18)  SpO2: 97% (20 Jul 2024 06:02) (94% - 97%)    Parameters below as of 20 Jul 2024 06:02  Patient On (Oxygen Delivery Method): room air        MEDICATIONS:  MEDICATIONS  (STANDING):  acetaminophen     Tablet .. 975 milliGRAM(s) Oral every 8 hours  bisacodyl 5 milliGRAM(s) Oral daily  enoxaparin Injectable 40 milliGRAM(s) SubCutaneous every 24 hours  levETIRAcetam 500 milliGRAM(s) Oral two times a day  lidocaine   4% Patch 1 Patch Transdermal daily  melatonin 3 milliGRAM(s) Oral at bedtime  methadone    Tablet 160 milliGRAM(s) Oral every 24 hours  mirtazapine 15 milliGRAM(s) Oral daily  oxacillin IVPB 2 Gram(s) IV Intermittent every 4 hours  polyethylene glycol 3350 17 Gram(s) Oral two times a day  QUEtiapine 75 milliGRAM(s) Oral at bedtime    MEDICATIONS  (PRN):  benzonatate 100 milliGRAM(s) Oral once PRN Cough  naloxone Injectable 1 milliGRAM(s) IV Push every 3 minutes PRN in case of overdose  oxyCODONE    IR 5 milliGRAM(s) Oral every 6 hours PRN Moderate Pain (4 - 6)  oxyCODONE    IR 10 milliGRAM(s) Oral every 6 hours PRN Severe Pain (7 - 10)  saline laxative (FLEET) Rectal Enema 1 Enema Rectal once PRN constipation, severe, please try oral laxatives first  senna 2 Tablet(s) Oral at bedtime PRN Constipation      ALLERGIES:  Allergies    No Known Allergies    Intolerances        LABS:              CAPILLARY BLOOD GLUCOSE          RADIOLOGY & ADDITIONAL TESTS: Reviewed.

## 2024-07-20 NOTE — PROGRESS NOTE ADULT - PROBLEM SELECTOR PLAN 7
As per patient has history of epilepsy  States she takes Keppra but unsure of dose, follows with Neurologist at [facility]  Per Jennifer, previously prescribed Keppra 1000mg bid.   - Order Keppra 500mg bid x 2 doses for now, confirm doses.

## 2024-07-20 NOTE — PROGRESS NOTE ADULT - ASSESSMENT
I M    59 y o F pmhx significant for IV drug use, anemia, epilepsy who presents for sharp L leg pain progressively worsening over the past month, likely 2/2 iliopsoas bursitis i/s/o IVDU, s/p IR aspiration on 7/3, on cefazolin 2 g for MSSA bacteremia.       Nutritional Assessment:  · Nutritional Assessment	This patient has been assessed with a concern for Malnutrition and has been determined to have a diagnosis/diagnoses of Severe protein-calorie malnutrition.    This patient is being managed with:   Diet Regular-  No Concentrated Phosphorus  Supplement Feeding Modality:  Oral  Suplena Cans or Servings Per Day:  1       Frequency:  Daily  Entered: Jul 15 2024  3:01PM    Problem/Plan - 1:  ·  Problem: Sepsis due to methicillin susceptible Staphylococcus aureus (MSSA) without acute organ dysfunction.   ·  Plan: Patient was found to have blood and wound cultures positive for MSSA bacteria on 7/1. Per ID recs, cultures from 7/3 have no growth, so there is no need for further surveillance cultures. ID recommends PICC line. Midline placed in Left UE by IR PICC team on 7/11  - Patient will need 4-6 weeks of antibiotic therapy (7/3 - 8/13)  - Continue IV antibiotics through LUE midline  - In event of early discharge before completion of IV antibiotics, contingency plan per ID is PO Keflex 500mg Q6 or Cefadroxil 1g PO Q12 for same planned duration  - IV Cefazolin 2g q8h started 7/3 --> discontinued 7/17 per ID recs, as patient demonstrated lab findings of agranulocytosis (neut# 5.96 --> 1.42)  - IV Oxacillin 2g q4h (7/17- ).    Problem/Plan - 2:  ·  Problem: Iliopsoas bursitis of left hip.   ·  Plan: Worsening left leg and thigh pain with radiation to groin. Duplex LLE negative for DVT. CT LE w IV contrast showed loculated fluid in L iliacus muscle, iliopsoas bursitis of infectious or inflammatory etiology s/p IR aspiration on 7/3.  - Initial BCx on 7/1 positive for MSSA. Plan as above  - Pain regimen: standing 975 tylenol q8, lidocaine patch, oxy 5mg q8PRN for moderate pain, oxy 10mg q6PRN for severe pain  - Toradol held due to mildly elevated creatinine  - CT of Left LE w/ IV contrast on 7/11 re-demonstrating iliopsoas bursitis and surrounding fluid collection, mildly improved compared to 7/3 study  - MR of pelvis/hip no longer needed as patient seems to be improving clinically.    Problem/Plan - 3:  ·  Problem: Mood disorder.   ·  Plan: Patient reports history of bipolar disorder with psychotic features. Previously prescribed quetiapine 150mg qd, buspirone 15mg qd, mirtazapine 15mg qd. Unclear what she is currently taking.  - Attempted to do med rec with shelter, pharmacy, patient, unable to find current doses for her mood medications  - She is endorsing 'ugly' thoughts about relapse, difficulty sleeping, visual hallucinations  - Negative SI/HI  - Started 25 mg Seroquel 7/9  - Worsening psychotic symptoms on 7/11. Re-attempted to contact shelter for med rec but unable to get through. Will aim to determine mood medication doses before making changes to regimen  - Psych recs appreciated - decreased Seroquel from 100mg to 75mg on 7/18 as patient reported daytime drowsiness. Tolerating well.    Problem/Plan - 4:  ·  Problem: Opioid dependence.   ·  Plan: Hx of IV drug use (heroin) on Methadone 160mg 5 days a week since age 19, confirmed with Methadone clinic. Last heroin injection in L leg on Saturday 6/29. Hep C positive, HIV negative. TTE 7/3 and TED 7/5 negative for endocarditis.  - Monitor COWs.    Problem/Plan - 5:  ·  Problem: BOB (acute kidney injury).   ·  Plan: Cr 1.28, BUN 29, GFR 49  - discontinued toradol  - encouraged PO fluid intake  - started on 1L NS 100cc/hour --> completed.    Problem/Plan - 6:  ·  Problem: Anemia.   ·  Plan: Pt states history of anemia  On admission Hg 10.2, Hct 31.0  - Continue to monitor H&H  - Maintain active type & screen  - Hb stable between 7.5 - 9.    Problem/Plan - 7:  ·  Problem: Epilepsy.   ·  Plan: As per patient has history of epilepsy  States she takes Keppra but unsure of dose, follows with Neurologist at [facility]  Per [employer], previously prescribed Keppra 1000mg bid.   - Order Keppra 500mg bid x 2 doses for now, confirm doses.    Problem/Plan - 8:  ·  Problem: Type 2 diabetes mellitus.   ·  Plan: Per patient's shelter, she was on insulin at some point, unclear of when she was taking it or history of diabetes.  - HbA1c 5.1  - BG well controlled inpatient.    Problem/Plan - 9:  ·  Problem: Severe protein-calorie malnutrition.   ·  Plan: - Ensure HD added per nutrition recs.    Problem/Plan - 10:  ·  Problem: Prophylactic measure.   ·  Plan; F: S/p NS 1L  E: replete as needed  N: regular diet, no concentrated phosphorus, Suplena supplement shake daily  DVT ppx: Lovenox 40mg subq  Dispo: [facility].    Attestation Statements:   Attestation Statements:  I have personally seen and examined the patient.  I fully participated in the care of this patient.  I have made amendments to the documentation where necessary, and agree with the history, physical exam, and plan as documented by the Resident.     59-year-old female with a PMHx of IVDU (on Methadone), anemia and epilepsy who presented with LLE pain, found to have loculated fluid collection of left iliacus muscle and iliopsoas bursitis c/b MSSA bacteremia.  BCx (7/1) MSSA, BCx (7/3) NGTD.              Plan:                 -IR consulted, s/p drainage, Cx with staph aureus                 -TTE and TED negative for endocarditis                -ID consulted, transitioned from Cefazolin to Oxacillin given worsening leukopenia/neutropenia, tentative plan for 4–6 week course              -ortho consulted, no acute surgical intervention                -psych consulted, continue with Seroquel, Remeron and Methadone          -PT recommends [facility], no acceptances to date

## 2024-07-20 NOTE — PROGRESS NOTE ADULT - SUBJECTIVE AND OBJECTIVE BOX
Physical Medicine and Rehabilitation Progress Note :       Patient is a 59y old  Female who presents with a chief complaint of left leg pain (20 Jul 2024 10:58)      HPI:  HPI: Pt is 60 y/o F pmhx significant for IV drug use, anemia, epilepsy who presents for sharp L leg pain progressively worsening over the past month. Pt states her pain began in the left thigh and later radiated to her toes but now has persisted in her entire left leg and groin. Pt reports this began about one month ago and required multiple ER visits. She states she went to [place] on 5/31 where nothing was done and she was discharged. Most recently last week she returned to [place] where she states she was given a 7 day course of oral antibiotics which she completed but did not improve her pain. She took Motrin which provided very minimal relief.  Pt last  injected heroin into her left thigh on Saturday 6/29. As per patient, she is chronically on methadone 160 mg 5 days a week since she was 19. She reports subjective fevers. Denies nausea, vomiting, chest pain, shortness of breath, constipation, diarrhea, pain on urination, hematuria, hematemesis.       In the ED:  Initial vital signs: T: 98.7 F, HR: 82, BP: 100/68, RR: 16, SpO2: 96% on RA  Labs: significant for AST 61, CRP 99.5, Hg 10.2, Hct 31.0  CT Lower Extremity with IV Contrast, Left: loculated fluid in L iliacus muscle, iliopsoas bursitis of infectious or inflammatory etiology  US Duplex Venous LE, Left: No evidence of L lower extremity DVT  Medications: Zosyn, Vancomycin, NS, Ibuprofen  Consults:         Ortho- recommended admission for IV antibiotics and IR consult, will continue to follow (01 Jul 2024 18:56)                Vital Signs Last 24 Hrs  T(C): 36.4 (20 Jul 2024 06:02), Max: 36.6 (19 Jul 2024 15:42)  T(F): 97.6 (20 Jul 2024 06:02), Max: 97.8 (19 Jul 2024 15:42)  HR: 86 (20 Jul 2024 06:02) (78 - 86)  BP: 98/66 (20 Jul 2024 06:02) (94/64 - 98/66)  BP(mean): --  RR: 18 (20 Jul 2024 06:02) (18 - 18)  SpO2: 97% (20 Jul 2024 06:02) (94% - 97%)    Parameters below as of 20 Jul 2024 06:02  Patient On (Oxygen Delivery Method): room air        MEDICATIONS  (STANDING):  acetaminophen     Tablet .. 975 milliGRAM(s) Oral every 8 hours  bisacodyl 5 milliGRAM(s) Oral daily  enoxaparin Injectable 40 milliGRAM(s) SubCutaneous every 24 hours  levETIRAcetam 500 milliGRAM(s) Oral two times a day  lidocaine   4% Patch 1 Patch Transdermal daily  melatonin 3 milliGRAM(s) Oral at bedtime  methadone    Tablet 160 milliGRAM(s) Oral every 24 hours  mirtazapine 15 milliGRAM(s) Oral daily  oxacillin IVPB 2 Gram(s) IV Intermittent every 4 hours  polyethylene glycol 3350 17 Gram(s) Oral two times a day  QUEtiapine 75 milliGRAM(s) Oral at bedtime    MEDICATIONS  (PRN):  benzonatate 100 milliGRAM(s) Oral once PRN Cough  naloxone Injectable 1 milliGRAM(s) IV Push every 3 minutes PRN in case of overdose  oxyCODONE    IR 5 milliGRAM(s) Oral every 6 hours PRN Moderate Pain (4 - 6)  oxyCODONE    IR 10 milliGRAM(s) Oral every 6 hours PRN Severe Pain (7 - 10)  saline laxative (FLEET) Rectal Enema 1 Enema Rectal once PRN constipation, severe, please try oral laxatives first  senna 2 Tablet(s) Oral at bedtime PRN Constipation      T(C): 36.4 (07-20-24 @ 06:02), Max: 36.6 (07-19-24 @ 15:42)  HR: 86 (07-20-24 @ 06:02) (78 - 86)  BP: 98/66 (07-20-24 @ 06:02) (94/64 - 98/66)  RR: 18 (07-20-24 @ 06:02) (18 - 18)  SpO2: 97% (07-20-24 @ 06:02) (94% - 97%)        Physical Exam:    59 y o woman lying comfortably in semi Andino's position , awake , alert , no acute complaints     Head: normocephalic , atraumatic    Eyes: PERRLA , EOMI , no nystagmus , sclera anicteric    ENT / FACE: neg nasal discharge , uvula midline , no oropharyngeal erythema / exudate    Neck: supple , negative JVD , negative carotid bruits , no thyromegaly    Chest: CTA bilaterally     Cardiovascular: regular rate and rhythm , neg murmurs / rubs / gallops    Abdomen: soft , non distended , no tenderness to palpation in all 4 quadrants ,  normal bowel sounds     Extremities: WWP , neg cyanosis /clubbing / edema     Musculoskeletal: pain w/ L hip flexion ,  in upper thigh and groin but decreased , no erythema     Skin:     :     Neurologic Exam:     Alert and oriented  x  3     Motor Exam:        > 3+/5 x 4 extremities , LLE pain limited proximally     Sensation:         intact to light touch x 4 extremities                                DTR:           biceps/brachioradialis: equal                            patella/ankle: equal     Functional Status Assessment :   7/19/2024     Pain Assessment/Number Scale (0-10) Adult  Presence of Pain: complains of pain/discomfort  Body Location: Left:   groin  Pain Rating (0-10): Rest: 10 (severe pain)  Pain Rating (0-10): Activity: 10 (severe pain)    Safety      AM-PAC Functional Assessment: Basic Mobility  Type of Assessment: Daily assessment  Turning from your back to your side while in a flat bed without using bedrails?: 4 = No assist / stand by assistance  Moving from lying on your back to sitting on the flat side of a flat bed without using bedrails?: 4 = No assist / stand by assistance  Moving to and from a bed to a chair (including a wheelchair)?: 4 = No assist / stand by assistance  Standing up from a chair using your arms (e.g. wheelchair or bedside chair)?: 4 = No assist / stand by assistance  Walking in hospital room?: 3 = A little assistance  Climbing 3-5 steps with a railing?: 3 = A little assistance  Score: 22   Row Comment: Ask the patient "How much help from another person do you currently need? (If the patient hasn't done an activity recently, how much help from another person do you think he/she needs if he/she tried?)    Cognitive/Neuro      Language Assistance  Preferred Language to Address Healthcare Preferred Language to Address Healthcare: English    Therapeutic Interventions      Bed Mobility  Bed Mobility Training Treatment not Performed: Patient received seated EOB and returned seated in bedside chair    Sit-Stand Transfer Training  Transfer Training Sit-to-Stand Transfer: supervsion;  verbal cues;  rolling walker  Transfer Training Stand-to-Sit Transfer: supervsion;  verbal cues;  rolling walker  Sit-to-Stand Transfer Training Transfer Safety Analysis: decreased balance;  decreased weight-shifting ability;  Demo good eccentric control during descend;  decreased flexibility;  decreased ROM;  decreased strength;  impaired balance;  pain    Gait Training  Gait Training: contact guard;  verbal cues;  rolling walker;  10 feet x 2; limited distance 2/2 L groin pain  Gait Analysis: decreased angie;  crouch;  decreased hip/knee flexion;  increased time in double stance;  decreased velocity of limb motion;  decreased stride length;  decreased step length;  decreased toe clearance;  decreased weight-shifting ability;  decreased flexibility;  decreased ROM;  decreased strength;  impaired balance;  10 feet x 2; limited distance 2/2 L groin pain;  rolling walker        PM&R Impression : as above    Current disposition plan recommendation :    subacute rehab placement

## 2024-07-21 LAB
BASOPHILS # BLD AUTO: 0.04 K/UL — SIGNIFICANT CHANGE UP (ref 0–0.2)
BASOPHILS NFR BLD AUTO: 1.1 % — SIGNIFICANT CHANGE UP (ref 0–2)
EOSINOPHIL # BLD AUTO: 0.25 K/UL — SIGNIFICANT CHANGE UP (ref 0–0.5)
EOSINOPHIL NFR BLD AUTO: 6.6 % — HIGH (ref 0–6)
HCT VFR BLD CALC: 28.5 % — LOW (ref 34.5–45)
HGB BLD-MCNC: 8.8 G/DL — LOW (ref 11.5–15.5)
IMM GRANULOCYTES NFR BLD AUTO: 0.3 % — SIGNIFICANT CHANGE UP (ref 0–0.9)
LYMPHOCYTES # BLD AUTO: 1.31 K/UL — SIGNIFICANT CHANGE UP (ref 1–3.3)
LYMPHOCYTES # BLD AUTO: 34.6 % — SIGNIFICANT CHANGE UP (ref 13–44)
MCHC RBC-ENTMCNC: 28.2 PG — SIGNIFICANT CHANGE UP (ref 27–34)
MCHC RBC-ENTMCNC: 30.9 GM/DL — LOW (ref 32–36)
MCV RBC AUTO: 91.3 FL — SIGNIFICANT CHANGE UP (ref 80–100)
MONOCYTES # BLD AUTO: 0.47 K/UL — SIGNIFICANT CHANGE UP (ref 0–0.9)
MONOCYTES NFR BLD AUTO: 12.4 % — SIGNIFICANT CHANGE UP (ref 2–14)
NEUTROPHILS # BLD AUTO: 1.71 K/UL — LOW (ref 1.8–7.4)
NEUTROPHILS NFR BLD AUTO: 45 % — SIGNIFICANT CHANGE UP (ref 43–77)
NRBC # BLD: 0 /100 WBCS — SIGNIFICANT CHANGE UP (ref 0–0)
PLATELET # BLD AUTO: 284 K/UL — SIGNIFICANT CHANGE UP (ref 150–400)
RBC # BLD: 3.12 M/UL — LOW (ref 3.8–5.2)
RBC # FLD: 16.3 % — HIGH (ref 10.3–14.5)
WBC # BLD: 3.79 K/UL — LOW (ref 3.8–10.5)
WBC # FLD AUTO: 3.79 K/UL — LOW (ref 3.8–10.5)

## 2024-07-21 PROCEDURE — 99232 SBSQ HOSP IP/OBS MODERATE 35: CPT | Mod: GC

## 2024-07-21 RX ORDER — OXYCODONE HYDROCHLORIDE 30 MG/1
5 TABLET ORAL EVERY 6 HOURS
Refills: 0 | Status: DISCONTINUED | OUTPATIENT
Start: 2024-07-21 | End: 2024-07-26

## 2024-07-21 RX ORDER — OXYCODONE HYDROCHLORIDE 30 MG/1
10 TABLET ORAL EVERY 6 HOURS
Refills: 0 | Status: DISCONTINUED | OUTPATIENT
Start: 2024-07-21 | End: 2024-07-26

## 2024-07-21 RX ADMIN — OXYCODONE HYDROCHLORIDE 10 MILLIGRAM(S): 30 TABLET ORAL at 02:55

## 2024-07-21 RX ADMIN — OXACILLIN 100 GRAM(S): 1 INJECTION, POWDER, FOR SOLUTION INTRAMUSCULAR; INTRAVENOUS at 18:00

## 2024-07-21 RX ADMIN — Medication 975 MILLIGRAM(S): at 11:00

## 2024-07-21 RX ADMIN — LIDOCAINE 5% 1 PATCH: 5 CREAM TOPICAL at 11:03

## 2024-07-21 RX ADMIN — OXACILLIN 100 GRAM(S): 1 INJECTION, POWDER, FOR SOLUTION INTRAMUSCULAR; INTRAVENOUS at 02:26

## 2024-07-21 RX ADMIN — OXYCODONE HYDROCHLORIDE 10 MILLIGRAM(S): 30 TABLET ORAL at 17:30

## 2024-07-21 RX ADMIN — OXACILLIN 100 GRAM(S): 1 INJECTION, POWDER, FOR SOLUTION INTRAMUSCULAR; INTRAVENOUS at 10:00

## 2024-07-21 RX ADMIN — Medication 75 MILLIGRAM(S): at 22:25

## 2024-07-21 RX ADMIN — Medication 975 MILLIGRAM(S): at 05:41

## 2024-07-21 RX ADMIN — ENOXAPARIN SODIUM 40 MILLIGRAM(S): 120 INJECTION SUBCUTANEOUS at 22:26

## 2024-07-21 RX ADMIN — OXACILLIN 100 GRAM(S): 1 INJECTION, POWDER, FOR SOLUTION INTRAMUSCULAR; INTRAVENOUS at 14:02

## 2024-07-21 RX ADMIN — Medication 975 MILLIGRAM(S): at 18:10

## 2024-07-21 RX ADMIN — LIDOCAINE 5% 1 PATCH: 5 CREAM TOPICAL at 18:03

## 2024-07-21 RX ADMIN — OXYCODONE HYDROCHLORIDE 10 MILLIGRAM(S): 30 TABLET ORAL at 02:25

## 2024-07-21 RX ADMIN — LIDOCAINE 5% 1 PATCH: 5 CREAM TOPICAL at 01:46

## 2024-07-21 RX ADMIN — LEVETIRACETAM 500 MILLIGRAM(S): 1000 TABLET, FILM COATED ORAL at 17:31

## 2024-07-21 RX ADMIN — Medication 15 MILLIGRAM(S): at 11:02

## 2024-07-21 RX ADMIN — LEVETIRACETAM 500 MILLIGRAM(S): 1000 TABLET, FILM COATED ORAL at 06:25

## 2024-07-21 RX ADMIN — OXYCODONE HYDROCHLORIDE 10 MILLIGRAM(S): 30 TABLET ORAL at 11:00

## 2024-07-21 RX ADMIN — OXYCODONE HYDROCHLORIDE 10 MILLIGRAM(S): 30 TABLET ORAL at 12:00

## 2024-07-21 RX ADMIN — OXACILLIN 100 GRAM(S): 1 INJECTION, POWDER, FOR SOLUTION INTRAMUSCULAR; INTRAVENOUS at 22:25

## 2024-07-21 RX ADMIN — OXYCODONE HYDROCHLORIDE 10 MILLIGRAM(S): 30 TABLET ORAL at 18:30

## 2024-07-21 RX ADMIN — Medication 3 MILLIGRAM(S): at 22:25

## 2024-07-21 RX ADMIN — Medication 160 MILLIGRAM(S): at 08:38

## 2024-07-21 RX ADMIN — Medication 975 MILLIGRAM(S): at 04:41

## 2024-07-21 RX ADMIN — OXACILLIN 100 GRAM(S): 1 INJECTION, POWDER, FOR SOLUTION INTRAMUSCULAR; INTRAVENOUS at 06:25

## 2024-07-21 NOTE — PROGRESS NOTE ADULT - PROBLEM SELECTOR PLAN 7
As per patient has history of epilepsy  States she takes Keppra but unsure of dose, follows with Neurologist at [facility]  Per Jennifer, previously prescribed Keppra 1000mg bid.   - Order Keppra 500mg bid x 2 doses for now, confirm doses.

## 2024-07-21 NOTE — PROGRESS NOTE ADULT - SUBJECTIVE AND OBJECTIVE BOX
[note incomplete] Patient is a 59y old  Female who presents with a chief complaint of left leg pain (21 Jul 2024 06:13)       INTERVAL HPI/OVERNIGHT EVENTS: No acute events overnight.    SUBJECTIVE: Patient seen and examined this morning. Reports good sleep and pain control. Eating and drinking well. BM yesterday. Denies shortness of breath, chest pain, fevers/chills, abd pain, N/V, diarrhea.    All other review of systems negative except otherwise noted in HPI above.    MEDICATIONS  (STANDING):  acetaminophen     Tablet .. 975 milliGRAM(s) Oral every 8 hours  bisacodyl 5 milliGRAM(s) Oral daily  enoxaparin Injectable 40 milliGRAM(s) SubCutaneous every 24 hours  levETIRAcetam 500 milliGRAM(s) Oral two times a day  lidocaine   4% Patch 1 Patch Transdermal daily  melatonin 3 milliGRAM(s) Oral at bedtime  methadone    Tablet 160 milliGRAM(s) Oral every 24 hours  mirtazapine 15 milliGRAM(s) Oral daily  oxacillin IVPB 2 Gram(s) IV Intermittent every 4 hours  polyethylene glycol 3350 17 Gram(s) Oral two times a day  QUEtiapine 75 milliGRAM(s) Oral at bedtime    MEDICATIONS  (PRN):  benzonatate 100 milliGRAM(s) Oral once PRN Cough  naloxone Injectable 1 milliGRAM(s) IV Push every 3 minutes PRN in case of overdose  oxyCODONE    IR 10 milliGRAM(s) Oral every 6 hours PRN Severe Pain (7 - 10)  oxyCODONE    IR 5 milliGRAM(s) Oral every 6 hours PRN Moderate Pain (4 - 6)  saline laxative (FLEET) Rectal Enema 1 Enema Rectal once PRN constipation, severe, please try oral laxatives first  senna 2 Tablet(s) Oral at bedtime PRN Constipation    Allergies    No Known Allergies    Intolerances      Vital Signs Last 24 Hrs  T(C): 36.8 (21 Jul 2024 05:18), Max: 37.1 (20 Jul 2024 21:00)  T(F): 98.3 (21 Jul 2024 05:18), Max: 98.8 (20 Jul 2024 21:00)  HR: 76 (21 Jul 2024 05:18) (75 - 87)  BP: 101/69 (21 Jul 2024 05:18) (99/64 - 105/70)  BP(mean): --  RR: 18 (21 Jul 2024 05:18) (17 - 18)  SpO2: 96% (21 Jul 2024 05:18) (94% - 97%)    Parameters below as of 20 Jul 2024 21:00  Patient On (Oxygen Delivery Method): room air      PHYSICAL EXAM:  Constitutional - NAD, Comfortable  HEENT - NCAT, EOMI  Neck - No limited ROM  Chest - Breathing comfortably on room air, CTAB   Cardio - Warm and well perfused, RRR  Abdomen -  Soft, NTND  Extremities - No peripheral edema  Neurologic Exam - Awake and alert. Speaking fluently    LABS:                        8.8    3.79  )-----------( 284      ( 21 Jul 2024 05:30 )             28.5       RADIOLOGY & ADDITIONAL TESTS: None

## 2024-07-22 PROCEDURE — 99232 SBSQ HOSP IP/OBS MODERATE 35: CPT | Mod: GC

## 2024-07-22 RX ADMIN — Medication 5 MILLIGRAM(S): at 12:19

## 2024-07-22 RX ADMIN — OXYCODONE HYDROCHLORIDE 10 MILLIGRAM(S): 30 TABLET ORAL at 12:46

## 2024-07-22 RX ADMIN — LEVETIRACETAM 500 MILLIGRAM(S): 1000 TABLET, FILM COATED ORAL at 18:22

## 2024-07-22 RX ADMIN — LIDOCAINE 5% 1 PATCH: 5 CREAM TOPICAL at 19:08

## 2024-07-22 RX ADMIN — LIDOCAINE 5% 1 PATCH: 5 CREAM TOPICAL at 12:18

## 2024-07-22 RX ADMIN — LEVETIRACETAM 500 MILLIGRAM(S): 1000 TABLET, FILM COATED ORAL at 06:32

## 2024-07-22 RX ADMIN — OXACILLIN 100 GRAM(S): 1 INJECTION, POWDER, FOR SOLUTION INTRAMUSCULAR; INTRAVENOUS at 18:22

## 2024-07-22 RX ADMIN — OXACILLIN 100 GRAM(S): 1 INJECTION, POWDER, FOR SOLUTION INTRAMUSCULAR; INTRAVENOUS at 22:02

## 2024-07-22 RX ADMIN — OXYCODONE HYDROCHLORIDE 10 MILLIGRAM(S): 30 TABLET ORAL at 13:47

## 2024-07-22 RX ADMIN — OXACILLIN 100 GRAM(S): 1 INJECTION, POWDER, FOR SOLUTION INTRAMUSCULAR; INTRAVENOUS at 01:53

## 2024-07-22 RX ADMIN — OXYCODONE HYDROCHLORIDE 10 MILLIGRAM(S): 30 TABLET ORAL at 00:21

## 2024-07-22 RX ADMIN — OXACILLIN 100 GRAM(S): 1 INJECTION, POWDER, FOR SOLUTION INTRAMUSCULAR; INTRAVENOUS at 06:32

## 2024-07-22 RX ADMIN — OXACILLIN 100 GRAM(S): 1 INJECTION, POWDER, FOR SOLUTION INTRAMUSCULAR; INTRAVENOUS at 10:18

## 2024-07-22 RX ADMIN — Medication 3 MILLIGRAM(S): at 22:01

## 2024-07-22 RX ADMIN — ENOXAPARIN SODIUM 40 MILLIGRAM(S): 120 INJECTION SUBCUTANEOUS at 22:01

## 2024-07-22 RX ADMIN — OXYCODONE HYDROCHLORIDE 10 MILLIGRAM(S): 30 TABLET ORAL at 20:01

## 2024-07-22 RX ADMIN — OXACILLIN 100 GRAM(S): 1 INJECTION, POWDER, FOR SOLUTION INTRAMUSCULAR; INTRAVENOUS at 13:46

## 2024-07-22 RX ADMIN — Medication 15 MILLIGRAM(S): at 12:19

## 2024-07-22 RX ADMIN — Medication 975 MILLIGRAM(S): at 18:23

## 2024-07-22 RX ADMIN — Medication 160 MILLIGRAM(S): at 09:46

## 2024-07-22 RX ADMIN — Medication 75 MILLIGRAM(S): at 22:01

## 2024-07-22 RX ADMIN — OXYCODONE HYDROCHLORIDE 10 MILLIGRAM(S): 30 TABLET ORAL at 19:01

## 2024-07-22 RX ADMIN — OXYCODONE HYDROCHLORIDE 10 MILLIGRAM(S): 30 TABLET ORAL at 06:44

## 2024-07-22 NOTE — PROGRESS NOTE ADULT - ASSESSMENT
Reason for Call:  Other Other    Detailed comments: Pt cut her self on small basil nail, she needs to know if she need tetanus shot or not.    Phone Number Patient can be reached at: Cell number on file:    Telephone Information:   Mobile [phone]       Best Time: anytime    Can we leave a detailed message on this number? YES    Call taken on 12/17/2019 at 11:54 AM by Britt Lobo       58 y/o F pmhx significant for IV drug use, anemia, epilepsy who presents for sharp L leg pain progressively worsening over the past month, likely 2/2 iliopsoas bursitis i/s/o IVDU, s/p IR aspiration on 7/3, on cefazolin 2 g for MSSA bacteremia.

## 2024-07-22 NOTE — PROGRESS NOTE ADULT - PROBLEM SELECTOR PLAN 2
Worsening left leg and thigh pain with radiation to groin. Duplex LLE negative for DVT. CT LE w IV contrast showed loculated fluid in L iliacus muscle, iliopsoas bursitis of infectious or inflammatory etiology s/p IR aspiration on 7/3.  - Initial BCx on 7/1 positive for MSSA. Plan as above  - Pain regimen: standing 975 tylenol q8, lidocaine patch, oxy 5mg q8PRN for moderate pain, oxy 10mg q6PRN for severe pain  - Toradol held due to mildly elevated creatinine  - CT of Left LE w/ IV contrast on 7/11 re-demonstrating iliopsoas bursitis and surrounding fluid collection, mildly improved compared to 7/3 study  - MR of pelvis/hip no longer needed as patient seems to be improving clinically  - Per ID recs, obtain repeat CT of the Left LE w/ IV contrast after 4 weeks of abx are completed (7/31)

## 2024-07-22 NOTE — PROGRESS NOTE ADULT - SUBJECTIVE AND OBJECTIVE BOX
[note incomplete] Patient is a 59y old  Female who presents with a chief complaint of left leg pain (22 Jul 2024 07:34)       INTERVAL HPI/OVERNIGHT EVENTS: No acute events overnight.    SUBJECTIVE: Patient seen and examined this morning. Patient reported sleeping well. She reports that her Left leg/groin pain is well controlled but she feels a little stiff because she didn't walk much over the weekend. Encouraged patient to keep walking as tolerated. She denied fever/chills, chest pain, shortness of breath, abdominal pain, N/V, diarrhea.     All other review of systems negative except otherwise noted in HPI above.    MEDICATIONS  (STANDING):  acetaminophen     Tablet .. 975 milliGRAM(s) Oral every 8 hours  bisacodyl 5 milliGRAM(s) Oral daily  enoxaparin Injectable 40 milliGRAM(s) SubCutaneous every 24 hours  levETIRAcetam 500 milliGRAM(s) Oral two times a day  lidocaine   4% Patch 1 Patch Transdermal daily  melatonin 3 milliGRAM(s) Oral at bedtime  methadone    Tablet 160 milliGRAM(s) Oral every 24 hours  mirtazapine 15 milliGRAM(s) Oral daily  oxacillin IVPB 2 Gram(s) IV Intermittent every 4 hours  polyethylene glycol 3350 17 Gram(s) Oral two times a day  QUEtiapine 75 milliGRAM(s) Oral at bedtime    MEDICATIONS  (PRN):  benzonatate 100 milliGRAM(s) Oral once PRN Cough  naloxone Injectable 1 milliGRAM(s) IV Push every 3 minutes PRN in case of overdose  oxyCODONE    IR 5 milliGRAM(s) Oral every 6 hours PRN Moderate Pain (4 - 6)  oxyCODONE    IR 10 milliGRAM(s) Oral every 6 hours PRN Severe Pain (7 - 10)  saline laxative (FLEET) Rectal Enema 1 Enema Rectal once PRN constipation, severe, please try oral laxatives first  senna 2 Tablet(s) Oral at bedtime PRN Constipation    Allergies    No Known Allergies    Intolerances      Vital Signs Last 24 Hrs  T(C): 36.8 (22 Jul 2024 10:41), Max: 36.8 (21 Jul 2024 21:14)  T(F): 98.2 (22 Jul 2024 10:41), Max: 98.3 (21 Jul 2024 21:14)  HR: 83 (22 Jul 2024 10:41) (75 - 88)  BP: 100/67 (22 Jul 2024 10:41) (97/65 - 105/65)  BP(mean): --  RR: 18 (22 Jul 2024 10:41) (18 - 18)  SpO2: 95% (22 Jul 2024 10:41) (95% - 98%)    Parameters below as of 22 Jul 2024 10:41  Patient On (Oxygen Delivery Method): room air      PHYSICAL EXAM:  Constitutional - NAD, Comfortable  HEENT - NCAT, EOMI  Neck - No limited ROM  Chest - Breathing comfortably on room air, CTAB   Cardio - Warm and well perfused, RRR  Abdomen -  Soft, NTND  Extremities - No peripheral edema  Neurologic Exam: Awake and alert, speaking fluently    LABS: None      RADIOLOGY & ADDITIONAL TESTS: None

## 2024-07-23 LAB
ALBUMIN SERPL ELPH-MCNC: 3.3 G/DL — SIGNIFICANT CHANGE UP (ref 3.3–5)
ALP SERPL-CCNC: 95 U/L — SIGNIFICANT CHANGE UP (ref 40–120)
ALT FLD-CCNC: 43 U/L — SIGNIFICANT CHANGE UP (ref 10–45)
ANION GAP SERPL CALC-SCNC: 8 MMOL/L — SIGNIFICANT CHANGE UP (ref 5–17)
AST SERPL-CCNC: 91 U/L — HIGH (ref 10–40)
BILIRUB SERPL-MCNC: 0.3 MG/DL — SIGNIFICANT CHANGE UP (ref 0.2–1.2)
BUN SERPL-MCNC: 23 MG/DL — SIGNIFICANT CHANGE UP (ref 7–23)
CALCIUM SERPL-MCNC: 9.7 MG/DL — SIGNIFICANT CHANGE UP (ref 8.4–10.5)
CHLORIDE SERPL-SCNC: 99 MMOL/L — SIGNIFICANT CHANGE UP (ref 96–108)
CO2 SERPL-SCNC: 32 MMOL/L — HIGH (ref 22–31)
CREAT SERPL-MCNC: 1.07 MG/DL — SIGNIFICANT CHANGE UP (ref 0.5–1.3)
CRP SERPL-MCNC: 4 MG/L — SIGNIFICANT CHANGE UP (ref 0–4)
EGFR: 61 ML/MIN/1.73M2 — SIGNIFICANT CHANGE UP
ERYTHROCYTE [SEDIMENTATION RATE] IN BLOOD: 83 MM/HR — HIGH
GLUCOSE SERPL-MCNC: 102 MG/DL — HIGH (ref 70–99)
POTASSIUM SERPL-MCNC: 4.9 MMOL/L — SIGNIFICANT CHANGE UP (ref 3.5–5.3)
POTASSIUM SERPL-SCNC: 4.9 MMOL/L — SIGNIFICANT CHANGE UP (ref 3.5–5.3)
PROT SERPL-MCNC: 8.2 G/DL — SIGNIFICANT CHANGE UP (ref 6–8.3)
SODIUM SERPL-SCNC: 139 MMOL/L — SIGNIFICANT CHANGE UP (ref 135–145)

## 2024-07-23 PROCEDURE — 99232 SBSQ HOSP IP/OBS MODERATE 35: CPT | Mod: GC

## 2024-07-23 RX ORDER — MAGNESIUM, ALUMINUM HYDROXIDE 200-225/5
30 SUSPENSION, ORAL (FINAL DOSE FORM) ORAL EVERY 6 HOURS
Refills: 0 | Status: DISCONTINUED | OUTPATIENT
Start: 2024-07-23 | End: 2024-07-29

## 2024-07-23 RX ORDER — METHADONE HCL 10 MG
160 TABLET ORAL EVERY 24 HOURS
Refills: 0 | Status: DISCONTINUED | OUTPATIENT
Start: 2024-07-24 | End: 2024-07-29

## 2024-07-23 RX ADMIN — OXACILLIN 100 GRAM(S): 1 INJECTION, POWDER, FOR SOLUTION INTRAMUSCULAR; INTRAVENOUS at 10:54

## 2024-07-23 RX ADMIN — Medication 975 MILLIGRAM(S): at 12:54

## 2024-07-23 RX ADMIN — OXACILLIN 100 GRAM(S): 1 INJECTION, POWDER, FOR SOLUTION INTRAMUSCULAR; INTRAVENOUS at 06:22

## 2024-07-23 RX ADMIN — Medication 975 MILLIGRAM(S): at 19:21

## 2024-07-23 RX ADMIN — OXYCODONE HYDROCHLORIDE 10 MILLIGRAM(S): 30 TABLET ORAL at 22:38

## 2024-07-23 RX ADMIN — OXYCODONE HYDROCHLORIDE 5 MILLIGRAM(S): 30 TABLET ORAL at 07:39

## 2024-07-23 RX ADMIN — Medication 975 MILLIGRAM(S): at 18:27

## 2024-07-23 RX ADMIN — Medication 75 MILLIGRAM(S): at 22:05

## 2024-07-23 RX ADMIN — Medication 15 MILLIGRAM(S): at 11:58

## 2024-07-23 RX ADMIN — LEVETIRACETAM 500 MILLIGRAM(S): 1000 TABLET, FILM COATED ORAL at 06:22

## 2024-07-23 RX ADMIN — OXACILLIN 100 GRAM(S): 1 INJECTION, POWDER, FOR SOLUTION INTRAMUSCULAR; INTRAVENOUS at 13:53

## 2024-07-23 RX ADMIN — OXYCODONE HYDROCHLORIDE 10 MILLIGRAM(S): 30 TABLET ORAL at 02:51

## 2024-07-23 RX ADMIN — Medication 160 MILLIGRAM(S): at 09:08

## 2024-07-23 RX ADMIN — LIDOCAINE 5% 1 PATCH: 5 CREAM TOPICAL at 19:21

## 2024-07-23 RX ADMIN — OXYCODONE HYDROCHLORIDE 10 MILLIGRAM(S): 30 TABLET ORAL at 02:21

## 2024-07-23 RX ADMIN — OXACILLIN 100 GRAM(S): 1 INJECTION, POWDER, FOR SOLUTION INTRAMUSCULAR; INTRAVENOUS at 02:06

## 2024-07-23 RX ADMIN — OXYCODONE HYDROCHLORIDE 10 MILLIGRAM(S): 30 TABLET ORAL at 22:08

## 2024-07-23 RX ADMIN — Medication 3 MILLIGRAM(S): at 22:05

## 2024-07-23 RX ADMIN — OXACILLIN 100 GRAM(S): 1 INJECTION, POWDER, FOR SOLUTION INTRAMUSCULAR; INTRAVENOUS at 22:05

## 2024-07-23 RX ADMIN — Medication 975 MILLIGRAM(S): at 11:58

## 2024-07-23 RX ADMIN — OXYCODONE HYDROCHLORIDE 10 MILLIGRAM(S): 30 TABLET ORAL at 14:52

## 2024-07-23 RX ADMIN — OXYCODONE HYDROCHLORIDE 10 MILLIGRAM(S): 30 TABLET ORAL at 13:52

## 2024-07-23 RX ADMIN — LIDOCAINE 5% 1 PATCH: 5 CREAM TOPICAL at 11:57

## 2024-07-23 RX ADMIN — OXACILLIN 100 GRAM(S): 1 INJECTION, POWDER, FOR SOLUTION INTRAMUSCULAR; INTRAVENOUS at 18:27

## 2024-07-23 RX ADMIN — OXYCODONE HYDROCHLORIDE 5 MILLIGRAM(S): 30 TABLET ORAL at 08:09

## 2024-07-23 RX ADMIN — Medication 30 MILLILITER(S): at 13:06

## 2024-07-23 RX ADMIN — LEVETIRACETAM 500 MILLIGRAM(S): 1000 TABLET, FILM COATED ORAL at 18:27

## 2024-07-23 RX ADMIN — ENOXAPARIN SODIUM 40 MILLIGRAM(S): 120 INJECTION SUBCUTANEOUS at 22:05

## 2024-07-23 RX ADMIN — LIDOCAINE 5% 1 PATCH: 5 CREAM TOPICAL at 00:20

## 2024-07-23 RX ADMIN — LIDOCAINE 5% 1 PATCH: 5 CREAM TOPICAL at 23:58

## 2024-07-23 NOTE — PROGRESS NOTE ADULT - ATTENDING COMMENTS
59-year-old female with a PMHx of IVDU (on Methadone), anemia and epilepsy who presented with LLE pain, found to have loculated fluid collection of left iliacus muscle and iliopsoas bursitis c/b MSSA bacteremia.  BCx (7/1) MSSA, BCx (7/3) NGTD.               Plan:                  -IR consulted, s/p drainage, Cx with staph aureus                  -TTE and TED negative for endocarditis                 -ID consulted, transitioned from Cefazolin to Oxacillin given worsening leukopenia/neutropenia, tentative plan for 4–6 week course, will obtain repeat CT on 7/29 to determine if Abx needs to be extended   -ortho consulted, no acute surgical intervention                 -psych consulted, continue with Seroquel, Remeron and Methadone             Rest of plan as per resident note.

## 2024-07-23 NOTE — PROGRESS NOTE ADULT - SUBJECTIVE AND OBJECTIVE BOX
[note incomplete] Patient is a 59y old  Female who presents with a chief complaint of left leg pain (23 Jul 2024 10:07)     HOSPITAL COURSE: Patient is a 59-year-old woman with PMHx of IVDU, anemia, and epilepsy who presented with subacute progressively worsening Left leg pain x1 month prior to admission, found to have iliopsoas bursitis likely 2/2 IVDU, c/b MSSA bacteremia. Per ID recs, patient was transitioned from empiric abx to IV Cefazolin (7/3-7/17), which was discontinued due to agranulocytosis, and then subsequently started on IV Oxacillin (7/17- ) with plan for a 6-week course concluding on 8/13. As patient expressed intermittent desire to leave AMA, contingency plan per ID in the event of AMA is PO Dicloxacillin. However, as patient's Seroquel was uptitrated from 25mg to 75mg, patient's mood symptoms have improved and she has maintained a positive attitude toward completing her medical treatment. Patient is medically stable for discharge to complete her abx therapy, but has had difficulty obtaining placement at [facility] due to IVDU history.    INTERVAL HPI/OVERNIGHT EVENTS: No acute events overnight.    SUBJECTIVE: Patient seen and examined this morning. She reports sleeping well. She reports unchanged pain in the Left leg/groin. She denies fevers/chills, chest pain, abdominal pain, N/V, and diarrhea.    All other review of systems negative except otherwise noted in HPI above.    MEDICATIONS  (STANDING):  acetaminophen     Tablet .. 975 milliGRAM(s) Oral every 8 hours  bisacodyl 5 milliGRAM(s) Oral daily  enoxaparin Injectable 40 milliGRAM(s) SubCutaneous every 24 hours  levETIRAcetam 500 milliGRAM(s) Oral two times a day  lidocaine   4% Patch 1 Patch Transdermal daily  melatonin 3 milliGRAM(s) Oral at bedtime  mirtazapine 15 milliGRAM(s) Oral daily  oxacillin IVPB 2 Gram(s) IV Intermittent every 4 hours  polyethylene glycol 3350 17 Gram(s) Oral two times a day  QUEtiapine 75 milliGRAM(s) Oral at bedtime    MEDICATIONS  (PRN):  aluminum hydroxide/magnesium hydroxide/simethicone Suspension 30 milliLiter(s) Oral every 6 hours PRN Dyspepsia  benzonatate 100 milliGRAM(s) Oral once PRN Cough  naloxone Injectable 1 milliGRAM(s) IV Push every 3 minutes PRN in case of overdose  oxyCODONE    IR 5 milliGRAM(s) Oral every 6 hours PRN Moderate Pain (4 - 6)  oxyCODONE    IR 10 milliGRAM(s) Oral every 6 hours PRN Severe Pain (7 - 10)  senna 2 Tablet(s) Oral at bedtime PRN Constipation    Allergies    No Known Allergies    Intolerances      Vital Signs Last 24 Hrs  T(C): 36.8 (23 Jul 2024 09:00), Max: 37.3 (22 Jul 2024 16:04)  T(F): 98.3 (23 Jul 2024 09:00), Max: 99.2 (22 Jul 2024 16:04)  HR: 69 (23 Jul 2024 09:00) (69 - 101)  BP: 102/71 (23 Jul 2024 09:00) (92/62 - 113/72)  BP(mean): --  RR: 18 (23 Jul 2024 09:00) (18 - 18)  SpO2: 97% (23 Jul 2024 09:00) (97% - 98%)    Parameters below as of 23 Jul 2024 09:00  Patient On (Oxygen Delivery Method): room air      PHYSICAL EXAM:  Constitutional - NAD, Comfortable  HEENT - NCAT, EOMI  Neck - No limited ROM  Chest - Breathing comfortably on room air, CTAB   Cardio - Warm and well perfused, RRR  Abdomen -  Soft, NTND  Extremities - No peripheral edema  Neurologic Exam: Awake and alert, speaking fluently      LABS:    23 Jul 2024 12:00    139    |  99     |  23     ----------------------------<  102    4.9     |  32     |  1.07     Ca    9.7        23 Jul 2024 12:00    TPro  8.2    /  Alb  3.3    /  TBili  0.3    /  DBili  x      /  AST  91     /  ALT  43     /  AlkPhos  95     23 Jul 2024 12:00      CAPILLARY BLOOD GLUCOSE        BLOOD CULTURE    RADIOLOGY & ADDITIONAL TESTS: None

## 2024-07-23 NOTE — PROGRESS NOTE ADULT - SUBJECTIVE AND OBJECTIVE BOX
Physical Medicine and Rehabilitation Progress Note :       Patient is a 59y old  Female who presents with a chief complaint of left leg pain (23 Jul 2024 06:52)      HPI:  HPI: Pt is 58 y/o F pmhx significant for IV drug use, anemia, epilepsy who presents for sharp L leg pain progressively worsening over the past month. Pt states her pain began in the left thigh and later radiated to her toes but now has persisted in her entire left leg and groin. Pt reports this began about one month ago and required multiple ER visits. She states she went to [place] on 5/31 where nothing was done and she was discharged. Most recently last week she returned to [place] where she states she was given a 7 day course of oral antibiotics which she completed but did not improve her pain. She took Motrin which provided very minimal relief.  Pt last  injected heroin into her left thigh on Saturday 6/29. As per patient, she is chronically on methadone 160 mg 5 days a week since she was 19. She reports subjective fevers. Denies nausea, vomiting, chest pain, shortness of breath, constipation, diarrhea, pain on urination, hematuria, hematemesis.       In the ED:  Initial vital signs: T: 98.7 F, HR: 82, BP: 100/68, RR: 16, SpO2: 96% on RA  Labs: significant for AST 61, CRP 99.5, Hg 10.2, Hct 31.0  CT Lower Extremity with IV Contrast, Left: loculated fluid in L iliacus muscle, iliopsoas bursitis of infectious or inflammatory etiology  US Duplex Venous LE, Left: No evidence of L lower extremity DVT  Medications: Zosyn, Vancomycin, NS, Ibuprofen  Consults:         Ortho- recommended admission for IV antibiotics and IR consult, will continue to follow (01 Jul 2024 18:56)                Vital Signs Last 24 Hrs  T(C): 36.8 (23 Jul 2024 09:00), Max: 37.3 (22 Jul 2024 16:04)  T(F): 98.3 (23 Jul 2024 09:00), Max: 99.2 (22 Jul 2024 16:04)  HR: 69 (23 Jul 2024 09:00) (69 - 101)  BP: 102/71 (23 Jul 2024 09:00) (92/62 - 113/72)  BP(mean): --  RR: 18 (23 Jul 2024 09:00) (18 - 18)  SpO2: 97% (23 Jul 2024 09:00) (95% - 98%)    Parameters below as of 23 Jul 2024 09:00  Patient On (Oxygen Delivery Method): room air        MEDICATIONS  (STANDING):  acetaminophen     Tablet .. 975 milliGRAM(s) Oral every 8 hours  bisacodyl 5 milliGRAM(s) Oral daily  enoxaparin Injectable 40 milliGRAM(s) SubCutaneous every 24 hours  levETIRAcetam 500 milliGRAM(s) Oral two times a day  lidocaine   4% Patch 1 Patch Transdermal daily  melatonin 3 milliGRAM(s) Oral at bedtime  mirtazapine 15 milliGRAM(s) Oral daily  oxacillin IVPB 2 Gram(s) IV Intermittent every 4 hours  polyethylene glycol 3350 17 Gram(s) Oral two times a day  QUEtiapine 75 milliGRAM(s) Oral at bedtime    MEDICATIONS  (PRN):  benzonatate 100 milliGRAM(s) Oral once PRN Cough  naloxone Injectable 1 milliGRAM(s) IV Push every 3 minutes PRN in case of overdose  oxyCODONE    IR 5 milliGRAM(s) Oral every 6 hours PRN Moderate Pain (4 - 6)  oxyCODONE    IR 10 milliGRAM(s) Oral every 6 hours PRN Severe Pain (7 - 10)  senna 2 Tablet(s) Oral at bedtime PRN Constipation      T(C): 36.8 (07-23-24 @ 09:00), Max: 37.3 (07-22-24 @ 16:04)  HR: 69 (07-23-24 @ 09:00) (69 - 101)  BP: 102/71 (07-23-24 @ 09:00) (92/62 - 113/72)  RR: 18 (07-23-24 @ 09:00) (18 - 18)  SpO2: 97% (07-23-24 @ 09:00) (95% - 98%)        Physical Exam:    59 y o woman lying comfortably in semi Andino's position , awake , alert , no acute complaints     Head: normocephalic , atraumatic    Eyes: PERRLA , EOMI , no nystagmus , sclera anicteric    ENT / FACE: neg nasal discharge , uvula midline , no oropharyngeal erythema / exudate    Neck: supple , negative JVD , negative carotid bruits , no thyromegaly    Chest: CTA bilaterally     Cardiovascular: regular rate and rhythm , neg murmurs / rubs / gallops    Abdomen: soft , non distended , no tenderness to palpation in all 4 quadrants ,  normal bowel sounds     Extremities: WWP , neg cyanosis /clubbing / edema     Musculoskeletal: pain w/ L hip flexion ,  in upper thigh and groin but decreased , no erythema     Skin:     :     Neurologic Exam:     Alert and oriented  x  3     Motor Exam:        > 3+/5 x 4 extremities , LLE pain limited proximally     Sensation:         intact to light touch x 4 extremities                                DTR:           biceps/brachioradialis: equal                            patella/ankle: equal     7/22/2024 Functional Status Assessment :       Pain Assessment/Number Scale (0-10) Adult  Presence of Pain: complains of pain/discomfort  Body Location: L hip  Pain Rating (0-10): Rest: 3 (mild pain)  Pain Rating (0-10): Activity: 5 (moderate pain)    Safety      AM-PAC Functional Assessment: Basic Mobility  Turning from your back to your side while in a flat bed without using bedrails?: 3 = A little assistance  Moving from lying on your back to sitting on the flat side of a flat bed without using bedrails?: 3 = A little assistance  Moving to and from a bed to a chair (including a wheelchair)?: 3 = A little assistance  Standing up from a chair using your arms (e.g. wheelchair or bedside chair)?: 3 = A little assistance  Walking in hospital room?: 3 = A little assistance  Climbing 3-5 steps with a railing?: 3 = A little assistance  Score: 18   Row Comment: Ask the patient "How much help from another person do you currently need? (If the patient hasn't done an activity recently, how much help from another person do you think he/she needs if he/she tried?)    Cognitive/Neuro      Cognitive/Neuro/Behavioral  Cognitive/Neuro/Behavioral [WDL Definition: Alert; opens eyes spontaneously; arouses to voice or touch; oriented x 4; follows commands; speech spontaneous, logical; purposeful motor response; behavior appropriate to situation]: WDL    Language Assistance  Preferred Language to Address Healthcare Preferred Language to Address Healthcare: English    Therapeutic Interventions      Bed Mobility  Bed Mobility Training Rolling/Turning: supervsion;  1 person assist;  verbal cues  Bed Mobility Training Sit-to-Supine: supervsion;  1 person assist;  verbal cues  Bed Mobility Training Supine-to-Sit: supervsion;  1 person assist;  verbal cues  Bed Mobility Training Limitations: decreased ability to use legs for bridging/pushing;  impaired ability to control trunk for mobility;  decreased strength;  impaired balance;  impaired postural control    Sit-Stand Transfer Training  Transfer Training Sit-to-Stand Transfer: contact guard;  1 person assist;  verbal cues;  rolling walker  Transfer Training Stand-to-Sit Transfer: contact guard;  1 person assist;  verbal cues;  rolling walker  Sit-to-Stand Transfer Training Transfer Safety Analysis: decreased balance;  decreased step length;  decreased weight-shifting ability;  decreased strength;  impaired balance;  impaired postural control;  rolling walker    Toilet Transfer Training  Transfer Training Toilet Transfer: contact guard;  1 person assist;  verbal cues;  grab bars  Toilet Transfer Training Transfer Safety Analysis: decreased balance;  decreased step length;  decreased weight-shifting ability;  decreased strength;  impaired balance;  impaired postural control;  grab bars    Gait Training  Gait Training: contact guard;  1 person assist;  verbal cues;  rolling walker;  60 feet x 2  Gait Analysis: decreased angie;  increased time in double stance;  decreased step length;  decreased weight-shifting ability;  antalgic ;  decreased strength;  impaired balance;  impaired postural control;  rolling walker  Gait Number of Times:: x 1            PM&R Impression : as above    Current disposition plan recommendation :    subacute rehab placement

## 2024-07-23 NOTE — PROGRESS NOTE ADULT - PROBLEM SELECTOR PLAN 1
Patient was found to have blood and wound cultures positive for MSSA bacteria on 7/1. Per ID recs, cultures from 7/3 have no growth, so there is no need for further surveillance cultures. ID recommends PICC line. Midline placed in Left UE by IR PICC team on 7/11  - Patient will need 4-6 weeks of antibiotic therapy (7/3 - 8/13)  - Continue IV antibiotics through LUE midline  - In event of early discharge before completion of IV antibiotics, contingency plan per ID is PO Dicloxacillin 500mg q6h for same planned duration  - IV Cefazolin 2g q8h started 7/3 --> discontinued 7/17 per ID recs, as patient demonstrated lab findings of agranulocytosis (neut# 5.96 --> 1.42)  - IV Oxacillin 2g q4h (7/17- )

## 2024-07-23 NOTE — PROGRESS NOTE ADULT - ASSESSMENT
I M    60 y/o F pmhx significant for IV drug use, anemia, epilepsy who presents for sharp L leg pain progressively worsening over the past month, likely 2/2 iliopsoas bursitis i/s/o IVDU, s/p IR aspiration on 7/3, on cefazolin 2 g for MSSA bacteremia.         Nutritional Assessment:  · Nutritional Assessment  This patient has been assessed with a concern for Malnutrition and has been determined to have a diagnosis/diagnoses of Severe protein-calorie malnutrition.    This patient is being managed with:   Diet Regular-  No Concentrated Phosphorus  Supplement Feeding Modality:  Oral  Suplena Cans or Servings Per Day:  1       Frequency:  Daily  Entered: Jul 15 2024  3:01PM    Problem/Plan - 1:  ·  Problem: Sepsis due to methicillin susceptible Staphylococcus aureus (MSSA) without acute organ dysfunction.   ·  Plan: Patient was found to have blood and wound cultures positive for MSSA bacteria on 7/1. Per ID recs, cultures from 7/3 have no growth, so there is no need for further surveillance cultures. ID recommends PICC line. Midline placed in Left UE by IR PICC team on 7/11  - Patient will need 4-6 weeks of antibiotic therapy (7/3 - 8/13)  - Continue IV antibiotics through LUE midline  - In event of early discharge before completion of IV antibiotics, contingency plan per ID is PO Keflex 500mg Q6 or Cefadroxil 1g PO Q12 for same planned duration  - IV Cefazolin 2g q8h started 7/3 --> discontinued 7/17 per ID recs, as patient demonstrated lab findings of agranulocytosis (neut# 5.96 --> 1.42)  - IV Oxacillin 2g q4h (7/17- ).    Problem/Plan - 2:  ·  Problem: Iliopsoas bursitis of left hip.   ·  Plan: Worsening left leg and thigh pain with radiation to groin. Duplex LLE negative for DVT. CT LE w IV contrast showed loculated fluid in L iliacus muscle, iliopsoas bursitis of infectious or inflammatory etiology s/p IR aspiration on 7/3.  - Initial BCx on 7/1 positive for MSSA. Plan as above  - Pain regimen: standing 975 tylenol q8, lidocaine patch, oxy 5mg q8PRN for moderate pain, oxy 10mg q6PRN for severe pain  - Toradol held due to mildly elevated creatinine  - CT of Left LE w/ IV contrast on 7/11 re-demonstrating iliopsoas bursitis and surrounding fluid collection, mildly improved compared to 7/3 study  - MR of pelvis/hip no longer needed as patient seems to be improving clinically  - Per ID recs, obtain repeat CT of the Left LE w/ IV contrast after 4 weeks of abx are completed (7/31).    Problem/Plan - 3:  ·  Problem: Mood disorder.   ·  Plan: Patient reports history of bipolar disorder with psychotic features. Previously prescribed quetiapine 150mg qd, buspirone 15mg qd, mirtazapine 15mg qd. Unclear what she is currently taking.  - Attempted to do med rec with shelter, pharmacy, patient, unable to find current doses for her mood medications  - She is endorsing 'ugly' thoughts about relapse, difficulty sleeping, visual hallucinations  - Negative SI/HI  - Started 25 mg Seroquel 7/9  - Worsening psychotic symptoms on 7/11. Re-attempted to contact shelter for med rec but unable to get through. Will aim to determine mood medication doses before making changes to regimen  - Psych recs appreciated - decreased Seroquel from 100mg to 75mg on 7/18 as patient reported daytime drowsiness. Tolerating well.    Problem/Plan - 4:  ·  Problem: Opioid dependence.   ·  Plan: Hx of IV drug use (heroin) on Methadone 160mg 5 days a week since age 19, confirmed with Methadone clinic. Last heroin injection in L leg on Saturday 6/29. Hep C positive, HIV negative. TTE 7/3 and TED 7/5 negative for endocarditis.  - Monitor COWs.    Problem/Plan - 5:  ·  Problem: BOB (acute kidney injury).   ·  Plan: Cr 1.28, BUN 29, GFR 49  - discontinued Toradol  - encouraged PO fluid intake  - started on 1L NS 100cc/hour --> completed.    Problem/Plan - 6:  ·  Problem: Anemia.   ·  Plan: Pt states history of anemia  On admission Hg 10.2, Hct 31.0  - Continue to monitor H&H  - Maintain active type & screen  - Hb stable between 7.5 - 9.    Problem/Plan - 7:  ·  Problem: Epilepsy.   ·  Plan: As per patient has history of epilepsy  States she takes Keppra but unsure of dose, follows with Neurologist at [facility]  Per [employer], previously prescribed Keppra 1000mg bid.   - Order Keppra 500mg bid x 2 doses for now, confirm doses.    Problem/Plan - 8:  ·  Problem: Type 2 diabetes mellitus.   ·  Plan: Per patient's shelter, she was on insulin at some point, unclear of when she was taking it or history of diabetes.  - HbA1c 5.1  - BG well controlled inpatient.    Problem/Plan - 9:  ·  Problem: Severe protein-calorie malnutrition.   ·  Plan: - Ensure HD added per nutrition recs.    Problem/Plan - 10:  ·  Problem: Prophylactic measure.   ·  Plan; F: S/p NS 1L  E: replete as needed  N: regular diet, no concentrated phosphorus, Suplena supplement shake daily  DVT ppx: Lovenox 40mg subq  Dispo: [facility].    Attestation Statements:   Attestation Statements:  I have personally seen and examined the patient.  I fully participated in the care of this patient.  I have made amendments to the documentation where necessary, and agree with the history, physical exam, and plan as documented by the Resident.     59-year-old female with a PMHx of IVDU (on Methadone), anemia and epilepsy who presented with LLE pain, found to have loculated fluid collection of left iliacus muscle and iliopsoas bursitis c/b MSSA bacteremia.  BCx (7/1) MSSA, BCx (7/3) NGTD.              Plan:                 -IR consulted, s/p drainage, Cx with staph aureus                 -TTE and TED negative for endocarditis                -ID consulted, transitioned from Cefazolin to Oxacillin given worsening leukopenia/neutropenia, tentative plan for 4–6 week course              -ortho consulted, no acute surgical intervention                -psych consulted, continue with Seroquel, Remeron and Methadone          -PT recommends [facility], no acceptances to date     Rest of plan as per resident note.

## 2024-07-24 LAB
ALBUMIN SERPL ELPH-MCNC: 3.1 G/DL — LOW (ref 3.3–5)
ALP SERPL-CCNC: 87 U/L — SIGNIFICANT CHANGE UP (ref 40–120)
ALT FLD-CCNC: 60 U/L — HIGH (ref 10–45)
ANION GAP SERPL CALC-SCNC: 12 MMOL/L — SIGNIFICANT CHANGE UP (ref 5–17)
AST SERPL-CCNC: SIGNIFICANT CHANGE UP (ref 10–40)
BASOPHILS # BLD AUTO: 0.04 K/UL — SIGNIFICANT CHANGE UP (ref 0–0.2)
BASOPHILS NFR BLD AUTO: 0.9 % — SIGNIFICANT CHANGE UP (ref 0–2)
BILIRUB SERPL-MCNC: 0.3 MG/DL — SIGNIFICANT CHANGE UP (ref 0.2–1.2)
BUN SERPL-MCNC: 22 MG/DL — SIGNIFICANT CHANGE UP (ref 7–23)
CALCIUM SERPL-MCNC: 9.7 MG/DL — SIGNIFICANT CHANGE UP (ref 8.4–10.5)
CHLORIDE SERPL-SCNC: 98 MMOL/L — SIGNIFICANT CHANGE UP (ref 96–108)
CO2 SERPL-SCNC: 25 MMOL/L — SIGNIFICANT CHANGE UP (ref 22–31)
CREAT SERPL-MCNC: 1.02 MG/DL — SIGNIFICANT CHANGE UP (ref 0.5–1.3)
EGFR: 64 ML/MIN/1.73M2 — SIGNIFICANT CHANGE UP
EOSINOPHIL # BLD AUTO: 0.19 K/UL — SIGNIFICANT CHANGE UP (ref 0–0.5)
EOSINOPHIL NFR BLD AUTO: 4.3 % — SIGNIFICANT CHANGE UP (ref 0–6)
GLUCOSE SERPL-MCNC: 117 MG/DL — HIGH (ref 70–99)
HCT VFR BLD CALC: 35.5 % — SIGNIFICANT CHANGE UP (ref 34.5–45)
HGB BLD-MCNC: 10.9 G/DL — LOW (ref 11.5–15.5)
IMM GRANULOCYTES NFR BLD AUTO: 0.2 % — SIGNIFICANT CHANGE UP (ref 0–0.9)
LYMPHOCYTES # BLD AUTO: 1.48 K/UL — SIGNIFICANT CHANGE UP (ref 1–3.3)
LYMPHOCYTES # BLD AUTO: 33.5 % — SIGNIFICANT CHANGE UP (ref 13–44)
MAGNESIUM SERPL-MCNC: 2 MG/DL — SIGNIFICANT CHANGE UP (ref 1.6–2.6)
MCHC RBC-ENTMCNC: 27.9 PG — SIGNIFICANT CHANGE UP (ref 27–34)
MCHC RBC-ENTMCNC: 30.7 GM/DL — LOW (ref 32–36)
MCV RBC AUTO: 90.8 FL — SIGNIFICANT CHANGE UP (ref 80–100)
MONOCYTES # BLD AUTO: 0.46 K/UL — SIGNIFICANT CHANGE UP (ref 0–0.9)
MONOCYTES NFR BLD AUTO: 10.4 % — SIGNIFICANT CHANGE UP (ref 2–14)
NEUTROPHILS # BLD AUTO: 2.24 K/UL — SIGNIFICANT CHANGE UP (ref 1.8–7.4)
NEUTROPHILS NFR BLD AUTO: 50.7 % — SIGNIFICANT CHANGE UP (ref 43–77)
NRBC # BLD: 0 /100 WBCS — SIGNIFICANT CHANGE UP (ref 0–0)
PHOSPHATE SERPL-MCNC: 5 MG/DL — HIGH (ref 2.5–4.5)
PLATELET # BLD AUTO: 241 K/UL — SIGNIFICANT CHANGE UP (ref 150–400)
POTASSIUM SERPL-MCNC: 5 MMOL/L — SIGNIFICANT CHANGE UP (ref 3.5–5.3)
POTASSIUM SERPL-SCNC: 5 MMOL/L — SIGNIFICANT CHANGE UP (ref 3.5–5.3)
PROT SERPL-MCNC: 8.4 G/DL — HIGH (ref 6–8.3)
RBC # BLD: 3.91 M/UL — SIGNIFICANT CHANGE UP (ref 3.8–5.2)
RBC # FLD: 17 % — HIGH (ref 10.3–14.5)
SODIUM SERPL-SCNC: 135 MMOL/L — SIGNIFICANT CHANGE UP (ref 135–145)
WBC # BLD: 4.42 K/UL — SIGNIFICANT CHANGE UP (ref 3.8–10.5)
WBC # FLD AUTO: 4.42 K/UL — SIGNIFICANT CHANGE UP (ref 3.8–10.5)

## 2024-07-24 PROCEDURE — 99232 SBSQ HOSP IP/OBS MODERATE 35: CPT | Mod: GC

## 2024-07-24 RX ORDER — CHLORHEXIDINE GLUCONATE 500 MG/1
1 CLOTH TOPICAL DAILY
Refills: 0 | Status: DISCONTINUED | OUTPATIENT
Start: 2024-07-24 | End: 2024-07-29

## 2024-07-24 RX ADMIN — Medication 160 MILLIGRAM(S): at 09:55

## 2024-07-24 RX ADMIN — OXYCODONE HYDROCHLORIDE 10 MILLIGRAM(S): 30 TABLET ORAL at 13:10

## 2024-07-24 RX ADMIN — OXYCODONE HYDROCHLORIDE 10 MILLIGRAM(S): 30 TABLET ORAL at 19:52

## 2024-07-24 RX ADMIN — Medication 975 MILLIGRAM(S): at 19:52

## 2024-07-24 RX ADMIN — OXACILLIN 100 GRAM(S): 1 INJECTION, POWDER, FOR SOLUTION INTRAMUSCULAR; INTRAVENOUS at 10:55

## 2024-07-24 RX ADMIN — OXACILLIN 100 GRAM(S): 1 INJECTION, POWDER, FOR SOLUTION INTRAMUSCULAR; INTRAVENOUS at 22:19

## 2024-07-24 RX ADMIN — Medication 75 MILLIGRAM(S): at 22:20

## 2024-07-24 RX ADMIN — OXACILLIN 100 GRAM(S): 1 INJECTION, POWDER, FOR SOLUTION INTRAMUSCULAR; INTRAVENOUS at 06:00

## 2024-07-24 RX ADMIN — OXACILLIN 100 GRAM(S): 1 INJECTION, POWDER, FOR SOLUTION INTRAMUSCULAR; INTRAVENOUS at 18:16

## 2024-07-24 RX ADMIN — Medication 5 MILLIGRAM(S): at 11:36

## 2024-07-24 RX ADMIN — OXACILLIN 100 GRAM(S): 1 INJECTION, POWDER, FOR SOLUTION INTRAMUSCULAR; INTRAVENOUS at 03:49

## 2024-07-24 RX ADMIN — Medication 975 MILLIGRAM(S): at 13:42

## 2024-07-24 RX ADMIN — OXYCODONE HYDROCHLORIDE 10 MILLIGRAM(S): 30 TABLET ORAL at 05:57

## 2024-07-24 RX ADMIN — LEVETIRACETAM 500 MILLIGRAM(S): 1000 TABLET, FILM COATED ORAL at 05:27

## 2024-07-24 RX ADMIN — LEVETIRACETAM 500 MILLIGRAM(S): 1000 TABLET, FILM COATED ORAL at 18:16

## 2024-07-24 RX ADMIN — Medication 3 MILLIGRAM(S): at 22:20

## 2024-07-24 RX ADMIN — OXYCODONE HYDROCHLORIDE 10 MILLIGRAM(S): 30 TABLET ORAL at 18:16

## 2024-07-24 RX ADMIN — ENOXAPARIN SODIUM 40 MILLIGRAM(S): 120 INJECTION SUBCUTANEOUS at 22:20

## 2024-07-24 RX ADMIN — OXYCODONE HYDROCHLORIDE 10 MILLIGRAM(S): 30 TABLET ORAL at 05:27

## 2024-07-24 RX ADMIN — Medication 975 MILLIGRAM(S): at 18:16

## 2024-07-24 RX ADMIN — OXACILLIN 100 GRAM(S): 1 INJECTION, POWDER, FOR SOLUTION INTRAMUSCULAR; INTRAVENOUS at 15:48

## 2024-07-24 RX ADMIN — Medication 15 MILLIGRAM(S): at 11:36

## 2024-07-24 RX ADMIN — OXYCODONE HYDROCHLORIDE 10 MILLIGRAM(S): 30 TABLET ORAL at 11:36

## 2024-07-24 RX ADMIN — Medication 975 MILLIGRAM(S): at 11:36

## 2024-07-24 NOTE — PROGRESS NOTE ADULT - PROBLEM SELECTOR PLAN 1
Patient was found to have blood and wound cultures positive for MSSA bacteria on 7/1. Per ID recs, cultures from 7/3 have no growth, so there is no need for further surveillance cultures. ID recommended PICC line. Midline placed in Left UE by IR PICC team on 7/11. Patient currently on 4-6 weeks of antibiotic therapy (7/3 - 8/13). Patient initially started on IV Cefazolin 2g q8h started 7/3 and discontinued 7/17, per ID recs, as patient demonstrated lab findings of agranulocytosis (neut# 5.96 --> 1.42)    Plan:  - Continue IV antibiotics through LUE midline  - In event of early discharge before completion of IV antibiotics, contingency plan per ID is PO Dicloxacillin 500mg q6h for same planned duration  - IV Oxacillin 2g q4h (7/17- )

## 2024-07-24 NOTE — PROGRESS NOTE ADULT - PROBLEM SELECTOR PLAN 7
As per patient has history of epilepsy  States she takes Keppra but unsure of dose, follows with Neurologist at [facility]  Per Jennifer, previously prescribed Keppra 1000mg bid.   - Order Keppra 500mg bid x 2 doses for now.

## 2024-07-24 NOTE — PROGRESS NOTE ADULT - PROBLEM SELECTOR PLAN 5
Cr 1.28, BUN 29, GFR 49. Initially was on Toradol, improved with PO fluid intake. Provided !L NS 100cc/hr  Plan:   -resolved

## 2024-07-24 NOTE — PROGRESS NOTE ADULT - PROBLEM SELECTOR PLAN 3
Patient reports history of bipolar disorder with psychotic features. Previously prescribed quetiapine 150mg qd, buspirone 15mg qd, mirtazapine 15mg qd. Unclear what she is currently taking. Attempted to do med rec with shelter, pharmacy, patient, unable to find current doses for her mood medications. She initially endorsed 'ugly' thoughts about relapse, difficulty sleeping, visual hallucinations, and negative SI/HI. Worsening psychotic symptoms on 7/11. Re-attempted to contact shelter for med rec but unable to get through. Psych recs appreciated - decreased Seroquel from 100mg to 75mg on 7/18 for reported daytime drowsiness    Plan:  - aim to determine mood medication doses before making changes to regimen  - continued with Seroquel 75mg once at bedtime.

## 2024-07-24 NOTE — PROGRESS NOTE ADULT - PROBLEM SELECTOR PLAN 2
Worsening left leg and thigh pain with radiation to groin. Duplex LLE negative for DVT. CT LE w IV contrast showed loculated fluid in L iliacus muscle, iliopsoas bursitis of infectious or inflammatory etiology s/p IR aspiration on 7/3. Toradol held due to mildly elevated creatinine. CT of Left LE w/ IV contrast on 7/11 re-demonstrating iliopsoas bursitis and surrounding fluid collection, mildly improved compared to 7/3 study. MR of pelvis/hip initially considered however was not pursued as noted to have clinical improvement.     Plan:  - Pain regimen: standing 975 tylenol q8, lidocaine patch, oxy 5mg q8PRN for moderate pain, oxy 10mg q6PRN for severe pain  - PER ID, obtain repeat CT of the Left LE w/ IV contrast after 4 weeks of abx are completed (7/29)

## 2024-07-24 NOTE — PROGRESS NOTE ADULT - SUBJECTIVE AND OBJECTIVE BOX
Patient is a 59y old  Female who presents with a chief complaint of left leg pain (23 Jul 2024 10:07)      HOSPITAL COURSE: Patient is a 59-year-old woman with PMHx of IVDU, anemia, and epilepsy who presented with subacute progressively worsening Left leg pain x1 month prior to admission, found to have iliopsoas bursitis likely 2/2 IVDU, c/b MSSA bacteremia. Per ID recs, patient was transitioned from empiric abx to IV Cefazolin (7/3-7/17), which was discontinued due to agranulocytosis, and then subsequently started on IV Oxacillin (7/17- ) with plan for a 6-week course concluding on 8/13. As patient expressed intermittent desire to leave AMA, contingency plan per ID in the event of AMA is PO Dicloxacillin. However, as patient's Seroquel was uptitrated from 25mg to 75mg, patient's mood symptoms have improved and she has maintained a positive attitude toward completing her medical treatment. Patient is medically stable for discharge to complete her abx therapy, but has had difficulty obtaining placement at [facility] due to IVDU history.     OVERNIGHT EVENTS:    SUBJECTIVE:     ROS: otherwise negative      T(C): 36.8 (07-23-24 @ 22:04), Max: 36.9 (07-23-24 @ 15:29)  HR: 82 (07-23-24 @ 22:04) (69 - 82)  BP: 108/72 (07-23-24 @ 22:04) (94/65 - 108/72)  RR: 18 (07-23-24 @ 22:04) (18 - 18)  SpO2: 95% (07-23-24 @ 22:04) (91% - 97%)  Wt(kg): --Vital Signs Last 24 Hrs  T(C): 36.8 (23 Jul 2024 22:04), Max: 36.9 (23 Jul 2024 15:29)  T(F): 98.2 (23 Jul 2024 22:04), Max: 98.4 (23 Jul 2024 15:29)  HR: 82 (23 Jul 2024 22:04) (69 - 82)  BP: 108/72 (23 Jul 2024 22:04) (94/65 - 108/72)  BP(mean): --  RR: 18 (23 Jul 2024 22:04) (18 - 18)  SpO2: 95% (23 Jul 2024 22:04) (91% - 97%)    Parameters below as of 23 Jul 2024 22:04  Patient On (Oxygen Delivery Method): room air        PHYSICAL EXAM:  Constitutional: resting comfortably in bed; NAD  Head: NC/AT  Eyes: PERRL, EOMI, anicteric sclera  ENT: no nasal discharge; MMM  Neck: supple; no JVD or thyromegaly  Respiratory: CTA B/L; no W/R/R, no retractions  Cardiac: +S1/S2; RRR; no M/R/G  Gastrointestinal: soft, NT/ND; no rebound or guarding; +BSx4  Back: spine midline, no bony tenderness or step-offs; no CVAT B/L  Extremities: WWP, no clubbing or cyanosis; no peripheral edema. Capillary refill <2 sec  Musculoskeletal: NROM x4; no joint swelling, tenderness or erythema  Vascular: 2+ radial, DP/PT pulses B/L  Dermatologic: skin warm, dry and intact; no rashes, wounds, or scars  Lymphatic: no submandibular or cervical LAD  Neurologic: AAOx3; CNII-XII grossly intact; no focal deficits  Psychiatric: affect and characteristics of appearance, verbalizations, behaviors are appropriate    LABS:    07-23    139  |  99  |  23  ----------------------------<  102<H>  4.9   |  32<H>  |  1.07    Ca    9.7      23 Jul 2024 12:00    TPro  8.2  /  Alb  3.3  /  TBili  0.3  /  DBili  x   /  AST  91<H>  /  ALT  43  /  AlkPhos  95  07-23        Urinalysis Basic - ( 23 Jul 2024 12:00 )    Color: x / Appearance: x / SG: x / pH: x  Gluc: 102 mg/dL / Ketone: x  / Bili: x / Urobili: x   Blood: x / Protein: x / Nitrite: x   Leuk Esterase: x / RBC: x / WBC x   Sq Epi: x / Non Sq Epi: x / Bacteria: x      CAPILLARY BLOOD GLUCOSE            Urinalysis Basic - ( 23 Jul 2024 12:00 )    Color: x / Appearance: x / SG: x / pH: x  Gluc: 102 mg/dL / Ketone: x  / Bili: x / Urobili: x   Blood: x / Protein: x / Nitrite: x   Leuk Esterase: x / RBC: x / WBC x   Sq Epi: x / Non Sq Epi: x / Bacteria: x        MEDICATIONS  (STANDING):  acetaminophen     Tablet .. 975 milliGRAM(s) Oral every 8 hours  bisacodyl 5 milliGRAM(s) Oral daily  enoxaparin Injectable 40 milliGRAM(s) SubCutaneous every 24 hours  levETIRAcetam 500 milliGRAM(s) Oral two times a day  lidocaine   4% Patch 1 Patch Transdermal daily  melatonin 3 milliGRAM(s) Oral at bedtime  methadone    Tablet 160 milliGRAM(s) Oral every 24 hours  mirtazapine 15 milliGRAM(s) Oral daily  oxacillin IVPB 2 Gram(s) IV Intermittent every 4 hours  polyethylene glycol 3350 17 Gram(s) Oral two times a day  QUEtiapine 75 milliGRAM(s) Oral at bedtime    MEDICATIONS  (PRN):  aluminum hydroxide/magnesium hydroxide/simethicone Suspension 30 milliLiter(s) Oral every 6 hours PRN Dyspepsia  benzonatate 100 milliGRAM(s) Oral once PRN Cough  naloxone Injectable 1 milliGRAM(s) IV Push every 3 minutes PRN in case of overdose  oxyCODONE    IR 5 milliGRAM(s) Oral every 6 hours PRN Moderate Pain (4 - 6)  oxyCODONE    IR 10 milliGRAM(s) Oral every 6 hours PRN Severe Pain (7 - 10)  senna 2 Tablet(s) Oral at bedtime PRN Constipation      RADIOLOGY & ADDITIONAL TESTS: Reviewed   Patient is a 59y old  Female who presents with a chief complaint of left leg pain (23 Jul 2024 10:07)      HOSPITAL COURSE: Patient is a 59-year-old woman with PMHx of IVDU, anemia, and epilepsy who presented with subacute progressively worsening Left leg pain x1 month prior to admission, found to have iliopsoas bursitis likely 2/2 IVDU, c/b MSSA bacteremia. Per ID recs, patient was transitioned from empiric abx to IV Cefazolin (7/3-7/17), which was discontinued due to agranulocytosis, and then subsequently started on IV Oxacillin (7/17- ) with plan for a 6-week course concluding on 8/13. As patient expressed intermittent desire to leave AMA, contingency plan per ID in the event of AMA is PO Dicloxacillin. However, as patient's Seroquel was uptitrated from 25mg to 75mg, patient's mood symptoms have improved and she has maintained a positive attitude toward completing her medical treatment. Patient is medically stable for discharge to complete her abx therapy, but has had difficulty obtaining placement at [facility] due to IVDU history.     OVERNIGHT EVENTS: NAEON     SUBJECTIVE: Patient examined at the bedside this morning. continues to have pain in the back and flank. Notes to have had a few soft non-bloody BMs (1x in past 12 hours) which she attributes to the abx use.     Patient notes having nausea, and headache however no fevers, chills, chest pain, shortness of breath, vomiting or constipation, no difficult urinating or changes in urination, no numbness,,tingling or weakness noted. No notable dizziness.     ROS: otherwise negative      T(C): 36.8 (07-23-24 @ 22:04), Max: 36.9 (07-23-24 @ 15:29)  HR: 82 (07-23-24 @ 22:04) (69 - 82)  BP: 108/72 (07-23-24 @ 22:04) (94/65 - 108/72)  RR: 18 (07-23-24 @ 22:04) (18 - 18)  SpO2: 95% (07-23-24 @ 22:04) (91% - 97%)  Wt(kg): --Vital Signs Last 24 Hrs  T(C): 36.8 (23 Jul 2024 22:04), Max: 36.9 (23 Jul 2024 15:29)  T(F): 98.2 (23 Jul 2024 22:04), Max: 98.4 (23 Jul 2024 15:29)  HR: 82 (23 Jul 2024 22:04) (69 - 82)  BP: 108/72 (23 Jul 2024 22:04) (94/65 - 108/72)  BP(mean): --  RR: 18 (23 Jul 2024 22:04) (18 - 18)  SpO2: 95% (23 Jul 2024 22:04) (91% - 97%)    Parameters below as of 23 Jul 2024 22:04  Patient On (Oxygen Delivery Method): room air        PHYSICAL EXAM:  Constitutional: resting comfortably in bed; NAD  Head: NC/AT  Eyes: PERRL, EOMI, anicteric sclera  ENT: no nasal discharge; MMM  Neck: supple; no JVD or thyromegaly  Respiratory: CTA B/L; no W/R/R, no retractions  Cardiac: +S1/S2; RRR; no M/R/G  Gastrointestinal: soft, NT/ND; no rebound or guarding; +BSx4  Back: spine midline, no bony tenderness or step-offs; no CVAT B/L  Extremities: WWP, no clubbing or cyanosis; no peripheral edema. Capillary refill <2 sec  Musculoskeletal: NROM x4; no joint swelling, tenderness or erythema  Vascular: 2+ radial, DP/PT pulses B/L  Dermatologic: skin warm, dry and intact; no rashes, wounds, or scars  Lymphatic: no submandibular or cervical LAD  Neurologic: AAOx3; CNII-XII grossly intact; no focal deficits  Psychiatric: affect and characteristics of appearance, verbalizations, behaviors are appropriate      LABS:    07-23    139  |  99  |  23  ----------------------------<  102<H>  4.9   |  32<H>  |  1.07    Ca    9.7      23 Jul 2024 12:00    TPro  8.2  /  Alb  3.3  /  TBili  0.3  /  DBili  x   /  AST  91<H>  /  ALT  43  /  AlkPhos  95  07-23        Urinalysis Basic - ( 23 Jul 2024 12:00 )    Color: x / Appearance: x / SG: x / pH: x  Gluc: 102 mg/dL / Ketone: x  / Bili: x / Urobili: x   Blood: x / Protein: x / Nitrite: x   Leuk Esterase: x / RBC: x / WBC x   Sq Epi: x / Non Sq Epi: x / Bacteria: x      CAPILLARY BLOOD GLUCOSE            Urinalysis Basic - ( 23 Jul 2024 12:00 )    Color: x / Appearance: x / SG: x / pH: x  Gluc: 102 mg/dL / Ketone: x  / Bili: x / Urobili: x   Blood: x / Protein: x / Nitrite: x   Leuk Esterase: x / RBC: x / WBC x   Sq Epi: x / Non Sq Epi: x / Bacteria: x        MEDICATIONS  (STANDING):  acetaminophen     Tablet .. 975 milliGRAM(s) Oral every 8 hours  bisacodyl 5 milliGRAM(s) Oral daily  enoxaparin Injectable 40 milliGRAM(s) SubCutaneous every 24 hours  levETIRAcetam 500 milliGRAM(s) Oral two times a day  lidocaine   4% Patch 1 Patch Transdermal daily  melatonin 3 milliGRAM(s) Oral at bedtime  methadone    Tablet 160 milliGRAM(s) Oral every 24 hours  mirtazapine 15 milliGRAM(s) Oral daily  oxacillin IVPB 2 Gram(s) IV Intermittent every 4 hours  polyethylene glycol 3350 17 Gram(s) Oral two times a day  QUEtiapine 75 milliGRAM(s) Oral at bedtime    MEDICATIONS  (PRN):  aluminum hydroxide/magnesium hydroxide/simethicone Suspension 30 milliLiter(s) Oral every 6 hours PRN Dyspepsia  benzonatate 100 milliGRAM(s) Oral once PRN Cough  naloxone Injectable 1 milliGRAM(s) IV Push every 3 minutes PRN in case of overdose  oxyCODONE    IR 5 milliGRAM(s) Oral every 6 hours PRN Moderate Pain (4 - 6)  oxyCODONE    IR 10 milliGRAM(s) Oral every 6 hours PRN Severe Pain (7 - 10)  senna 2 Tablet(s) Oral at bedtime PRN Constipation      RADIOLOGY & ADDITIONAL TESTS: Reviewed

## 2024-07-25 PROCEDURE — 99232 SBSQ HOSP IP/OBS MODERATE 35: CPT | Mod: GC

## 2024-07-25 RX ORDER — SENNOSIDES 8.6 MG/1
2 TABLET ORAL AT BEDTIME
Refills: 0 | Status: DISCONTINUED | OUTPATIENT
Start: 2024-07-25 | End: 2024-07-29

## 2024-07-25 RX ORDER — LORATADINE 10 MG
17 TABLET,DISINTEGRATING ORAL EVERY 24 HOURS
Refills: 0 | Status: DISCONTINUED | OUTPATIENT
Start: 2024-07-25 | End: 2024-07-29

## 2024-07-25 RX ADMIN — OXACILLIN 100 GRAM(S): 1 INJECTION, POWDER, FOR SOLUTION INTRAMUSCULAR; INTRAVENOUS at 01:50

## 2024-07-25 RX ADMIN — OXACILLIN 100 GRAM(S): 1 INJECTION, POWDER, FOR SOLUTION INTRAMUSCULAR; INTRAVENOUS at 17:43

## 2024-07-25 RX ADMIN — Medication 160 MILLIGRAM(S): at 09:55

## 2024-07-25 RX ADMIN — ENOXAPARIN SODIUM 40 MILLIGRAM(S): 120 INJECTION SUBCUTANEOUS at 21:25

## 2024-07-25 RX ADMIN — OXYCODONE HYDROCHLORIDE 5 MILLIGRAM(S): 30 TABLET ORAL at 23:46

## 2024-07-25 RX ADMIN — OXYCODONE HYDROCHLORIDE 10 MILLIGRAM(S): 30 TABLET ORAL at 08:23

## 2024-07-25 RX ADMIN — OXACILLIN 100 GRAM(S): 1 INJECTION, POWDER, FOR SOLUTION INTRAMUSCULAR; INTRAVENOUS at 09:55

## 2024-07-25 RX ADMIN — OXYCODONE HYDROCHLORIDE 10 MILLIGRAM(S): 30 TABLET ORAL at 02:17

## 2024-07-25 RX ADMIN — OXYCODONE HYDROCHLORIDE 10 MILLIGRAM(S): 30 TABLET ORAL at 13:33

## 2024-07-25 RX ADMIN — OXACILLIN 100 GRAM(S): 1 INJECTION, POWDER, FOR SOLUTION INTRAMUSCULAR; INTRAVENOUS at 06:51

## 2024-07-25 RX ADMIN — Medication 975 MILLIGRAM(S): at 17:56

## 2024-07-25 RX ADMIN — OXYCODONE HYDROCHLORIDE 10 MILLIGRAM(S): 30 TABLET ORAL at 13:18

## 2024-07-25 RX ADMIN — OXYCODONE HYDROCHLORIDE 10 MILLIGRAM(S): 30 TABLET ORAL at 07:23

## 2024-07-25 RX ADMIN — LEVETIRACETAM 500 MILLIGRAM(S): 1000 TABLET, FILM COATED ORAL at 06:52

## 2024-07-25 RX ADMIN — Medication 975 MILLIGRAM(S): at 10:30

## 2024-07-25 RX ADMIN — OXACILLIN 100 GRAM(S): 1 INJECTION, POWDER, FOR SOLUTION INTRAMUSCULAR; INTRAVENOUS at 13:33

## 2024-07-25 RX ADMIN — OXACILLIN 100 GRAM(S): 1 INJECTION, POWDER, FOR SOLUTION INTRAMUSCULAR; INTRAVENOUS at 21:24

## 2024-07-25 RX ADMIN — Medication 975 MILLIGRAM(S): at 17:47

## 2024-07-25 RX ADMIN — Medication 75 MILLIGRAM(S): at 21:24

## 2024-07-25 RX ADMIN — Medication 975 MILLIGRAM(S): at 12:02

## 2024-07-25 RX ADMIN — Medication 15 MILLIGRAM(S): at 10:30

## 2024-07-25 RX ADMIN — LEVETIRACETAM 500 MILLIGRAM(S): 1000 TABLET, FILM COATED ORAL at 17:41

## 2024-07-25 RX ADMIN — OXYCODONE HYDROCHLORIDE 10 MILLIGRAM(S): 30 TABLET ORAL at 19:38

## 2024-07-25 RX ADMIN — Medication 3 MILLIGRAM(S): at 21:24

## 2024-07-25 RX ADMIN — OXYCODONE HYDROCHLORIDE 10 MILLIGRAM(S): 30 TABLET ORAL at 01:17

## 2024-07-25 NOTE — PROGRESS NOTE ADULT - PROBLEM SELECTOR PLAN 7
As per patient has history of epilepsy  States she takes Keppra but unsure of dose, follows with Neurologist at [facility]  Per Jennifer, previously prescribed Keppra 1000mg bid.   - Order Keppra 500mg bid x 2 doses for now.

## 2024-07-25 NOTE — PROGRESS NOTE ADULT - SUBJECTIVE AND OBJECTIVE BOX
Physical Medicine and Rehabilitation Progress Note :       Patient is a 59y old  Female who presents with a chief complaint of left leg pain (25 Jul 2024 05:28)      HPI:  HPI: Pt is 58 y/o F pmhx significant for IV drug use, anemia, epilepsy who presents for sharp L leg pain progressively worsening over the past month. Pt states her pain began in the left thigh and later radiated to her toes but now has persisted in her entire left leg and groin. Pt reports this began about one month ago and required multiple ER visits. She states she went to [facility] on 5/31 where nothing was done and she was discharged. Most recently last week she returned to [place] where she states she was given a 7 day course of oral antibiotics which she completed but did not improve her pain. She took Motrin which provided very minimal relief.  Pt last  injected heroin into her left thigh on Saturday 6/29. As per patient, she is chronically on methadone 160 mg 5 days a week since she was 19. She reports subjective fevers. Denies nausea, vomiting, chest pain, shortness of breath, constipation, diarrhea, pain on urination, hematuria, hematemesis.       In the ED:  Initial vital signs: T: 98.7 F, HR: 82, BP: 100/68, RR: 16, SpO2: 96% on RA  Labs: significant for AST 61, CRP 99.5, Hg 10.2, Hct 31.0  CT Lower Extremity with IV Contrast, Left: loculated fluid in L iliacus muscle, iliopsoas bursitis of infectious or inflammatory etiology  US Duplex Venous LE, Left: No evidence of L lower extremity DVT  Medications: Zosyn, Vancomycin, NS, Ibuprofen  Consults:         Ortho- recommended admission for IV antibiotics and IR consult, will continue to follow (01 Jul 2024 18:56)                            10.9   4.42  )-----------( 241      ( 24 Jul 2024 10:00 )             35.5       07-24    135  |  98  |  22  ----------------------------<  117<H>  5.0   |  25  |  1.02    Ca    9.7      24 Jul 2024 10:00  Phos  5.0     07-24  Mg     2.0     07-24    TPro  8.4<H>  /  Alb  3.1<L>  /  TBili  0.3  /  DBili  x   /  AST  See Note  /  ALT  60<H>  /  AlkPhos  87  07-24    Vital Signs Last 24 Hrs  T(C): 36.7 (25 Jul 2024 09:05), Max: 36.9 (24 Jul 2024 21:13)  T(F): 98.1 (25 Jul 2024 09:05), Max: 98.5 (24 Jul 2024 21:13)  HR: 75 (25 Jul 2024 09:05) (70 - 80)  BP: 99/68 (25 Jul 2024 09:05) (90/57 - 101/68)  BP(mean): 75 (24 Jul 2024 21:13) (75 - 75)  RR: 18 (25 Jul 2024 09:05) (18 - 18)  SpO2: 96% (25 Jul 2024 09:05) (95% - 98%)    Parameters below as of 25 Jul 2024 09:05  Patient On (Oxygen Delivery Method): room air        MEDICATIONS  (STANDING):  acetaminophen     Tablet .. 975 milliGRAM(s) Oral every 8 hours  chlorhexidine 2% Cloths 1 Application(s) Topical daily  enoxaparin Injectable 40 milliGRAM(s) SubCutaneous every 24 hours  levETIRAcetam 500 milliGRAM(s) Oral two times a day  lidocaine   4% Patch 1 Patch Transdermal daily  melatonin 3 milliGRAM(s) Oral at bedtime  methadone    Tablet 160 milliGRAM(s) Oral every 24 hours  mirtazapine 15 milliGRAM(s) Oral daily  oxacillin IVPB 2 Gram(s) IV Intermittent every 4 hours  QUEtiapine 75 milliGRAM(s) Oral at bedtime    MEDICATIONS  (PRN):  aluminum hydroxide/magnesium hydroxide/simethicone Suspension 30 milliLiter(s) Oral every 6 hours PRN Dyspepsia  benzonatate 100 milliGRAM(s) Oral once PRN Cough  naloxone Injectable 1 milliGRAM(s) IV Push every 3 minutes PRN in case of overdose  oxyCODONE    IR 5 milliGRAM(s) Oral every 6 hours PRN Moderate Pain (4 - 6)  oxyCODONE    IR 10 milliGRAM(s) Oral every 6 hours PRN Severe Pain (7 - 10)  polyethylene glycol 3350 17 Gram(s) Oral every 24 hours PRN Constipation  senna 2 Tablet(s) Oral at bedtime PRN Constipation      T(C): 36.7 (07-25-24 @ 09:05), Max: 36.9 (07-24-24 @ 21:13)  HR: 75 (07-25-24 @ 09:05) (70 - 80)  BP: 99/68 (07-25-24 @ 09:05) (90/57 - 101/68)  RR: 18 (07-25-24 @ 09:05) (18 - 18)  SpO2: 96% (07-25-24 @ 09:05) (95% - 98%)        Physical Exam:   59 y o woman lying comfortably in semi Andino's position , awake , alert , no acute complaints     Head: normocephalic , atraumatic    Eyes: PERRLA , EOMI , no nystagmus , sclera anicteric    ENT / FACE: neg nasal discharge , uvula midline , no oropharyngeal erythema / exudate    Neck: supple , negative JVD , negative carotid bruits , no thyromegaly    Chest: CTA bilaterally     Cardiovascular: regular rate and rhythm , neg murmurs / rubs / gallops    Abdomen: soft , non distended , no tenderness to palpation in all 4 quadrants ,  normal bowel sounds     Extremities: WWP , neg cyanosis /clubbing / edema     Musculoskeletal: pain w/ L hip flexion ,  in upper thigh and groin but decreased , no erythema     Skin:     :     Neurologic Exam:     Alert and oriented  x  3     Motor Exam:        > 3+/5 x 4 extremities , LLE pain limited proximally     Sensation:         intact to light touch x 4 extremities                                DTR:           biceps/brachioradialis: equal                            patella/ankle: equal       7/24/2024 Functional Status Assessment :       Pain Assessment/Number Scale (0-10) Adult  Presence of Pain: denies pain/discomfort (Rating = 0)  Pain Rating (0-10): Rest: 0 (no pain/absence of nonverbal indicators of pain)  Pain Rating (0-10): Activity: 0 (no pain/absence of nonverbal indicators of pain)    Safety      AM-PAC Functional Assessment: Basic Mobility  Type of Assessment: Daily assessment  Turning from your back to your side while in a flat bed without using bedrails?: 4 = No assist / stand by assistance  Moving from lying on your back to sitting on the flat side of a flat bed without using bedrails?: 4 = No assist / stand by assistance  Moving to and from a bed to a chair (including a wheelchair)?: 4 = No assist / stand by assistance  Standing up from a chair using your arms (e.g. wheelchair or bedside chair)?: 4 = No assist / stand by assistance  Walking in hospital room?: 4 = No assist / stand by assistance  Climbing 3-5 steps with a railing?: 3 = A little assistance  Score: 23   Row Comment: Ask the patient "How much help from another person do you currently need? (If the patient hasn't done an activity recently, how much help from another person do you think he/she needs if he/she tried?)    Cognitive/Neuro      Cognitive/Neuro/Behavioral  Cognitive/Neuro/Behavioral [WDL Definition: Alert; opens eyes spontaneously; arouses to voice or touch; oriented x 4; follows commands; speech spontaneous, logical; purposeful motor response; behavior appropriate to situation]: WDL    Language Assistance  Preferred Language to Address Healthcare Preferred Language to Address Healthcare: English    Therapeutic Interventions      Bed Mobility  Bed Mobility Training Rehab Potential: good, to achieve stated therapy goals  Bed Mobility Training Sit-to-Supine: independent  Bed Mobility Training Supine-to-Sit: received OOBTC    Sit-Stand Transfer Training  Sit-to-Stand Transfer Training Rehab Potential: good, to achieve stated therapy goals  Transfer Training Sit-to-Stand Transfer: independent;  rolling walker  Transfer Training Stand-to-Sit Transfer: independent;  rolling walker    Gait Training  Gait Training Rehab Potential: good, to achieve stated therapy goals  Gait Training: independent;  rolling walker;  100 feet  Gait Analysis: pt demos alow antalgic but steady gait, no loss of balance, good endurance;  100 feet;  rolling walker  Type of Rest Type of Rest: sitting  Duration of Rest Duration of Rest: 1min       AM-PAC Functional Assessment: Daily Activity  Type of Assessment: Daily assessment  Putting on and taking off regular lower body clothing?: 4 = No assist / stand by assistance  Bathing (including washing, rinsing, drying)?: 4 = No assist / stand by assistance  Toileting, which includes using toilet, bedpan or urinal?: 4 = No assist / stand by assistance  Putting on and taking off regular upper body clothing?: 4 = No assist / stand by assistance  Take care of personal grooming such as brushing teeth?: 4 = No assist / stand by assistance  Eating meals?: 4 = No assist / stand by assistance  Score: 24   Row Comment: Ask the patient "How much help from another person do you currently need? (If the patient hasn't done an activity recently, how much help from another person do you think he/she needs if he/she tried?)    Cognitive/Neuro      Cognitive/Neuro/Behavioral  Cognitive/Neuro/Behavioral [WDL Definition: Alert; opens eyes spontaneously; arouses to voice or touch; oriented x 4; follows commands; speech spontaneous, logical; purposeful motor response; behavior appropriate to situation]: WDL    Language Assistance  Preferred Language to Address Healthcare Preferred Language to Address Healthcare: English    Therapeutic Interventions      Sit-Stand Transfer Training  Transfer Training Sit-to-Stand Transfer: independent;  full weight-bearing   rolling walker  Transfer Training Stand-to-Sit Transfer: independent;  full weight-bearing   rolling walker    Therapeutic Exercise  Therapeutic Exercise Charges: pt. performed functional mobility in hallway independently with RW, antalgic gait noted but no LOB and able to compensate.     Lower Body Dressing Training  Lower Body Dressing Training Charges: don b/l socks seated in chair   Lower Body Dressing Training Assistance: independent    Upper Body Dressing Training  Upper Body Dressing Training Charges: don back gown   Upper Body Dressing Training Assistance: independent              PM&R Impression : as above    Current disposition plan recommendation :   d/c home with outpatient physical therapy

## 2024-07-25 NOTE — PROGRESS NOTE ADULT - ASSESSMENT
I M    59 y o F pmhx significant for IV drug use, anemia, epilepsy who presents for sharp L leg pain progressively worsening over the past month, likely 2/2 iliopsoas bursitis i/s/o IVDU, s/p IR aspiration on 7/3, on cefazolin 2 g for MSSA bacteremia.       Nutritional Assessment:  · Nutritional Assessment	This patient has been assessed with a concern for Malnutrition and has been determined to have a diagnosis/diagnoses of Severe protein-calorie malnutrition.    This patient is being managed with:   Diet Regular-  No Concentrated Phosphorus  Supplement Feeding Modality:  Oral  Suplena Cans or Servings Per Day:  1       Frequency:  Daily  Entered: Jul 15 2024  3:01PM    Problem/Plan - 1:  ·  Problem: Sepsis due to methicillin susceptible Staphylococcus aureus (MSSA) without acute organ dysfunction.   ·  Plan: Patient was found to have blood and wound cultures positive for MSSA bacteria on 7/1. Per ID recs, cultures from 7/3 have no growth, so there is no need for further surveillance cultures. ID recommended PICC line. Midline placed in Left UE by IR PICC team on 7/11. Patient currently on 4-6 weeks of antibiotic therapy (7/3 - 8/13). Patient initially started on IV Cefazolin 2g q8h started 7/3 and discontinued 7/17, per ID recs, as patient demonstrated lab findings of agranulocytosis (neut# 5.96 --> 1.42)    Plan:  - Continue IV antibiotics through LUE midline  - In event of early discharge before completion of IV antibiotics, contingency plan per ID is PO Dicloxacillin 500mg q6h for same planned duration  - IV Oxacillin 2g q4h (7/17- ).    Problem/Plan - 2:  ·  Problem: Iliopsoas bursitis of left hip.   ·  Plan: Worsening left leg and thigh pain with radiation to groin. Duplex LLE negative for DVT. CT LE w IV contrast showed loculated fluid in L iliacus muscle, iliopsoas bursitis of infectious or inflammatory etiology s/p IR aspiration on 7/3. Toradol held due to mildly elevated creatinine. CT of Left LE w/ IV contrast on 7/11 re-demonstrating iliopsoas bursitis and surrounding fluid collection, mildly improved compared to 7/3 study. MR of pelvis/hip initially considered however was not pursued as noted to have clinical improvement.     Plan:  - Pain regimen: standing 975 tylenol q8, lidocaine patch, oxy 5mg q8PRN for moderate pain, oxy 10mg q6PRN for severe pain  - PER ID, obtain repeat CT of the Left LE w/ IV contrast after 4 weeks of abx are completed (7/29).    Problem/Plan - 3:  ·  Problem: Mood disorder.   ·  Plan: Patient reports history of bipolar disorder with psychotic features. Previously prescribed quetiapine 150mg qd, buspirone 15mg qd, mirtazapine 15mg qd. Unclear what she is currently taking. Attempted to do med rec with shelter, pharmacy, patient, unable to find current doses for her mood medications. She initially endorsed 'ugly' thoughts about relapse, difficulty sleeping, visual hallucinations, and negative SI/HI. Worsening psychotic symptoms on 7/11. Re-attempted to contact shelter for med rec but unable to get through. Psych recs appreciated - decreased Seroquel from 100mg to 75mg on 7/18 for reported daytime drowsiness    Plan:  - aim to determine mood medication doses before making changes to regimen  - continued with Seroquel 75mg once at bedtime.    Problem/Plan - 4:  ·  Problem: Opioid dependence.   ·  Plan: Hx of IV drug use (heroin) on Methadone 160mg 5 days a week since age 19, confirmed with Methadone clinic. Last heroin injection in L leg on Saturday 6/29. Hep C positive, HIV negative. TTE 7/3 and TED 7/5 negative for endocarditis.  - Monitor COWs.    Problem/Plan - 5:  ·  Problem: BOB (acute kidney injury).   ·  Plan: Cr 1.28, BUN 29, GFR 49. Initially was on Toradol, improved with PO fluid intake. Provided !L NS 100cc/hr  Plan:   -resolved.    Problem/Plan - 6:  ·  Problem: Anemia.   ·  Plan: Pt states history of anemia  On admission Hg 10.2, Hct 31.0  - Continue to monitor H&H  - Maintain active type & screen  - Hb stable between 7.5 - 9.    Problem/Plan - 7:  ·  Problem: Epilepsy.   ·  Plan: As per patient has history of epilepsy  States she takes Keppra but unsure of dose, follows with Neurologist at [facility]  Per [facility], previously prescribed Keppra 1000mg bid.   - Order Keppra 500mg bid x 2 doses for now.    Problem/Plan - 8:  ·  Problem: Type 2 diabetes mellitus.   ·  Plan: Per patient's shelter, she was on insulin at some point, unclear of when she was taking it or history of diabetes.  - HbA1c 5.1  - BG well controlled inpatient.    Problem/Plan - 9:  ·  Problem: Severe protein-calorie malnutrition.   ·  Plan: - Ensure HD added per nutrition recs.    Problem/Plan - 10:  ·  Problem: Prophylactic measure.   ·  Plan; F: S/p NS 1L  E: replete as needed  N: regular diet, no concentrated phosphorus, Suplena supplement shake daily  DVT ppx: Lovenox 40mg subq  Dispo: [facility].    Attestation Statements:   Attestation Statements:  I have personally seen and examined the patient.  I fully participated in the care of this patient.  I have made amendments to the documentation where necessary, and agree with the history, physical exam, and plan as documented by the Resident.     59-year-old female with a PMHx of IVDU (on Methadone), anemia and epilepsy who presented with LLE pain, found to have loculated fluid collection of left iliacus muscle and iliopsoas bursitis c/b MSSA bacteremia.  BCx (7/1) MSSA, BCx (7/3) NGTD.                Plan:                   -IR consulted, s/p drainage, Cx with staph aureus                   -TTE and TED negative for endocarditis                  -ID consulted, transitioned from Cefazolin to Oxacillin given worsening leukopenia/neutropenia, tentative plan for 4–6 week course, will obtain repeat CT on 7/29 to determine if Abx needs to be extended    -ortho consulted, no acute surgical intervention                  -psych consulted, continue with Seroquel, Remeron and Methadone              Rest of plan as per resident note.

## 2024-07-26 PROBLEM — J45.909 UNSPECIFIED ASTHMA, UNCOMPLICATED: Chronic | Status: ACTIVE | Noted: 2024-07-01

## 2024-07-26 PROBLEM — D64.9 ANEMIA, UNSPECIFIED: Chronic | Status: ACTIVE | Noted: 2024-07-01

## 2024-07-26 PROBLEM — G40.909 EPILEPSY, UNSPECIFIED, NOT INTRACTABLE, WITHOUT STATUS EPILEPTICUS: Chronic | Status: ACTIVE | Noted: 2024-07-01

## 2024-07-26 PROBLEM — F19.90 OTHER PSYCHOACTIVE SUBSTANCE USE, UNSPECIFIED, UNCOMPLICATED: Chronic | Status: ACTIVE | Noted: 2024-07-01

## 2024-07-26 PROCEDURE — 99233 SBSQ HOSP IP/OBS HIGH 50: CPT | Mod: GC

## 2024-07-26 RX ORDER — ACETAMINOPHEN 500 MG
650 TABLET ORAL ONCE
Refills: 0 | Status: COMPLETED | OUTPATIENT
Start: 2024-07-26 | End: 2024-07-26

## 2024-07-26 RX ORDER — OXYCODONE HYDROCHLORIDE 30 MG/1
5 TABLET ORAL EVERY 6 HOURS
Refills: 0 | Status: DISCONTINUED | OUTPATIENT
Start: 2024-07-26 | End: 2024-07-29

## 2024-07-26 RX ORDER — OXYCODONE HYDROCHLORIDE 30 MG/1
10 TABLET ORAL EVERY 6 HOURS
Refills: 0 | Status: DISCONTINUED | OUTPATIENT
Start: 2024-07-26 | End: 2024-07-29

## 2024-07-26 RX ADMIN — LEVETIRACETAM 500 MILLIGRAM(S): 1000 TABLET, FILM COATED ORAL at 06:00

## 2024-07-26 RX ADMIN — Medication 3 MILLIGRAM(S): at 22:27

## 2024-07-26 RX ADMIN — LEVETIRACETAM 500 MILLIGRAM(S): 1000 TABLET, FILM COATED ORAL at 17:57

## 2024-07-26 RX ADMIN — OXYCODONE HYDROCHLORIDE 10 MILLIGRAM(S): 30 TABLET ORAL at 17:57

## 2024-07-26 RX ADMIN — OXYCODONE HYDROCHLORIDE 10 MILLIGRAM(S): 30 TABLET ORAL at 06:38

## 2024-07-26 RX ADMIN — OXYCODONE HYDROCHLORIDE 10 MILLIGRAM(S): 30 TABLET ORAL at 18:57

## 2024-07-26 RX ADMIN — Medication 975 MILLIGRAM(S): at 02:49

## 2024-07-26 RX ADMIN — OXACILLIN 100 GRAM(S): 1 INJECTION, POWDER, FOR SOLUTION INTRAMUSCULAR; INTRAVENOUS at 10:20

## 2024-07-26 RX ADMIN — OXYCODONE HYDROCHLORIDE 10 MILLIGRAM(S): 30 TABLET ORAL at 12:58

## 2024-07-26 RX ADMIN — Medication 15 MILLIGRAM(S): at 11:58

## 2024-07-26 RX ADMIN — Medication 975 MILLIGRAM(S): at 20:40

## 2024-07-26 RX ADMIN — OXYCODONE HYDROCHLORIDE 5 MILLIGRAM(S): 30 TABLET ORAL at 00:16

## 2024-07-26 RX ADMIN — Medication 975 MILLIGRAM(S): at 19:45

## 2024-07-26 RX ADMIN — OXACILLIN 100 GRAM(S): 1 INJECTION, POWDER, FOR SOLUTION INTRAMUSCULAR; INTRAVENOUS at 17:59

## 2024-07-26 RX ADMIN — OXACILLIN 100 GRAM(S): 1 INJECTION, POWDER, FOR SOLUTION INTRAMUSCULAR; INTRAVENOUS at 22:27

## 2024-07-26 RX ADMIN — OXACILLIN 100 GRAM(S): 1 INJECTION, POWDER, FOR SOLUTION INTRAMUSCULAR; INTRAVENOUS at 06:00

## 2024-07-26 RX ADMIN — OXYCODONE HYDROCHLORIDE 10 MILLIGRAM(S): 30 TABLET ORAL at 11:58

## 2024-07-26 RX ADMIN — OXACILLIN 100 GRAM(S): 1 INJECTION, POWDER, FOR SOLUTION INTRAMUSCULAR; INTRAVENOUS at 02:48

## 2024-07-26 RX ADMIN — OXYCODONE HYDROCHLORIDE 10 MILLIGRAM(S): 30 TABLET ORAL at 06:08

## 2024-07-26 RX ADMIN — ENOXAPARIN SODIUM 40 MILLIGRAM(S): 120 INJECTION SUBCUTANEOUS at 22:27

## 2024-07-26 RX ADMIN — OXACILLIN 100 GRAM(S): 1 INJECTION, POWDER, FOR SOLUTION INTRAMUSCULAR; INTRAVENOUS at 13:43

## 2024-07-26 RX ADMIN — Medication 160 MILLIGRAM(S): at 09:16

## 2024-07-26 NOTE — PROGRESS NOTE ADULT - PROBLEM SELECTOR PLAN 7
As per patient has history of epilepsy  States she takes Keppra but unsure of dose, follows with Neurologist at [facility]  Per Jennifer, previously prescribed Keppra 1000mg bid.   - Order Keppra 500mg bid x 2 doses for now.

## 2024-07-26 NOTE — PROGRESS NOTE ADULT - ATTENDING COMMENTS
59-year-old woman with hx of IVDU (on Methadone), anemia and epilepsy who presented with LLE pain, found to have loculated fluid collection of left iliacus muscle and iliopsoas bursitis c/b MSSA bacteremia.  BCx (7/1) MSSA, BCx (7/3) NGTD.                 Plan:                    -IR consulted, s/p drainage, Cx with staph aureus                    -TTE and TED negative for endocarditis                   -ID consulted, transitioned from Cefazolin to Oxacillin given worsening leukopenia/neutropenia, tentative plan for 4–6 week course,   [ ] repeat CT on 7/29 to determine if Abx need to be extended ; discuss duration of abx with ID after getting CT on 7/29   -ortho consulted, no acute surgical intervention                   -psych consulted, continue with Seroquel, Remeron and Methadone    dvt ppx - subq lovenox

## 2024-07-26 NOTE — GOALS OF CARE CONVERSATION - ADVANCED CARE PLANNING - CONVERSATION DETAILS
Patient was seen at bedside this afternoon. We discussed that she would like external compressions (CPR) if her heart would stop beating. She also noted that she has reservations regarding Intubation as her mother was kept on life support for a long period of time. She was counselled that generally CPR and Intubation are done in conjunction with one another. She understood this. Patient noted to be full code. Stated that if she is unable to make medical decisions she would like her brother, José Luis Escobar, to make decisions for her.

## 2024-07-26 NOTE — PROGRESS NOTE ADULT - SUBJECTIVE AND OBJECTIVE BOX
Patient is a 59y old  Female who presents with a chief complaint of left leg pain (25 Jul 2024 12:02)      HOSPITAL COURSE: Patient is a 59-year-old woman with PMHx of IVDU, anemia, and epilepsy who presented with subacute progressively worsening Left leg pain x1 month prior to admission, found to have iliopsoas bursitis likely 2/2 IVDU, c/b MSSA bacteremia. Per ID recs, patient was transitioned from empiric abx to IV Cefazolin (7/3-7/17), which was discontinued due to agranulocytosis, and then subsequently started on IV Oxacillin (7/17- ) with plan for a 6-week course concluding on 8/13. As patient expressed intermittent desire to leave AMA, contingency plan per ID in the event of AMA is PO Dicloxacillin. However, as patient's Seroquel was uptitrated from 25mg to 75mg, patient's mood symptoms have improved and she has maintained a positive attitude toward completing her medical treatment. Patient is medically stable for discharge to complete her abx therapy, but has had difficulty obtaining placement at [facility] due to IVDU history.     OVERNIGHT EVENTS: NAEON    SUBJECTIVE:     ROS: otherwise negative      T(C): 36.8 (07-26-24 @ 04:54), Max: 37.2 (07-25-24 @ 21:24)  HR: 67 (07-26-24 @ 04:54) (67 - 86)  BP: 110/70 (07-26-24 @ 04:54) (92/60 - 110/70)  RR: 17 (07-26-24 @ 04:54) (17 - 18)  SpO2: 98% (07-26-24 @ 04:54) (92% - 100%)  Wt(kg): --Vital Signs Last 24 Hrs  T(C): 36.8 (26 Jul 2024 04:54), Max: 37.2 (25 Jul 2024 21:24)  T(F): 98.3 (26 Jul 2024 04:54), Max: 98.9 (25 Jul 2024 21:24)  HR: 67 (26 Jul 2024 04:54) (67 - 86)  BP: 110/70 (26 Jul 2024 04:54) (92/60 - 110/70)  BP(mean): 84 (26 Jul 2024 04:54) (84 - 84)  RR: 17 (26 Jul 2024 04:54) (17 - 18)  SpO2: 98% (26 Jul 2024 04:54) (92% - 100%)    Parameters below as of 26 Jul 2024 04:54  Patient On (Oxygen Delivery Method): room air        PHYSICAL EXAM:  Constitutional: resting comfortably in bed; NAD  Head: NC/AT  Eyes: PERRL, EOMI, anicteric sclera  ENT: no nasal discharge; MMM  Neck: supple; no JVD or thyromegaly  Respiratory: CTA B/L; no W/R/R, no retractions  Cardiac: +S1/S2; RRR; no M/R/G  Gastrointestinal: soft, NT/ND; no rebound or guarding; +BSx4  Back: spine midline, no bony tenderness or step-offs; no CVAT B/L  Extremities: WWP, no clubbing or cyanosis; no peripheral edema. Capillary refill <2 sec  Musculoskeletal: NROM x4; no joint swelling, tenderness or erythema  Vascular: 2+ radial, DP/PT pulses B/L  Dermatologic: skin warm, dry and intact; no rashes, wounds, or scars  Lymphatic: no submandibular or cervical LAD  Neurologic: AAOx3; CNII-XII grossly intact; no focal deficits  Psychiatric: affect and characteristics of appearance, verbalizations, behaviors are appropriate    LABS:                        10.9   4.42  )-----------( 241      ( 24 Jul 2024 10:00 )             35.5     07-24    135  |  98  |  22  ----------------------------<  117<H>  5.0   |  25  |  1.02    Ca    9.7      24 Jul 2024 10:00  Phos  5.0     07-24  Mg     2.0     07-24    TPro  8.4<H>  /  Alb  3.1<L>  /  TBili  0.3  /  DBili  x   /  AST  See Note  /  ALT  60<H>  /  AlkPhos  87  07-24        Urinalysis Basic - ( 24 Jul 2024 10:00 )    Color: x / Appearance: x / SG: x / pH: x  Gluc: 117 mg/dL / Ketone: x  / Bili: x / Urobili: x   Blood: x / Protein: x / Nitrite: x   Leuk Esterase: x / RBC: x / WBC x   Sq Epi: x / Non Sq Epi: x / Bacteria: x      CAPILLARY BLOOD GLUCOSE            Urinalysis Basic - ( 24 Jul 2024 10:00 )    Color: x / Appearance: x / SG: x / pH: x  Gluc: 117 mg/dL / Ketone: x  / Bili: x / Urobili: x   Blood: x / Protein: x / Nitrite: x   Leuk Esterase: x / RBC: x / WBC x   Sq Epi: x / Non Sq Epi: x / Bacteria: x        MEDICATIONS  (STANDING):  acetaminophen     Tablet .. 975 milliGRAM(s) Oral every 8 hours  chlorhexidine 2% Cloths 1 Application(s) Topical daily  enoxaparin Injectable 40 milliGRAM(s) SubCutaneous every 24 hours  levETIRAcetam 500 milliGRAM(s) Oral two times a day  lidocaine   4% Patch 1 Patch Transdermal daily  melatonin 3 milliGRAM(s) Oral at bedtime  methadone    Tablet 160 milliGRAM(s) Oral every 24 hours  mirtazapine 15 milliGRAM(s) Oral daily  oxacillin IVPB 2 Gram(s) IV Intermittent every 4 hours  QUEtiapine 75 milliGRAM(s) Oral at bedtime    MEDICATIONS  (PRN):  aluminum hydroxide/magnesium hydroxide/simethicone Suspension 30 milliLiter(s) Oral every 6 hours PRN Dyspepsia  benzonatate 100 milliGRAM(s) Oral once PRN Cough  naloxone Injectable 1 milliGRAM(s) IV Push every 3 minutes PRN in case of overdose  oxyCODONE    IR 5 milliGRAM(s) Oral every 6 hours PRN Moderate Pain (4 - 6)  oxyCODONE    IR 10 milliGRAM(s) Oral every 6 hours PRN Severe Pain (7 - 10)  polyethylene glycol 3350 17 Gram(s) Oral every 24 hours PRN Constipation  senna 2 Tablet(s) Oral at bedtime PRN Constipation      RADIOLOGY & ADDITIONAL TESTS: Reviewed   Patient is a 59y old  Female who presents with a chief complaint of left leg pain (25 Jul 2024 12:02)      HOSPITAL COURSE: Patient is a 59-year-old woman with PMHx of IVDU, anemia, and epilepsy who presented with subacute progressively worsening Left leg pain x1 month prior to admission, found to have iliopsoas bursitis likely 2/2 IVDU, c/b MSSA bacteremia. Per ID recs, patient was transitioned from empiric abx to IV Cefazolin (7/3-7/17), which was discontinued due to agranulocytosis, and then subsequently started on IV Oxacillin (7/17- ) with plan for a 6-week course concluding on 8/13. As patient expressed intermittent desire to leave AMA, contingency plan per ID in the event of AMA is PO Dicloxacillin. However, as patient's Seroquel was uptitrated from 25mg to 75mg, patient's mood symptoms have improved and she has maintained a positive attitude toward completing her medical treatment. Patient is medically stable for discharge to complete her abx therapy, but has had difficulty obtaining placement at [facility] due to IVDU history.     OVERNIGHT EVENTS: NAEON    SUBJECTIVE: patient was examined at the bedside this am. She notes that she did not sleep well as she has been having continued Left foot pain. She states her back pain is improved. Continues to have nausea no episodes of emesis. She states that her diarrhea is much improved.     Sheotherwise no fevers, chills, chest pain, shortness of breath, vomiting or constipation, no difficult urinating or changes in urination, no numbness, tingling or weakness noted.      ROS: otherwise negative      T(C): 36.8 (07-26-24 @ 04:54), Max: 37.2 (07-25-24 @ 21:24)  HR: 67 (07-26-24 @ 04:54) (67 - 86)  BP: 110/70 (07-26-24 @ 04:54) (92/60 - 110/70)  RR: 17 (07-26-24 @ 04:54) (17 - 18)  SpO2: 98% (07-26-24 @ 04:54) (92% - 100%)  Wt(kg): --Vital Signs Last 24 Hrs  T(C): 36.8 (26 Jul 2024 04:54), Max: 37.2 (25 Jul 2024 21:24)  T(F): 98.3 (26 Jul 2024 04:54), Max: 98.9 (25 Jul 2024 21:24)  HR: 67 (26 Jul 2024 04:54) (67 - 86)  BP: 110/70 (26 Jul 2024 04:54) (92/60 - 110/70)  BP(mean): 84 (26 Jul 2024 04:54) (84 - 84)  RR: 17 (26 Jul 2024 04:54) (17 - 18)  SpO2: 98% (26 Jul 2024 04:54) (92% - 100%)    Parameters below as of 26 Jul 2024 04:54  Patient On (Oxygen Delivery Method): room air        PHYSICAL EXAM:  Constitutional: resting comfortably in bed; NAD  Head: NC/AT  Eyes: PERRL, EOMI, anicteric sclera  ENT: no nasal discharge; MMM  Neck: supple; no JVD or thyromegaly  Respiratory: CTA B/L; no W/R/R, no retractions  Cardiac: +S1/S2; RRR; no M/R/G  Gastrointestinal: soft, NT/ND; no rebound or guarding; +BSx4  Back: spine midline, no bony tenderness or step-offs; no CVAT B/L  Extremities: WWP, no clubbing or cyanosis; no peripheral edema. Capillary refill <2 sec  Musculoskeletal: NROM x4; no joint swelling, tenderness or erythema  Vascular: 2+ radial, DP/PT pulses B/L  Dermatologic: skin warm, dry and intact; no rashes, wounds, or scars  Lymphatic: no submandibular or cervical LAD  Neurologic: AAOx3; CNII-XII grossly intact; no focal deficits  Psychiatric: affect and characteristics of appearance, verbalizations, behaviors are appropriate    LABS:                        10.9   4.42  )-----------( 241      ( 24 Jul 2024 10:00 )             35.5     07-24    135  |  98  |  22  ----------------------------<  117<H>  5.0   |  25  |  1.02    Ca    9.7      24 Jul 2024 10:00  Phos  5.0     07-24  Mg     2.0     07-24    TPro  8.4<H>  /  Alb  3.1<L>  /  TBili  0.3  /  DBili  x   /  AST  See Note  /  ALT  60<H>  /  AlkPhos  87  07-24        Urinalysis Basic - ( 24 Jul 2024 10:00 )    Color: x / Appearance: x / SG: x / pH: x  Gluc: 117 mg/dL / Ketone: x  / Bili: x / Urobili: x   Blood: x / Protein: x / Nitrite: x   Leuk Esterase: x / RBC: x / WBC x   Sq Epi: x / Non Sq Epi: x / Bacteria: x      CAPILLARY BLOOD GLUCOSE            Urinalysis Basic - ( 24 Jul 2024 10:00 )    Color: x / Appearance: x / SG: x / pH: x  Gluc: 117 mg/dL / Ketone: x  / Bili: x / Urobili: x   Blood: x / Protein: x / Nitrite: x   Leuk Esterase: x / RBC: x / WBC x   Sq Epi: x / Non Sq Epi: x / Bacteria: x        MEDICATIONS  (STANDING):  acetaminophen     Tablet .. 975 milliGRAM(s) Oral every 8 hours  chlorhexidine 2% Cloths 1 Application(s) Topical daily  enoxaparin Injectable 40 milliGRAM(s) SubCutaneous every 24 hours  levETIRAcetam 500 milliGRAM(s) Oral two times a day  lidocaine   4% Patch 1 Patch Transdermal daily  melatonin 3 milliGRAM(s) Oral at bedtime  methadone    Tablet 160 milliGRAM(s) Oral every 24 hours  mirtazapine 15 milliGRAM(s) Oral daily  oxacillin IVPB 2 Gram(s) IV Intermittent every 4 hours  QUEtiapine 75 milliGRAM(s) Oral at bedtime    MEDICATIONS  (PRN):  aluminum hydroxide/magnesium hydroxide/simethicone Suspension 30 milliLiter(s) Oral every 6 hours PRN Dyspepsia  benzonatate 100 milliGRAM(s) Oral once PRN Cough  naloxone Injectable 1 milliGRAM(s) IV Push every 3 minutes PRN in case of overdose  oxyCODONE    IR 5 milliGRAM(s) Oral every 6 hours PRN Moderate Pain (4 - 6)  oxyCODONE    IR 10 milliGRAM(s) Oral every 6 hours PRN Severe Pain (7 - 10)  polyethylene glycol 3350 17 Gram(s) Oral every 24 hours PRN Constipation  senna 2 Tablet(s) Oral at bedtime PRN Constipation      RADIOLOGY & ADDITIONAL TESTS: Reviewed   Patient is a 59y old  Female who presents with a chief complaint of left leg pain (25 Jul 2024 12:02)      HOSPITAL COURSE: Patient is a 59-year-old woman with PMHx of IVDU, anemia, and epilepsy who presented with subacute progressively worsening Left leg pain x1 month prior to admission, found to have iliopsoas bursitis likely 2/2 IVDU, c/b MSSA bacteremia. Per ID recs, patient was transitioned from empiric abx to IV Cefazolin (7/3-7/17), which was discontinued due to agranulocytosis, and then subsequently started on IV Oxacillin (7/17- ) with plan for a 6-week course concluding on 8/13. As patient expressed intermittent desire to leave AMA, contingency plan per ID in the event of AMA is PO Dicloxacillin. However, as patient's Seroquel was uptitrated from 25mg to 75mg, patient's mood symptoms have improved and she has maintained a positive attitude toward completing her medical treatment. Patient is medically stable for discharge to complete her abx therapy, but has had difficulty obtaining placement at [facility] due to IVDU history.     OVERNIGHT EVENTS: NAEON    SUBJECTIVE: patient was examined at the bedside this am. She notes that she did not sleep well as she has been having continued Left foot pain. She states her back pain is improved. Continues to have nausea no episodes of emesis. She states that her diarrhea is much improved.     She otherwise notes no fevers, chills, chest pain, shortness of breath, vomiting or constipation, no difficult urinating or changes in urination, no numbness, tingling or weakness noted.      ROS: otherwise negative      T(C): 36.8 (07-26-24 @ 04:54), Max: 37.2 (07-25-24 @ 21:24)  HR: 67 (07-26-24 @ 04:54) (67 - 86)  BP: 110/70 (07-26-24 @ 04:54) (92/60 - 110/70)  RR: 17 (07-26-24 @ 04:54) (17 - 18)  SpO2: 98% (07-26-24 @ 04:54) (92% - 100%)  Wt(kg): --Vital Signs Last 24 Hrs  T(C): 36.8 (26 Jul 2024 04:54), Max: 37.2 (25 Jul 2024 21:24)  T(F): 98.3 (26 Jul 2024 04:54), Max: 98.9 (25 Jul 2024 21:24)  HR: 67 (26 Jul 2024 04:54) (67 - 86)  BP: 110/70 (26 Jul 2024 04:54) (92/60 - 110/70)  BP(mean): 84 (26 Jul 2024 04:54) (84 - 84)  RR: 17 (26 Jul 2024 04:54) (17 - 18)  SpO2: 98% (26 Jul 2024 04:54) (92% - 100%)    Parameters below as of 26 Jul 2024 04:54  Patient On (Oxygen Delivery Method): room air        PHYSICAL EXAM:  Constitutional: resting comfortably in bed; NAD  Head: NC/AT  Eyes: PERRL, EOMI, anicteric sclera  ENT: no nasal discharge; MMM  Neck: supple; no JVD or thyromegaly  Respiratory: CTA B/L; no W/R/R, no retractions  Cardiac: +S1/S2; RRR; no M/R/G  Gastrointestinal: soft, NT/ND; no rebound or guarding; +BSx4  Back: spine midline, no bony tenderness or step-offs; no CVAT B/L  Extremities: WWP, no clubbing or cyanosis; no peripheral edema. Capillary refill <2 sec  Musculoskeletal: NROM x4; no joint swelling, tenderness or erythema  Vascular: 2+ radial, DP/PT pulses B/L  Dermatologic: skin warm, dry and intact; no rashes, wounds, or scars  Lymphatic: no submandibular or cervical LAD  Neurologic: AAOx3; CNII-XII grossly intact; no focal deficits  Psychiatric: affect and characteristics of appearance, verbalizations, behaviors are appropriate    LABS:                        10.9   4.42  )-----------( 241      ( 24 Jul 2024 10:00 )             35.5     07-24    135  |  98  |  22  ----------------------------<  117<H>  5.0   |  25  |  1.02    Ca    9.7      24 Jul 2024 10:00  Phos  5.0     07-24  Mg     2.0     07-24    TPro  8.4<H>  /  Alb  3.1<L>  /  TBili  0.3  /  DBili  x   /  AST  See Note  /  ALT  60<H>  /  AlkPhos  87  07-24        Urinalysis Basic - ( 24 Jul 2024 10:00 )    Color: x / Appearance: x / SG: x / pH: x  Gluc: 117 mg/dL / Ketone: x  / Bili: x / Urobili: x   Blood: x / Protein: x / Nitrite: x   Leuk Esterase: x / RBC: x / WBC x   Sq Epi: x / Non Sq Epi: x / Bacteria: x      CAPILLARY BLOOD GLUCOSE            Urinalysis Basic - ( 24 Jul 2024 10:00 )    Color: x / Appearance: x / SG: x / pH: x  Gluc: 117 mg/dL / Ketone: x  / Bili: x / Urobili: x   Blood: x / Protein: x / Nitrite: x   Leuk Esterase: x / RBC: x / WBC x   Sq Epi: x / Non Sq Epi: x / Bacteria: x        MEDICATIONS  (STANDING):  acetaminophen     Tablet .. 975 milliGRAM(s) Oral every 8 hours  chlorhexidine 2% Cloths 1 Application(s) Topical daily  enoxaparin Injectable 40 milliGRAM(s) SubCutaneous every 24 hours  levETIRAcetam 500 milliGRAM(s) Oral two times a day  lidocaine   4% Patch 1 Patch Transdermal daily  melatonin 3 milliGRAM(s) Oral at bedtime  methadone    Tablet 160 milliGRAM(s) Oral every 24 hours  mirtazapine 15 milliGRAM(s) Oral daily  oxacillin IVPB 2 Gram(s) IV Intermittent every 4 hours  QUEtiapine 75 milliGRAM(s) Oral at bedtime    MEDICATIONS  (PRN):  aluminum hydroxide/magnesium hydroxide/simethicone Suspension 30 milliLiter(s) Oral every 6 hours PRN Dyspepsia  benzonatate 100 milliGRAM(s) Oral once PRN Cough  naloxone Injectable 1 milliGRAM(s) IV Push every 3 minutes PRN in case of overdose  oxyCODONE    IR 5 milliGRAM(s) Oral every 6 hours PRN Moderate Pain (4 - 6)  oxyCODONE    IR 10 milliGRAM(s) Oral every 6 hours PRN Severe Pain (7 - 10)  polyethylene glycol 3350 17 Gram(s) Oral every 24 hours PRN Constipation  senna 2 Tablet(s) Oral at bedtime PRN Constipation      RADIOLOGY & ADDITIONAL TESTS: Reviewed   Patient is a 59y old  Female who presents with a chief complaint of left leg pain (25 Jul 2024 12:02)      HOSPITAL COURSE: Patient is a 59-year-old woman with PMHx of IVDU, anemia, and epilepsy who presented with subacute progressively worsening Left leg pain x1 month prior to admission, found to have iliopsoas bursitis likely 2/2 IVDU, c/b MSSA bacteremia. Per ID recs, patient was transitioned from empiric abx to IV Cefazolin (7/3-7/17), which was discontinued due to agranulocytosis, and then subsequently started on IV Oxacillin (7/17- ) with plan for a 6-week course concluding on 8/13. As patient expressed intermittent desire to leave AMA, contingency plan per ID in the event of AMA is PO Dicloxacillin. However, as patient's Seroquel was uptitrated from 25mg to 75mg, patient's mood symptoms have improved and she has maintained a positive attitude toward completing her medical treatment. Patient is medically stable for discharge to complete her abx therapy, but has had difficulty obtaining placement at [facility] due to IVDU history.     OVERNIGHT EVENTS: NAEON    SUBJECTIVE: patient was examined at the bedside this am. She notes that she did not sleep well as she has been having continued Left foot pain. She states her back pain is improved. Continues to have nausea no episodes of emesis. She states that her diarrhea is much improved.     She otherwise notes no fevers, chills, chest pain, shortness of breath, vomiting or constipation, no difficult urinating or changes in urination, no numbness, tingling or weakness noted.      ROS: otherwise negative      T(C): 36.8 (07-26-24 @ 04:54), Max: 37.2 (07-25-24 @ 21:24)  HR: 67 (07-26-24 @ 04:54) (67 - 86)  BP: 110/70 (07-26-24 @ 04:54) (92/60 - 110/70)  RR: 17 (07-26-24 @ 04:54) (17 - 18)  SpO2: 98% (07-26-24 @ 04:54) (92% - 100%)  Wt(kg): --Vital Signs Last 24 Hrs  T(C): 36.8 (26 Jul 2024 04:54), Max: 37.2 (25 Jul 2024 21:24)  T(F): 98.3 (26 Jul 2024 04:54), Max: 98.9 (25 Jul 2024 21:24)  HR: 67 (26 Jul 2024 04:54) (67 - 86)  BP: 110/70 (26 Jul 2024 04:54) (92/60 - 110/70)  BP(mean): 84 (26 Jul 2024 04:54) (84 - 84)  RR: 17 (26 Jul 2024 04:54) (17 - 18)  SpO2: 98% (26 Jul 2024 04:54) (92% - 100%)    Parameters below as of 26 Jul 2024 04:54  Patient On (Oxygen Delivery Method): room air        PHYSICAL EXAM:  Constitutional: resting comfortably in bed; NAD  Head: NC/AT  Eyes: PERRL, EOMI, anicteric sclera  ENT: no nasal discharge; MMM  Neck: supple; no JVD or thyromegaly  Respiratory: CTA B/L; no W/R/R, no retractions  Cardiac: +S1/S2; RRR; no M/R/G  Gastrointestinal: soft, NT/ND; no rebound or guarding; +BSx4  Back: spine midline, no bony tenderness or step-offs; no CVAT B/L  Extremities: WWP, no clubbing or cyanosis; no peripheral edema. Capillary refill <2 sec, no rash or skin changes in the left ankle or lower extremity.   Musculoskeletal: NROM x4; no joint swelling, tenderness or erythema  Vascular: 2+ radial, DP/PT pulses B/L  Dermatologic: skin warm, dry and intact; no rashes, wounds, or scars  Lymphatic: no submandibular or cervical LAD  Neurologic: AAOx3; CNII-XII grossly intact; no focal deficits  Psychiatric: affect and characteristics of appearance, verbalizations, behaviors are appropriate    LABS:                        10.9   4.42  )-----------( 241      ( 24 Jul 2024 10:00 )             35.5     07-24    135  |  98  |  22  ----------------------------<  117<H>  5.0   |  25  |  1.02    Ca    9.7      24 Jul 2024 10:00  Phos  5.0     07-24  Mg     2.0     07-24    TPro  8.4<H>  /  Alb  3.1<L>  /  TBili  0.3  /  DBili  x   /  AST  See Note  /  ALT  60<H>  /  AlkPhos  87  07-24        Urinalysis Basic - ( 24 Jul 2024 10:00 )    Color: x / Appearance: x / SG: x / pH: x  Gluc: 117 mg/dL / Ketone: x  / Bili: x / Urobili: x   Blood: x / Protein: x / Nitrite: x   Leuk Esterase: x / RBC: x / WBC x   Sq Epi: x / Non Sq Epi: x / Bacteria: x      CAPILLARY BLOOD GLUCOSE            Urinalysis Basic - ( 24 Jul 2024 10:00 )    Color: x / Appearance: x / SG: x / pH: x  Gluc: 117 mg/dL / Ketone: x  / Bili: x / Urobili: x   Blood: x / Protein: x / Nitrite: x   Leuk Esterase: x / RBC: x / WBC x   Sq Epi: x / Non Sq Epi: x / Bacteria: x        MEDICATIONS  (STANDING):  acetaminophen     Tablet .. 975 milliGRAM(s) Oral every 8 hours  chlorhexidine 2% Cloths 1 Application(s) Topical daily  enoxaparin Injectable 40 milliGRAM(s) SubCutaneous every 24 hours  levETIRAcetam 500 milliGRAM(s) Oral two times a day  lidocaine   4% Patch 1 Patch Transdermal daily  melatonin 3 milliGRAM(s) Oral at bedtime  methadone    Tablet 160 milliGRAM(s) Oral every 24 hours  mirtazapine 15 milliGRAM(s) Oral daily  oxacillin IVPB 2 Gram(s) IV Intermittent every 4 hours  QUEtiapine 75 milliGRAM(s) Oral at bedtime    MEDICATIONS  (PRN):  aluminum hydroxide/magnesium hydroxide/simethicone Suspension 30 milliLiter(s) Oral every 6 hours PRN Dyspepsia  benzonatate 100 milliGRAM(s) Oral once PRN Cough  naloxone Injectable 1 milliGRAM(s) IV Push every 3 minutes PRN in case of overdose  oxyCODONE    IR 5 milliGRAM(s) Oral every 6 hours PRN Moderate Pain (4 - 6)  oxyCODONE    IR 10 milliGRAM(s) Oral every 6 hours PRN Severe Pain (7 - 10)  polyethylene glycol 3350 17 Gram(s) Oral every 24 hours PRN Constipation  senna 2 Tablet(s) Oral at bedtime PRN Constipation      RADIOLOGY & ADDITIONAL TESTS: Reviewed

## 2024-07-27 LAB
ANION GAP SERPL CALC-SCNC: 13 MMOL/L — SIGNIFICANT CHANGE UP (ref 5–17)
BASOPHILS # BLD AUTO: 0.04 K/UL — SIGNIFICANT CHANGE UP (ref 0–0.2)
BASOPHILS NFR BLD AUTO: 0.9 % — SIGNIFICANT CHANGE UP (ref 0–2)
BUN SERPL-MCNC: 33 MG/DL — HIGH (ref 7–23)
CALCIUM SERPL-MCNC: 9.6 MG/DL — SIGNIFICANT CHANGE UP (ref 8.4–10.5)
CHLORIDE SERPL-SCNC: 100 MMOL/L — SIGNIFICANT CHANGE UP (ref 96–108)
CO2 SERPL-SCNC: 26 MMOL/L — SIGNIFICANT CHANGE UP (ref 22–31)
CREAT SERPL-MCNC: 1.2 MG/DL — SIGNIFICANT CHANGE UP (ref 0.5–1.3)
EGFR: 53 ML/MIN/1.73M2 — LOW
EOSINOPHIL # BLD AUTO: 0.21 K/UL — SIGNIFICANT CHANGE UP (ref 0–0.5)
EOSINOPHIL NFR BLD AUTO: 4.9 % — SIGNIFICANT CHANGE UP (ref 0–6)
GLUCOSE SERPL-MCNC: 96 MG/DL — SIGNIFICANT CHANGE UP (ref 70–99)
HCT VFR BLD CALC: 31.2 % — LOW (ref 34.5–45)
HGB BLD-MCNC: 9.8 G/DL — LOW (ref 11.5–15.5)
IMM GRANULOCYTES NFR BLD AUTO: 0.5 % — SIGNIFICANT CHANGE UP (ref 0–0.9)
LYMPHOCYTES # BLD AUTO: 1.63 K/UL — SIGNIFICANT CHANGE UP (ref 1–3.3)
LYMPHOCYTES # BLD AUTO: 38 % — SIGNIFICANT CHANGE UP (ref 13–44)
MAGNESIUM SERPL-MCNC: 1.9 MG/DL — SIGNIFICANT CHANGE UP (ref 1.6–2.6)
MCHC RBC-ENTMCNC: 28.2 PG — SIGNIFICANT CHANGE UP (ref 27–34)
MCHC RBC-ENTMCNC: 31.4 GM/DL — LOW (ref 32–36)
MCV RBC AUTO: 89.9 FL — SIGNIFICANT CHANGE UP (ref 80–100)
MONOCYTES # BLD AUTO: 0.57 K/UL — SIGNIFICANT CHANGE UP (ref 0–0.9)
MONOCYTES NFR BLD AUTO: 13.3 % — SIGNIFICANT CHANGE UP (ref 2–14)
NEUTROPHILS # BLD AUTO: 1.82 K/UL — SIGNIFICANT CHANGE UP (ref 1.8–7.4)
NEUTROPHILS NFR BLD AUTO: 42.4 % — LOW (ref 43–77)
NRBC # BLD: 0 /100 WBCS — SIGNIFICANT CHANGE UP (ref 0–0)
PHOSPHATE SERPL-MCNC: 5.1 MG/DL — HIGH (ref 2.5–4.5)
PLATELET # BLD AUTO: 207 K/UL — SIGNIFICANT CHANGE UP (ref 150–400)
POTASSIUM SERPL-MCNC: 5.2 MMOL/L — SIGNIFICANT CHANGE UP (ref 3.5–5.3)
POTASSIUM SERPL-SCNC: 5.2 MMOL/L — SIGNIFICANT CHANGE UP (ref 3.5–5.3)
RBC # BLD: 3.47 M/UL — LOW (ref 3.8–5.2)
RBC # FLD: 16.9 % — HIGH (ref 10.3–14.5)
SODIUM SERPL-SCNC: 139 MMOL/L — SIGNIFICANT CHANGE UP (ref 135–145)
WBC # BLD: 4.29 K/UL — SIGNIFICANT CHANGE UP (ref 3.8–10.5)
WBC # FLD AUTO: 4.29 K/UL — SIGNIFICANT CHANGE UP (ref 3.8–10.5)

## 2024-07-27 RX ADMIN — OXACILLIN 100 GRAM(S): 1 INJECTION, POWDER, FOR SOLUTION INTRAMUSCULAR; INTRAVENOUS at 06:39

## 2024-07-27 RX ADMIN — OXYCODONE HYDROCHLORIDE 10 MILLIGRAM(S): 30 TABLET ORAL at 06:53

## 2024-07-27 RX ADMIN — OXYCODONE HYDROCHLORIDE 10 MILLIGRAM(S): 30 TABLET ORAL at 07:32

## 2024-07-27 RX ADMIN — OXACILLIN 100 GRAM(S): 1 INJECTION, POWDER, FOR SOLUTION INTRAMUSCULAR; INTRAVENOUS at 02:59

## 2024-07-27 RX ADMIN — OXYCODONE HYDROCHLORIDE 10 MILLIGRAM(S): 30 TABLET ORAL at 13:04

## 2024-07-27 RX ADMIN — LIDOCAINE 5% 1 PATCH: 5 CREAM TOPICAL at 11:14

## 2024-07-27 RX ADMIN — LEVETIRACETAM 500 MILLIGRAM(S): 1000 TABLET, FILM COATED ORAL at 18:27

## 2024-07-27 RX ADMIN — OXYCODONE HYDROCHLORIDE 10 MILLIGRAM(S): 30 TABLET ORAL at 01:39

## 2024-07-27 RX ADMIN — OXYCODONE HYDROCHLORIDE 10 MILLIGRAM(S): 30 TABLET ORAL at 00:44

## 2024-07-27 RX ADMIN — ENOXAPARIN SODIUM 40 MILLIGRAM(S): 120 INJECTION SUBCUTANEOUS at 21:26

## 2024-07-27 RX ADMIN — OXACILLIN 100 GRAM(S): 1 INJECTION, POWDER, FOR SOLUTION INTRAMUSCULAR; INTRAVENOUS at 21:28

## 2024-07-27 RX ADMIN — OXYCODONE HYDROCHLORIDE 10 MILLIGRAM(S): 30 TABLET ORAL at 19:09

## 2024-07-27 RX ADMIN — Medication 160 MILLIGRAM(S): at 09:53

## 2024-07-27 RX ADMIN — OXACILLIN 100 GRAM(S): 1 INJECTION, POWDER, FOR SOLUTION INTRAMUSCULAR; INTRAVENOUS at 18:28

## 2024-07-27 RX ADMIN — Medication 75 MILLIGRAM(S): at 21:27

## 2024-07-27 RX ADMIN — Medication 3 MILLIGRAM(S): at 21:27

## 2024-07-27 RX ADMIN — OXACILLIN 100 GRAM(S): 1 INJECTION, POWDER, FOR SOLUTION INTRAMUSCULAR; INTRAVENOUS at 09:53

## 2024-07-27 RX ADMIN — OXACILLIN 100 GRAM(S): 1 INJECTION, POWDER, FOR SOLUTION INTRAMUSCULAR; INTRAVENOUS at 14:57

## 2024-07-27 RX ADMIN — LEVETIRACETAM 500 MILLIGRAM(S): 1000 TABLET, FILM COATED ORAL at 06:39

## 2024-07-27 RX ADMIN — OXYCODONE HYDROCHLORIDE 10 MILLIGRAM(S): 30 TABLET ORAL at 14:04

## 2024-07-27 RX ADMIN — Medication 15 MILLIGRAM(S): at 11:14

## 2024-07-27 NOTE — PROGRESS NOTE ADULT - PROBLEM SELECTOR PLAN 5
Cr 1.28, BUN 29, GFR 49. Initially was on Toradol, improved with PO fluid intake. Provided 1L NS 100cc/hr  Plan:   -resolved

## 2024-07-27 NOTE — PROGRESS NOTE ADULT - PROBLEM SELECTOR PROBLEM 10
Prophylactic measure
Mood disorder
Prophylactic measure
Mood disorder
Prophylactic measure

## 2024-07-27 NOTE — PROGRESS NOTE ADULT - PROBLEM SELECTOR PLAN 7
13-Jun-2021 12:27 As per patient has history of epilepsy  States she takes Keppra but unsure of dose, follows with Neurologist at [facility]  Per Jennifer, previously prescribed Keppra 1000mg bid.   - Continue Keppra 500mg bid x 2 doses for now.

## 2024-07-27 NOTE — PROGRESS NOTE ADULT - ATTENDING COMMENTS
Patient was seen and examined at bedside. Case discuss with resident. Pt continues to have left sided thigh pain.     Vital Signs Last 24 Hrs  T(C): 37.2 (27 Jul 2024 15:49), Max: 37.2 (27 Jul 2024 15:49)  T(F): 98.9 (27 Jul 2024 15:49), Max: 98.9 (27 Jul 2024 15:49)  HR: 89 (27 Jul 2024 15:49) (77 - 89)  BP: 95/61 (27 Jul 2024 15:49) (95/61 - 103/72)  RR: 16 (27 Jul 2024 15:49) (16 - 18)  SpO2: 95% (27 Jul 2024 15:49) (95% - 96%)    Parameters below as of 27 Jul 2024 15:49  Patient On (Oxygen Delivery Method): room air    PE: NAD laying in bed   CTA B/l no wheezing or crackles  Nl S1,S2 no mumur   soft NT/ND + BS   LE left thigh no erythema or warmth     LABS:                        9.8    4.29  )-----------( 207      ( 27 Jul 2024 05:30 )             31.2     07-27    139  |  100  |  33<H>  ----------------------------<  96  5.2   |  26  |  1.20    Ca    9.6      27 Jul 2024 05:30  Phos  5.1     07-27  Mg     1.9     07-27    acetaminophen     Tablet .. 975 milliGRAM(s) Oral every 8 hours  aluminum hydroxide/magnesium hydroxide/simethicone Suspension 30 milliLiter(s) Oral every 6 hours PRN  benzonatate 100 milliGRAM(s) Oral once PRN  chlorhexidine 2% Cloths 1 Application(s) Topical daily  enoxaparin Injectable 40 milliGRAM(s) SubCutaneous every 24 hours  levETIRAcetam 500 milliGRAM(s) Oral two times a day  lidocaine   4% Patch 1 Patch Transdermal daily  melatonin 3 milliGRAM(s) Oral at bedtime  methadone    Tablet 160 milliGRAM(s) Oral every 24 hours  mirtazapine 15 milliGRAM(s) Oral daily  naloxone Injectable 1 milliGRAM(s) IV Push every 3 minutes PRN  oxacillin IVPB 2 Gram(s) IV Intermittent every 4 hours  oxyCODONE    IR 10 milliGRAM(s) Oral every 6 hours PRN  oxyCODONE    IR 5 milliGRAM(s) Oral every 6 hours PRN  polyethylene glycol 3350 17 Gram(s) Oral every 24 hours PRN  QUEtiapine 75 milliGRAM(s) Oral at bedtime  senna 2 Tablet(s) Oral at bedtime PRN      A/P: 59-year-old woman with hx of IVDU (on Methadone), anemia and epilepsy who presented with LLE pain, found to have loculated fluid collection of left iliacus muscle and iliopsoas bursitis c/b MSSA bacteremia.  BCx (7/1) MSSA, BCx (7/3) NGTD.                 #Loculated fluid collection of left iliacus muscle and iliopsoas bursitis c/b MSSA bacteremia  -IR consulted, s/p drainage, Cx with staph aureus                    -Pt s/pTTE and TED which were negative for endocarditis                   -ID consulted; Pt abx were transitioned from Cefazolin to Oxacillin given worsening leukopenia/neutropenia, tentative plan for 4–6 week course (7/3- 8/13).   -Pt is for a repeat CT LLE on 7/29 to determine if Abx need to be extended ; Will discuss duration of abx with ID after getting CT on 7/29   -Ortho consulted, no acute surgical intervention                 #Depression   -psych consulted on 7/14 and they recommended to continue with Seroquel 100mg qhs, Remeron 15mg qhs and [id number] daily     #Hxof IVDU on Methadone   -Will continue Methadone     #Seizure Disorder  -Continue keppra     #DVT  ppx   -Enoxaparin     #DISPO  -Pending repeat CT LLE on 7/29     Time based billing  40 minutes spent on total encounter. The necessity of the time spent during the encounter on this date of service was due to:    Review of hospital course, labs, vitals, radiology and medical records.  Direct patient encounter including bedside exam   Discussed plan of care with resident team and interdisciplinary team.   Documenting the encounter. Patient was seen and examined at bedside. Case discuss with resident. Pt continues to have left sided thigh pain.     Vital Signs Last 24 Hrs  T(C): 37.2 (27 Jul 2024 15:49), Max: 37.2 (27 Jul 2024 15:49)  T(F): 98.9 (27 Jul 2024 15:49), Max: 98.9 (27 Jul 2024 15:49)  HR: 89 (27 Jul 2024 15:49) (77 - 89)  BP: 95/61 (27 Jul 2024 15:49) (95/61 - 103/72)  RR: 16 (27 Jul 2024 15:49) (16 - 18)  SpO2: 95% (27 Jul 2024 15:49) (95% - 96%)    Parameters below as of 27 Jul 2024 15:49  Patient On (Oxygen Delivery Method): room air    PE: NAD laying in bed   CTA B/l no wheezing or crackles  Nl S1,S2 no mumur   soft NT/ND + BS   LE left thigh no erythema or warmth     LABS:                        9.8    4.29  )-----------( 207      ( 27 Jul 2024 05:30 )             31.2     07-27    139  |  100  |  33<H>  ----------------------------<  96  5.2   |  26  |  1.20    Ca    9.6      27 Jul 2024 05:30  Phos  5.1     07-27  Mg     1.9     07-27    acetaminophen     Tablet .. 975 milliGRAM(s) Oral every 8 hours  aluminum hydroxide/magnesium hydroxide/simethicone Suspension 30 milliLiter(s) Oral every 6 hours PRN  benzonatate 100 milliGRAM(s) Oral once PRN  chlorhexidine 2% Cloths 1 Application(s) Topical daily  enoxaparin Injectable 40 milliGRAM(s) SubCutaneous every 24 hours  levETIRAcetam 500 milliGRAM(s) Oral two times a day  lidocaine   4% Patch 1 Patch Transdermal daily  melatonin 3 milliGRAM(s) Oral at bedtime  methadone    Tablet 160 milliGRAM(s) Oral every 24 hours  mirtazapine 15 milliGRAM(s) Oral daily  naloxone Injectable 1 milliGRAM(s) IV Push every 3 minutes PRN  oxacillin IVPB 2 Gram(s) IV Intermittent every 4 hours  oxyCODONE    IR 10 milliGRAM(s) Oral every 6 hours PRN  oxyCODONE    IR 5 milliGRAM(s) Oral every 6 hours PRN  polyethylene glycol 3350 17 Gram(s) Oral every 24 hours PRN  QUEtiapine 75 milliGRAM(s) Oral at bedtime  senna 2 Tablet(s) Oral at bedtime PRN      A/P: 59-year-old woman with hx of IVDU (on Methadone), anemia and epilepsy who presented with LLE pain, found to have loculated fluid collection of left iliacus muscle and iliopsoas bursitis c/b MSSA bacteremia.  BCx (7/1) MSSA, BCx (7/3) NGTD.                 #Loculated fluid collection of left iliacus muscle and iliopsoas bursitis c/b MSSA bacteremia  -IR consulted, s/p drainage, Cx with staph aureus                    -Pt s/pTTE and TED which were negative for endocarditis                   -ID consulted; Pt abx were transitioned from Cefazolin to Oxacillin given worsening leukopenia/neutropenia, tentative plan for 4–6 week course (7/3- 8/13).   -Pt is for a repeat CT LLE on 7/29 to determine if Abx need to be extended ; Will discuss duration of abx with ID after getting CT on 7/29   -Ortho consulted, no acute surgical intervention                 #Depression   -psych consulted on 7/14 and they recommended to continue with Seroquel 100mg qhs, Remeron 15mg qhs and [id number] daily     #Hxof IVDU on Methadone   #Chronic Opioid Dependence   -Will continue Methadone     #Seizure Disorder  -Continue keppra     #DVT  ppx   -Enoxaparin     #DISPO  -Pending repeat CT LLE on 7/29     Time based billing  40 minutes spent on total encounter. The necessity of the time spent during the encounter on this date of service was due to:    Review of hospital course, labs, vitals, radiology and medical records.  Direct patient encounter including bedside exam   Discussed plan of care with resident team and interdisciplinary team.   Documenting the encounter.

## 2024-07-27 NOTE — PROGRESS NOTE ADULT - PROBLEM SELECTOR PROBLEM 2
Iliopsoas bursitis of left hip
Leg pain, left
Iliopsoas bursitis of left hip
Iliopsoas bursitis of left hip
Leg pain, left
Iliopsoas bursitis of left hip
Leg pain, left
Iliopsoas bursitis of left hip
Leg pain, left
Iliopsoas bursitis of left hip

## 2024-07-27 NOTE — PROGRESS NOTE ADULT - PROBLEM SELECTOR PROBLEM 9
Severe protein-calorie malnutrition
Mood disorder
Severe protein-calorie malnutrition
Mood disorder
Severe protein-calorie malnutrition
Prophylactic measure

## 2024-07-27 NOTE — PROGRESS NOTE ADULT - PROBLEM SELECTOR PLAN 1
Patient was found to have blood and wound cultures positive for MSSA bacteria on 7/1. Per ID recs, cultures from 7/3 have no growth, so there is no need for further surveillance cultures. ID recommended PICC line. Midline placed in Left UE by IR PICC team on 7/11. Patient currently on 4-6 weeks of antibiotic therapy (7/3 - 8/13). Patient initially started on IV Cefazolin 2g q8h started 7/3 and discontinued 7/17, per ID recs, as patient demonstrated lab findings of agranulocytosis (neut# 5.96 --> 1.42). Currently on oxacillin 2g [id number]    Plan:  - Continue IV oxacillin 2g q4hrs (7/17 - ) through LUE midline  - s/p cefazolin 7/3 - 7/17  - In event of early discharge before completion of IV antibiotics, contingency plan per ID is PO Dicloxacillin 500mg q6h for same planned duration

## 2024-07-27 NOTE — PROGRESS NOTE ADULT - SUBJECTIVE AND OBJECTIVE BOX
Internal Medicine Progress Note  Hiral Huddleston, PGY-1      OVERNIGHT EVENTS/INTERVAL HPI:  No acute overnight events. Patient denying chest pain, SOB, palpitations, denies fever, chills, HA, Dizziness, N/V, abdominal pain, diarrhea, constipation, hematochezia/melena, dysuria, hematuria, new onset weakness/numbness. Has LLE pain when moving and on palpation. Scheduled for repeat CT LLE on 7/29.    OBJECTIVE:  Vital Signs Last 24 Hrs  T(C): 37.2 (27 Jul 2024 15:49), Max: 37.2 (27 Jul 2024 15:49)  T(F): 98.9 (27 Jul 2024 15:49), Max: 98.9 (27 Jul 2024 15:49)  HR: 89 (27 Jul 2024 15:49) (73 - 89)  BP: 95/61 (27 Jul 2024 15:49) (95/61 - 103/72)  BP(mean): --  RR: 16 (27 Jul 2024 15:49) (16 - 18)  SpO2: 95% (27 Jul 2024 15:49) (95% - 97%)    Parameters below as of 27 Jul 2024 15:49  Patient On (Oxygen Delivery Method): room air      I&O's Detail    PHYSICAL EXAM:  Constitutional: resting comfortably in bed; NAD  Head: NC/AT  Eyes: PERRL, EOMI, anicteric sclera  ENT: no nasal discharge; MMM  Respiratory: CTA B/L; no W/R/R, no retractions  Cardiac: +S1/S2; RRR; no M/R/G  Gastrointestinal: soft, NT/ND; no rebound or guarding; +BSx4  Extremities: WWP, no clubbing or cyanosis; no peripheral edema. Capillary refill <2 sec, no rash or skin changes in the left ankle or lower extremity. Pain on palpation of Left hip but no bruising or swelling.  Musculoskeletal: NROM x4; no joint swelling, tenderness or erythema  Vascular: 2+ radial, DP/PT pulses B/L  Neurologic: AAOx3; CNII-XII grossly intact; no focal deficits  Psychiatric: affect and characteristics of appearance, verbalizations, behaviors are appropriate    Medications:  MEDICATIONS  (STANDING):  acetaminophen     Tablet .. 975 milliGRAM(s) Oral every 8 hours  chlorhexidine 2% Cloths 1 Application(s) Topical daily  enoxaparin Injectable 40 milliGRAM(s) SubCutaneous every 24 hours  levETIRAcetam 500 milliGRAM(s) Oral two times a day  lidocaine   4% Patch 1 Patch Transdermal daily  melatonin 3 milliGRAM(s) Oral at bedtime  methadone    Tablet 160 milliGRAM(s) Oral every 24 hours  mirtazapine 15 milliGRAM(s) Oral daily  oxacillin IVPB 2 Gram(s) IV Intermittent every 4 hours  QUEtiapine 75 milliGRAM(s) Oral at bedtime    MEDICATIONS  (PRN):  aluminum hydroxide/magnesium hydroxide/simethicone Suspension 30 milliLiter(s) Oral every 6 hours PRN Dyspepsia  benzonatate 100 milliGRAM(s) Oral once PRN Cough  naloxone Injectable 1 milliGRAM(s) IV Push every 3 minutes PRN in case of overdose  oxyCODONE    IR 5 milliGRAM(s) Oral every 6 hours PRN Moderate Pain (4 - 6)  oxyCODONE    IR 10 milliGRAM(s) Oral every 6 hours PRN Severe Pain (7 - 10)  polyethylene glycol 3350 17 Gram(s) Oral every 24 hours PRN Constipation  senna 2 Tablet(s) Oral at bedtime PRN Constipation      Labs:                        9.8    4.29  )-----------( 207      ( 27 Jul 2024 05:30 )             31.2     07-27    139  |  100  |  33<H>  ----------------------------<  96  5.2   |  26  |  1.20    Ca    9.6      27 Jul 2024 05:30  Phos  5.1     07-27  Mg     1.9     07-27          Urinalysis Basic - ( 27 Jul 2024 05:30 )    Color: x / Appearance: x / SG: x / pH: x  Gluc: 96 mg/dL / Ketone: x  / Bili: x / Urobili: x   Blood: x / Protein: x / Nitrite: x   Leuk Esterase: x / RBC: x / WBC x   Sq Epi: x / Non Sq Epi: x / Bacteria: x          Radiology: Reviewed

## 2024-07-28 LAB
CRP SERPL-MCNC: <3 MG/L — SIGNIFICANT CHANGE UP (ref 0–4)
ERYTHROCYTE [SEDIMENTATION RATE] IN BLOOD: 58 MM/HR — HIGH

## 2024-07-28 PROCEDURE — 99232 SBSQ HOSP IP/OBS MODERATE 35: CPT

## 2024-07-28 RX ADMIN — OXACILLIN 100 GRAM(S): 1 INJECTION, POWDER, FOR SOLUTION INTRAMUSCULAR; INTRAVENOUS at 01:11

## 2024-07-28 RX ADMIN — OXYCODONE HYDROCHLORIDE 10 MILLIGRAM(S): 30 TABLET ORAL at 11:17

## 2024-07-28 RX ADMIN — OXYCODONE HYDROCHLORIDE 10 MILLIGRAM(S): 30 TABLET ORAL at 17:48

## 2024-07-28 RX ADMIN — Medication 15 MILLIGRAM(S): at 11:17

## 2024-07-28 RX ADMIN — OXYCODONE HYDROCHLORIDE 10 MILLIGRAM(S): 30 TABLET ORAL at 01:26

## 2024-07-28 RX ADMIN — Medication 975 MILLIGRAM(S): at 05:49

## 2024-07-28 RX ADMIN — OXACILLIN 100 GRAM(S): 1 INJECTION, POWDER, FOR SOLUTION INTRAMUSCULAR; INTRAVENOUS at 21:38

## 2024-07-28 RX ADMIN — Medication 975 MILLIGRAM(S): at 19:45

## 2024-07-28 RX ADMIN — OXACILLIN 100 GRAM(S): 1 INJECTION, POWDER, FOR SOLUTION INTRAMUSCULAR; INTRAVENOUS at 09:59

## 2024-07-28 RX ADMIN — LEVETIRACETAM 500 MILLIGRAM(S): 1000 TABLET, FILM COATED ORAL at 05:44

## 2024-07-28 RX ADMIN — LEVETIRACETAM 500 MILLIGRAM(S): 1000 TABLET, FILM COATED ORAL at 17:48

## 2024-07-28 RX ADMIN — ENOXAPARIN SODIUM 40 MILLIGRAM(S): 120 INJECTION SUBCUTANEOUS at 21:38

## 2024-07-28 RX ADMIN — OXYCODONE HYDROCHLORIDE 10 MILLIGRAM(S): 30 TABLET ORAL at 12:17

## 2024-07-28 RX ADMIN — OXACILLIN 100 GRAM(S): 1 INJECTION, POWDER, FOR SOLUTION INTRAMUSCULAR; INTRAVENOUS at 13:34

## 2024-07-28 RX ADMIN — OXACILLIN 100 GRAM(S): 1 INJECTION, POWDER, FOR SOLUTION INTRAMUSCULAR; INTRAVENOUS at 05:45

## 2024-07-28 RX ADMIN — OXACILLIN 100 GRAM(S): 1 INJECTION, POWDER, FOR SOLUTION INTRAMUSCULAR; INTRAVENOUS at 17:48

## 2024-07-28 RX ADMIN — OXYCODONE HYDROCHLORIDE 10 MILLIGRAM(S): 30 TABLET ORAL at 18:48

## 2024-07-28 RX ADMIN — Medication 75 MILLIGRAM(S): at 21:38

## 2024-07-28 RX ADMIN — Medication 3 MILLIGRAM(S): at 21:38

## 2024-07-28 RX ADMIN — Medication 160 MILLIGRAM(S): at 09:13

## 2024-07-28 NOTE — PROGRESS NOTE ADULT - ASSESSMENT
A/P: 59-year-old woman with hx of IVDU (on Methadone), anemia and epilepsy who presented with LLE pain, found to have loculated fluid collection of left iliacus muscle and iliopsoas bursitis c/b MSSA bacteremia.  BCx (7/1) MSSA, BCx (7/3) NGTD.                 #Loculated fluid collection of left iliacus muscle and iliopsoas bursitis c/b MSSA bacteremia  -IR consulted, s/p drainage, Cx with staph aureus                    -Pt s/p TTE and TED which were negative for endocarditis                   -ID consulted; Pt abx were transitioned from Cefazolin to Oxacillin given worsening leukopenia/neutropenia, tentative plan for 4–6 week course (7/3- 8/13).   -Pt is for a repeat CT LLE on 7/29 to determine if Abx need to be extended ; Will discuss duration of abx with ID after getting CT on 7/29   -Ortho consulted, no acute surgical intervention                 #Depression   -Psych consulted on 7/14 and they recommended to continue with Seroquel 100mg qhs, Remeron 15mg qhs and [id number] daily.  -Will consider reconsulting Psych in the AM      #Hxof IVDU on Methadone   #Chronic Opioid Dependence   -Will continue Methadone     #Seizure Disorder  -Continue keppra     #DVT  ppx   -Enoxaparin     #DISPO  -Pending repeat CT LLE on 7/29 to determine the duration of abx     Time based billing  40 minutes spent on total encounter. The necessity of the time spent during the encounter on this date of service was due to:    Review of hospital course, labs, vitals, radiology and medical records.  Direct patient encounter including bedside exam   Discussed plan of care with resident team and interdisciplinary team.   Documenting the encounter.

## 2024-07-28 NOTE — PROGRESS NOTE ADULT - SUBJECTIVE AND OBJECTIVE BOX
Patient was seen and examined at bedside. Case discuss with resident. Pt continues to feel depressed this morning and she is anxious to complete her abx treatment course.     Vital Signs Last 24 Hrs  T(C): 36.9 (28 Jul 2024 08:45), Max: 37.2 (27 Jul 2024 15:49)  T(F): 98.5 (28 Jul 2024 08:45), Max: 98.9 (27 Jul 2024 15:49)  HR: 70 (28 Jul 2024 08:45) (70 - 89)  BP: 98/62 (28 Jul 2024 08:45) (93/61 - 100/66)  BP(mean): 77 (28 Jul 2024 05:42) (77 - 77)  RR: 18 (28 Jul 2024 08:45) (16 - 18)  SpO2: 97% (28 Jul 2024 08:45) (95% - 99%)    Parameters below as of 28 Jul 2024 08:45  Patient On (Oxygen Delivery Method): room air    PE: NAD laying in bed   CTA B/l no wheezing or crackles  Nl S1,S2 no mumur   soft NT/ND + BS   LE left thigh no erythema or warmth     LABS:                        9.8    4.29  )-----------( 207      ( 27 Jul 2024 05:30 )             31.2     139  |  100  |  33<H>  ----------------------------<  96  5.2   |  26  |  1.20    Ca    9.6      27 Jul 2024 05:30  Phos  5.1     07-27  Mg     1.9     07-27      acetaminophen     Tablet .. 975 milliGRAM(s) Oral every 8 hours  aluminum hydroxide/magnesium hydroxide/simethicone Suspension 30 milliLiter(s) Oral every 6 hours PRN  benzonatate 100 milliGRAM(s) Oral once PRN  chlorhexidine 2% Cloths 1 Application(s) Topical daily  enoxaparin Injectable 40 milliGRAM(s) SubCutaneous every 24 hours  levETIRAcetam 500 milliGRAM(s) Oral two times a day  lidocaine   4% Patch 1 Patch Transdermal daily  melatonin 3 milliGRAM(s) Oral at bedtime  methadone    Tablet 160 milliGRAM(s) Oral every 24 hours  mirtazapine 15 milliGRAM(s) Oral daily  naloxone Injectable 1 milliGRAM(s) IV Push every 3 minutes PRN  oxacillin IVPB 2 Gram(s) IV Intermittent every 4 hours  oxyCODONE    IR 5 milliGRAM(s) Oral every 6 hours PRN  oxyCODONE    IR 10 milliGRAM(s) Oral every 6 hours PRN  polyethylene glycol 3350 17 Gram(s) Oral every 24 hours PRN  QUEtiapine 75 milliGRAM(s) Oral at bedtime  senna 2 Tablet(s) Oral at bedtime PRN

## 2024-07-28 NOTE — PROGRESS NOTE ADULT - NUTRITIONAL ASSESSMENT
This patient has been assessed with a concern for Malnutrition and has been determined to have a diagnosis/diagnoses of Severe protein-calorie malnutrition.    This patient is being managed with:   Diet Regular-  No Concentrated Phosphorus  Supplement Feeding Modality:  Oral  Ensure Plus High Protein Cans or Servings Per Day:  1       Frequency:  Daily  Entered: Jul 10 2024  5:44PM    Diet NPO after Midnight-     NPO Start Date: 04-Jul-2024   NPO Start Time: 23:59  Except Medications  Entered: Jul 5 2024  4:11AM  
This patient has been assessed with a concern for Malnutrition and has been determined to have a diagnosis/diagnoses of Severe protein-calorie malnutrition.    This patient is being managed with:   Diet Regular-  No Concentrated Phosphorus  Supplement Feeding Modality:  Oral  Suplena Cans or Servings Per Day:  1       Frequency:  Daily  Entered: Jul 15 2024  3:01PM  
This patient has been assessed with a concern for Malnutrition and has been determined to have a diagnosis/diagnoses of Severe protein-calorie malnutrition.    This patient is being managed with:   Diet Regular-  Supplement Feeding Modality:  Oral  Ensure Plus High Protein Cans or Servings Per Day:  1       Frequency:  Daily  Entered: Jul 6 2024 12:30PM    Diet NPO after Midnight-     NPO Start Date: 04-Jul-2024   NPO Start Time: 23:59  Except Medications  Entered: Jul 5 2024  4:11AM  
This patient has been assessed with a concern for Malnutrition and has been determined to have a diagnosis/diagnoses of Severe protein-calorie malnutrition.    This patient is being managed with:   Diet Regular-  No Concentrated Phosphorus  Supplement Feeding Modality:  Oral  Ensure Plus High Protein Cans or Servings Per Day:  1       Frequency:  Daily  Entered: Jul 10 2024  5:44PM  
This patient has been assessed with a concern for Malnutrition and has been determined to have a diagnosis/diagnoses of Severe protein-calorie malnutrition.    This patient is being managed with:   Diet Regular-  No Concentrated Phosphorus  Supplement Feeding Modality:  Oral  Ensure Plus High Protein Cans or Servings Per Day:  1       Frequency:  Daily  Entered: Jul 10 2024  5:44PM    Diet NPO after Midnight-     NPO Start Date: 04-Jul-2024   NPO Start Time: 23:59  Except Medications  Entered: Jul 5 2024  4:11AM  
This patient has been assessed with a concern for Malnutrition and has been determined to have a diagnosis/diagnoses of Severe protein-calorie malnutrition.    This patient is being managed with:   Diet Regular-  No Concentrated Phosphorus  Supplement Feeding Modality:  Oral  Suplena Cans or Servings Per Day:  1       Frequency:  Daily  Entered: Jul 15 2024  3:01PM  
This patient has been assessed with a concern for Malnutrition and has been determined to have a diagnosis/diagnoses of Severe protein-calorie malnutrition.    This patient is being managed with:   Diet Regular-  Entered: Jul 5 2024  1:30PM    Diet NPO after Midnight-     NPO Start Date: 04-Jul-2024   NPO Start Time: 23:59  Except Medications  Entered: Jul 5 2024  4:11AM  
This patient has been assessed with a concern for Malnutrition and has been determined to have a diagnosis/diagnoses of Severe protein-calorie malnutrition.    This patient is being managed with:   Diet Regular-  No Concentrated Phosphorus  Supplement Feeding Modality:  Oral  Ensure Plus High Protein Cans or Servings Per Day:  1       Frequency:  Daily  Entered: Jul 10 2024  5:44PM    Diet NPO after Midnight-     NPO Start Date: 04-Jul-2024   NPO Start Time: 23:59  Except Medications  Entered: Jul 5 2024  4:11AM  
This patient has been assessed with a concern for Malnutrition and has been determined to have a diagnosis/diagnoses of Severe protein-calorie malnutrition.    This patient is being managed with:   Diet Regular-  Supplement Feeding Modality:  Oral  Ensure Plus High Protein Cans or Servings Per Day:  1       Frequency:  Daily  Entered: Jul 6 2024 12:30PM    Diet NPO after Midnight-     NPO Start Date: 04-Jul-2024   NPO Start Time: 23:59  Except Medications  Entered: Jul 5 2024  4:11AM  
This patient has been assessed with a concern for Malnutrition and has been determined to have a diagnosis/diagnoses of Severe protein-calorie malnutrition.    This patient is being managed with:   Diet Regular-  No Concentrated Phosphorus  Supplement Feeding Modality:  Oral  Suplena Cans or Servings Per Day:  1       Frequency:  Daily  Entered: Jul 15 2024  3:01PM  
This patient has been assessed with a concern for Malnutrition and has been determined to have a diagnosis/diagnoses of Severe protein-calorie malnutrition.    This patient is being managed with:   Diet Regular-  Supplement Feeding Modality:  Oral  Ensure Plus High Protein Cans or Servings Per Day:  1       Frequency:  Daily  Entered: Jul 6 2024 12:30PM    Diet NPO after Midnight-     NPO Start Date: 04-Jul-2024   NPO Start Time: 23:59  Except Medications  Entered: Jul 5 2024  4:11AM  
This patient has been assessed with a concern for Malnutrition and has been determined to have a diagnosis/diagnoses of Severe protein-calorie malnutrition.    This patient is being managed with:   Diet Regular-  Supplement Feeding Modality:  Oral  Ensure Plus High Protein Cans or Servings Per Day:  1       Frequency:  Daily  Entered: Jul 6 2024 12:30PM    Diet NPO after Midnight-     NPO Start Date: 04-Jul-2024   NPO Start Time: 23:59  Except Medications  Entered: Jul 5 2024  4:11AM  
This patient has been assessed with a concern for Malnutrition and has been determined to have a diagnosis/diagnoses of Severe protein-calorie malnutrition.    This patient is being managed with:   Diet Regular-  No Concentrated Phosphorus  Supplement Feeding Modality:  Oral  Suplena Cans or Servings Per Day:  1       Frequency:  Daily  Entered: Jul 15 2024  3:01PM  
This patient has been assessed with a concern for Malnutrition and has been determined to have a diagnosis/diagnoses of Severe protein-calorie malnutrition.    This patient is being managed with:   Diet Regular-  No Concentrated Phosphorus  Supplement Feeding Modality:  Oral  Suplena Cans or Servings Per Day:  1       Frequency:  Daily  Entered: Jul 15 2024  3:01PM  
This patient has been assessed with a concern for Malnutrition and has been determined to have a diagnosis/diagnoses of Severe protein-calorie malnutrition.    This patient is being managed with:   Diet Regular-  No Concentrated Phosphorus  Supplement Feeding Modality:  Oral  Ensure Plus High Protein Cans or Servings Per Day:  1       Frequency:  Daily  Entered: Jul 10 2024  5:44PM  
This patient has been assessed with a concern for Malnutrition and has been determined to have a diagnosis/diagnoses of Severe protein-calorie malnutrition.    This patient is being managed with:   Diet Regular-  No Concentrated Phosphorus  Supplement Feeding Modality:  Oral  Suplena Cans or Servings Per Day:  1       Frequency:  Daily  Entered: Jul 15 2024  3:01PM

## 2024-07-29 ENCOUNTER — TRANSCRIPTION ENCOUNTER (OUTPATIENT)
Age: 59
End: 2024-07-29

## 2024-07-29 VITALS
OXYGEN SATURATION: 99 % | RESPIRATION RATE: 18 BRPM | HEART RATE: 73 BPM | SYSTOLIC BLOOD PRESSURE: 98 MMHG | DIASTOLIC BLOOD PRESSURE: 64 MMHG | TEMPERATURE: 98 F

## 2024-07-29 LAB
ALBUMIN SERPL ELPH-MCNC: 3.4 G/DL — SIGNIFICANT CHANGE UP (ref 3.3–5)
ALP SERPL-CCNC: 99 U/L — SIGNIFICANT CHANGE UP (ref 40–120)
ALT FLD-CCNC: 94 U/L — HIGH (ref 10–45)
ANION GAP SERPL CALC-SCNC: 8 MMOL/L — SIGNIFICANT CHANGE UP (ref 5–17)
AST SERPL-CCNC: 125 U/L — HIGH (ref 10–40)
BASOPHILS # BLD AUTO: 0.04 K/UL — SIGNIFICANT CHANGE UP (ref 0–0.2)
BASOPHILS NFR BLD AUTO: 0.9 % — SIGNIFICANT CHANGE UP (ref 0–2)
BILIRUB SERPL-MCNC: 0.4 MG/DL — SIGNIFICANT CHANGE UP (ref 0.2–1.2)
BUN SERPL-MCNC: 31 MG/DL — HIGH (ref 7–23)
CALCIUM SERPL-MCNC: 9.8 MG/DL — SIGNIFICANT CHANGE UP (ref 8.4–10.5)
CHLORIDE SERPL-SCNC: 98 MMOL/L — SIGNIFICANT CHANGE UP (ref 96–108)
CO2 SERPL-SCNC: 29 MMOL/L — SIGNIFICANT CHANGE UP (ref 22–31)
CREAT SERPL-MCNC: 1.2 MG/DL — SIGNIFICANT CHANGE UP (ref 0.5–1.3)
EGFR: 53 ML/MIN/1.73M2 — LOW
EOSINOPHIL # BLD AUTO: 0.2 K/UL — SIGNIFICANT CHANGE UP (ref 0–0.5)
EOSINOPHIL NFR BLD AUTO: 4.3 % — SIGNIFICANT CHANGE UP (ref 0–6)
GLUCOSE SERPL-MCNC: 73 MG/DL — SIGNIFICANT CHANGE UP (ref 70–99)
HCT VFR BLD CALC: 32.8 % — LOW (ref 34.5–45)
HGB BLD-MCNC: 10 G/DL — LOW (ref 11.5–15.5)
IMM GRANULOCYTES NFR BLD AUTO: 0.2 % — SIGNIFICANT CHANGE UP (ref 0–0.9)
LYMPHOCYTES # BLD AUTO: 1.92 K/UL — SIGNIFICANT CHANGE UP (ref 1–3.3)
LYMPHOCYTES # BLD AUTO: 41.6 % — SIGNIFICANT CHANGE UP (ref 13–44)
MAGNESIUM SERPL-MCNC: 2 MG/DL — SIGNIFICANT CHANGE UP (ref 1.6–2.6)
MCHC RBC-ENTMCNC: 27.9 PG — SIGNIFICANT CHANGE UP (ref 27–34)
MCHC RBC-ENTMCNC: 30.5 GM/DL — LOW (ref 32–36)
MCV RBC AUTO: 91.4 FL — SIGNIFICANT CHANGE UP (ref 80–100)
MONOCYTES # BLD AUTO: 0.64 K/UL — SIGNIFICANT CHANGE UP (ref 0–0.9)
MONOCYTES NFR BLD AUTO: 13.9 % — SIGNIFICANT CHANGE UP (ref 2–14)
NEUTROPHILS # BLD AUTO: 1.81 K/UL — SIGNIFICANT CHANGE UP (ref 1.8–7.4)
NEUTROPHILS NFR BLD AUTO: 39.1 % — LOW (ref 43–77)
NRBC # BLD: 0 /100 WBCS — SIGNIFICANT CHANGE UP (ref 0–0)
PHOSPHATE SERPL-MCNC: 5.5 MG/DL — HIGH (ref 2.5–4.5)
PLATELET # BLD AUTO: 184 K/UL — SIGNIFICANT CHANGE UP (ref 150–400)
POTASSIUM SERPL-MCNC: 4.6 MMOL/L — SIGNIFICANT CHANGE UP (ref 3.5–5.3)
POTASSIUM SERPL-SCNC: 4.6 MMOL/L — SIGNIFICANT CHANGE UP (ref 3.5–5.3)
PROT SERPL-MCNC: 8.2 G/DL — SIGNIFICANT CHANGE UP (ref 6–8.3)
RBC # BLD: 3.59 M/UL — LOW (ref 3.8–5.2)
RBC # FLD: 16.5 % — HIGH (ref 10.3–14.5)
SODIUM SERPL-SCNC: 135 MMOL/L — SIGNIFICANT CHANGE UP (ref 135–145)
WBC # BLD: 4.62 K/UL — SIGNIFICANT CHANGE UP (ref 3.8–10.5)
WBC # FLD AUTO: 4.62 K/UL — SIGNIFICANT CHANGE UP (ref 3.8–10.5)

## 2024-07-29 PROCEDURE — 97116 GAIT TRAINING THERAPY: CPT

## 2024-07-29 PROCEDURE — 93971 EXTREMITY STUDY: CPT

## 2024-07-29 PROCEDURE — 72170 X-RAY EXAM OF PELVIS: CPT

## 2024-07-29 PROCEDURE — 83036 HEMOGLOBIN GLYCOSYLATED A1C: CPT

## 2024-07-29 PROCEDURE — 93005 ELECTROCARDIOGRAM TRACING: CPT

## 2024-07-29 PROCEDURE — 87077 CULTURE AEROBIC IDENTIFY: CPT

## 2024-07-29 PROCEDURE — 85025 COMPLETE CBC W/AUTO DIFF WBC: CPT

## 2024-07-29 PROCEDURE — 93307 TTE W/O DOPPLER COMPLETE: CPT

## 2024-07-29 PROCEDURE — 82728 ASSAY OF FERRITIN: CPT

## 2024-07-29 PROCEDURE — 87150 DNA/RNA AMPLIFIED PROBE: CPT

## 2024-07-29 PROCEDURE — 87521 HEPATITIS C PROBE&RVRS TRNSC: CPT

## 2024-07-29 PROCEDURE — 86140 C-REACTIVE PROTEIN: CPT

## 2024-07-29 PROCEDURE — 97535 SELF CARE MNGMENT TRAINING: CPT

## 2024-07-29 PROCEDURE — 80202 ASSAY OF VANCOMYCIN: CPT

## 2024-07-29 PROCEDURE — 76942 ECHO GUIDE FOR BIOPSY: CPT

## 2024-07-29 PROCEDURE — 99239 HOSP IP/OBS DSCHRG MGMT >30: CPT | Mod: GC

## 2024-07-29 PROCEDURE — 86480 TB TEST CELL IMMUN MEASURE: CPT

## 2024-07-29 PROCEDURE — 83540 ASSAY OF IRON: CPT

## 2024-07-29 PROCEDURE — 86901 BLOOD TYPING SEROLOGIC RH(D): CPT

## 2024-07-29 PROCEDURE — 74176 CT ABD & PELVIS W/O CONTRAST: CPT | Mod: MC

## 2024-07-29 PROCEDURE — 73701 CT LOWER EXTREMITY W/DYE: CPT | Mod: 26,LT

## 2024-07-29 PROCEDURE — 85610 PROTHROMBIN TIME: CPT

## 2024-07-29 PROCEDURE — 10160 PNXR ASPIR ABSC HMTMA BULLA: CPT

## 2024-07-29 PROCEDURE — 87186 SC STD MICRODIL/AGAR DIL: CPT

## 2024-07-29 PROCEDURE — 85730 THROMBOPLASTIN TIME PARTIAL: CPT

## 2024-07-29 PROCEDURE — 82010 KETONE BODYS QUAN: CPT

## 2024-07-29 PROCEDURE — 36415 COLL VENOUS BLD VENIPUNCTURE: CPT

## 2024-07-29 PROCEDURE — 87389 HIV-1 AG W/HIV-1&-2 AB AG IA: CPT

## 2024-07-29 PROCEDURE — 87205 SMEAR GRAM STAIN: CPT

## 2024-07-29 PROCEDURE — 85027 COMPLETE CBC AUTOMATED: CPT

## 2024-07-29 PROCEDURE — 73701 CT LOWER EXTREMITY W/DYE: CPT | Mod: MC

## 2024-07-29 PROCEDURE — 93312 ECHO TRANSESOPHAGEAL: CPT

## 2024-07-29 PROCEDURE — 87040 BLOOD CULTURE FOR BACTERIA: CPT

## 2024-07-29 PROCEDURE — 97165 OT EVAL LOW COMPLEX 30 MIN: CPT

## 2024-07-29 PROCEDURE — 99233 SBSQ HOSP IP/OBS HIGH 50: CPT

## 2024-07-29 PROCEDURE — 84100 ASSAY OF PHOSPHORUS: CPT

## 2024-07-29 PROCEDURE — 80048 BASIC METABOLIC PNL TOTAL CA: CPT

## 2024-07-29 PROCEDURE — 83735 ASSAY OF MAGNESIUM: CPT

## 2024-07-29 PROCEDURE — 87075 CULTR BACTERIA EXCEPT BLOOD: CPT

## 2024-07-29 PROCEDURE — 97530 THERAPEUTIC ACTIVITIES: CPT

## 2024-07-29 PROCEDURE — 86900 BLOOD TYPING SEROLOGIC ABO: CPT

## 2024-07-29 PROCEDURE — 80053 COMPREHEN METABOLIC PANEL: CPT

## 2024-07-29 PROCEDURE — 97161 PT EVAL LOW COMPLEX 20 MIN: CPT

## 2024-07-29 PROCEDURE — 80074 ACUTE HEPATITIS PANEL: CPT

## 2024-07-29 PROCEDURE — 97110 THERAPEUTIC EXERCISES: CPT

## 2024-07-29 PROCEDURE — 83550 IRON BINDING TEST: CPT

## 2024-07-29 PROCEDURE — 85652 RBC SED RATE AUTOMATED: CPT

## 2024-07-29 PROCEDURE — 87070 CULTURE OTHR SPECIMN AEROBIC: CPT

## 2024-07-29 PROCEDURE — 99285 EMERGENCY DEPT VISIT HI MDM: CPT

## 2024-07-29 PROCEDURE — 86850 RBC ANTIBODY SCREEN: CPT

## 2024-07-29 RX ORDER — METHADONE HCL 10 MG
16 TABLET ORAL
Qty: 0 | Refills: 0 | DISCHARGE
Start: 2024-07-29

## 2024-07-29 RX ORDER — LEVETIRACETAM 1000 MG/1
1 TABLET, FILM COATED ORAL
Qty: 60 | Refills: 0
Start: 2024-07-29

## 2024-07-29 RX ORDER — MELATONIN 3 MG
1 TABLET ORAL
Qty: 30 | Refills: 0
Start: 2024-07-29

## 2024-07-29 RX ADMIN — OXACILLIN 100 GRAM(S): 1 INJECTION, POWDER, FOR SOLUTION INTRAMUSCULAR; INTRAVENOUS at 11:02

## 2024-07-29 RX ADMIN — OXYCODONE HYDROCHLORIDE 10 MILLIGRAM(S): 30 TABLET ORAL at 00:02

## 2024-07-29 RX ADMIN — LIDOCAINE 5% 1 PATCH: 5 CREAM TOPICAL at 12:29

## 2024-07-29 RX ADMIN — Medication 15 MILLIGRAM(S): at 12:29

## 2024-07-29 RX ADMIN — OXACILLIN 100 GRAM(S): 1 INJECTION, POWDER, FOR SOLUTION INTRAMUSCULAR; INTRAVENOUS at 14:04

## 2024-07-29 RX ADMIN — CHLORHEXIDINE GLUCONATE 1 APPLICATION(S): 500 CLOTH TOPICAL at 12:29

## 2024-07-29 RX ADMIN — Medication 975 MILLIGRAM(S): at 12:49

## 2024-07-29 RX ADMIN — LEVETIRACETAM 500 MILLIGRAM(S): 1000 TABLET, FILM COATED ORAL at 06:25

## 2024-07-29 RX ADMIN — Medication 975 MILLIGRAM(S): at 04:34

## 2024-07-29 RX ADMIN — OXACILLIN 100 GRAM(S): 1 INJECTION, POWDER, FOR SOLUTION INTRAMUSCULAR; INTRAVENOUS at 06:25

## 2024-07-29 RX ADMIN — Medication 975 MILLIGRAM(S): at 11:02

## 2024-07-29 RX ADMIN — OXYCODONE HYDROCHLORIDE 10 MILLIGRAM(S): 30 TABLET ORAL at 06:35

## 2024-07-29 RX ADMIN — OXYCODONE HYDROCHLORIDE 10 MILLIGRAM(S): 30 TABLET ORAL at 13:14

## 2024-07-29 RX ADMIN — OXACILLIN 100 GRAM(S): 1 INJECTION, POWDER, FOR SOLUTION INTRAMUSCULAR; INTRAVENOUS at 01:13

## 2024-07-29 RX ADMIN — Medication 160 MILLIGRAM(S): at 09:18

## 2024-07-29 RX ADMIN — OXYCODONE HYDROCHLORIDE 10 MILLIGRAM(S): 30 TABLET ORAL at 12:30

## 2024-07-29 NOTE — PROGRESS NOTE ADULT - SUBJECTIVE AND OBJECTIVE BOX
INTERVAL HPI/OVERNIGHT EVENTS:    Patient was seen and examined at bedside.  Wants to go home    CONSTITUTIONAL:  Negative fever or chills, feels well, good appetite  EYES:  Negative  blurry vision or double vision  CARDIOVASCULAR:  Negative for chest pain or palpitations  RESPIRATORY:  Negative for cough, wheezing, or SOB   GASTROINTESTINAL:  Negative for nausea, vomiting, diarrhea, constipation, or abdominal pain  GENITOURINARY:  Negative frequency, urgency or dysuria  NEUROLOGIC:  No headache, confusion, dizziness, lightheadedness      ANTIBIOTICS/RELEVANT:    MEDICATIONS  (STANDING):  acetaminophen     Tablet .. 975 milliGRAM(s) Oral every 8 hours  chlorhexidine 2% Cloths 1 Application(s) Topical daily  enoxaparin Injectable 40 milliGRAM(s) SubCutaneous every 24 hours  levETIRAcetam 500 milliGRAM(s) Oral two times a day  lidocaine   4% Patch 1 Patch Transdermal daily  melatonin 3 milliGRAM(s) Oral at bedtime  methadone    Tablet 160 milliGRAM(s) Oral every 24 hours  mirtazapine 15 milliGRAM(s) Oral daily  oxacillin IVPB 2 Gram(s) IV Intermittent every 4 hours  QUEtiapine 75 milliGRAM(s) Oral at bedtime    MEDICATIONS  (PRN):  aluminum hydroxide/magnesium hydroxide/simethicone Suspension 30 milliLiter(s) Oral every 6 hours PRN Dyspepsia  benzonatate 100 milliGRAM(s) Oral once PRN Cough  naloxone Injectable 1 milliGRAM(s) IV Push every 3 minutes PRN in case of overdose  oxyCODONE    IR 5 milliGRAM(s) Oral every 6 hours PRN Moderate Pain (4 - 6)  oxyCODONE    IR 10 milliGRAM(s) Oral every 6 hours PRN Severe Pain (7 - 10)  polyethylene glycol 3350 17 Gram(s) Oral every 24 hours PRN Constipation  senna 2 Tablet(s) Oral at bedtime PRN Constipation        Vital Signs Last 24 Hrs  T(C): 36.6 (29 Jul 2024 09:32), Max: 37.1 (28 Jul 2024 17:19)  T(F): 97.8 (29 Jul 2024 09:32), Max: 98.7 (28 Jul 2024 17:19)  HR: 73 (29 Jul 2024 09:32) (73 - 90)  BP: 98/64 (29 Jul 2024 09:32) (90/58 - 98/64)  BP(mean): --  RR: 18 (29 Jul 2024 09:32) (16 - 18)  SpO2: 99% (29 Jul 2024 09:32) (94% - 99%)    Parameters below as of 29 Jul 2024 09:32  Patient On (Oxygen Delivery Method): room air        PHYSICAL EXAM:  Constitutional: non-toxic, no distress  Eyes:OMKAR, EOMI  Ear/Nose/Throat: no oral lesion, no sinus tenderness on percussion	  Neck:  supple  Respiratory: CTA lety  Cardiovascular: S1S2 RRR, no murmurs  Gastrointestinal:soft, (+) BS, no HSM  Extremities:no e/e/c  Vascular: DP Pulse:	right normal; left normal      LABS:                        10.0   4.62  )-----------( 184      ( 29 Jul 2024 05:30 )             32.8     07-29    135  |  98  |  31<H>  ----------------------------<  73  4.6   |  29  |  1.20    Ca    9.8      29 Jul 2024 05:30  Phos  5.5     07-29  Mg     2.0     07-29    TPro  8.2  /  Alb  3.4  /  TBili  0.4  /  DBili  x   /  AST  125<H>  /  ALT  94<H>  /  AlkPhos  99  07-29      Urinalysis Basic - ( 29 Jul 2024 05:30 )    Color: x / Appearance: x / SG: x / pH: x  Gluc: 73 mg/dL / Ketone: x  / Bili: x / Urobili: x   Blood: x / Protein: x / Nitrite: x   Leuk Esterase: x / RBC: x / WBC x   Sq Epi: x / Non Sq Epi: x / Bacteria: x        MICROBIOLOGY:    RADIOLOGY & ADDITIONAL STUDIES:    < from: CT Lower Extremity w/ IV Cont, Left (07.29.24 @ 13:15) >  IMPRESSION:  CT of the left lower extremity from the iliac crests to the mid femur   demonstrates significantly decreased in size to near resolution of the   fluid collection deep to the iliopsoas muscle.    < end of copied text >

## 2024-07-29 NOTE — CHART NOTE - NSCHARTNOTESELECT_GEN_ALL_CORE
Nutrition Services
Off Service Note
ISTOP/Event Note
Nutrition Services

## 2024-07-29 NOTE — DISCHARGE NOTE NURSING/CASE MANAGEMENT/SOCIAL WORK - NSTOBACCONEVERSMOKERY/N_GEN_A
distress. Eyes:      Conjunctiva/sclera: Conjunctivae normal.   Cardiovascular:      Rate and Rhythm: Normal rate and regular rhythm. Pulmonary:      Effort: Pulmonary effort is normal. No respiratory distress. Breath sounds: No wheezing or rhonchi. Comments: Decreased breath sounds  Abdominal:      General: Bowel sounds are normal.      Palpations: Abdomen is soft. Tenderness: There is no abdominal tenderness. Musculoskeletal:      Cervical back: Neck supple. Right lower leg: No edema. Left lower leg: No edema. Neurological:      Mental Status: He is alert and oriented to person, place, and time. Cranial Nerves: No cranial nerve deficit. Psychiatric:         Mood and Affect: Mood normal.         ASSESSMENT:  1. Essential hypertension    2.  Gastroesophageal reflux disease without esophagitis        PLAN:    Orders Placed This Encounter   Medications    amLODIPine-benazepril (LOTREL) 5-20 MG per capsule     Sig: take 1 capsule by mouth once daily     Dispense:  90 capsule     Refill:  1   Last labs reviewed  Continue medications  Keep f/u with pulmonology and other specialists         Return in about 4 months (around 9/12/2021) for Physical.    Electronically Signed by Aicha Bishop DO No

## 2024-07-29 NOTE — DISCHARGE NOTE NURSING/CASE MANAGEMENT/SOCIAL WORK - PATIENT PORTAL LINK FT
You can access the FollowMyHealth Patient Portal offered by [facility] by registering at the following website: [email or web address]. By joining [facility]’s FollowMyHealth portal, you will also be able to view your health information using other applications (apps) compatible with our system.

## 2024-07-29 NOTE — DISCHARGE NOTE NURSING/CASE MANAGEMENT/SOCIAL WORK - NSDCPEFALRISK_GEN_ALL_CORE
For information on Fall & Injury Prevention, visit: [email or web address] OR  [email or web address] OR  https://www.cdc.gov/steadi/patient.html

## 2024-07-29 NOTE — PROGRESS NOTE ADULT - PROVIDER SPECIALTY LIST ADULT
Infectious Disease
Infectious Disease
Internal Medicine
Rehab Medicine
Rehab Medicine
Hospitalist
Infectious Disease
Infectious Disease
Internal Medicine
Orthopedics
Rehab Medicine
Infectious Disease
Infectious Disease
Internal Medicine
Rehab Medicine
Rehab Medicine
Hospitalist
Internal Medicine
Internal Medicine
Rehab Medicine
Rehab Medicine
Infectious Disease
Infectious Disease
Hospitalist
Internal Medicine
abdomen soft, non-tender, and non-distended. Bowel sounds present.

## 2024-07-29 NOTE — PROGRESS NOTE ADULT - ASSESSMENT
IMPRESSION:  59 year old female with hx of IVDU (heroin/cocaine) on methadone, epilepsy who presents for sharp L leg pain progressively worsening over the past month found to have loculated fluid in L iliacus muscle, iliopsoas bursitis c/b MSSA bacteremia. Patient afebrile without leukocytosis. Fluid collection/bursitis likely 2/2 injecting IV drug into LLE. Given MSSA BSI- suspect MSSA as causative pathogen of iliopsoas collection. S/P IR drainage 7/3 - 5 cc purulent fluid aspirated and sent for culture. IR drainage cx grew MSSA.   She has completed 4 weeks of IV antibiotics.  Repeat CT (7/29) with near resolution of fluid collection    Recommend:  1.  Agree with stopping oxacillin  2.  Can discharge with Dicloxacillin 500 mg PO q6hrs x 14 more days (ends 8/13/24)  3.  She can follow up with Dr. He 2 weeks after discharge ([address] [place] [address], [phone]), ID office will call patient to schedule     ID team 2 will sign off.  Reconsult as needed

## 2024-07-29 NOTE — DISCHARGE NOTE NURSING/CASE MANAGEMENT/SOCIAL WORK - NSDCFUADDAPPT_GEN_ALL_CORE_FT
Contact [facility] for PCP appt.    Please bring your Insurance card, Photo ID and Discharge paperwork to the following appointment:    (1) Please follow up with [facility], Dr. Henrietta Lui on behalf of Dr. Joellen Rodriguez at [address] [place] [address], [address], [place] [address] on 8/19/2024 at 9:45am.    Appointment was confirmed by Ms. YOKO Solorzano, Referral Coordinator.    Dr. He   [address] [place] [address]  [phone]  Appt time: 8/15/2024  Appt Date: 2pm    Please bring your Insurance card, Photo ID and Discharge paperwork to the following appointment.

## 2024-07-29 NOTE — CHART NOTE - NSCHARTNOTEFT_GEN_A_CORE
ADMITTING DIAGNOSIS:   Patient is a 59y old  Female who presents with a chief complaint of left leg pain (27 Jul 2024 05:46)    PAST MEDICAL & SURGICAL HISTORY:  IVDU (intravenous drug user)  Anemia  Asthma  Epilepsy  No significant past surgical history    CURRENT DIET ORDER: Diet, Regular:   No Concentrated Phosphorus  Supplement Feeding Modality:  Oral  Suplena Cans or Servings Per Day:  1       Frequency:  Daily (07-15-24 @ 15:01) [Active]     PO INTAKE: Good (%) [X]  Fair (50-75%) [   ] Poor (<25%) [   ]    GI ISSUES: none reported at this time    PAIN: none reported at this time    SKIN: no pressure injury per RN flowsheets    EDEMA: no edema per RN flowsheets    LABS:   07-29    135  |  98  |  31<H>  ----------------------------<  73  4.6   |  29  |  1.20    Ca    9.8      29 Jul 2024 05:30  Phos  5.5     07-29  Mg     2.0     07-29    TPro  8.2  /  Alb  3.4  /  TBili  0.4  /  DBili  x   /  AST  125<H>  /  ALT  94<H>  /  AlkPhos  99  07-29    MEDICATIONS:  MEDICATIONS  (STANDING):  acetaminophen     Tablet .. 975 milliGRAM(s) Oral every 8 hours  chlorhexidine 2% Cloths 1 Application(s) Topical daily  enoxaparin Injectable 40 milliGRAM(s) SubCutaneous every 24 hours  levETIRAcetam 500 milliGRAM(s) Oral two times a day  lidocaine   4% Patch 1 Patch Transdermal daily  melatonin 3 milliGRAM(s) Oral at bedtime  methadone    Tablet 160 milliGRAM(s) Oral every 24 hours  mirtazapine 15 milliGRAM(s) Oral daily  oxacillin IVPB 2 Gram(s) IV Intermittent every 4 hours  QUEtiapine 75 milliGRAM(s) Oral at bedtime    MEDICATIONS  (PRN):  aluminum hydroxide/magnesium hydroxide/simethicone Suspension 30 milliLiter(s) Oral every 6 hours PRN Dyspepsia  benzonatate 100 milliGRAM(s) Oral once PRN Cough  naloxone Injectable 1 milliGRAM(s) IV Push every 3 minutes PRN in case of overdose  oxyCODONE    IR 10 milliGRAM(s) Oral every 6 hours PRN Severe Pain (7 - 10)  oxyCODONE    IR 5 milliGRAM(s) Oral every 6 hours PRN Moderate Pain (4 - 6)  polyethylene glycol 3350 17 Gram(s) Oral every 24 hours PRN Constipation  senna 2 Tablet(s) Oral at bedtime PRN Constipation    WEIGHT: (7/1)  Height for BMI (FEET)	5 Feet  Height for BMI (INCHES)	3 Inch(s)  Height for BMI (CENTIMETERS)	160.02 Centimeter(s)  Weight for BMI (lbs)	179 lb  Weight for BMI (kg)	81.2 kg  Body Mass Index	31.7    WEIGHT CHANGE: no updated weight since admission    NUTRITION FOCUSED PHYSICAL EXAM: Completed [7/5]; Not Pertinent at this time    ESTIMATED ENERGY NEEDS:   Weight used for calculations	IBW  Estimated Energy Needs Weight (lbs)	126.5 lb  Estimated Energy Needs Weight (kg)	57.3 kg  Estimated Energy Needs From (chris/kg)	25  Estimated Energy Needs To (chris/kg)	30  Estimated Energy Needs Calculated From (chris/kg)	1432  Estimated Energy Needs Calculated To (chris/kg)	1719    Weight used for calculations	IBW  Estimated Protein Needs Weight (lbs)	126.5 lb  Estimated Protein Needs Weight (kg)	57.3 kg  Estimated Protein Needs From (g/kg)	1.25  Estimated Protein Needs To (g/kg)	1.5  Estimated Protein Needs Calculated From (g/kg)	71.62  Estimated Protein Needs Calculated To (g/kg)	85.95    Estimated Fluid Needs Weight (lbs)	126.5 lb  Estimated Fluid Needs Weight (kg)	57.3 kg  Estimated Fluid Needs From (ml/kg)	25  Estimated Fluid Needs To (ml/kg)	30  Estimated Fluid Needs Calculated From (ml/kg)	1432  Estimated Fluid Needs Calculated To (ml/kg)	1719    *Using +10% ideal body weight (126.5 pounds) as pt is >120% ideal body weight. Needs adjusted for malnutrition. ideal body weight: 115 pounds; % ideal body weight: 155.7%    SUBJECTIVE:   Per H&P: Pt is 60 y/o F pmhx significant for IV drug use, anemia, epilepsy who presents for sharp L leg pain progressively worsening over the past month. Pt states her pain began in the left thigh and later radiated to her toes but now has persisted in her entire left leg and groin. Pt reports this began about one month ago and required multiple ER visits. She states she went to [place] on 5/31 where nothing was done and she was discharged. Most recently last week she returned to [place] where she states she was given a 7 day course of oral antibiotics which she completed but did not improve her pain. She took Motrin which provided very minimal relief.  Pt last  injected heroin into her left thigh on Saturday 6/29. As per patient, she is chronically on methadone 160 mg 5 days a week since she was 19. She reports subjective fevers. Denies nausea, vomiting, chest pain, shortness of breath, constipation, diarrhea, pain on urination, hematuria, hematemesis.     (7/5) Met with pt this AM at bedside. Pt was seated upright and able to articulate her nutrition hx well. No known food allergies nor food intolerances reported. Pt reported good appetite PTA, denied following any specific diet at home, however reported has been forgetting to eat due to ?memory loss and IVDU. Pt mentioned her current appetite is good, however she has been unable to eat due to NPO status for potential procedure. Pt endorsed nausea due to hunger (denied emesis). Pt denied diarrhea, however endorsed constipation, stated last BM 6/30. RD encouraged adequate fluids and fiber when PO diet returns to assist with BMs. Pt reported usual body weight 179 pounds and denies any recent wt loss/gain, however mentioned usual body weight ~286 pounds x 1 yr ago (37% wt loss x 1 year - meets criteria for malnutrition) with significant weight loss due to ?memory loss and IVDU (RD ?accuracy of previous weight). nutrition focused physical exam did not reveal any overt signs of muscle or subcutaneous fat wasting at this time.     (7/10) Met with pt this afternoon at bedside. Pt was seated upright in chair and able to articulate her nutrition hx well. Pt reported good appetite and taking adequate POs, with good acceptance to Ensure plus HP 1x/day. Pt denied nausea/vomiting/diarrhea, however endorsed occasional constipation, stated last BM 7/9. RD encouraged adequate fluids and fiber consumption to assist with BMs. RD reviewed protein sources (chicken, fish, beans, nut, etc.) and encouraged continued adequate protein consumption in order to regain strength, maintain muscle mass and reach adequate nutritional status. Pt was accepting to RD suggestion and asking appropriate questions.     (7/15) Met with pt this AM at bedside. Pt was seated upright and able to articulate her nutrition hx well. Pt reported continued good appetite and taking adequate POs, with good acceptance to Ensure plus HP 1x/day. Pt denied nausea/vomiting/diarrhea/constipation, stated last BM 7/14. RD explained that Phos is a mineral found in food and their roles. RD provided no concentrated Phos diet education, reviewed when Phos builds up in the blood it can pull calcium out of bones. Discussed as pt kidney function declines, blood values of Phos may rise. Reviewed how dairy products (milk, cheese, yogurt) are especially high in phosphorus. Expect pt will comply well with no-concentrated Phos diet well. Pt engaged in education and asking appropriate questions    (7/22) Met with pt this AM at bedside. Pt was seated upright and able to articulate her nutrition hx well. Pt mentioned a persist good appetite and PO intake, with good acceptance to Suplena 1x/day. Pt denied nausea/vomiting/diarrhea/constipation, stated last BM 7/21 (last BM 7/22 per RN flowsheets). RD observed 100% breakfast eaten - reviewed general healthy diet including balanced meals, including vegetables and protein. Pt was accepting to RD education and asking appropriate questions.     (7/29) Met with pt this AM at bedside. Pt resting, however able to articulate her nutrition hx well. Pt endorsed continued good appetite and adequate PO intake, with good acceptance to Suplena 1x/day. P denied nausea/vomiting/diarrhea/constipation, stated last BM 7/28 and reported consistent daily BMs. RD observed 75% breakfast eaten - pt did not eat eggs/sausage, stated she was saving them and going to eat them shortly (consistent with patients PO intake). Reviewed no concentrated Phos diet, specifically dairy products. Pt was engaged, agreeable and accepting to RD education.    *Note: Phos 5.5 mg/dL (7/29)    PREVIOUS NUTRITION DIAGNOSIS: severe chronic protein-kcal malnutrition related to inadequate PO/energy intake in setting of IVDU/?forgetting to eat as evidenced by pt report of >20% wt loss x 1 year, <50% PO intake x 5 days    Active [X]  Resolved [   ]    IF RESOLVED, NEW PES: N/A    GOAL: Pt to meet >75% estimated energy and protein needs throughout hospitalization    MONITORING AND EVALUATION:   Current diet order is appropriate and is well tolerated, will continue to monitor:  - Food and nutrient intake/POs  - Nutrition related lab value, specifically Phos  - Weight/weight trends  - GI functions  - Supplement acceptance    NUTRITION RECOMMENDATIONS:   1. Continue with Regular, no concentrated Phos diet  - RD will continue to monitor Phos values  - Honor food preferences, as medically able  2. Continue with Suplena 1x/day   - Provides 420 kcal, 10.6 g pro per serving  3. Continue with bowel regimen, prn  4. Appreciate updated weight and weekly weight trends  5. Ongoing diet education     RISK LEVEL: High [   ] Moderate [X] Low [   ]    Cheli Green RDN also available via TEAMS
PDI	First Name	Last Name	Birth Date	Gender	Street Address	[facility]	Zip Code  A	Pretty Escobar	1965	Female	[address] [facility]	[address]    Prescription Information      PDI Filter:    PDI	Current Rx	Drug Type	Rx Written	Rx Dispensed	Drug	Quantity	Days Supply	Prescriber Name	Prescriber BRI #	Payment Method	Dispenser  A	N		10/06/2023	10/10/2023	phenobarbital 30 mg tablet	90	30	Chata Clark [facility]	[id number]	Medicaid	[facility]    * - Details of Drug Type : O = Opioid, B = Benzodiazepine, S = Stimulant
ADMITTING DIAGNOSIS:   Patient is a 59y old  Female who presents with a chief complaint of left leg pain (10 Jul 2024 13:55)    PAST MEDICAL & SURGICAL HISTORY:  IVDU (intravenous drug user)  Anemia  Asthma  Epilepsy    CURRENT DIET ORDER: Diet, Regular:   Supplement Feeding Modality:  Oral  Ensure Plus High Protein Cans or Servings Per Day:  1       Frequency:  Daily (07-06-24 @ 12:30) [Active]  Diet, NPO after Midnight:      NPO Start Date: 04-Jul-2024,   NPO Start Time: 23:59  Except Medications (07-05-24 @ 04:12) [Active]    PO INTAKE: Good (%) [X]  Fair (50-75%) [   ] Poor (<25%) [   ]    GI ISSUES: none reported at this time    PAIN: none reported at this time    SKIN: no pressure injury per RN flowsheets    07-10-24 @ 07:01  -  07-10-24 @ 14:23  --------------------------------------------------------  IN: 50 mL / OUT: 0 mL / NET: 50 mL    EDEMA: no edema per RN flowsheets    LABS:   07-10    136  |  95<L>  |  23  ----------------------------<  116<H>  5.3   |  35<H>  |  1.07    Ca    10.0      10 Jul 2024 05:30  Phos  4.9     07-10  Mg     2.3     07-10    TPro  7.6  /  Alb  2.8<L>  /  TBili  <0.2  /  DBili  x   /  AST  26  /  ALT  6<L>  /  AlkPhos  88  07-09    MEDICATIONS:  MEDICATIONS  (STANDING):  acetaminophen     Tablet .. 975 milliGRAM(s) Oral every 8 hours  ceFAZolin   IVPB 2000 milliGRAM(s) IV Intermittent every 8 hours  enoxaparin Injectable 40 milliGRAM(s) SubCutaneous every 24 hours  levETIRAcetam 500 milliGRAM(s) Oral two times a day  lidocaine   4% Patch 1 Patch Transdermal daily  melatonin 3 milliGRAM(s) Oral at bedtime  methadone    Tablet 160 milliGRAM(s) Oral daily  mirtazapine 15 milliGRAM(s) Oral daily  QUEtiapine 25 milliGRAM(s) Oral at bedtime    MEDICATIONS  (PRN):  naloxone Injectable 1 milliGRAM(s) IV Push every 3 minutes PRN in case of overdose  oxyCODONE    IR 10 milliGRAM(s) Oral every 6 hours PRN Severe Pain (7 - 10)  oxyCODONE    IR 5 milliGRAM(s) Oral every 6 hours PRN Moderate Pain (4 - 6)  polyethylene glycol 3350 17 Gram(s) Oral daily PRN Constipation  saline laxative (FLEET) Rectal Enema 1 Enema Rectal once PRN constipation, severe, please try oral laxatives first  senna 2 Tablet(s) Oral at bedtime PRN Constipation    WEIGHT: (7/1)  Height for BMI (FEET)	5 Feet  Height for BMI (INCHES)	3 Inch(s)  Height for BMI (CENTIMETERS)	160.02 Centimeter(s)  Weight for BMI (lbs)	179 lb  Weight for BMI (kg)	81.2 kg  Body Mass Index	31.7    WEIGHT CHANGE: no updated weight since admission    NUTRITION FOCUSED PHYSICAL EXAM: Completed [7/5]; Not Pertinent at this time    ESTIMATED ENERGY NEEDS:   Weight used for calculations	IBW  Estimated Energy Needs Weight (lbs)	126.5 lb  Estimated Energy Needs Weight (kg)	57.3 kg  Estimated Energy Needs From (chris/kg)	25  Estimated Energy Needs To (chris/kg)	30  Estimated Energy Needs Calculated From (chris/kg)	1432  Estimated Energy Needs Calculated To (chris/kg)	1719    Weight used for calculations	IBW  Estimated Protein Needs Weight (lbs)	126.5 lb  Estimated Protein Needs Weight (kg)	57.3 kg  Estimated Protein Needs From (g/kg)	1.25  Estimated Protein Needs To (g/kg)	1.5  Estimated Protein Needs Calculated From (g/kg)	71.62  Estimated Protein Needs Calculated To (g/kg)	85.95    Estimated Fluid Needs Weight (lbs)	126.5 lb  Estimated Fluid Needs Weight (kg)	57.3 kg  Estimated Fluid Needs From (ml/kg)	25  Estimated Fluid Needs To (ml/kg)	30  Estimated Fluid Needs Calculated From (ml/kg)	1432  Estimated Fluid Needs Calculated To (ml/kg)	1719    *Using +10% ideal body weight (126.5 pounds) as pt is >120% ideal body weight. Needs adjusted for malnutrition. ideal body weight: 115 pounds; % ideal body weight: 155.7%    SUBJECTIVE:   Per H&P: Pt is 60 y/o F pmhx significant for IV drug use, anemia, epilepsy who presents for sharp L leg pain progressively worsening over the past month. Pt states her pain began in the left thigh and later radiated to her toes but now has persisted in her entire left leg and groin. Pt reports this began about one month ago and required multiple ER visits. She states she went to [place] on 5/31 where nothing was done and she was discharged. Most recently last week she returned to [place] where she states she was given a 7 day course of oral antibiotics which she completed but did not improve her pain. She took Motrin which provided very minimal relief.  Pt last  injected heroin into her left thigh on Saturday 6/29. As per patient, she is chronically on methadone 160 mg 5 days a week since she was 19. She reports subjective fevers. Denies nausea, vomiting, chest pain, shortness of breath, constipation, diarrhea, pain on urination, hematuria, hematemesis.     (7/5) Met with pt this AM at bedside. Pt was seated upright and able to articulate her nutrition hx well. No known food allergies nor food intolerances reported. Pt reported good appetite PTA, denied following any specific diet at home, however reported has been forgetting to eat due to ?memory loss and IVDU. Pt mentioned her current appetite is good, however she has been unable to eat due to NPO status for potential procedure. Pt endorsed nausea due to hunger (denied emesis). Pt denied diarrhea, however endorsed constipation, stated last BM 6/30. RD encouraged adequate fluids and fiber when PO diet returns to assist with BMs. Pt reported usual body weight 179 pounds and denies any recent wt loss/gain, however mentioned usual body weight ~286 pounds x 1 yr ago (37% wt loss x 1 year - meets criteria for malnutrition) with significant weight loss due to ?memory loss and IVDU (RD ?accuracy of previous weight). nutrition focused physical exam did not reveal any overt signs of muscle or subcutaneous fat wasting at this time.     (7/10) Met with pt this afternoon at bedside. Pt was seated upright in chair and able to articulate her nutrition hx well. Pt reported good appetite and taking adequate POs, with good acceptance to Ensure plus HP 1x/day. Pt denied nausea/vomiting/diarrhea, however endorsed occasional constipation, stated last BM 7/9. RD encouraged adequate fluids and fiber consumption to assist with BMs. RD reviewed protein sources (chicken, fish, beans, nut, etc.) and encouraged continued adequate protein consumption in order to regain strength, maintain muscle mass and reach adequate nutritional status. Pt was accepting to RD suggestion and asking appropriate questions.     *Phos 4.9 mg/dL (7/10)    PREVIOUS NUTRITION DIAGNOSIS: severe chronic protein-kcal malnutrition related to inadequate PO/energy intake in setting of IVDU/?forgetting to eat as evidenced by pt report of >20% wt loss x 1 year, <50% PO intake x 5 days    Active [X]  Resolved [   ]    IF RESOLVED, NEW PES: N/A    GOAL: Pt to meet >75% estimated energy and protein needs throughout hospitalization    MONITORING AND EVALUATION:   Current diet order is appropriate and is well tolerated, will continue to monitor:  - Food and nutrient intake/POs  - Nutrition related lab value, specifically Phos  - Weight/weight trends  - GI functions  - Supplement acceptance    NUTRITION RECOMMENDATIONS:   1. Continue with Regular diet, Add no concentrated Phos restriction as Phos trending high  - RD will continue to monitor Phos values and reassess need for low Phos diet  - Encourage adequate PO and protein intake  - Honor food preferences  2. Recommend MVI for general nutrient coverage  3. Continue with Ensure plus HP 1x/day   - Provides 350 kcal, 20 g pro  4. Continue with bowel regimen, prn  5. Appreciate weekly weight trends    RISK LEVEL: High [   ] Moderate [X] Low [   ]    Cheli Green RDN also available via TEAMS
ADMITTING DIAGNOSIS:   Patient is a 59y old  Female who presents with a chief complaint of left leg pain (15 Jul 2024 12:31)    PAST MEDICAL & SURGICAL HISTORY:  IVDU (intravenous drug user)  Anemia  Asthma  Epilepsy  No significant past surgical history    CURRENT DIET ORDER: Diet, Regular:   No Concentrated Phosphorus  Supplement Feeding Modality:  Oral  Ensure Plus High Protein Cans or Servings Per Day:  1       Frequency:  Daily (07-10-24 @ 17:44) [Active]    PO INTAKE: Good (%) [X]  Fair (50-75%) [   ] Poor (<25%) [   ]    GI ISSUES: none reported at this time    PAIN: none reported at this time    SKIN: no pressure injury per RN flowsheets    07-14-24 @ 07:01  -  07-15-24 @ 07:00  --------------------------------------------------------  IN: 50 mL / OUT: 0 mL / NET: 50 mL    EDEMA: no edema per RN flowsheets    LABS:   07-14    135  |  94<L>  |  26<H>  ----------------------------<  86  4.7   |  33<H>  |  1.06    Ca    9.8      14 Jul 2024 05:30  Phos  5.2     07-14  Mg     2.1     07-14    MEDICATIONS:  MEDICATIONS  (STANDING):  acetaminophen     Tablet .. 975 milliGRAM(s) Oral every 8 hours  bisacodyl 5 milliGRAM(s) Oral daily  ceFAZolin   IVPB 2000 milliGRAM(s) IV Intermittent every 8 hours  enoxaparin Injectable 40 milliGRAM(s) SubCutaneous every 24 hours  levETIRAcetam 500 milliGRAM(s) Oral two times a day  lidocaine   4% Patch 1 Patch Transdermal daily  melatonin 3 milliGRAM(s) Oral at bedtime  mirtazapine 15 milliGRAM(s) Oral daily  polyethylene glycol 3350 17 Gram(s) Oral two times a day  QUEtiapine 25 milliGRAM(s) Oral at bedtime    MEDICATIONS  (PRN):  benzonatate 100 milliGRAM(s) Oral once PRN Cough  naloxone Injectable 1 milliGRAM(s) IV Push every 3 minutes PRN in case of overdose  oxyCODONE    IR 5 milliGRAM(s) Oral every 6 hours PRN Moderate Pain (4 - 6)  oxyCODONE    IR 10 milliGRAM(s) Oral every 6 hours PRN Severe Pain (7 - 10)  saline laxative (FLEET) Rectal Enema 1 Enema Rectal once PRN constipation, severe, please try oral laxatives first  senna 2 Tablet(s) Oral at bedtime PRN Constipation    WEIGHT: (7/1)  Height for BMI (FEET)	5 Feet  Height for BMI (INCHES)	3 Inch(s)  Height for BMI (CENTIMETERS)	160.02 Centimeter(s)  Weight for BMI (lbs)	179 lb  Weight for BMI (kg)	81.2 kg  Body Mass Index	31.7    WEIGHT CHANGE: no updated weight since admission    NUTRITION FOCUSED PHYSICAL EXAM: Completed [7/5]; Not Pertinent at this time    ESTIMATED ENERGY NEEDS:   Weight used for calculations	IBW  Estimated Energy Needs Weight (lbs)	126.5 lb  Estimated Energy Needs Weight (kg)	57.3 kg  Estimated Energy Needs From (chris/kg)	25  Estimated Energy Needs To (chris/kg)	30  Estimated Energy Needs Calculated From (chris/kg)	1432  Estimated Energy Needs Calculated To (chris/kg)	1719    Weight used for calculations	IBW  Estimated Protein Needs Weight (lbs)	126.5 lb  Estimated Protein Needs Weight (kg)	57.3 kg  Estimated Protein Needs From (g/kg)	1.25  Estimated Protein Needs To (g/kg)	1.5  Estimated Protein Needs Calculated From (g/kg)	71.62  Estimated Protein Needs Calculated To (g/kg)	85.95    Estimated Fluid Needs Weight (lbs)	126.5 lb  Estimated Fluid Needs Weight (kg)	57.3 kg  Estimated Fluid Needs From (ml/kg)	25  Estimated Fluid Needs To (ml/kg)	30  Estimated Fluid Needs Calculated From (ml/kg)	1432  Estimated Fluid Needs Calculated To (ml/kg)	1719    *Using +10% ideal body weight (126.5 pounds) as pt is >120% ideal body weight. Needs adjusted for malnutrition. ideal body weight: 115 pounds; % ideal body weight: 155.7%    SUBJECTIVE:   Per H&P: Pt is 58 y/o F pmhx significant for IV drug use, anemia, epilepsy who presents for sharp L leg pain progressively worsening over the past month. Pt states her pain began in the left thigh and later radiated to her toes but now has persisted in her entire left leg and groin. Pt reports this began about one month ago and required multiple ER visits. She states she went to [place] on 5/31 where nothing was done and she was discharged. Most recently last week she returned to [place] where she states she was given a 7 day course of oral antibiotics which she completed but did not improve her pain. She took Motrin which provided very minimal relief.  Pt last  injected heroin into her left thigh on Saturday 6/29. As per patient, she is chronically on methadone 160 mg 5 days a week since she was 19. She reports subjective fevers. Denies nausea, vomiting, chest pain, shortness of breath, constipation, diarrhea, pain on urination, hematuria, hematemesis.     (7/5) Met with pt this AM at bedside. Pt was seated upright and able to articulate her nutrition hx well. No known food allergies nor food intolerances reported. Pt reported good appetite PTA, denied following any specific diet at home, however reported has been forgetting to eat due to ?memory loss and IVDU. Pt mentioned her current appetite is good, however she has been unable to eat due to NPO status for potential procedure. Pt endorsed nausea due to hunger (denied emesis). Pt denied diarrhea, however endorsed constipation, stated last BM 6/30. RD encouraged adequate fluids and fiber when PO diet returns to assist with BMs. Pt reported usual body weight 179 pounds and denies any recent wt loss/gain, however mentioned usual body weight ~286 pounds x 1 yr ago (37% wt loss x 1 year - meets criteria for malnutrition) with significant weight loss due to ?memory loss and IVDU (RD ?accuracy of previous weight). nutrition focused physical exam did not reveal any overt signs of muscle or subcutaneous fat wasting at this time.     (7/10) Met with pt this afternoon at bedside. Pt was seated upright in chair and able to articulate her nutrition hx well. Pt reported good appetite and taking adequate POs, with good acceptance to Ensure plus HP 1x/day. Pt denied nausea/vomiting/diarrhea, however endorsed occasional constipation, stated last BM 7/9. RD encouraged adequate fluids and fiber consumption to assist with BMs. RD reviewed protein sources (chicken, fish, beans, nut, etc.) and encouraged continued adequate protein consumption in order to regain strength, maintain muscle mass and reach adequate nutritional status. Pt was accepting to RD suggestion and asking appropriate questions.     (7/15) Met with pt this AM at bedside. Pt was seated upright and able to articulate her nutrition hx well. Pt reported continued good appetite and taking adequate POs, with good acceptance to Ensure plus HP 1x/day. Pt denied nausea/vomiting/diarrhea/constipation, stated last BM 7/14. RD explained that Phos is a mineral found in food and their roles. RD provided no concentrated Phos diet education, reviewed when Phos builds up in the blood it can pull calcium out of bones. Discussed as pt kidney function declines, blood values of Phos may rise. Reviewed how dairy products (milk, cheese, yogurt) are especially high in phosphorus. Expect pt will comply well with no-concentrated Phos diet well. Pt engaged in education and asking appropriate questions    *Note: Phos 5.2 mg/dL (7/14)    PREVIOUS NUTRITION DIAGNOSIS: severe chronic protein-kcal malnutrition related to inadequate PO/energy intake in setting of IVDU/?forgetting to eat as evidenced by pt report of >20% wt loss x 1 year, <50% PO intake x 5 days    Active [X]  Resolved [   ]    IF RESOLVED, NEW PES: N/A    GOAL: Pt to meet >75% estimated energy and protein needs throughout hospitalization    MONITORING AND EVALUATION:   Current diet order is appropriate and is well tolerated, will continue to monitor:  - Food and nutrient intake/POs  - Nutrition related lab value, specifically Phos  - Weight/weight trends  - GI functions  - Supplement acceptance    NUTRITION RECOMMENDATIONS:   1. Continue with Regular, no concentrated Phos diet  - RD will continue to monitor Phos values  - Honor food preferences, as medically able  2. Change oral nutrition supplement to Suplena  - Provides 420 kcal, 10.6 g pro per serving  3. Continue with bowel regimen, prn  4. Appreciate updated weight and weekly weight trends  5. Ongoing diet education     RISK LEVEL: High [   ] Moderate [X] Low [   ]    Cheli Green RDN also available via TEAMS
ADMITTING DIAGNOSIS:   Patient is a 59y old  Female who presents with a chief complaint of left leg pain (22 Jul 2024 07:34)    PAST MEDICAL & SURGICAL HISTORY:  IVDU (intravenous drug user)  Anemia  Asthma  Epilepsy  No significant past surgical history    CURRENT DIET ORDER: Diet, Regular:   No Concentrated Phosphorus  Supplement Feeding Modality:  Oral  Suplena Cans or Servings Per Day:  1       Frequency:  Daily (07-15-24 @ 15:01) [Active]     PO INTAKE: Good (%) [X]  Fair (50-75%) [   ] Poor (<25%) [   ]    GI ISSUES: none reported at this time    PAIN: none reported at this time    SKIN: no pressure injury per RN flowsheets    EDEMA: no edema per RN flowsheets    LABS: *no updated labs since 7/16    MEDICATIONS:  MEDICATIONS  (STANDING):  acetaminophen     Tablet .. 975 milliGRAM(s) Oral every 8 hours  bisacodyl 5 milliGRAM(s) Oral daily  enoxaparin Injectable 40 milliGRAM(s) SubCutaneous every 24 hours  levETIRAcetam 500 milliGRAM(s) Oral two times a day  lidocaine   4% Patch 1 Patch Transdermal daily  melatonin 3 milliGRAM(s) Oral at bedtime  methadone    Tablet 160 milliGRAM(s) Oral every 24 hours  mirtazapine 15 milliGRAM(s) Oral daily  oxacillin IVPB 2 Gram(s) IV Intermittent every 4 hours  polyethylene glycol 3350 17 Gram(s) Oral two times a day  QUEtiapine 75 milliGRAM(s) Oral at bedtime    MEDICATIONS  (PRN):  benzonatate 100 milliGRAM(s) Oral once PRN Cough  naloxone Injectable 1 milliGRAM(s) IV Push every 3 minutes PRN in case of overdose  oxyCODONE    IR 5 milliGRAM(s) Oral every 6 hours PRN Moderate Pain (4 - 6)  oxyCODONE    IR 10 milliGRAM(s) Oral every 6 hours PRN Severe Pain (7 - 10)  saline laxative (FLEET) Rectal Enema 1 Enema Rectal once PRN constipation, severe, please try oral laxatives first  senna 2 Tablet(s) Oral at bedtime PRN Constipation    WEIGHT: (7/1)  Height for BMI (FEET)	5 Feet  Height for BMI (INCHES)	3 Inch(s)  Height for BMI (CENTIMETERS)	160.02 Centimeter(s)  Weight for BMI (lbs)	179 lb  Weight for BMI (kg)	81.2 kg  Body Mass Index	31.7    WEIGHT CHANGE: no updated weight since admission    NUTRITION FOCUSED PHYSICAL EXAM: Completed [7/5]; Not Pertinent at this time    ESTIMATED ENERGY NEEDS:   Weight used for calculations	IBW  Estimated Energy Needs Weight (lbs)	126.5 lb  Estimated Energy Needs Weight (kg)	57.3 kg  Estimated Energy Needs From (chris/kg)	25  Estimated Energy Needs To (chris/kg)	30  Estimated Energy Needs Calculated From (chris/kg)	1432  Estimated Energy Needs Calculated To (chris/kg)	1719    Weight used for calculations	IBW  Estimated Protein Needs Weight (lbs)	126.5 lb  Estimated Protein Needs Weight (kg)	57.3 kg  Estimated Protein Needs From (g/kg)	1.25  Estimated Protein Needs To (g/kg)	1.5  Estimated Protein Needs Calculated From (g/kg)	71.62  Estimated Protein Needs Calculated To (g/kg)	85.95    Estimated Fluid Needs Weight (lbs)	126.5 lb  Estimated Fluid Needs Weight (kg)	57.3 kg  Estimated Fluid Needs From (ml/kg)	25  Estimated Fluid Needs To (ml/kg)	30  Estimated Fluid Needs Calculated From (ml/kg)	1432  Estimated Fluid Needs Calculated To (ml/kg)	1719    *Using +10% ideal body weight (126.5 pounds) as pt is >120% ideal body weight. Needs adjusted for malnutrition. ideal body weight: 115 pounds; % ideal body weight: 155.7%    SUBJECTIVE:   Per H&P: Pt is 58 y/o F pmhx significant for IV drug use, anemia, epilepsy who presents for sharp L leg pain progressively worsening over the past month. Pt states her pain began in the left thigh and later radiated to her toes but now has persisted in her entire left leg and groin. Pt reports this began about one month ago and required multiple ER visits. She states she went to [place] on 5/31 where nothing was done and she was discharged. Most recently last week she returned to [place] where she states she was given a 7 day course of oral antibiotics which she completed but did not improve her pain. She took Motrin which provided very minimal relief.  Pt last  injected heroin into her left thigh on Saturday 6/29. As per patient, she is chronically on methadone 160 mg 5 days a week since she was 19. She reports subjective fevers. Denies nausea, vomiting, chest pain, shortness of breath, constipation, diarrhea, pain on urination, hematuria, hematemesis.     (7/5) Met with pt this AM at bedside. Pt was seated upright and able to articulate her nutrition hx well. No known food allergies nor food intolerances reported. Pt reported good appetite PTA, denied following any specific diet at home, however reported has been forgetting to eat due to ?memory loss and IVDU. Pt mentioned her current appetite is good, however she has been unable to eat due to NPO status for potential procedure. Pt endorsed nausea due to hunger (denied emesis). Pt denied diarrhea, however endorsed constipation, stated last BM 6/30. RD encouraged adequate fluids and fiber when PO diet returns to assist with BMs. Pt reported usual body weight 179 pounds and denies any recent wt loss/gain, however mentioned usual body weight ~286 pounds x 1 yr ago (37% wt loss x 1 year - meets criteria for malnutrition) with significant weight loss due to ?memory loss and IVDU (RD ?accuracy of previous weight). nutrition focused physical exam did not reveal any overt signs of muscle or subcutaneous fat wasting at this time.     (7/10) Met with pt this afternoon at bedside. Pt was seated upright in chair and able to articulate her nutrition hx well. Pt reported good appetite and taking adequate POs, with good acceptance to Ensure plus HP 1x/day. Pt denied nausea/vomiting/diarrhea, however endorsed occasional constipation, stated last BM 7/9. RD encouraged adequate fluids and fiber consumption to assist with BMs. RD reviewed protein sources (chicken, fish, beans, nut, etc.) and encouraged continued adequate protein consumption in order to regain strength, maintain muscle mass and reach adequate nutritional status. Pt was accepting to RD suggestion and asking appropriate questions.     (7/15) Met with pt this AM at bedside. Pt was seated upright and able to articulate her nutrition hx well. Pt reported continued good appetite and taking adequate POs, with good acceptance to Ensure plus HP 1x/day. Pt denied nausea/vomiting/diarrhea/constipation, stated last BM 7/14. RD explained that Phos is a mineral found in food and their roles. RD provided no concentrated Phos diet education, reviewed when Phos builds up in the blood it can pull calcium out of bones. Discussed as pt kidney function declines, blood values of Phos may rise. Reviewed how dairy products (milk, cheese, yogurt) are especially high in phosphorus. Expect pt will comply well with no-concentrated Phos diet well. Pt engaged in education and asking appropriate questions    (7/22) Met with pt this AM at bedside. Pt was seated upright and able to articulate her nutrition hx well. Pt mentioned a persist good appetite and PO intake, with good acceptance to Suplena 1x/day. Pt denied nausea/vomiting/diarrhea/constipation, stated last BM 7/21 (last BM 7/22 per RN flowsheets). RD observed 100% breakfast eaten - reviewed general healthy diet including balanced meals, including vegetables and protein. Pt was accepting to RD education and asking appropriate questions.     PREVIOUS NUTRITION DIAGNOSIS: severe chronic protein-kcal malnutrition related to inadequate PO/energy intake in setting of IVDU/?forgetting to eat as evidenced by pt report of >20% wt loss x 1 year, <50% PO intake x 5 days    Active [X]  Resolved [   ]    IF RESOLVED, NEW PES: N/A    GOAL: Pt to meet >75% estimated energy and protein needs throughout hospitalization    MONITORING AND EVALUATION:   Current diet order is appropriate and is well tolerated, will continue to monitor:  - Food and nutrient intake/POs  - Nutrition related lab value, specifically Phos  - Weight/weight trends  - GI functions  - Supplement acceptance    NUTRITION RECOMMENDATIONS:   1. Continue with Regular, no concentrated Phos diet  - RD will continue to monitor Phos values  - Honor food preferences, as medically able  2. Continue with Suplena 1x/day   - Provides 420 kcal, 10.6 g pro per serving  3. Continue with bowel regimen, prn  4. Appreciate updated weight and weekly weight trends  5. Ongoing diet education     RISK LEVEL: High [   ] Moderate [X] Low [   ]    Cheli Green RDN also available via TEAMS
Jaelyn Escobar is a pleasant 59-year-old female admitted to [facility] since 7/1/24 for MSSA bacteremia, likely originating from left iliopsoas bursitis. Despite a history of IV drug use, she has maintained excellent compliance with a stable dose of methadone (160mg daily) for the past 2 years, as confirmed by her regular attendance at the methadone clinic. Her hospital course has been marked by steady improvement, with resolution of fever and improvement in inflammatory markers.    She has family support, with visits from her brother, niece, and nephew, all of whom appreciate our updates on her care. Her mental status is clear, and she actively participates in discussions regarding her treatment plan.    Given her progress, Ms. Escobar will benefit from subacute rehabilitation to optimize her recovery in a setting that can provide ongoing medical supervision while she completes her IV antibiotic therapy.
No Repair - Repaired With Adjacent Surgical Defect Text (Leave Blank If You Do Not Want): After obtaining clear surgical margins the defect was repaired concurrently with another surgical defect which was in close approximation.

## 2024-07-29 NOTE — PROGRESS NOTE ADULT - REASON FOR ADMISSION
left leg pain

## 2024-07-29 NOTE — PROGRESS NOTE ADULT - TIME BILLING
treatment of MSSA bacteremia and muscle abscess
Managing bacteremia
Review of hospital course, labs, vitals and medical records  Bedside exam and patient interview   Discussion of plan with housestaff and consultants   Documenting the encounter
Review of hospital course, labs, vitals and medical records  Bedside exam and patient interview   Discussion of plan with housestaff and consultants   Documenting the encounter
Evaluation of patient, review of charts
Iliopsoas bursitis/abscess, MSSA bacteremia
Review of hospital course, labs, vitals and medical records  Bedside exam and patient interview   Discussion of plan with consultants   Documenting the encounter
Review of hospital course, labs, vitals and medical records  Bedside exam and patient interview   Discussion of plan with housestaff and consultants   Documenting the encounter
Bedside exam and interview    Review of hospital course, labs, vitals, imaging ,  medical records.   Reviewing outside records   Discharge planning on Interdisciplinary rounds   Discussion with consultants and Primary team   Documenting the encounter.
MSSA bacteremia, iliopsoas abscess
Review of hospital course, labs, vitals and medical records  Bedside exam and patient interview   Discussion of plan with housestaff and consultants   Documenting the encounter
Iliopsoas abscess/bursitis
Iliopsoas bursitis/abscess
MSSA, Iliopsoas bursitits/abscess
Review of hospital course, labs, vitals and medical records  Bedside exam and patient interview   Discussion of plan with housestaff and consultants   Documenting the encounter
treatment of MSSA bacteremia and iliopsoas abscess
Evaluation of patient, review of charts
Evaluation of patient, d/w consultants, documentation
Evaluation of patient, review of chart
Evaluation of patient, review of charts
Review of hospital course, labs, vitals and medical records  Bedside exam and patient interview   Discussion of plan with housestaff and consultants   Documenting the encounter
Review of hospital course, labs, vitals, medical records    Bedside exam and interview      Discussed plan of care with housestaff   Documenting the encounter.
Review of hospital course, labs, vitals and medical records  Bedside exam and patient interview   Discussion of plan with housestaff and consultants   Documenting the encounter
Bedside exam and interview   Reviewed vitals, labs   Discussed patient's plan of care with housestaff   Documentation of encounter  Excludes teaching and separately reported services
Review of hospital course, labs, vitals and medical records  Bedside exam and patient interview   Discussion of plan with housestaff and consultants   Documenting the encounter

## 2024-07-30 LAB
GAMMA INTERFERON BACKGROUND BLD IA-ACNC: 0.03 IU/ML — SIGNIFICANT CHANGE UP
M TB IFN-G BLD-IMP: NEGATIVE — SIGNIFICANT CHANGE UP
M TB IFN-G CD4+ BCKGRND COR BLD-ACNC: 0 IU/ML — SIGNIFICANT CHANGE UP
M TB IFN-G CD4+CD8+ BCKGRND COR BLD-ACNC: 0 IU/ML — SIGNIFICANT CHANGE UP
QUANT TB PLUS MITOGEN MINUS NIL: 2.14 IU/ML — SIGNIFICANT CHANGE UP

## 2024-08-02 DIAGNOSIS — F11.20 OPIOID DEPENDENCE, UNCOMPLICATED: ICD-10-CM
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
## 2024-08-02 DIAGNOSIS — F32.A DEPRESSION, UNSPECIFIED: ICD-10-CM

## 2024-08-02 DIAGNOSIS — D72.819 DECREASED WHITE BLOOD CELL COUNT, UNSPECIFIED: ICD-10-CM

## 2024-08-02 DIAGNOSIS — F25.0 SCHIZOAFFECTIVE DISORDER, BIPOLAR TYPE: ICD-10-CM

## 2024-08-02 DIAGNOSIS — M79.606 PAIN IN LEG, UNSPECIFIED: ICD-10-CM

## 2024-08-02 DIAGNOSIS — D70.9 NEUTROPENIA, UNSPECIFIED: ICD-10-CM

## 2024-08-02 DIAGNOSIS — F43.10 POST-TRAUMATIC STRESS DISORDER, UNSPECIFIED: ICD-10-CM

## 2024-08-02 DIAGNOSIS — F19.94 OTHER PSYCHOACTIVE SUBSTANCE USE, UNSPECIFIED WITH PSYCHOACTIVE SUBSTANCE-INDUCED MOOD DISORDER: ICD-10-CM

## 2024-08-02 DIAGNOSIS — Z87.891 PERSONAL HISTORY OF NICOTINE DEPENDENCE: ICD-10-CM

## 2024-08-02 DIAGNOSIS — E43 UNSPECIFIED SEVERE PROTEIN-CALORIE MALNUTRITION: ICD-10-CM

## 2024-08-02 DIAGNOSIS — E11.9 TYPE 2 DIABETES MELLITUS WITHOUT COMPLICATIONS: ICD-10-CM

## 2024-08-02 DIAGNOSIS — M70.72 OTHER BURSITIS OF HIP, LEFT HIP: ICD-10-CM

## 2024-08-02 DIAGNOSIS — N17.9 ACUTE KIDNEY FAILURE, UNSPECIFIED: ICD-10-CM

## 2024-08-02 DIAGNOSIS — D70.2 OTHER DRUG-INDUCED AGRANULOCYTOSIS: ICD-10-CM

## 2024-08-02 DIAGNOSIS — B19.20 UNSPECIFIED VIRAL HEPATITIS C WITHOUT HEPATIC COMA: ICD-10-CM

## 2024-08-02 DIAGNOSIS — D64.9 ANEMIA, UNSPECIFIED: ICD-10-CM

## 2024-08-02 DIAGNOSIS — F60.3 BORDERLINE PERSONALITY DISORDER: ICD-10-CM

## 2024-08-02 DIAGNOSIS — G40.909 EPILEPSY, UNSPECIFIED, NOT INTRACTABLE, WITHOUT STATUS EPILEPTICUS: ICD-10-CM

## 2024-08-02 DIAGNOSIS — A41.9 SEPSIS, UNSPECIFIED ORGANISM: ICD-10-CM

## 2024-08-02 DIAGNOSIS — J45.909 UNSPECIFIED ASTHMA, UNCOMPLICATED: ICD-10-CM

## 2024-08-02 DIAGNOSIS — T36.1X5A ADVERSE EFFECT OF CEPHALOSPORINS AND OTHER BETA-LACTAM ANTIBIOTICS, INITIAL ENCOUNTER: ICD-10-CM

## 2024-08-02 DIAGNOSIS — F19.90 OTHER PSYCHOACTIVE SUBSTANCE USE, UNSPECIFIED, UNCOMPLICATED: ICD-10-CM

## 2024-08-15 ENCOUNTER — APPOINTMENT (OUTPATIENT)
Dept: INFECTIOUS DISEASE | Facility: CLINIC | Age: 59
End: 2024-08-15

## 2024-08-19 ENCOUNTER — APPOINTMENT (OUTPATIENT)
Age: 59
End: 2024-08-19

## 2024-10-03 ENCOUNTER — EMERGENCY (EMERGENCY)
Facility: HOSPITAL | Age: 59
LOS: 1 days | Discharge: LEFT BEFORE DISCHARGE | End: 2024-10-03
Attending: EMERGENCY MEDICINE | Admitting: EMERGENCY MEDICINE
Payer: MEDICAID

## 2024-10-03 VITALS
TEMPERATURE: 99 F | RESPIRATION RATE: 18 BRPM | HEIGHT: 62 IN | HEART RATE: 115 BPM | WEIGHT: 149.91 LBS | SYSTOLIC BLOOD PRESSURE: 121 MMHG | DIASTOLIC BLOOD PRESSURE: 85 MMHG

## 2024-10-03 DIAGNOSIS — R33.9 RETENTION OF URINE, UNSPECIFIED: ICD-10-CM

## 2024-10-03 DIAGNOSIS — Z53.21 PROCEDURE AND TREATMENT NOT CARRIED OUT DUE TO PATIENT LEAVING PRIOR TO BEING SEEN BY HEALTH CARE PROVIDER: ICD-10-CM

## 2024-10-03 PROCEDURE — L9991: CPT

## 2024-10-03 NOTE — ED ADULT NURSE NOTE - CHIEF COMPLAINT QUOTE
Pt presents to ED C/O urinary symptoms. States, " I'm peeing every 20 minutes" C/P pelvic pain. States, " It hurts when I sit and when I pee, I went to all different hospitals last time and this is the only hospital that helped me, in July I had a UTI and took antibiotics, my program put me in an Uber here  I go to [facility] and to a clinic for methadone". Pt walks with walker at baseline. Pt took Motrin and Methadone PTA.

## 2024-12-02 VITALS
TEMPERATURE: 98 F | RESPIRATION RATE: 18 BRPM | SYSTOLIC BLOOD PRESSURE: 100 MMHG | WEIGHT: 149.91 LBS | HEIGHT: 65 IN | OXYGEN SATURATION: 94 % | HEART RATE: 68 BPM | DIASTOLIC BLOOD PRESSURE: 65 MMHG

## 2024-12-02 LAB
ALBUMIN SERPL ELPH-MCNC: 2.2 G/DL — LOW (ref 3.4–5)
ALP SERPL-CCNC: 56 U/L — SIGNIFICANT CHANGE UP (ref 40–120)
ALT FLD-CCNC: 80 U/L — HIGH (ref 12–42)
ANION GAP SERPL CALC-SCNC: 2 MMOL/L — LOW (ref 9–16)
APTT BLD: 38 SEC — HIGH (ref 24.5–35.6)
AST SERPL-CCNC: 75 U/L — HIGH (ref 15–37)
BILIRUB SERPL-MCNC: 0.2 MG/DL — SIGNIFICANT CHANGE UP (ref 0.2–1.2)
BUN SERPL-MCNC: 60 MG/DL — HIGH (ref 7–23)
CALCIUM SERPL-MCNC: 8 MG/DL — LOW (ref 8.5–10.5)
CHLORIDE SERPL-SCNC: 109 MMOL/L — HIGH (ref 96–108)
CO2 SERPL-SCNC: 32 MMOL/L — HIGH (ref 22–31)
CREAT SERPL-MCNC: 1.5 MG/DL — HIGH (ref 0.5–1.3)
EGFR: 40 ML/MIN/1.73M2 — LOW
GLUCOSE SERPL-MCNC: 148 MG/DL — HIGH (ref 70–99)
HCT VFR BLD CALC: 18.6 % — CRITICAL LOW (ref 34.5–45)
HGB BLD-MCNC: 5.9 G/DL — CRITICAL LOW (ref 11.5–15.5)
INR BLD: 1.37 — HIGH (ref 0.85–1.16)
MCHC RBC-ENTMCNC: 28.5 PG — SIGNIFICANT CHANGE UP (ref 27–34)
MCHC RBC-ENTMCNC: 31.7 G/DL — LOW (ref 32–36)
MCV RBC AUTO: 89.9 FL — SIGNIFICANT CHANGE UP (ref 80–100)
PLATELET # BLD AUTO: 184 K/UL — SIGNIFICANT CHANGE UP (ref 150–400)
POTASSIUM SERPL-MCNC: 5 MMOL/L — SIGNIFICANT CHANGE UP (ref 3.5–5.3)
POTASSIUM SERPL-SCNC: 5 MMOL/L — SIGNIFICANT CHANGE UP (ref 3.5–5.3)
PROT SERPL-MCNC: 5.3 G/DL — LOW (ref 6.4–8.2)
PROTHROM AB SERPL-ACNC: 16 SEC — HIGH (ref 9.9–13.4)
RBC # BLD: 2.07 M/UL — LOW (ref 3.8–5.2)
RBC # FLD: 14 % — SIGNIFICANT CHANGE UP (ref 10.3–14.5)
SODIUM SERPL-SCNC: 143 MMOL/L — SIGNIFICANT CHANGE UP (ref 132–145)
WBC # BLD: 10.49 K/UL — SIGNIFICANT CHANGE UP (ref 3.8–10.5)
WBC # FLD AUTO: 10.49 K/UL — SIGNIFICANT CHANGE UP (ref 3.8–10.5)

## 2024-12-02 PROCEDURE — 99291 CRITICAL CARE FIRST HOUR: CPT | Mod: 25

## 2024-12-02 PROCEDURE — 36556 INSERT NON-TUNNEL CV CATH: CPT

## 2024-12-02 PROCEDURE — 99292 CRITICAL CARE ADDL 30 MIN: CPT | Mod: 25

## 2024-12-02 RX ORDER — PANTOPRAZOLE SODIUM 40 MG/1
8 TABLET, DELAYED RELEASE ORAL
Qty: 80 | Refills: 0 | Status: DISCONTINUED | OUTPATIENT
Start: 2024-12-02 | End: 2024-12-03

## 2024-12-02 RX ORDER — PANTOPRAZOLE SODIUM 40 MG/1
80 TABLET, DELAYED RELEASE ORAL ONCE
Refills: 0 | Status: COMPLETED | OUTPATIENT
Start: 2024-12-02 | End: 2024-12-02

## 2024-12-02 RX ORDER — SODIUM CHLORIDE 9 MG/ML
1000 INJECTION, SOLUTION INTRAMUSCULAR; INTRAVENOUS; SUBCUTANEOUS ONCE
Refills: 0 | Status: COMPLETED | OUTPATIENT
Start: 2024-12-02 | End: 2024-12-02

## 2024-12-02 RX ADMIN — SODIUM CHLORIDE 1000 MILLILITER(S): 9 INJECTION, SOLUTION INTRAMUSCULAR; INTRAVENOUS; SUBCUTANEOUS at 23:53

## 2024-12-02 NOTE — ED PROVIDER NOTE - WHICH SHOWED
EKG was interpreted by me Dr. Sol Martin, ED attending, which showed Sinus tachycardia 101No nonischemic diffuse flattened T waves    I Dr. Sol Martin, ED attending independently interpreted films which showed Increased reticular markings with mild cardiomegaly.  No effusions no pneumonia

## 2024-12-02 NOTE — ED PROVIDER NOTE - PHYSICAL EXAMINATION
CONSTITUTIONAL   General appearance: looks well, no acute distress, alert, interactive    EYES   RIGHT eye: Normal Conjunctiva and Lid   LEFT eye: Normal Conjunctiva and Lid   Sclerae: NO subconjunctival hemorrhage, No scleral icterus   PUPILS: PERRL   EOM: EOMI     EAR / NOSE / THROAT   Moist mucous membranes      NECK   Supple, no meningismus, trachea midline, full ROM     CARDIOVASCULAR   Well perfused      LUNGS   Respiratory effort: No respiratory distress, normal respiration effort, speaking in Full Sentences     ABDOMEN   No distention      SKIN   Normal color, NO rash, NO erythema, NO bruising, NO skin lesion     PSYCHIATRIC   unable to eval    NEUROLOGIC    Alert and oriented x 3, Speech normal, No focal deficits, normal motor and sensory, MAEx4

## 2024-12-02 NOTE — ED PROVIDER NOTE - PROGRESS NOTE DETAILS
patient signed out to me pending re-evaluation. patient now awake, alert, coherent, MCKEON x 4. normal gait. tolerating po. will dc. CHELSEA Hauser. at discharge, nurse assisted patient with changing into clean clothes. patient noted to have large amount of melena in pants and on the floor. she reports this has been occurring for the past 2 days. not on AC. c/o generalized weakness. no abdominal pain, fever, or vomiting. does not recall last colonoscopy, never had blood transfusion. will obtain labs, obtain repeat vitals, admit for GI bleed workup. patient signed out to me pending re-evaluation for sobriety. patient now awake, alert, coherent, MCKEON x 4. tolerating po. will dc. CHELSEA Hauser. BP 95/53, , hgb 5.9, hct 18.6, patient currently has L AC 20g IV, will place central line, 2 units O- blood ordered, IVF, protonix drip ordered. [facility]. Tier 1 transfer. CHELSEA Hauser at discharge, nurse assisted patient with changing into clean clothes. patient noted to have large amount of melena in pants and on the floor. she reports this has been occurring for the past 2 days. not on AC. c/o generalized weakness. no abdominal pain, fever, or vomiting. does not recall last colonoscopy, never had blood transfusion. will obtain labs, obtain repeat vitals, admit for GI bleed workup. CHELSEA Hauser at discharge, nurse assisted patient with changing into clean clothes. patient noted to have large amount of melena in pants and on the floor. she reports this has been occurring for the past 2 days. not on AC. c/o generalized weakness. no abdominal pain, fever, or vomiting. does not recall last colonoscopy, never had blood transfusion. will obtain labs, IV access, obtain repeat vitals, admit for GI bleed workup. CHELSEA Hauser Patient consistently hypotensive blood pressures 83/40 after first unit of blood, and Levophed running at 0.1 mcg/kg/min, this was discussed with Dr. Lan intensivist he is recommending 2 more units of blood packed red blood cells over pressure bags.Transport delay secondary to stat transfusion.  Patient remains awake alert and oriented x 3 with no changes in mental status lance -Patient consistently hypotensive blood pressures 83/40 after first unit of blood, and Levophed running at 0.1 mcg/kg/min, this was discussed with Dr. Lan intensivist he is recommending 2 more units of blood packed red blood cells over pressure bags.Transport delay secondary to stat transfusion.  Patient remains awake alert and oriented x 3 with no changes in mental status

## 2024-12-02 NOTE — ED ADULT TRIAGE NOTE - CHIEF COMPLAINT QUOTE
irma holland from [facility] she missed her methadone appt today aggressive in triage, irma holland from street states she missed her methadone so took heroin,

## 2024-12-02 NOTE — ED PROVIDER NOTE - ATTENDING CONTRIBUTION TO CARE
Patient with past medical history of anemia baseline hemoglobin 7-9, no history of transfusion no anticoagulants, history of IVDA active last use 1 week ago, on methadone 170 mg daily, history of hepatitis C, undomiciled lives in a hotel in the [place], history of iliopsoas bursitis on the left no NSAIDs, history of epilepsy on Keppra full code who presents with active melena episode with small clot in the ED.  Patient was seen earlier in the ED for altered mental status and upon discharge had active episode of melena.  Patient found to be tachycardic with low blood pressure alert and oriented x 3 but appears mildly lethargic.  Patient consents to stat blood transfusion.  She denies history of NSAID use or anticoagulant use.  eICU consult initiated at 11:55 PM.  Patient accepted to the medical ICU, will receive blood prior to transport.  Will consider pressors if she does not respond to blood.  Patient notes no history of CHF.    Critical care time spent with patient 60 minutes, Stat blood transfusion consultation with ICU intensivist eICU consultation Patient with past medical history of anemia baseline hemoglobin 7-9, no history of transfusion no anticoagulants, history of IVDA active last use 1 week ago, on methadone 170 mg daily, history of hepatitis C, undomiciled lives in a hotel in the [place], history of iliopsoas bursitis on the left no NSAIDs, history of epilepsy on Keppra full code who presents with active melena episode with small clot in the ED.  Patient was seen earlier in the ED for altered mental status and upon discharge had active episode of melena.  Patient found to be tachycardic with low blood pressure alert and oriented x 3 but appears mildly lethargic.  Patient consents to stat blood transfusion.  She denies history of NSAID use or anticoagulant use.  eICU consult initiated at 11:55 PM.  Patient accepted to the medical ICU, will receive blood prior to transport.  Will consider pressors if she does not respond to blood.  Patient notes no history of CHF.    Critical care time spent with patient 120 minutes, Stat blood transfusion consultation with ICU intensivist eICU consultation Patient with past medical history of anemia baseline hemoglobin 7-9, no history of transfusion no anticoagulants, history of IVDA active last use 1 week ago, on methadone 170 mg daily, history of hepatitis C, undomiciled lives in a hotel in the [place], history of iliopsoas bursitis on the left no NSAIDs, history of epilepsy on Keppra full code who presents with active melena episode with small clot in the ED.  Patient was seen earlier in the ED for altered mental status and upon discharge had active episode of melena.  Patient found to be tachycardic with low blood pressure alert and oriented x 3 but appears mildly lethargic.  Patient consents to stat blood transfusion.  She denies history of NSAID use or anticoagulant use.  eICU consult initiated at 11:55 PM.  Patient accepted to the medical ICU, will receive blood prior to transport.  Will consider pressors if she does not respond to blood.  Patient notes no history of CHF.    Please see subsequent progress notes.     Critical care time spent with patient 120 minutes, Stat blood transfusion consultation with ICU intensivist eICU consultation appreciated, given tier 1, will recommmend imaging at [facility].

## 2024-12-02 NOTE — ED PROVIDER NOTE - PATIENT PORTAL LINK FT
You can access the FollowMyHealth Patient Portal offered by [facility] by registering at the following website: [email or web address]. By joining [facility]’s FollowMyHealth portal, you will also be able to view your health information using other applications (apps) compatible with our system.

## 2024-12-02 NOTE — ED PROVIDER NOTE - OBJECTIVE STATEMENT
59-year-old female patient brought in by EMS from [place] for altered mental status.  Patient states she missed her methadone appointment and therefore used heroin instead.  Patient denies any pain.

## 2024-12-02 NOTE — ED PROVIDER NOTE - CARE PLAN
1 Principal Discharge DX:	Heroin use   Principal Discharge DX:	Acute GI bleeding  Secondary Diagnosis:	Moderate substance use disorder

## 2024-12-02 NOTE — ED PROVIDER NOTE - NSFOLLOWUPINSTRUCTIONS_ED_ALL_ED_FT
Patient: Obed Haley  External Cardioversion  Anesthesia type: general    Patient location: [facility]    Last vitals:   Vitals:    01/18/18 1340   BP:    Pulse:    Resp:    Temp: 36.3  C (97.4  F)   SpO2:      Post vital signs: stable  Level of consciousness: awake and responds to simple questions  Post-anesthesia pain: pain controlled  Post-anesthesia nausea and vomiting: no  Pulmonary: unassisted, return to baseline  Cardiovascular: stable and blood pressure at baseline  Hydration: adequate  Anesthetic events: no    QCDR Measures:  ASA# 11 - Valerie-op Cardiac Arrest: ASA11B - Patient did NOT experience unanticipated cardiac arrest  ASA# 12 - Valerie-op Mortality Rate: ASA12B - Patient did NOT die  ASA# 13 - PACU Re-Intubation Rate: NA - No ETT / LMA used for case  ASA# 10 - Composite Anes Safety: ASA10A - No serious adverse event    Additional Notes:  
Using heroin is dangerous, please stop using. Go to detox.    PLEASE FOLLOW-UP WITH YOUR PRIMARY CARE DOCTOR IN 1-2 DAYS FOR FURTHER EVALUATION.      PLEASE TAKE ALL PAPERWORK FROM TODAY'S VISIT TO YOUR PRIMARY DOCTOR.     IF YOU DO NOT HAVE A PRIMARY CARE DOCTOR PLEASE REFER TO THE OFFICE/CLINIC INFORMATION GIVEN BELOW:    If you do not have a doctor, you can call our referral line to find a doctor that matches your insurance; the number is [phone].     You can also follow up with clinics listed below, if you do not have a doctor:  [address] [address]  To make an appointment, call [phone]    [facility]  Address: [address] [place] [address]  Appointment Center: [phone] ([phone])     To access your record on the patient portal [facility], please visit:  [email or web address]  If you are having difficulties setting this up, call [phone] and someone can assist you over the phone.     PLEASE RETURN TO THE ER IMMEDIATELY OR CALL 431 ANY HIGH FEVER, CHEST PAIN, TROUBLE BREATHING, VOMITING, SEVERE PAIN, OR ANY OTHER CONCERNS

## 2024-12-03 ENCOUNTER — INPATIENT (INPATIENT)
Facility: HOSPITAL | Age: 59
LOS: 8 days | Discharge: HOME | End: 2024-12-12
Attending: STUDENT IN AN ORGANIZED HEALTH CARE EDUCATION/TRAINING PROGRAM | Admitting: INTERNAL MEDICINE
Payer: MEDICAID

## 2024-12-03 ENCOUNTER — TRANSCRIPTION ENCOUNTER (OUTPATIENT)
Age: 59
End: 2024-12-03

## 2024-12-03 LAB
ADD ON TEST-SPECIMEN IN LAB: SIGNIFICANT CHANGE UP
ADD ON TEST-SPECIMEN IN LAB: SIGNIFICANT CHANGE UP
ALBUMIN SERPL ELPH-MCNC: 2.4 G/DL — LOW (ref 3.3–5)
ALP SERPL-CCNC: 44 U/L — SIGNIFICANT CHANGE UP (ref 40–120)
ALT FLD-CCNC: 49 U/L — HIGH (ref 10–45)
ANION GAP SERPL CALC-SCNC: 4 MMOL/L — LOW (ref 5–17)
ANION GAP SERPL CALC-SCNC: 6 MMOL/L — SIGNIFICANT CHANGE UP (ref 5–17)
APPEARANCE UR: CLEAR — SIGNIFICANT CHANGE UP
APTT BLD: 32.5 SEC — SIGNIFICANT CHANGE UP (ref 24.5–35.6)
APTT BLD: 37.6 SEC — HIGH (ref 24.5–35.6)
AST SERPL-CCNC: 57 U/L — HIGH (ref 10–40)
BASOPHILS # BLD AUTO: 0.01 K/UL — SIGNIFICANT CHANGE UP (ref 0–0.2)
BASOPHILS NFR BLD AUTO: 0.1 % — SIGNIFICANT CHANGE UP (ref 0–2)
BILIRUB SERPL-MCNC: 0.3 MG/DL — SIGNIFICANT CHANGE UP (ref 0.2–1.2)
BILIRUB UR-MCNC: NEGATIVE — SIGNIFICANT CHANGE UP
BLD GP AB SCN SERPL QL: NEGATIVE — SIGNIFICANT CHANGE UP
BLD GP AB SCN SERPL QL: NEGATIVE — SIGNIFICANT CHANGE UP
BUN SERPL-MCNC: 57 MG/DL — HIGH (ref 7–23)
BUN SERPL-MCNC: 60 MG/DL — HIGH (ref 7–23)
CALCIUM SERPL-MCNC: 7 MG/DL — LOW (ref 8.4–10.5)
CALCIUM SERPL-MCNC: 7.5 MG/DL — LOW (ref 8.4–10.5)
CHLORIDE SERPL-SCNC: 110 MMOL/L — HIGH (ref 96–108)
CHLORIDE SERPL-SCNC: 111 MMOL/L — HIGH (ref 96–108)
CO2 SERPL-SCNC: 24 MMOL/L — SIGNIFICANT CHANGE UP (ref 22–31)
CO2 SERPL-SCNC: 24 MMOL/L — SIGNIFICANT CHANGE UP (ref 22–31)
COLOR SPEC: YELLOW — SIGNIFICANT CHANGE UP
CREAT ?TM UR-MCNC: 38 MG/DL — SIGNIFICANT CHANGE UP
CREAT SERPL-MCNC: 0.93 MG/DL — SIGNIFICANT CHANGE UP (ref 0.5–1.3)
CREAT SERPL-MCNC: 1.07 MG/DL — SIGNIFICANT CHANGE UP (ref 0.5–1.3)
DIFF PNL FLD: NEGATIVE — SIGNIFICANT CHANGE UP
EGFR: 61 ML/MIN/1.73M2 — SIGNIFICANT CHANGE UP
EGFR: 72 ML/MIN/1.73M2 — SIGNIFICANT CHANGE UP
EOSINOPHIL # BLD AUTO: 0 K/UL — SIGNIFICANT CHANGE UP (ref 0–0.5)
EOSINOPHIL NFR BLD AUTO: 0 % — SIGNIFICANT CHANGE UP (ref 0–6)
FERRITIN SERPL-MCNC: 69 NG/ML — SIGNIFICANT CHANGE UP (ref 13–330)
FIBRINOGEN PPP-MCNC: 153 MG/DL — LOW (ref 200–445)
FOLATE SERPL-MCNC: 18.5 NG/ML — SIGNIFICANT CHANGE UP
GAS PNL BLDA: SIGNIFICANT CHANGE UP
GAS PNL BLDV: SIGNIFICANT CHANGE UP
GLUCOSE SERPL-MCNC: 129 MG/DL — HIGH (ref 70–99)
GLUCOSE SERPL-MCNC: 163 MG/DL — HIGH (ref 70–99)
GLUCOSE UR QL: NEGATIVE MG/DL — SIGNIFICANT CHANGE UP
HCT VFR BLD CALC: 18.6 % — CRITICAL LOW (ref 34.5–45)
HCT VFR BLD CALC: 18.6 % — CRITICAL LOW (ref 34.5–45)
HCT VFR BLD CALC: 23.6 % — LOW (ref 34.5–45)
HCT VFR BLD CALC: 26.9 % — LOW (ref 34.5–45)
HCT VFR BLD CALC: 28.7 % — LOW (ref 34.5–45)
HCT VFR BLD CALC: 29.2 % — LOW (ref 34.5–45)
HGB BLD-MCNC: 6.1 G/DL — CRITICAL LOW (ref 11.5–15.5)
HGB BLD-MCNC: 6.2 G/DL — CRITICAL LOW (ref 11.5–15.5)
HGB BLD-MCNC: 7.8 G/DL — LOW (ref 11.5–15.5)
HGB BLD-MCNC: 9.3 G/DL — LOW (ref 11.5–15.5)
HGB BLD-MCNC: 9.5 G/DL — LOW (ref 11.5–15.5)
HGB BLD-MCNC: 9.9 G/DL — LOW (ref 11.5–15.5)
IMM GRANULOCYTES NFR BLD AUTO: 0.9 % — SIGNIFICANT CHANGE UP (ref 0–0.9)
INR BLD: 1.25 — HIGH (ref 0.85–1.16)
INR BLD: 1.48 — HIGH (ref 0.85–1.16)
IRON SATN MFR SERPL: 47 % — SIGNIFICANT CHANGE UP (ref 14–50)
IRON SATN MFR SERPL: 71 UG/DL — SIGNIFICANT CHANGE UP (ref 30–160)
KETONES UR-MCNC: NEGATIVE MG/DL — SIGNIFICANT CHANGE UP
LACTATE BLDV-MCNC: 1.4 MMOL/L — SIGNIFICANT CHANGE UP (ref 0.5–2)
LACTATE SERPL-SCNC: 0.8 MMOL/L — SIGNIFICANT CHANGE UP (ref 0.5–2)
LACTATE SERPL-SCNC: 1.5 MMOL/L — SIGNIFICANT CHANGE UP (ref 0.5–2)
LEUKOCYTE ESTERASE UR-ACNC: NEGATIVE — SIGNIFICANT CHANGE UP
LYMPHOCYTES # BLD AUTO: 1.58 K/UL — SIGNIFICANT CHANGE UP (ref 1–3.3)
LYMPHOCYTES # BLD AUTO: 15.1 % — SIGNIFICANT CHANGE UP (ref 13–44)
MAGNESIUM SERPL-MCNC: 1.7 MG/DL — SIGNIFICANT CHANGE UP (ref 1.6–2.6)
MAGNESIUM SERPL-MCNC: 2.2 MG/DL — SIGNIFICANT CHANGE UP (ref 1.6–2.6)
MCHC RBC-ENTMCNC: 28.8 PG — SIGNIFICANT CHANGE UP (ref 27–34)
MCHC RBC-ENTMCNC: 29.3 PG — SIGNIFICANT CHANGE UP (ref 27–34)
MCHC RBC-ENTMCNC: 29.8 PG — SIGNIFICANT CHANGE UP (ref 27–34)
MCHC RBC-ENTMCNC: 30.2 PG — SIGNIFICANT CHANGE UP (ref 27–34)
MCHC RBC-ENTMCNC: 30.2 PG — SIGNIFICANT CHANGE UP (ref 27–34)
MCHC RBC-ENTMCNC: 30.6 PG — SIGNIFICANT CHANGE UP (ref 27–34)
MCHC RBC-ENTMCNC: 32.8 G/DL — SIGNIFICANT CHANGE UP (ref 32–36)
MCHC RBC-ENTMCNC: 33.1 G/DL — SIGNIFICANT CHANGE UP (ref 32–36)
MCHC RBC-ENTMCNC: 33.1 G/DL — SIGNIFICANT CHANGE UP (ref 32–36)
MCHC RBC-ENTMCNC: 33.3 G/DL — SIGNIFICANT CHANGE UP (ref 32–36)
MCHC RBC-ENTMCNC: 33.9 G/DL — SIGNIFICANT CHANGE UP (ref 32–36)
MCHC RBC-ENTMCNC: 34.6 G/DL — SIGNIFICANT CHANGE UP (ref 32–36)
MCV RBC AUTO: 87.1 FL — SIGNIFICANT CHANGE UP (ref 80–100)
MCV RBC AUTO: 87.3 FL — SIGNIFICANT CHANGE UP (ref 80–100)
MCV RBC AUTO: 89 FL — SIGNIFICANT CHANGE UP (ref 80–100)
MCV RBC AUTO: 89.4 FL — SIGNIFICANT CHANGE UP (ref 80–100)
MCV RBC AUTO: 89.4 FL — SIGNIFICANT CHANGE UP (ref 80–100)
MCV RBC AUTO: 92.6 FL — SIGNIFICANT CHANGE UP (ref 80–100)
MONOCYTES # BLD AUTO: 0.54 K/UL — SIGNIFICANT CHANGE UP (ref 0–0.9)
MONOCYTES NFR BLD AUTO: 5.1 % — SIGNIFICANT CHANGE UP (ref 2–14)
NEUTROPHILS # BLD AUTO: 8.27 K/UL — HIGH (ref 1.8–7.4)
NEUTROPHILS NFR BLD AUTO: 78.8 % — HIGH (ref 43–77)
NITRITE UR-MCNC: NEGATIVE — SIGNIFICANT CHANGE UP
NRBC # BLD: 0 /100 WBCS — SIGNIFICANT CHANGE UP (ref 0–0)
NT-PROBNP SERPL-SCNC: 1285 PG/ML — HIGH
OSMOLALITY UR: 465 MOSM/KG — SIGNIFICANT CHANGE UP (ref 300–900)
PH UR: 6.5 — SIGNIFICANT CHANGE UP (ref 5–8)
PHOSPHATE SERPL-MCNC: 2.9 MG/DL — SIGNIFICANT CHANGE UP (ref 2.5–4.5)
PLATELET # BLD AUTO: 112 K/UL — LOW (ref 150–400)
PLATELET # BLD AUTO: 116 K/UL — LOW (ref 150–400)
PLATELET # BLD AUTO: 117 K/UL — LOW (ref 150–400)
PLATELET # BLD AUTO: 130 K/UL — LOW (ref 150–400)
PLATELET # BLD AUTO: 72 K/UL — LOW (ref 150–400)
PLATELET # BLD AUTO: 93 K/UL — LOW (ref 150–400)
POTASSIUM SERPL-MCNC: 4.6 MMOL/L — SIGNIFICANT CHANGE UP (ref 3.5–5.3)
POTASSIUM SERPL-MCNC: 4.9 MMOL/L — SIGNIFICANT CHANGE UP (ref 3.5–5.3)
POTASSIUM SERPL-SCNC: 4.6 MMOL/L — SIGNIFICANT CHANGE UP (ref 3.5–5.3)
POTASSIUM SERPL-SCNC: 4.9 MMOL/L — SIGNIFICANT CHANGE UP (ref 3.5–5.3)
POTASSIUM UR-SCNC: 68 MMOL/L — SIGNIFICANT CHANGE UP
PROT SERPL-MCNC: 4.2 G/DL — LOW (ref 6–8.3)
PROT UR-MCNC: NEGATIVE MG/DL — SIGNIFICANT CHANGE UP
PROTHROM AB SERPL-ACNC: 14.4 SEC — HIGH (ref 9.9–13.4)
PROTHROM AB SERPL-ACNC: 16.9 SEC — HIGH (ref 9.9–13.4)
RBC # BLD: 2.08 M/UL — LOW (ref 3.8–5.2)
RBC # BLD: 2.08 M/UL — LOW (ref 3.8–5.2)
RBC # BLD: 2.71 M/UL — LOW (ref 3.8–5.2)
RBC # BLD: 3.08 M/UL — LOW (ref 3.8–5.2)
RBC # BLD: 3.1 M/UL — LOW (ref 3.8–5.2)
RBC # BLD: 3.28 M/UL — LOW (ref 3.8–5.2)
RBC # FLD: 13.8 % — SIGNIFICANT CHANGE UP (ref 10.3–14.5)
RBC # FLD: 13.9 % — SIGNIFICANT CHANGE UP (ref 10.3–14.5)
RBC # FLD: 14.1 % — SIGNIFICANT CHANGE UP (ref 10.3–14.5)
RBC # FLD: 14.4 % — SIGNIFICANT CHANGE UP (ref 10.3–14.5)
RH IG SCN BLD-IMP: POSITIVE — SIGNIFICANT CHANGE UP
SODIUM SERPL-SCNC: 139 MMOL/L — SIGNIFICANT CHANGE UP (ref 135–145)
SODIUM SERPL-SCNC: 140 MMOL/L — SIGNIFICANT CHANGE UP (ref 135–145)
SODIUM UR-SCNC: 27 MMOL/L — SIGNIFICANT CHANGE UP
SP GR SPEC: 1.01 — SIGNIFICANT CHANGE UP (ref 1–1.03)
TIBC SERPL-MCNC: 152 UG/DL — LOW (ref 220–430)
TROPONIN I, HIGH SENSITIVITY RESULT: 6.4 NG/L — SIGNIFICANT CHANGE UP
UIBC SERPL-MCNC: 81 UG/DL — LOW (ref 110–370)
UROBILINOGEN FLD QL: 0.2 MG/DL — SIGNIFICANT CHANGE UP (ref 0.2–1)
VIT B12 SERPL-MCNC: 385 PG/ML — SIGNIFICANT CHANGE UP (ref 232–1245)
WBC # BLD: 10.99 K/UL — HIGH (ref 3.8–10.5)
WBC # BLD: 11.65 K/UL — HIGH (ref 3.8–10.5)
WBC # BLD: 11.68 K/UL — HIGH (ref 3.8–10.5)
WBC # BLD: 7.44 K/UL — SIGNIFICANT CHANGE UP (ref 3.8–10.5)
WBC # BLD: 8.31 K/UL — SIGNIFICANT CHANGE UP (ref 3.8–10.5)
WBC # BLD: 9.26 K/UL — SIGNIFICANT CHANGE UP (ref 3.8–10.5)
WBC # FLD AUTO: 10.99 K/UL — HIGH (ref 3.8–10.5)
WBC # FLD AUTO: 11.65 K/UL — HIGH (ref 3.8–10.5)
WBC # FLD AUTO: 11.68 K/UL — HIGH (ref 3.8–10.5)
WBC # FLD AUTO: 7.44 K/UL — SIGNIFICANT CHANGE UP (ref 3.8–10.5)
WBC # FLD AUTO: 8.31 K/UL — SIGNIFICANT CHANGE UP (ref 3.8–10.5)
WBC # FLD AUTO: 9.26 K/UL — SIGNIFICANT CHANGE UP (ref 3.8–10.5)

## 2024-12-03 PROCEDURE — 99222 1ST HOSP IP/OBS MODERATE 55: CPT | Mod: 25

## 2024-12-03 PROCEDURE — 36556 INSERT NON-TUNNEL CV CATH: CPT

## 2024-12-03 PROCEDURE — 76937 US GUIDE VASCULAR ACCESS: CPT | Mod: 26,76

## 2024-12-03 PROCEDURE — 93308 TTE F-UP OR LMTD: CPT | Mod: 26

## 2024-12-03 PROCEDURE — 99291 CRITICAL CARE FIRST HOUR: CPT | Mod: 25

## 2024-12-03 PROCEDURE — 71045 X-RAY EXAM CHEST 1 VIEW: CPT | Mod: 26

## 2024-12-03 PROCEDURE — 36600 WITHDRAWAL OF ARTERIAL BLOOD: CPT | Mod: 59

## 2024-12-03 PROCEDURE — 31500 INSERT EMERGENCY AIRWAY: CPT

## 2024-12-03 PROCEDURE — 43255 EGD CONTROL BLEEDING ANY: CPT | Mod: GC

## 2024-12-03 PROCEDURE — 36000 PLACE NEEDLE IN VEIN: CPT | Mod: 59

## 2024-12-03 PROCEDURE — 99291 CRITICAL CARE FIRST HOUR: CPT | Mod: GC,25

## 2024-12-03 RX ORDER — CHLORHEXIDINE GLUCONATE 1.2 MG/ML
1 RINSE ORAL
Refills: 0 | Status: DISCONTINUED | OUTPATIENT
Start: 2024-12-03 | End: 2024-12-12

## 2024-12-03 RX ORDER — LEVETIRACETAM 1000 MG/1
750 TABLET ORAL EVERY 12 HOURS
Refills: 0 | Status: DISCONTINUED | OUTPATIENT
Start: 2024-12-03 | End: 2024-12-07

## 2024-12-03 RX ORDER — 0.9 % SODIUM CHLORIDE 0.9 %
500 INTRAVENOUS SOLUTION INTRAVENOUS ONCE
Refills: 0 | Status: COMPLETED | OUTPATIENT
Start: 2024-12-03 | End: 2024-12-03

## 2024-12-03 RX ORDER — PIPERACILLIN SODIUM AND TAZOBACTAM SODIUM 4; .5 G/20ML; G/20ML
3.38 INJECTION, POWDER, LYOPHILIZED, FOR SOLUTION INTRAVENOUS ONCE
Refills: 0 | Status: COMPLETED | OUTPATIENT
Start: 2024-12-04 | End: 2024-12-04

## 2024-12-03 RX ORDER — FENTANYL 12 UG/H
100 PATCH, EXTENDED RELEASE TRANSDERMAL ONCE
Refills: 0 | Status: DISCONTINUED | OUTPATIENT
Start: 2024-12-03 | End: 2024-12-03

## 2024-12-03 RX ORDER — LEVETIRACETAM 1000 MG/1
750 TABLET ORAL EVERY 12 HOURS
Refills: 0 | Status: DISCONTINUED | OUTPATIENT
Start: 2024-12-03 | End: 2024-12-03

## 2024-12-03 RX ORDER — 0.9 % SODIUM CHLORIDE 0.9 %
1000 INTRAVENOUS SOLUTION INTRAVENOUS ONCE
Refills: 0 | Status: DISCONTINUED | OUTPATIENT
Start: 2024-12-03 | End: 2024-12-03

## 2024-12-03 RX ORDER — NOREPINEPHRINE BITARTRATE 1 MG/ML
0.05 INJECTION, SOLUTION, CONCENTRATE INTRAVENOUS
Qty: 8 | Refills: 0 | Status: DISCONTINUED | OUTPATIENT
Start: 2024-12-03 | End: 2024-12-04

## 2024-12-03 RX ORDER — ETOMIDATE 2 MG/ML
20 INJECTION, SOLUTION INTRAVENOUS ONCE
Refills: 0 | Status: COMPLETED | OUTPATIENT
Start: 2024-12-03 | End: 2024-12-03

## 2024-12-03 RX ORDER — ROCURONIUM BROMIDE 10 MG/ML
80 INJECTION INTRAVENOUS ONCE
Refills: 0 | Status: COMPLETED | OUTPATIENT
Start: 2024-12-03 | End: 2024-12-03

## 2024-12-03 RX ORDER — CHLORHEXIDINE GLUCONATE 1.2 MG/ML
15 RINSE ORAL EVERY 12 HOURS
Refills: 0 | Status: DISCONTINUED | OUTPATIENT
Start: 2024-12-03 | End: 2024-12-04

## 2024-12-03 RX ORDER — 0.9 % SODIUM CHLORIDE 0.9 %
1000 INTRAVENOUS SOLUTION INTRAVENOUS ONCE
Refills: 0 | Status: COMPLETED | OUTPATIENT
Start: 2024-12-03 | End: 2024-12-03

## 2024-12-03 RX ORDER — PIPERACILLIN SODIUM AND TAZOBACTAM SODIUM 4; .5 G/20ML; G/20ML
3.38 INJECTION, POWDER, LYOPHILIZED, FOR SOLUTION INTRAVENOUS ONCE
Refills: 0 | Status: COMPLETED | OUTPATIENT
Start: 2024-12-03 | End: 2024-12-03

## 2024-12-03 RX ORDER — ACETAMINOPHEN 500MG 500 MG/1
1000 TABLET, COATED ORAL ONCE
Refills: 0 | Status: COMPLETED | OUTPATIENT
Start: 2024-12-03 | End: 2024-12-03

## 2024-12-03 RX ORDER — SODIUM CHLORIDE 9 MG/ML
10 INJECTION, SOLUTION INTRAMUSCULAR; INTRAVENOUS; SUBCUTANEOUS
Refills: 0 | Status: DISCONTINUED | OUTPATIENT
Start: 2024-12-03 | End: 2024-12-07

## 2024-12-03 RX ORDER — PIPERACILLIN SODIUM AND TAZOBACTAM SODIUM 4; .5 G/20ML; G/20ML
3.38 INJECTION, POWDER, LYOPHILIZED, FOR SOLUTION INTRAVENOUS EVERY 8 HOURS
Refills: 0 | Status: DISCONTINUED | OUTPATIENT
Start: 2024-12-04 | End: 2024-12-07

## 2024-12-03 RX ORDER — ROCURONIUM BROMIDE 10 MG/ML
100 INJECTION INTRAVENOUS ONCE
Refills: 0 | Status: DISCONTINUED | OUTPATIENT
Start: 2024-12-03 | End: 2024-12-03

## 2024-12-03 RX ORDER — PANTOPRAZOLE SODIUM 40 MG/1
40 TABLET, DELAYED RELEASE ORAL EVERY 12 HOURS
Refills: 0 | Status: DISCONTINUED | OUTPATIENT
Start: 2024-12-03 | End: 2024-12-07

## 2024-12-03 RX ORDER — FENTANYL 12 UG/H
1.5 PATCH, EXTENDED RELEASE TRANSDERMAL
Qty: 2500 | Refills: 0 | Status: DISCONTINUED | OUTPATIENT
Start: 2024-12-03 | End: 2024-12-04

## 2024-12-03 RX ORDER — VANCOMYCIN HCL 900 MCG/MG
1000 POWDER (GRAM) MISCELLANEOUS EVERY 12 HOURS
Refills: 0 | Status: DISCONTINUED | OUTPATIENT
Start: 2024-12-03 | End: 2024-12-04

## 2024-12-03 RX ORDER — METHADONE HYDROCHLORIDE 5 MG/1
40 TABLET ORAL DAILY
Refills: 0 | Status: DISCONTINUED | OUTPATIENT
Start: 2024-12-03 | End: 2024-12-04

## 2024-12-03 RX ORDER — PROPOFOL 10 MG/ML
20 INJECTION, EMULSION INTRAVENOUS
Qty: 1000 | Refills: 0 | Status: DISCONTINUED | OUTPATIENT
Start: 2024-12-03 | End: 2024-12-04

## 2024-12-03 RX ADMIN — NOREPINEPHRINE BITARTRATE 6.38 MICROGRAM(S)/KG/MIN: 1 INJECTION, SOLUTION, CONCENTRATE INTRAVENOUS at 02:09

## 2024-12-03 RX ADMIN — PIPERACILLIN SODIUM AND TAZOBACTAM SODIUM 200 GRAM(S): 4; .5 INJECTION, POWDER, LYOPHILIZED, FOR SOLUTION INTRAVENOUS at 22:30

## 2024-12-03 RX ADMIN — Medication 1000 MILLILITER(S): at 04:38

## 2024-12-03 RX ADMIN — ETOMIDATE 20 MILLIGRAM(S): 2 INJECTION, SOLUTION INTRAVENOUS at 12:13

## 2024-12-03 RX ADMIN — PROPOFOL 8.16 MICROGRAM(S)/KG/MIN: 10 INJECTION, EMULSION INTRAVENOUS at 12:14

## 2024-12-03 RX ADMIN — PROPOFOL 8.16 MICROGRAM(S)/KG/MIN: 10 INJECTION, EMULSION INTRAVENOUS at 18:04

## 2024-12-03 RX ADMIN — PANTOPRAZOLE SODIUM 40 MILLIGRAM(S): 40 TABLET, DELAYED RELEASE ORAL at 18:04

## 2024-12-03 RX ADMIN — Medication 2 MILLIGRAM(S): at 16:15

## 2024-12-03 RX ADMIN — Medication 1000 MILLILITER(S): at 12:14

## 2024-12-03 RX ADMIN — NOREPINEPHRINE BITARTRATE 6.38 MICROGRAM(S)/KG/MIN: 1 INJECTION, SOLUTION, CONCENTRATE INTRAVENOUS at 18:04

## 2024-12-03 RX ADMIN — PANTOPRAZOLE SODIUM 10 MG/HR: 40 TABLET, DELAYED RELEASE ORAL at 02:09

## 2024-12-03 RX ADMIN — NOREPINEPHRINE BITARTRATE 6.38 MICROGRAM(S)/KG/MIN: 1 INJECTION, SOLUTION, CONCENTRATE INTRAVENOUS at 23:27

## 2024-12-03 RX ADMIN — Medication 25 GRAM(S): at 06:35

## 2024-12-03 RX ADMIN — NOREPINEPHRINE BITARTRATE 6.38 MICROGRAM(S)/KG/MIN: 1 INJECTION, SOLUTION, CONCENTRATE INTRAVENOUS at 12:14

## 2024-12-03 RX ADMIN — Medication 250 MILLIGRAM(S): at 09:33

## 2024-12-03 RX ADMIN — PROPOFOL 8.16 MICROGRAM(S)/KG/MIN: 10 INJECTION, EMULSION INTRAVENOUS at 14:05

## 2024-12-03 RX ADMIN — CHLORHEXIDINE GLUCONATE 1 APPLICATION(S): 1.2 RINSE ORAL at 06:20

## 2024-12-03 RX ADMIN — ACETAMINOPHEN 500MG 400 MILLIGRAM(S): 500 TABLET, COATED ORAL at 09:08

## 2024-12-03 RX ADMIN — Medication 6 MILLIGRAM(S): at 12:13

## 2024-12-03 RX ADMIN — FENTANYL 100 MICROGRAM(S): 12 PATCH, EXTENDED RELEASE TRANSDERMAL at 12:14

## 2024-12-03 RX ADMIN — Medication 2 MILLIGRAM(S): at 12:13

## 2024-12-03 RX ADMIN — CHLORHEXIDINE GLUCONATE 15 MILLILITER(S): 1.2 RINSE ORAL at 18:05

## 2024-12-03 RX ADMIN — Medication 1000 MILLILITER(S): at 05:23

## 2024-12-03 RX ADMIN — ROCURONIUM BROMIDE 80 MILLIGRAM(S): 10 INJECTION INTRAVENOUS at 12:13

## 2024-12-03 RX ADMIN — FENTANYL 10.2 MICROGRAM(S)/KG/HR: 12 PATCH, EXTENDED RELEASE TRANSDERMAL at 21:51

## 2024-12-03 RX ADMIN — FENTANYL 10.2 MICROGRAM(S)/KG/HR: 12 PATCH, EXTENDED RELEASE TRANSDERMAL at 12:30

## 2024-12-03 RX ADMIN — Medication 250 MILLIGRAM(S): at 22:28

## 2024-12-03 RX ADMIN — METHADONE HYDROCHLORIDE 40 MILLIGRAM(S): 5 TABLET ORAL at 09:33

## 2024-12-03 RX ADMIN — PROPOFOL 8.16 MICROGRAM(S)/KG/MIN: 10 INJECTION, EMULSION INTRAVENOUS at 21:51

## 2024-12-03 RX ADMIN — FENTANYL 100 MICROGRAM(S): 12 PATCH, EXTENDED RELEASE TRANSDERMAL at 12:15

## 2024-12-03 RX ADMIN — Medication 1000 MILLILITER(S): at 06:35

## 2024-12-03 RX ADMIN — PANTOPRAZOLE SODIUM 80 MILLIGRAM(S): 40 TABLET, DELAYED RELEASE ORAL at 00:04

## 2024-12-03 RX ADMIN — LEVETIRACETAM 750 MILLIGRAM(S): 1000 TABLET ORAL at 18:04

## 2024-12-03 RX ADMIN — PANTOPRAZOLE SODIUM 10 MG/HR: 40 TABLET, DELAYED RELEASE ORAL at 10:10

## 2024-12-03 RX ADMIN — ACETAMINOPHEN 500MG 1000 MILLIGRAM(S): 500 TABLET, COATED ORAL at 09:23

## 2024-12-03 NOTE — PROCEDURE NOTE - ADDITIONAL PROCEDURE DETAILS
R IJ CVC placed under US guidance.   Guide wire visualized in R IJ under ultrasound in long and short axis views prior to dilation.  Lung sliding present pre and post procedure.   Agitated saline test demonstrates R sided heart filling on POCUS.   Post-procedure CXR PENDING for placement confirmation.

## 2024-12-03 NOTE — PROVIDER CONTACT NOTE (EICU) - BACKGROUND
Sodium 143 , Potassium 5.0, chloride 109 , bicarb 32 , BUN 60, creat 1.5.  AST 56, ALT 75 , alkaline phosphatase 56

## 2024-12-03 NOTE — H&P ADULT - NSHPSOCIALHISTORY_GEN_ALL_CORE
Lives in shelter and hotel, reporting moving into an apartment prior to start of symptoms  Uses a walker at baseline

## 2024-12-03 NOTE — PROCEDURE NOTE - NSPOSTPRCRAD_GEN_A_CORE
17cm, CXR pending/central line located in the/central line located in the superior vena cava/line adjusted to depth of insertion

## 2024-12-03 NOTE — SBIRT NOTE ADULT - NSSBIRTUNABLESCROTHER_GEN_A_CORE
met with pt. earlier in the day for psychosocial. However, when  returned to complete SBIRT with pt, she had been intubated. She is currently intubated and sedated.

## 2024-12-03 NOTE — CONSULT NOTE ADULT - SUBJECTIVE AND OBJECTIVE BOX
HPI: Patient is a 59F w/ PMHx of polysubstance use (tobacco, opioids on methadone), HepC (treated 15-20y ago), anemia, CKD and epilepsy BIBEMS for AMS. . Pt reported that she was at her shelter when she had a seizure that was witnessed by her roommate. Patient reports having maroon colored bowel movement since Saturday. She reports having 3-4x episodes of loose maroon colored bowel movements on Saturday. The stools turned black on  and for the past day it has become maroon again. Currently she is having back-to-back maroon colored bowel movement. She reports never having similar symptoms before. She purchased Naproxen and gabapentin 400mg from a dealer a week ago. She reports taking 2-3 capsules of naproxen and popping gabapentin "like candy" throughout the day. She does not recall having EGD or colonoscopy in the past. She reports being treated for HepC 15-20 years ago at [facility] and was undetectable for a long time but was recently told by her methadone program that her HepC is detectable. Patient does not use alcohol.     In the Ed, patient was found to be hypotensive to 85/50. Patient was started on norepinephrine. Hgb was 5.9 on admission. Patient was given 4U pRBC, which brought the Hgb to 7.8. She was started on pantoprazole gtt. Patient endorses lightheadedness and constant maroon colored diarrhea. She denies nausea, vomiting, abdominal pain.       Allergies  No Known Allergies  Intolerances      Home Medications:  methadone 10 mg oral tablet: 16 tab(s) orally every 24 hours (2024 15:11)  QUEtiapine 25 mg oral tablet: 3 tab(s) orally once a day (at bedtime) (2024 15:11)    MEDICATIONS:  MEDICATIONS  (STANDING):  acetaminophen   IVPB .. 1000 milliGRAM(s) IV Intermittent once  chlorhexidine 2% Cloths 1 Application(s) Topical <User Schedule>  erythromycin   IVPB 250 milliGRAM(s) IV Intermittent once  levETIRAcetam 750 milliGRAM(s) Oral every 12 hours  norepinephrine Infusion 0.05 MICROgram(s)/kG/Min (6.38 mL/Hr) IV Continuous <Continuous>  pantoprazole Infusion 8 mG/Hr (10 mL/Hr) IV Continuous <Continuous>    MEDICATIONS  (PRN):    PAST MEDICAL & SURGICAL HISTORY:  IVDU (intravenous drug user)  Anemia  Asthma  Epilepsy    No significant past surgical history         FAMILY HISTORY:  Family history of AIDS (Mother, Sibling)  Family history of alcohol abuse (Father)      SOCIAL HISTORY:  Tobacoo: [ ] Current, [ ] Former, [ ] Never; Pack Years:  Alcohol:  Illicit Drugs:    REVIEW OF SYSTEMS:  All other 10 review of systems is negative unless indicated above.    Vital Signs Last 24 Hrs  T(C): 37 (03 Dec 2024 02:23), Max: 37 (03 Dec 2024 02:19)  T(F): 98.6 (03 Dec 2024 02:23), Max: 98.6 (03 Dec 2024 02:19)  HR: 94 (03 Dec 2024 08:00) (65 - 103)  BP: 91/52 (03 Dec 2024 08:00) (84/50 - 131/66)  BP(mean): 65 (03 Dec 2024 08:00) (65 - 80)  RR: 26 (03 Dec 2024 08:00) (13 - 30)  SpO2: 98% (03 Dec 2024 08:00) (94% - 100%)    Parameters below as of 03 Dec 2024 08:00  Patient On (Oxygen Delivery Method): room air         @ : @ 07:00  --------------------------------------------------------  IN: 1112.5 mL / OUT: 1025 mL / NET: 87.5 mL     @ 07:  -   @ 08:42  --------------------------------------------------------  IN: 30.2 mL / OUT: 90 mL / NET: -59.8 mL        PHYSICAL EXAM:    General: Pale-appearing, in acute distress. On levo 0.08 units   Eyes: Anicteric sclerae, moist conjunctivae  HENT: Moist mucous membranes  Neck: Trachea midline, supple  Lungs: Normal respiratory effort and no intercostal retractions  Cardiovascular: Tachycardic.   Abdomen: Soft, non-tender non-distended; Hyperactive bowel sounds; No rebound or guarding. Watery maroon stool during exam   Extremities: Normal range of motion in UE, LE ROM limited by pain.  1+ edema;   Neurological: Alert and oriented x3  Skin: Warm and dry. No obvious rash    LABS:                        7.8    8.31  )-----------( 117      ( 03 Dec 2024 03:30 )             23.6     12-03    140  |  110[H]  |  57[H]  ----------------------------<  129[H]  4.6   |  24  |  0.93    Ca    7.5[L]      03 Dec 2024 03:30  Phos  2.9     12  Mg     1.7     12    TPro  4.2[L]  /  Alb  2.4[L]  /  TBili  0.3  /  DBili  x   /  AST  57[H]  /  ALT  49[H]  /  AlkPhos  44  12-03        PT/INR - ( 02 Dec 2024 23:17 )   PT: 16.0 sec;   INR: 1.37          PTT - ( 02 Dec 2024 23:17 )  PTT:38.0 sec    RADIOLOGY & ADDITIONAL STUDIES:    HPI: Patient is a 59F w/ PMHx of polysubstance use (tobacco, opioids on methadone), HepC (treated 15-20y ago), anemia, CKD and epilepsy BIBEMS for AMS. . Pt reported that she was at her shelter when she had a seizure that was witnessed by her roommate. Patient reports having maroon colored bowel movement since Saturday. She reports having 3-4x episodes of loose maroon colored bowel movements on Saturday. The stools turned black on  and for the past day it has become maroon again. Currently she is having back-to-back maroon colored bowel movement. She denies having similar symptoms before. She purchased Naproxen and gabapentin 400mg from a dealer a week ago. She reports taking 2-3 capsules of naproxen and popping gabapentin "like candy" throughout the day. She does not recall having and EGD or colonoscopy in the past. She reports being treated for HepC 15-20 years ago at [facility] and was undetectable for a long time but was recently told by her methadone program that her HepC is detectable. Patient does not use alcohol.     In the ED, patient was found to be hypotensive to 85/50. Patient was started on norepinephrine. Hgb was 5.9 on admission. Patient was given 4U pRBC, which brought the Hgb to 7.8. She was started on pantoprazole gtt. Patient endorses lightheadedness and constant maroon colored diarrhea. She denies nausea, vomiting, abdominal pain.       Allergies  No Known Allergies  Intolerances      Home Medications:  methadone 10 mg oral tablet: 16 tab(s) orally every 24 hours (2024 15:11)  QUEtiapine 25 mg oral tablet: 3 tab(s) orally once a day (at bedtime) (2024 15:11)    MEDICATIONS:  MEDICATIONS  (STANDING):  acetaminophen   IVPB .. 1000 milliGRAM(s) IV Intermittent once  chlorhexidine 2% Cloths 1 Application(s) Topical <User Schedule>  erythromycin   IVPB 250 milliGRAM(s) IV Intermittent once  levETIRAcetam 750 milliGRAM(s) Oral every 12 hours  norepinephrine Infusion 0.05 MICROgram(s)/kG/Min (6.38 mL/Hr) IV Continuous <Continuous>  pantoprazole Infusion 8 mG/Hr (10 mL/Hr) IV Continuous <Continuous>    MEDICATIONS  (PRN):    PAST MEDICAL & SURGICAL HISTORY:  IVDU (intravenous drug user)  Anemia  Asthma  Epilepsy    No significant past surgical history         FAMILY HISTORY:  Family history of AIDS (Mother, Sibling)  Family history of alcohol abuse (Father)      SOCIAL HISTORY:  Tobacoo: [ ] Current, [ ] Former, [ ] Never; Pack Years:  Alcohol:  Illicit Drugs:    REVIEW OF SYSTEMS:  All other 10 review of systems is negative unless indicated above.    Vital Signs Last 24 Hrs  T(C): 37 (03 Dec 2024 02:23), Max: 37 (03 Dec 2024 02:19)  T(F): 98.6 (03 Dec 2024 02:23), Max: 98.6 (03 Dec 2024 02:19)  HR: 94 (03 Dec 2024 08:00) (65 - 103)  BP: 91/52 (03 Dec 2024 08:00) (84/50 - 131/66)  BP(mean): 65 (03 Dec 2024 08:00) (65 - 80)  RR: 26 (03 Dec 2024 08:00) (13 - 30)  SpO2: 98% (03 Dec 2024 08:00) (94% - 100%)    Parameters below as of 03 Dec 2024 08:00  Patient On (Oxygen Delivery Method): room air         @ :  -   @ 07:00  --------------------------------------------------------  IN: 1112.5 mL / OUT: 1025 mL / NET: 87.5 mL     @ 07:  -   @ 08:42  --------------------------------------------------------  IN: 30.2 mL / OUT: 90 mL / NET: -59.8 mL        PHYSICAL EXAM:    General: Pale-appearing, in acute distress. On levo 0.08 units   Eyes: Anicteric sclerae, moist conjunctivae  HENT: Moist mucous membranes  Neck: Trachea midline, supple  Lungs: Normal respiratory effort and no intercostal retractions  Cardiovascular: Tachycardic.   Abdomen: Soft, non-tender non-distended; Hyperactive bowel sounds; No rebound or guarding. Watery maroon stool during exam   Extremities: Normal range of motion in UE, LE ROM limited by pain.  1+ edema;   Neurological: Alert and oriented x3  Skin: Warm and dry. No obvious rash    LABS:                        7.8    8.31  )-----------( 117      ( 03 Dec 2024 03:30 )             23.6     12-03    140  |  110[H]  |  57[H]  ----------------------------<  129[H]  4.6   |  24  |  0.93    Ca    7.5[L]      03 Dec 2024 03:30  Phos  2.9     12-  Mg     1.7     12    TPro  4.2[L]  /  Alb  2.4[L]  /  TBili  0.3  /  DBili  x   /  AST  57[H]  /  ALT  49[H]  /  AlkPhos  44  12-03        PT/INR - ( 02 Dec 2024 23:17 )   PT: 16.0 sec;   INR: 1.37          PTT - ( 02 Dec 2024 23:17 )  PTT:38.0 sec    RADIOLOGY & ADDITIONAL STUDIES:

## 2024-12-03 NOTE — H&P ADULT - NSHPPHYSICALEXAM_GEN_ALL_CORE
GENERAL: NAD, lying in bed comfortably  HEAD:  Atraumatic, normocephalic  EYES: EOMI, PERRLA, conjunctiva and sclera clear  NECK: Supple, trachea midline, no JVD  HEART: Regular rate and rhythm, no murmurs, rubs, or gallops  LUNGS: Unlabored respirations.  Clear to auscultation bilaterally, no crackles, wheezing, or rhonchi  ABDOMEN: Soft, nontender, nondistended, +BS  EXTREMITIES: 2+ peripheral pulses bilaterally. No clubbing, cyanosis, or edema  NERVOUS SYSTEM:  A&Ox3, moving all extremities, no focal deficits   SKIN: No rashes or lesions GENERAL: NAD, lying in bed comfortably  HEAD:  Atraumatic, normocephalic  EYES: EOMI, PERRLA, conjunctiva and sclera clear  NECK: Supple, trachea midline, no JVD  HEART: Regular rate and rhythm, no murmurs, rubs, or gallops  LUNGS: Unlabored respirations.  Clear to auscultation bilaterally, no crackles, wheezing, or rhonchi  ABDOMEN: Soft, nontender, nondistended, +BS  : Rectal exam +maroon/black tarry stool and external hemorrhoid, unable to complete full rectal exam due to pt refusal  EXTREMITIES: 2+ peripheral pulses bilaterally. No clubbing, cyanosis, or edema  NERVOUS SYSTEM:  A&Ox3, moving all extremities, no focal deficits   SKIN: No rashes or lesions GENERAL: NAD, lying in bed comfortably  HEAD:  Atraumatic, normocephalic  EYES: EOMI, PERRLA, conjunctiva and sclera clear  NECK: Supple, trachea midline, bandage over RIJ  HEART: Regular rate and rhythm, no murmurs, rubs, or gallops  LUNGS: Unlabored respirations.  Clear to auscultation bilaterally, no crackles, wheezing, or rhonchi  ABDOMEN: Soft, nontender, nondistended, +BS  : Rectal exam +maroon/black tarry stool and external hemorrhoid, unable to complete full rectal exam due to pt refusal. R femoral line in place  EXTREMITIES: 2+ peripheral pulses bilaterally. No clubbing, cyanosis, or edema  NERVOUS SYSTEM:  A&Ox3, moving all extremities, no focal deficits   SKIN: No rashes or lesions

## 2024-12-03 NOTE — H&P ADULT - ATTENDING COMMENTS
59 F with polysubstance use (tobacco, opioids, on methadone), epilepsy, CKD, Hep C, and anemia presents for AMS. She was at her shelter when she had a seizure witnessed by her roommate. She developed abdominal pain and multiple episodes of loose, maroon stools and was unable to take her home medications and missed her appointment with the methadone clinic. She was taking gabapentin 400mg QID for the past week that she purchased from a dealer. She denies GIB, prior colonoscopies, recent NSAID, and alcohol use. Physical exam as above. She developed hypotension after melena in the ED and was given four PRBCs, and started on norepinephrine. Labs significant for prerenal BOB.   1. UGIB  2. Hemorrhagic vs hypovolemic shock  3. BOB  4. Encephalopathy - due to gabapentin vs substance use vs metabolic  5. Epilepsy   - NPO  - large bore iv   - pantoprazole drip  - iv fluids  - hold gabapentin  - cont Keppra  - cont methadone  - urine tox screen  - echo

## 2024-12-03 NOTE — ED ADULT NURSE NOTE - NSFALLHARMRISKINTERV_ED_ALL_ED

## 2024-12-03 NOTE — ED PROCEDURE NOTE - PROCEDURE ADDITIONAL DETAILS
After dilation and placement of triple lumen central line, unable to draw back blood. Removed line and held manual pressure for ~5min and then placed pressure dressing over right IJ.

## 2024-12-03 NOTE — PROVIDER CONTACT NOTE (EICU) - SITUATION
Pt missed her daily methadone dose and took her Heroine IV, Pt has been confused. He was about to be discharged. She has an abdominal pain

## 2024-12-03 NOTE — CONSULT NOTE ADULT - ASSESSMENT
Patient is a 59F w/ PMHx of polysubstance use (tobacco, opioids on methadone), HepC (treated 15-20y ago), anemia, CKD and epilepsy admitted to MICU due to hemorrhagic shock 2/2 GIB requiring pressors. GI consulted for GI bleed.     Patient having multiple episodes of maroon-colored diarrhea. Patient did not respond appropriately to transfusion. Hgb went from 5.9 to 7.8 after 4U of pRBC. Patient endorses 7d history of using multiple tabs of gabapentin and naproxen, which could have triggered the upper GI bleed. Variceal bleeding less likely given CT A/P in 7/24 showed liver within normal limits without signs of cirrhosis. Patient reports being told her HepC is detectable by her methadone clinic.     Recommendations:  - EGD at bedside today  - continue with pantoprazole gtt   - maintain 2 large bore IVs   - Active T&S   - Transfuse as needed for Hgb > 7   - Obtain hepatitis C RNA levels   - Supportive management per primary team     The note is incomplete until appended by the fellow and attending.      Patient is a 59F w/ PMHx of polysubstance use (tobacco, opioids on methadone), HepC (treated 15-20y ago), anemia, CKD and epilepsy admitted to MICU due to hemorrhagic shock 2/2 GIB requiring pressors. GI consulted for GI bleed.     Patient having multiple episodes of maroon-colored diarrhea. Patient did not respond appropriately to transfusion. Hgb went from 5.9 to 7.8 after 4U of pRBC. Patient endorses 7d history of using multiple tabs of gabapentin and naproxen, which could have triggered the upper GI bleeding on gastric or duodenal ulcer or gastritis. Variceal bleeding less likely given CT A/P in 7/24 showed liver within normal limits without signs of cirrhosis. Patient reports being told her HepC is detectable by her methadone clinic.     Recommendations:  - EGD at bedside today  - continue with pantoprazole gtt   - maintain 2 large bore IVs   - Active T&S   - Transfuse as needed for Hgb > 7   - Obtain hepatitis C RNA levels   - Supportive management per primary team     The note is incomplete until appended by the fellow and attending.

## 2024-12-03 NOTE — CHART NOTE - NSCHARTNOTEFT_GEN_A_CORE
EGD performed at bedside on 12/3/24:    Findings:  Esophagus	Normal esophagus.  Stomach	Excavated lesions	A single cratered spurting 10 mm ulcer was found in the antrum, c/w [place] 1b ulcer. After careful examination with gastroscope, gastroscope was removed and ovesco clip was attached to the tip of the gastroscope and then gastroscope was reintroduced and ovesco clip was applied over the ulcer with successful hemostasis achieved.  Duodenum	Normal duodenum.    Plan:  NPO      No GI contraindication to extubation today      PPI IV 40 mg BID    Yogi Laboy MD  PGY-4 GI Fellow  GI consult pager (M-F 3q-5p): [phone]  Please call  for on-call fellow after hours

## 2024-12-03 NOTE — PROVIDER CONTACT NOTE (EICU) - RECOMMENDATIONS
Calls stating has been bleeding a lot since having the IUD placed and was feeling for strings today and grasped them and pulled the IUD out. Would like to start on Depo as she had been on this in the past. Appt scheduled.   Transfer to the ICU at [facility]  GI consult   CT angio abdomen pelvis, check lactic acid

## 2024-12-03 NOTE — H&P ADULT - ASSESSMENT
Neuro   #AMS      #    Cardiovascular   #    Pulmonary       GI   #UGIB?  Pt complaining of maroon/dark stools since 11/30  GI consulted       Renal       ID      Heme  #    Endo      MSK    Prophylaxis  F: as needed  E: Replete as needed, target K>4, Phos>3, Mg>2  N: NPO  DVT pptx:   GI pptx:   Code status:   Dispo: MICU       Neuro   #AMS  Pt BIBEMS after using heroin  Improvement in mentation, now AOx3  Likely 2/2     #Seizure  Home keppra 750mg BID  Per pt, has had breakthrough seizures, last 11/30 witnessed by roommate   - c/w keppra 750mg BID  - seizure precautions  - vEEG  - epilepsy consult    #Opioid dependence  Pt reports she takes methadone 170mg qd -- last went to methadone clinic 1 week ago, given     Cardiovascular   #    Pulmonary   FAUSTO, pt started on 2L/NC for comfort   - wean as tolerated     GI   #UGIB?  Pt complaining of episodes of maroon/dark stools since 11/30  CT A/P  - NPO pending scope w/ GI  - pantoprazole gtt  - GI consulted, recs appreciated    #Transaminitis  AST 75, ALT 80  Pt reporting ongoing abdominal pain since 11/30  - trend w/ daily LFTs    Renal   #BOB on CKD  Unclear baseline, BUN 60, Cr 1.5 at presentation  Likely pre-renal iso hypovolemia 2/2 acute GIB    Plan:  - daily BMP  - urine lytes    #Urinary retention      ID  FAUSTO, afebrile, no leukocytosis    Heme  #Normocytic anemia likely 2/2 UGIB  Hgb 5.9 > 6.1 s/p 2 units pRBC, received 2 more units prior to transfer   At last admission, Hgb 7.9-9  - repeat CBC   - maintain active T&S  - transfuse Hgb <7    Endo  FAUSTO    MSK  FAUSTO    Prophylaxis  F: as needed  E: Replete as needed, target K>4, Phos>3, Mg>2  N: NPO  DVT pptx: hold iso GIB  GI pptx: pantoprazole gtt  Code status: Full code  Dispo: MICU Patient is a 59-year-old female w/ PMH of polysubstance use (tobacco, opioids on methadone, Hep C positive), anemia, CKD, and epilepsy BIBEMS for AMS found to have Hgb 5.9 and melena admitted to MICU w/ c/f acute UGIB pending endoscopy.    Neuro/Psych  #AMS -- RESOLVED  Initially BIBEMS for AMS  Improvement in mentation, now AOx3    #Seizure  Home keppra 750mg BID  Per pt, has had breakthrough seizures, last 11/30 witnessed by roommate  Last dose of keppra taken morning of seizure  - c/w keppra 750mg BID  - seizure precautions  - epilepsy consult    #Opioid dependence  Pt reports she takes methadone 170mg qd -- last went to methadone clinic 1 week ago but missed appointment on 12/2 due to symptoms   Reported last use of heroin 10 days ago, has been taking gabapentin 400mg QID for the past week  - confirm dose w/ methadone clinic ([facility]: [phone])  - f/u [facility]    #Bipolar disorder  #Depression  Previously on buspar though self-discontinued by pt due to side effects (shakiness)  Per surescripts, is also on quetiapine 75mg qd  - formal med rec     Cardiovascular   #Hemorrhagic shock likely 2/2 acute GIB  100/65 --> 85/50, started on levo gtt  Unlikely septic shock given afebrile, no leukocytosis, no evidence of infection; unlikely cardiogenic given no significant cardiovascular hx, warm, well-perfused on exam  POCUS w/ normal heart function  - c/w levo gtt  - titrate to MAP > 65  - rest of plan per GI below    Pulmonary   FAUSTO, pt started on 2L/NC for comfort   CXR negative  - wean as tolerated     GI   #UGIB?  Pt complaining of episodes of maroon/dark stools since 11/30  No prior hx, no prior colonoscopies. Pt reporting prior EGD in 7/2024 admission though none documented, no c/f bleeding at that time   Low concern for varices given no significant alcohol use hx   Rectal exam +maroon/dark stool, external hemorrhoid   - NPO pending scope w/ GI  - consider erythromycin prior to scope for better visualization  - pantoprazole gtt  - GI consult     #Transaminitis  #Hx of hepatitic C  AST 75, ALT 80  Pt reporting ongoing abdominal pain since 11/30  - trend w/ daily LFTs    Diet: NPO except meds  GI pptx: pantoprazole gtt  Bowel regimen: none    Renal   #BOB on CKD  Unclear baseline, BUN 60, Cr 1.5 at presentation  Cr 1.2 at prior admission in 7/2024  Likely pre-renal iso hypovolemia 2/2 acute GIB  - daily BMP  - urine lytes    #Urinary retention  At presentation, , SC x1 w/ 550cc output  - BS q6  - SC PRN    ID  FAUSTO, afebrile, no leukocytosis    Heme  #Normocytic anemia likely 2/2 acute UGIB  Hgb 5.9 > 6.1 s/p 2 units pRBC, received 2 more units prior to transfer   At last admission, Hgb 7.9-9  - repeat CBC   - maintain active T&S  - transfuse Hgb <7  - f/u iron studies  - f/u B12, folate    Endo  FAUSTO    MSK  FAUSTO    Prophylaxis  F: as needed  E: Replete as needed, target K>4, Phos>3, Mg>2  N: NPO  DVT pptx: hold iso GIB  GI pptx: pantoprazole gtt  Code status: Full code  Dispo: MICU Patient is a 59-year-old female w/ PMH of polysubstance use (tobacco, opioids on methadone, Hep C positive), anemia, CKD, and epilepsy BIBEMS for AMS found to have Hgb 5.9 and melena admitted to MICU w/ c/f acute UGIB pending endoscopy.    Neuro/Psych  #AMS -- RESOLVED  Initially BIBEMS for AMS  Improvement in mentation, now AOx3    #Seizure  Home keppra 750mg BID  Per pt, has had breakthrough seizures, last 11/30 witnessed by roommate  Last dose of keppra taken morning of seizure  - c/w keppra 750mg BID  - seizure precautions  - epilepsy consult    #Opioid dependence  Pt reports she takes methadone 170mg qd -- last went to methadone clinic 1 week ago but missed appointment on 12/2 due to symptoms   Reported last use of heroin 10 days ago, has been taking gabapentin 400mg QID for the past week  - confirm dose w/ methadone clinic ([facility]: [phone])  - f/u [facility]    #Bipolar disorder  #Depression  Previously on buspar though self-discontinued by pt due to side effects (shakiness)  Per surescripts, is also on quetiapine 75mg qd  - formal med rec     Cardiovascular   #Hemorrhagic shock likely 2/2 acute GIB  100/65 --> 85/50, started on levo gtt  Unlikely septic shock given afebrile, no leukocytosis, no evidence of infection; unlikely cardiogenic given no significant cardiovascular hx, warm, well-perfused on exam  POCUS w/ normal heart function  - c/w levo gtt  - titrate to MAP > 65  - rest of plan per GI below    Pulmonary   FAUSTO, pt started on 2L/NC for comfort   CXR negative  - wean as tolerated     GI   #UGIB?  Pt complaining of episodes of maroon/dark stools since 11/30  No prior hx, no prior colonoscopies. Pt reporting prior EGD in 7/2024 admission though none documented, no c/f bleeding at that time   Low concern for varices given no significant alcohol use hx   Rectal exam +maroon/dark stool, external hemorrhoid   - NPO pending scope w/ GI  - consider erythromycin prior to scope for better visualization  - pantoprazole gtt  - GI consult     #Transaminitis  #Hx of hepatitis C  AST 75, ALT 80  Pt reporting ongoing abdominal pain since 11/30  - trend w/ daily LFTs    Diet: NPO except meds  GI pptx: pantoprazole gtt  Bowel regimen: none    Renal   #BOB on CKD  Unclear baseline, BUN 60, Cr 1.5 at presentation  Cr 1.2 at prior admission in 7/2024  Likely pre-renal iso hypovolemia 2/2 acute GIB  - daily BMP  - urine lytes    #Urinary retention  At presentation, , SC x1 w/ 550cc output  - BS q6  - SC PRN    ID  FAUSTO, afebrile, no leukocytosis    Heme  #Normocytic anemia likely 2/2 acute UGIB  Hgb 5.9 > 6.1 s/p 2 units pRBC, received 2 more units prior to transfer   At last admission, Hgb 7.9-9  - repeat CBC   - maintain active T&S  - transfuse Hgb <7  - f/u iron studies  - f/u B12, folate    Endo  FAUSTO    MSK  FAUSTO    Prophylaxis  F: as needed  E: Replete as needed, target K>4, Phos>3, Mg>2  N: NPO  DVT pptx: hold iso GIB  GI pptx: pantoprazole gtt  Code status: Full code  Dispo: MICU Patient is a 59-year-old female w/ PMH of polysubstance use (tobacco, opioids on methadone, Hep C positive), anemia, CKD, and epilepsy BIBEMS for AMS found to have Hgb 5.9 and melena admitted to MICU w/ c/f acute UGIB pending endoscopy.    Neuro/Psych  #AMS -- RESOLVED  Initially BIBEMS for AMS  Improvement in mentation, now AOx3    #Seizure  Home keppra 750mg BID  Per pt, has had breakthrough seizures, last 11/30 witnessed by roommate  Last dose of keppra taken morning of seizure  - c/w keppra 750mg BID  - seizure precautions  - epilepsy consult    #Opioid dependence  Pt reports she takes methadone 170mg qd -- last went to methadone clinic 1 week ago but missed appointment on 12/2 due to symptoms   Reported last use of heroin 10 days ago, has been taking gabapentin 400mg QID for the past week  - confirm dose w/ methadone clinic ([facility]: [phone])  - f/u [facility]    #Bipolar disorder  #Depression  Previously on buspar though self-discontinued by pt due to side effects (shakiness)  Per surescripts, is also on quetiapine 75mg qd  - formal med rec     Cardiovascular   #Hemorrhagic shock likely 2/2 acute GIB  100/65 --> 85/50, started on levo gtt  Unlikely septic shock given afebrile, no leukocytosis, no evidence of infection; unlikely cardiogenic given no significant cardiovascular hx, warm, well-perfused on exam  POCUS w/ normal heart function  - c/w levo gtt  - titrate to MAP > 65  - rest of plan per GI below    Pulmonary   FAUSTO, pt started on 2L/NC for comfort   CXR negative  - wean as tolerated     GI   #UGIB?  Pt complaining of episodes of maroon/dark stools since 11/30 likely 2/2 NSAID use for the past week  No prior hx, no prior colonoscopies. Pt reporting prior EGD in 7/2024 admission though none documented, no c/f bleeding at that time   Low concern for varices given no significant alcohol use hx   Rectal exam +maroon/dark stool, external hemorrhoid   - NPO pending scope w/ GI  - consider erythromycin prior to scope for better visualization  - pantoprazole gtt  - GI consult     #Transaminitis  #Hx of hepatitis C  AST 75, ALT 80  Pt reporting ongoing abdominal pain since 11/30  - trend w/ daily LFTs    Diet: NPO except meds  GI pptx: pantoprazole gtt  Bowel regimen: none    Renal   #BOB on CKD  Unclear baseline, BUN 60, Cr 1.5 at presentation  Cr 1.2 at prior admission in 7/2024  Likely pre-renal iso hypovolemia 2/2 acute GIB  - daily BMP  - urine lytes    #Urinary retention  At presentation, , SC x1 w/ 550cc output  - BS q6  - SC PRN    ID  FAUSTO, afebrile, no leukocytosis    Heme  #Normocytic anemia likely 2/2 acute UGIB  Hgb 5.9 > 6.1 s/p 2 units pRBC, received 2 more units prior to transfer   At last admission, Hgb 7.9-9  - repeat CBC   - maintain active T&S  - transfuse Hgb <7  - f/u iron studies  - f/u B12, folate    Endo  FAUSTO    MSK  FAUSTO    Prophylaxis  F: as needed  E: Replete as needed, target K>4, Phos>3, Mg>2  N: NPO  DVT pptx: hold iso GIB  GI pptx: pantoprazole gtt  Code status: Full code  Dispo: MICU Patient is a 59-year-old female w/ PMH of polysubstance use (tobacco, opioids on methadone, Hep C positive), anemia, CKD, and epilepsy BIBEMS for AMS found to have Hgb 5.9 and melena admitted to MICU w/ c/f acute UGIB pending endoscopy.    Neuro/Psych  #AMS -- RESOLVED  Initially BIBEMS for AMS  Improvement in mentation, now AOx3    #Seizure  Home keppra 750mg BID  Per pt, has had breakthrough seizures, last 11/30 witnessed by roommate  Last dose of keppra taken morning of seizure  - c/w keppra 750mg BID  - seizure precautions  - epilepsy consult    #Opioid dependence  Pt reports she takes methadone 170mg qd -- last went to methadone clinic 1 week ago but missed appointment on 12/2 due to symptoms   Reported last use of heroin 10 days ago, has been taking gabapentin 400mg QID for the past week  - confirm dose w/ methadone clinic ([facility]: [phone])  - f/u [facility]    #Bipolar disorder  #Depression  Previously on buspar though self-discontinued by pt due to side effects (shakiness)  Per surescripts, is also on quetiapine 75mg qd  - formal med rec     Cardiovascular   #Hemorrhagic vs hypovolemic shock likely 2/2 acute GIB  100/65 --> 85/50, started on levo gtt  Unlikely septic shock given afebrile, no leukocytosis, no evidence of infection; unlikely cardiogenic given no significant cardiovascular hx, warm, well-perfused on exam  POCUS w/ normal heart function  - c/w levo gtt  - titrate to MAP > 65  - rest of plan per GI below    Pulmonary   FAUSTO, pt started on 2L/NC for comfort   CXR negative  - wean as tolerated     GI   #UGIB?  Pt complaining of episodes of maroon/dark stools since 11/30 likely 2/2 NSAID use for the past week  No prior hx, no prior colonoscopies. Pt reporting prior EGD in 7/2024 admission though none documented, no c/f bleeding at that time   Low concern for varices given no significant alcohol use hx   Rectal exam +maroon/dark stool, external hemorrhoid   - NPO pending scope w/ GI  - consider erythromycin prior to scope for better visualization  - pantoprazole gtt  - GI consult     #Transaminitis  #Hx of hepatitis C  AST 75, ALT 80  Pt reporting ongoing abdominal pain since 11/30  - trend w/ daily LFTs    Diet: NPO except meds  GI pptx: pantoprazole gtt  Bowel regimen: none    Renal   #BOB on CKD  Unclear baseline, BUN 60, Cr 1.5 at presentation  Cr 1.2 at prior admission in 7/2024  Likely pre-renal iso hypovolemia 2/2 acute GIB  - daily BMP  - urine lytes    #Urinary retention  At presentation, , SC x1 w/ 550cc output  - BS q6  - SC PRN    ID  FAUSTO, afebrile, no leukocytosis    Heme  #Normocytic anemia likely 2/2 acute UGIB  Hgb 5.9 > 6.1 s/p 2 units pRBC, received 2 more units prior to transfer   At last admission, Hgb 7.9-9  - repeat CBC   - maintain active T&S  - transfuse Hgb <7  - f/u iron studies  - f/u B12, folate    Endo  FAUSTO    MSK  FAUSTO    Prophylaxis  F: as needed  E: Replete as needed, target K>4, Phos>3, Mg>2  N: NPO  DVT pptx: hold iso GIB  GI pptx: pantoprazole gtt  Code status: Full code  Dispo: MICU

## 2024-12-03 NOTE — ED PROCEDURE NOTE - CPROC ED INDICATIONS1
emergency venous access/volume resuscitation
emergency venous access/hemodynamic monitoring/volume resuscitation

## 2024-12-03 NOTE — H&P ADULT - HISTORY OF PRESENT ILLNESS
Patient is a 59-year-old female w/ PMH of polysubstance use (tobacco, opioids on methadone, Hep C positive), anemia, CKD, and epilepsy BIBEMS for AMS. Pt reporting she missed her methadone dose today and used heroin instead. Pt also complained of abdominal pain.     ED course:   Vitals: T98, HR 68, /65, RR 18, SpO2 94% on RA  Labs:  EKG:   Imaging:   Interventions:  Consults: GI Patient is a 59-year-old female w/ PMH of polysubstance use (tobacco, opioids on methadone, Hep C positive), anemia, CKD, and epilepsy BIBEMS for AMS. Pt reported that she was at her shelter when she had a seizure that was witnessed by her roommate. Following her seizure, she developed significant, diffuse abdominal pain and had multiple episodes of loose, maroon/dark stools to the point where she was unable to take her home medications and missed her appointment with the methadone clinic. She uses keppra 750mg BID and methadone 170mg qd, both last taken on 11/30. Pt reports she had also been taking gabapentin 400mg QID that she purchased from a dealer. Pt denies any prior hx of GIB, no prior colonoscopies, but states she had an endoscopy during her last admission in 7/2024 for L  iliopsoas bursitis. She denies any recent NSAID, alcohol use.     ED course:   Vitals: T98, HR 68, /65, RR 18, SpO2 94% on RA  Labs: WBC 10.49   EKG:   Imaging:   Interventions:  Consults: GI Patient is a 59-year-old female w/ PMH of polysubstance use (tobacco, opioids on methadone, Hep C positive), anemia, CKD, and epilepsy BIBEMS for AMS. Pt reported that she was at her shelter when she had a seizure that was witnessed by her roommate. Following her seizure, she developed significant, diffuse abdominal pain and had multiple episodes of loose, maroon/dark stools to the point where she was unable to take her home medications and missed her appointment with the methadone clinic. She uses keppra 750mg BID and methadone 170mg qd, both last taken on 11/30. Pt reports she had also been taking gabapentin 400mg QID for the past week that she purchased from a dealer. Pt denies any prior hx of GIB, no prior colonoscopies, but states she had an endoscopy during her last admission in 7/2024 for L  iliopsoas bursitis. She denies any recent NSAID, alcohol use.     ED course:   Vitals: T98, HR 68, /65, RR 18, SpO2 94% on RA  Labs: WBC 10.49, Hgb 5.9, MCV 89.9, BUN 60, Cr 1.50, AST 74, ALT 80, proBNP 1285, lactate 1.4  EKG: sinus tachycardia  Imaging: CXR negative for consolidation, effusion  Interventions:  Patient is a 59-year-old female w/ PMH of polysubstance use (tobacco, opioids on methadone, Hep C positive), anemia, CKD, and epilepsy BIBEMS for AMS. Pt reported that she was at her shelter when she had a seizure that was witnessed by her roommate. Following her seizure, she developed significant, diffuse abdominal pain and had multiple episodes of loose, maroon/dark stools to the point where she was unable to take her home medications and missed her appointment with the methadone clinic. She uses keppra 750mg BID and methadone 170mg qd, both last taken on 11/30. Pt reports she had also been taking gabapentin 400mg QID for the past week that she purchased from a dealer. Pt denies any prior hx of GIB, no prior colonoscopies, but states she had an endoscopy during her last admission in 7/2024 for L iliopsoas bursitis. She denies any recent NSAID, alcohol use.     ED course:   Vitals: T98, HR 68, /65 --> 85/50, RR 18, SpO2 94% on RA  Labs: WBC 10.49, Hgb 5.9, MCV 89.9, BUN 60, Cr 1.50, AST 74, ALT 80, proBNP 1285, lactate 1.4  EKG: sinus tachycardia  Imaging: CXR negative for consolidation, effusion  Interventions: pantoprazole 80mg x1, 1L NS x1, pRBC x4, levo gtt, pantoprazole gtt Patient is a 59-year-old female w/ PMH of polysubstance use (tobacco, opioids on methadone, Hep C positive), anemia, CKD, and epilepsy BIBEMS for AMS. Pt reported that she was at her shelter when she had a seizure that was witnessed by her roommate. Following her seizure, she developed significant, diffuse abdominal pain and had multiple episodes of loose, maroon/dark stools to the point where she was unable to take her home medications and missed her appointment with the methadone clinic. Pt reports she was taking 2-3 capsules of Naproxen for 7 days and gabapentin 400mg QID that she purchased from a dealer. She also reports she takes keppra 750mg BID and methadone 170mg qd, both last taken on 11/30. Pt denies any prior hx of GIB, no prior colonoscopies, but states she had an endoscopy during her last admission in 7/2024 for L iliopsoas bursitis. She denies any recent alcohol use.     ED course:   Vitals: T98, HR 68, /65 --> 85/50, RR 18, SpO2 94% on RA  Labs: WBC 10.49, Hgb 5.9, MCV 89.9, BUN 60, Cr 1.50, AST 74, ALT 80, proBNP 1285, lactate 1.4  EKG: sinus tachycardia  Imaging: CXR negative for consolidation, effusion  Interventions: pantoprazole 80mg x1, 1L NS x1, pRBC x4, levo gtt, pantoprazole gtt

## 2024-12-03 NOTE — H&P ADULT - NSHPREVIEWOFSYSTEMS_GEN_ALL_CORE
REVIEW OF SYSTEMS:    CONSTITUTIONAL:  No weakness, fevers or chills  EYES/ENT:  No visual changes;  No vertigo or throat pain   NECK:  No pain or stiffness  RESPIRATORY:  No cough, wheezing, hemoptysis; No shortness of breath  CARDIOVASCULAR:  No chest pain or palpitations  GASTROINTESTINAL:  No abdominal or epigastric pain. No nausea, vomiting, or hematemesis; No diarrhea or constipation. No melena or hematochezia.  GENITOURINARY:  No dysuria, frequency or hematuria  MUSCULOSKELETAL:  FROM all extremities, normal strength, No calf tenderness  NEUROLOGICAL:  No numbness or weakness  SKIN:  No itching, rashes REVIEW OF SYSTEMS:    CONSTITUTIONAL:  No weakness, fevers or chills  EYES/ENT:  No visual changes;  No vertigo or throat pain   NECK:  No pain or stiffness  RESPIRATORY:  No cough, wheezing, hemoptysis; No shortness of breath  CARDIOVASCULAR:  No chest pain or palpitations  GASTROINTESTINAL: No nausea, vomiting, or hematemesis; Admits to diffuse abdominal pain, maroon/dark-colored stools  GENITOURINARY:  No dysuria, frequency or hematuria  MUSCULOSKELETAL:  Admits to decreased L hip ROM 2/2 pain  NEUROLOGICAL:  No numbness or weakness  SKIN:  No itching, rashes

## 2024-12-03 NOTE — ED PROCEDURE NOTE - CPROC ED INFUS LINE DETAIL1
The location was identified, and the area was draped and prepped./The catheter was placed using sterile technique./Ultrasound guidance was used./The guidewire was recovered./All lumen(s) aspirated and flushed without difficulty.
The location was identified, and the area was draped and prepped./The catheter was placed using sterile technique./Ultrasound guidance was used./The guidewire was recovered.

## 2024-12-03 NOTE — PROCEDURE NOTE - NSPOSTCAREGUIDE_GEN_A_CORE
Verbal/written post procedure instructions were given to patient/caregiver
Care for catheter as per unit/ICU protocols
Verbal/written post procedure instructions were given to patient/caregiver/Instructed patient/caregiver to follow-up with primary care physician/Instructed patient/caregiver regarding signs and symptoms of infection/Keep the cast/splint/dressing clean and dry/Care for catheter as per unit/ICU protocols

## 2024-12-03 NOTE — PROCEDURE NOTE - ADDITIONAL PROCEDURE DETAILS
Endotracheal intubation via video laryngoscopy with Glidescope. S4 blade used. Etomidate 20 mg and rocuronium 80 mg to facilitate intubation. One visualization, one attempt was successful. Placed on ventilator. No complications. Endotracheal intubation via video laryngoscopy with Glidescope. S4 blade used. Etomidate 20 mg and rocuronium 80 mg to facilitate intubation. Grade 2 view. One visualization, one attempt was successful. Placed on ventilator. No complications.

## 2024-12-03 NOTE — ED ADULT NURSE REASSESSMENT NOTE - NS ED NURSE REASSESS COMMENT FT1
Assume care of patient from [facility] at midnight, received patient lying on stretcher, alert and oriented x 3, pending 2 units of packed RBC transfusion for hgb of 5.9 and GI bleed. 1st unit RBC started at 0020. BP 85/50. MD Martin was at bedside and aware of low BP. Completed 1st unit of RBC without any transfusion reactions. MD Martin and MD Brooks at bedside attempting to insert central line access. Levophed drip initiated after 1st unit of blood. Patient is responsive with levophed, BP raising. 2nd unit RBC started and finished at 0145. No transfusion reactions after 2nd unit. /57 at end of 2nd transfusion. As per ICU doctor at [facility], they want 2 more units of RBC transfusion before transferring to [facility]. Triple lumen central line placed to right femoral vein. 3rd unit completed at 0203 with any transfusion reactions.     Transfer huddle performed via tele-EICU with EMS crew, RN and MD Martin. Vital signs are improving and stable. Levophed infusing at 0.15mcg/kg/min via central line. Protonix drip infusing at 8 mg/hr. All clinicians agree patient is safe for transfer. Patient left ED at 0223 with 4th unit RBC is infusing with pressure bag. Assume care of patient from [facility] at midnight, received patient lying on stretcher, alert and oriented x 3, pending 2 units of packed RBC transfusion for hgb of 5.9 and GI bleed. 1st unit RBC started at 0020. BP 85/50. MD Martin was at bedside and aware of low BP. Completed 1st unit of RBC without any transfusion reactions. MD Martin and MD Brooks at bedside attempting to insert central line access. Levophed drip initiated after 1st unit of blood. Patient is responsive with levophed, BP raising. 2nd unit RBC started and finished at 0145. No transfusion reactions after 2nd unit. /57 at end of 2nd transfusion. As per ICU doctor at [facility], they want 2 more units of RBC transfusion before transferring to [facility]. Triple lumen central line placed to right femoral vein. 3rd unit completed at 0203 with no transfusion reactions observed.    Transfer huddle performed via tele-EICU with EMS crew, RN and MD Martin. Vital signs are improving and stable. Levophed infusing at 0.15mcg/kg/min via central line. Protonix drip infusing at 8 mg/hr. All clinicians agree patient is safe for transfer. Patient left ED at 0223 with 4th unit RBC infusing with pressure bag.

## 2024-12-03 NOTE — PROCEDURE NOTE - ADDITIONAL PROCEDURE DETAILS
R Radial artery puncture under ultrasound guidance x 1 attempt, patient tolerated well, direct pressure applied and hemostasis obtaine.d

## 2024-12-03 NOTE — PROGRESS NOTE ADULT - SUBJECTIVE AND OBJECTIVE BOX
Patient is a 59y old  Female who presents with a chief complaint of AMS, Abdominal pain (03 Dec 2024 06:54)    HOSPITAL COURSE:  Patient is a 59-year-old female w/ PMH of polysubstance use (tobacco, opioids on methadone, Hep C positive), anemia, CKD, and epilepsy BIBEMS for AMS, found to be having anemia from active GIB iso melena.  . Pt reported that she was at her shelter when she had a seizure that was witnessed by her roommate. Following her seizure, she developed significant, diffuse abdominal pain and had multiple episodes of loose, maroon/dark stools to the point where she was unable to take her home medications and missed her appointment with the methadone clinic. Pt reports she was taking 2-3 capsules of Naproxen for 7 days and gabapentin 400mg QID that she purchased from a dealer. She also reports she takes keppra 750mg BID and methadone 170mg qd, both last taken on . Pt denies any prior hx of GIB, no prior colonoscopies, but states she had an endoscopy during her last admission in 2024 for L iliopsoas bursitis. She denies any recent alcohol use.     Neg UA.     ED course:   Vitals: T98, HR 68, /65 --> 85/50, RR 18, SpO2 94% on RA  Labs: WBC 10.49, Hgb 5.9, MCV 89.9, BUN 60, Cr 1.50, AST 74, ALT 80, proBNP 1285, lactate 1.4  EKG: sinus tachycardia  Imaging: CXR negative for consolidation, effusion  Interventions: pantoprazole 80mg x1, 1L NS x1, pRBC x4, levo gtt, pantoprazole gtt      INTERVAL HPI/OVERNIGHT EVENTS:   No overnight events   Afebrile, hemodynamically stable     ICU Vital Signs Last 24 Hrs  T(C): 37 (03 Dec 2024 02:23), Max: 37 (03 Dec 2024 02:19)  T(F): 98.6 (03 Dec 2024 02:23), Max: 98.6 (03 Dec 2024 02:19)  HR: 94 (03 Dec 2024 08:00) (65 - 103)  BP: 91/52 (03 Dec 2024 08:00) (84/50 - 131/66)  BP(mean): 65 (03 Dec 2024 08:00) (65 - 80)  ABP: --  ABP(mean): --  RR: 26 (03 Dec 2024 08:00) (13 - 30)  SpO2: 98% (03 Dec 2024 08:00) (94% - 100%)    O2 Parameters below as of 03 Dec 2024 08:00  Patient On (Oxygen Delivery Method): room air          I&O's Summary    02 Dec 2024 07:01  -  03 Dec 2024 07:00  --------------------------------------------------------  IN: 1112.5 mL / OUT: 1025 mL / NET: 87.5 mL    03 Dec 2024 07:01  -  03 Dec 2024 08:52  --------------------------------------------------------  IN: 30.2 mL / OUT: 90 mL / NET: -59.8 mL          LABS:                        7.8    8.31  )-----------( 117      ( 03 Dec 2024 03:30 )             23.6     12-03    140  |  110[H]  |  57[H]  ----------------------------<  129[H]  4.6   |  24  |  0.93    Ca    7.5[L]      03 Dec 2024 03:30  Phos  2.9     12-03  Mg     1.7     1203    TPro  4.2[L]  /  Alb  2.4[L]  /  TBili  0.3  /  DBili  x   /  AST  57[H]  /  ALT  49[H]  /  AlkPhos  44  12-03    PT/INR - ( 02 Dec 2024 23:17 )   PT: 16.0 sec;   INR: 1.37          PTT - ( 02 Dec 2024 23:17 )  PTT:38.0 sec  Urinalysis Basic - ( 03 Dec 2024 03:43 )    Color: Yellow / Appearance: Clear / S.015 / pH: x  Gluc: x / Ketone: Negative mg/dL  / Bili: Negative / Urobili: 0.2 mg/dL   Blood: x / Protein: Negative mg/dL / Nitrite: Negative   Leuk Esterase: Negative / RBC: x / WBC x   Sq Epi: x / Non Sq Epi: x / Bacteria: x      CAPILLARY BLOOD GLUCOSE      POCT Blood Glucose.: 174 mg/dL (02 Dec 2024 18:54)        RADIOLOGY & ADDITIONAL TESTS:    Consultant(s) Notes Reviewed:  [x ] YES  [ ] NO    MEDICATIONS  (STANDING):  acetaminophen   IVPB .. 1000 milliGRAM(s) IV Intermittent once  chlorhexidine 2% Cloths 1 Application(s) Topical <User Schedule>  erythromycin   IVPB 250 milliGRAM(s) IV Intermittent once  levETIRAcetam 750 milliGRAM(s) Oral every 12 hours  norepinephrine Infusion 0.05 MICROgram(s)/kG/Min (6.38 mL/Hr) IV Continuous <Continuous>  pantoprazole Infusion 8 mG/Hr (10 mL/Hr) IV Continuous <Continuous>    MEDICATIONS  (PRN):      PHYSICAL EXAM:  GENERAL:   HEAD:  Atraumatic, Normocephalic  EYES: EOMI, PERRLA, conjunctiva and sclera clear  NECK: Supple, No JVD, Normal thyroid, no enlarged nodes  NERVOUS SYSTEM:  Alert & Awake.   CHEST/LUNG: B/L good air entry; No rales, rhonchi, or wheezing  HEART: S1S2 normal, no S3, Regular rate and rhythm; No murmurs  ABDOMEN: Soft, Nontender, Nondistended; Bowel sounds present  EXTREMITIES:  2+ Peripheral Pulses, No clubbing, cyanosis, or edema  LYMPH: No lymphadenopathy noted  SKIN: No rashes or lesions    Care Discussed with Consultants/Other Providers [ x] YES  [ ] NO Patient is a 59y old  Female who presents with a chief complaint of AMS, Abdominal pain (03 Dec 2024 06:54)    HOSPITAL COURSE:  Patient is a 59-year-old female w/ PMH of polysubstance use (tobacco, opioids on methadone, Hep C positive), anemia, CKD, and epilepsy BIBEMS for AMS, found to be having anemia from active GIB iso melena iso use of naproxen x 7d (2-3 capsules/day). Pt denies any prior hx of GIB, no prior colonoscopies, but states she had an endoscopy during her last admission in 2024 for L iliopsoas bursitis. At ED, vitals were T98, HR 68, /65 --> 85/50, RR 18, SpO2 94% on RA. Labs showed Hgb 5.9 (s/p pRBC x4); repeat hgb after 4 transfusions were 6.2. Not appropriate response 2/2 active GIB. Another       Pt had 2 episodes of melena 12/3 AM.     Neg UA.     ED course:   Labs: WBC 10.49, Hgb 5.9, MCV 89.9, BUN 60, Cr 1.50, AST 74, ALT 80, proBNP 1285, lactate 1.4  EKG: sinus tachycardia  Imaging: CXR negative for consolidation, effusion  Interventions: pantoprazole 80mg x1, 1L NS x1, pRBC x4, levo gtt, pantoprazole gtt      INTERVAL HPI/OVERNIGHT EVENTS:   O/N: Hgb 5.9>6.1 s/p 2 units pRBC. Given 2 more units. (total of 4 units). levo at 0.1, pantoprazole gtt running. Given 500cc bolus x2. Repeat Hgb 7.8. , SC x1 550cc. Mg 1.7, repleted w/ Mg 2g x1. Babin placed.     AM: Pt reports having another large episode of melena. Endorsing abd pain and LE pain. Denies having abd pain upon palpation.  Afebrile, hemodynamically stable     ICU Vital Signs Last 24 Hrs  T(C): 37 (03 Dec 2024 02:23), Max: 37 (03 Dec 2024 02:19)  T(F): 98.6 (03 Dec 2024 02:23), Max: 98.6 (03 Dec 2024 02:19)  HR: 94 (03 Dec 2024 08:00) (65 - 103)  BP: 91/52 (03 Dec 2024 08:00) (84/50 - 131/66)  BP(mean): 65 (03 Dec 2024 08:00) (65 - 80)  ABP: --  ABP(mean): --  RR: 26 (03 Dec 2024 08:00) (13 - 30)  SpO2: 98% (03 Dec 2024 08:00) (94% - 100%)    O2 Parameters below as of 03 Dec 2024 08:00  Patient On (Oxygen Delivery Method): room air          I&O's Summary    02 Dec 2024 07:01  -  03 Dec 2024 07:00  --------------------------------------------------------  IN: 1112.5 mL / OUT: 1025 mL / NET: 87.5 mL    03 Dec 2024 07:01  -  03 Dec 2024 08:52  --------------------------------------------------------  IN: 30.2 mL / OUT: 90 mL / NET: -59.8 mL          LABS:                        7.8    8.31  )-----------( 117      ( 03 Dec 2024 03:30 )             23.6     12-03    140  |  110[H]  |  57[H]  ----------------------------<  129[H]  4.6   |  24  |  0.93    Ca    7.5[L]      03 Dec 2024 03:30  Phos  2.9     12-03  Mg     1.7     12-03    TPro  4.2[L]  /  Alb  2.4[L]  /  TBili  0.3  /  DBili  x   /  AST  57[H]  /  ALT  49[H]  /  AlkPhos  44  12-03    PT/INR - ( 02 Dec 2024 23:17 )   PT: 16.0 sec;   INR: 1.37          PTT - ( 02 Dec 2024 23:17 )  PTT:38.0 sec  Urinalysis Basic - ( 03 Dec 2024 03:43 )    Color: Yellow / Appearance: Clear / S.015 / pH: x  Gluc: x / Ketone: Negative mg/dL  / Bili: Negative / Urobili: 0.2 mg/dL   Blood: x / Protein: Negative mg/dL / Nitrite: Negative   Leuk Esterase: Negative / RBC: x / WBC x   Sq Epi: x / Non Sq Epi: x / Bacteria: x      CAPILLARY BLOOD GLUCOSE      POCT Blood Glucose.: 174 mg/dL (02 Dec 2024 18:54)        RADIOLOGY & ADDITIONAL TESTS:    Consultant(s) Notes Reviewed:  [x ] YES  [ ] NO    MEDICATIONS  (STANDING):  acetaminophen   IVPB .. 1000 milliGRAM(s) IV Intermittent once  chlorhexidine 2% Cloths 1 Application(s) Topical <User Schedule>  erythromycin   IVPB 250 milliGRAM(s) IV Intermittent once  levETIRAcetam 750 milliGRAM(s) Oral every 12 hours  norepinephrine Infusion 0.05 MICROgram(s)/kG/Min (6.38 mL/Hr) IV Continuous <Continuous>  pantoprazole Infusion 8 mG/Hr (10 mL/Hr) IV Continuous <Continuous>    MEDICATIONS  (PRN):      PHYSICAL EXAM:  GENERAL:   HEAD:  Atraumatic, Normocephalic  EYES: EOMI, PERRLA, conjunctiva and sclera clear  NECK: Supple, No JVD, Normal thyroid, no enlarged nodes  NERVOUS SYSTEM:  Alert & Awake.   CHEST/LUNG: B/L good air entry; No rales, rhonchi, or wheezing  HEART: S1S2 normal, no S3, Regular rate and rhythm; No murmurs  ABDOMEN: Soft, Nontender, Nondistended; Bowel sounds present  EXTREMITIES:  2+ Peripheral Pulses, No clubbing, cyanosis, or edema  LYMPH: No lymphadenopathy noted  SKIN: No rashes or lesions    Care Discussed with Consultants/Other Providers [ x] YES  [ ] NO Patient is a 59y old  Female who presents with a chief complaint of AMS, Abdominal pain (03 Dec 2024 06:54)    HOSPITAL COURSE:  Patient is a 59-year-old female w/ PMH of polysubstance use (tobacco, opioids on methadone, Hep C positive), anemia, CKD, and epilepsy BIBEMS for AMS, found to be having anemia from active GIB iso melena iso use of naproxen x 7d (2-3 capsules/day). Pt denies any prior hx of GIB, no prior colonoscopies, but states she had an endoscopy during her last admission in 2024 for L iliopsoas bursitis. At ED, vitals were T98, HR 68, /65 --> 85/50, RR 18, SpO2 94% on RA. Started on levo gtt for hypotension. Labs showed Hgb 5.9 (s/p pRBC x4; complete ~4am); repeat hgb 6.2 around 9AM. Not appropriate response 2/2 active GIB. Another 8u pRBC ordered to be given. Intubated for EGD and code fusion was called around 11:25AM as pt had massive episode of melena. Pending EGD for source control. Pt has been placed on pantoprazole gtt throughout the hospitalization.    INTERVAL HPI/OVERNIGHT EVENTS:   O/N: Hgb 5.9>6.1 s/p 2 units pRBC. Given 2 more units. (total of 4 units). levo at 0.1, pantoprazole gtt running. Given 500cc bolus x2. Repeat Hgb 7.8. , SC x1 550cc. Mg 1.7, repleted w/ Mg 2g x1. Babin placed.     AM: Pt reports having another large episode of melena. Endorsing abd pain and LE pain. Denies having abd pain upon palpation.    ICU Vital Signs Last 24 Hrs  T(C): 37 (03 Dec 2024 02:23), Max: 37 (03 Dec 2024 02:19)  T(F): 98.6 (03 Dec 2024 02:23), Max: 98.6 (03 Dec 2024 02:19)  HR: 94 (03 Dec 2024 08:00) (65 - 103)  BP: 91/52 (03 Dec 2024 08:00) (84/50 - 131/66)  BP(mean): 65 (03 Dec 2024 08:00) (65 - 80)  ABP: --  ABP(mean): --  RR: 26 (03 Dec 2024 08:00) (13 - 30)  SpO2: 98% (03 Dec 2024 08:00) (94% - 100%)    O2 Parameters below as of 03 Dec 2024 08:00  Patient On (Oxygen Delivery Method): room air          I&O's Summary    02 Dec 2024 07:01  -  03 Dec 2024 07:00  --------------------------------------------------------  IN: 1112.5 mL / OUT: 1025 mL / NET: 87.5 mL    03 Dec 2024 07:01  -  03 Dec 2024 08:52  --------------------------------------------------------  IN: 30.2 mL / OUT: 90 mL / NET: -59.8 mL          LABS:                        7.8    8.31  )-----------( 117      ( 03 Dec 2024 03:30 )             23.6     12-03    140  |  110[H]  |  57[H]  ----------------------------<  129[H]  4.6   |  24  |  0.93    Ca    7.5[L]      03 Dec 2024 03:30  Phos  2.9     12-03  Mg     1.7     1203    TPro  4.2[L]  /  Alb  2.4[L]  /  TBili  0.3  /  DBili  x   /  AST  57[H]  /  ALT  49[H]  /  AlkPhos  44  12-03    PT/INR - ( 02 Dec 2024 23:17 )   PT: 16.0 sec;   INR: 1.37          PTT - ( 02 Dec 2024 23:17 )  PTT:38.0 sec  Urinalysis Basic - ( 03 Dec 2024 03:43 )    Color: Yellow / Appearance: Clear / S.015 / pH: x  Gluc: x / Ketone: Negative mg/dL  / Bili: Negative / Urobili: 0.2 mg/dL   Blood: x / Protein: Negative mg/dL / Nitrite: Negative   Leuk Esterase: Negative / RBC: x / WBC x   Sq Epi: x / Non Sq Epi: x / Bacteria: x      CAPILLARY BLOOD GLUCOSE      POCT Blood Glucose.: 174 mg/dL (02 Dec 2024 18:54)        RADIOLOGY & ADDITIONAL TESTS:    Consultant(s) Notes Reviewed:  [x ] YES  [ ] NO    MEDICATIONS  (STANDING):  acetaminophen   IVPB .. 1000 milliGRAM(s) IV Intermittent once  chlorhexidine 2% Cloths 1 Application(s) Topical <User Schedule>  erythromycin   IVPB 250 milliGRAM(s) IV Intermittent once  levETIRAcetam 750 milliGRAM(s) Oral every 12 hours  norepinephrine Infusion 0.05 MICROgram(s)/kG/Min (6.38 mL/Hr) IV Continuous <Continuous>  pantoprazole Infusion 8 mG/Hr (10 mL/Hr) IV Continuous <Continuous>    MEDICATIONS  (PRN):      PHYSICAL EXAM:  GENERAL: a/o x 3  HEAD:  Atraumatic, Normocephalic  NERVOUS SYSTEM:  Alert & Awake.   CHEST/LUNG: B/L good air entry; No rales, rhonchi, or wheezing  HEART: S1S2 normal, no S3, Regular rate and rhythm; No murmurs  ABDOMEN: Soft, Nontender, Nondistended; Bowel sounds present  EXTREMITIES:  2+ Peripheral Pulses, No clubbing, cyanosis, or edema  S KIN: No rashes or lesions    Care Discussed with Consultants/Other Providers [ x] YES  [ ] NO

## 2024-12-03 NOTE — PROVIDER CONTACT NOTE (EICU) - ASSESSMENT
GI bleed , suspected to be upper GI bleed  IV Fluids  Protonix GTT  GI Consult for endoscopy  CT angio of the abdomen / pelvis  IV Fluids   2 units PRBC transfused

## 2024-12-03 NOTE — PROGRESS NOTE ADULT - ASSESSMENT
Patient is a 59-year-old female w/ PMH of polysubstance use (tobacco, opioids on methadone, Hep C positive), anemia, CKD, and epilepsy BIBEMS for AMS found to have Hgb 5.9 and melena admitted to MICU w/ c/f acute UGIB pending endoscopy.    Neuro/Psych  #AMS -- RESOLVED  Initially BIBEMS for AMS  Improvement in mentation, now AOx3    #Seizure  Home keppra 750mg BID  Per pt, has had breakthrough seizures, last 11/30 witnessed by roommate  Last dose of keppra taken morning of seizure  - c/w keppra 750mg BID  - seizure precautions  - epilepsy consult    #Opioid dependence  Pt reports she takes methadone 170mg qd -- last went to methadone clinic 1 week ago but missed appointment on 12/2 due to symptoms   Reported last use of heroin 10 days ago, has been taking gabapentin 400mg QID for the past week  - confirm dose w/ methadone clinic ([facility]: [phone])  - f/u Utox    #Bipolar disorder  #Depression  Previously on buspar though self-discontinued by pt due to side effects (shakiness)  Per surescripts, is also on quetiapine 75mg qd  - formal med rec     Cardiovascular   #Hemorrhagic shock likely 2/2 acute GIB  100/65 --> 85/50, started on levo gtt  Unlikely septic shock given afebrile, no leukocytosis, no evidence of infection; unlikely cardiogenic given no significant cardiovascular hx, warm, well-perfused on exam  POCUS w/ normal heart function  - c/w levo gtt  - titrate to MAP > 65  - rest of plan per GI below    Pulmonary   FAUSTO, pt started on 2L/NC for comfort   CXR negative  - wean as tolerated     GI   #UGIB?  Pt complaining of episodes of maroon/dark stools since 11/30  No prior hx, no prior colonoscopies. Pt reporting prior EGD in 7/2024 admission though none documented, no c/f bleeding at that time   Low concern for varices given no significant alcohol use hx   Rectal exam +maroon/dark stool, external hemorrhoid   - NPO pending scope w/ GI  - consider erythromycin prior to scope for better visualization  - pantoprazole gtt  - GI consult     #Transaminitis  #Hx of hepatitis C  AST 75, ALT 80  Pt reporting ongoing abdominal pain since 11/30  - trend w/ daily LFTs    Diet: NPO except meds  GI pptx: pantoprazole gtt  Bowel regimen: none    Renal   #BOB on CKD  Unclear baseline, BUN 60, Cr 1.5 at presentation  Cr 1.2 at prior admission in 7/2024  Likely pre-renal iso hypovolemia 2/2 acute GIB  - daily BMP  - urine lytes    #Urinary retention  At presentation, , SC x1 w/ 550cc output  - BS q6  - SC PRN    ID  FAUSTO, afebrile, no leukocytosis    Heme  #Normocytic anemia likely 2/2 acute UGIB  Hgb 5.9 > 6.1 s/p 2 units pRBC, received 2 more units prior to transfer   At last admission, Hgb 7.9-9  - repeat CBC   - maintain active T&S  - transfuse Hgb <7  - f/u iron studies  - f/u B12, folate    Endo  FAUSTO    MSK  FAUSTO    Prophylaxis  F: as needed  E: Replete as needed, target K>4, Phos>3, Mg>2  N: NPO  DVT pptx: hold iso GIB  GI pptx: pantoprazole gtt  Code status: Full code  Dispo: MICU Patient is a 59-year-old female w/ PMH of polysubstance use (tobacco, opioids on methadone, Hep C positive), anemia, CKD, and epilepsy BIBEMS for AMS found to have Hgb 5.9 and melena admitted to MICU w/ c/f acute UGIB pending endoscopy.    Neuro/Psych  #AMS -- RESOLVED  Initially BIBEMS for AMS  Improvement in mentation, now AOx3    #Seizure  Home keppra 750mg BID  Per pt, has had breakthrough seizures, last 11/30 witnessed by roommate  Last dose of keppra taken morning of seizure  - c/w keppra 750mg BID  - seizure precautions  - epilepsy consult    #Opioid dependence  Pt reports she takes methadone 170mg qd -- last went to methadone clinic 1 week ago but missed appointment on 12/2 due to symptoms   Reported last use of heroin 10 days ago, has been taking gabapentin 400mg QID for the past week  - confirm dose w/ methadone clinic ([facility]: [phone])  - f/u Utox    #Bipolar disorder  #Depression  Previously on buspar though self-discontinued by pt due to side effects (shakiness)  Per surescripts, is also on quetiapine 75mg qd  - formal med rec when pt more stable from GIB    Cardiovascular   #Hemorrhagic shock likely 2/2 acute GIB  100/65 --> 85/50, started on levo gtt  Unlikely septic shock given afebrile, no leukocytosis, no evidence of infection; unlikely cardiogenic given no significant cardiovascular hx, warm, well-perfused on exam  POCUS w/ normal heart function  - c/w levo gtt  - titrate to MAP > 65  - rest of plan per GI below    Pulmonary   FAUSTO, pt started on 2L/NC for comfort   CXR negative  - wean as tolerated     GI   #UGIB?  Pt complaining of episodes of maroon/dark stools since 11/30  No prior hx, no prior colonoscopies. Pt reporting prior EGD in 7/2024 admission though none documented, no c/f bleeding at that time   Low concern for varices given no significant alcohol use hx   Rectal exam +maroon/dark stool, external hemorrhoid   - NPO pending scope w/ GI  - consider erythromycin prior to scope for better visualization  - pantoprazole gtt  - GI consult     #Transaminitis  #Hx of hepatitis C  AST 75, ALT 80; likely 2/ hemorrhagic shock.  Pt reporting ongoing abdominal pain since 11/30  - trend w/ daily LFTs    Diet: NPO except meds  GI pptx: pantoprazole gtt  Bowel regimen: none    Renal   #BOB on CKD, resolved  Unclear baseline, BUN 60, Cr 1.5 at presentation  Cr 1.2 at prior admission in 7/2024  Likely pre-renal iso hypovolemia 2/2 acute GIB  - daily BMP  - urine lytes    #Urinary retention  At presentation, , SC x1 w/ 550cc output; straight cath x2.  - c/w hilton      ID  FAUSTO, afebrile, no leukocytosis    Heme  #Normocytic anemia likely 2/2 acute UGIB  Hgb 5.9 > 6.1 s/p 2 units pRBC, received 2 more units prior to transfer   At last admission, Hgb 7.9-9  - repeat CBC   - maintain active T&S  - transfuse Hgb <7  - f/u iron studies  - f/u B12, folate    Endo  FAUSTO    MSK  FAUSTO    Prophylaxis  F: as needed  E: Replete as needed, target K>4, Phos>3, Mg>2  N: NPO  DVT pptx: hold iso GIB  GI pptx: pantoprazole gtt  Code status: Full code  Dispo: MICU  Lines: R femoral line  Patient is a 59-year-old female w/ PMH of polysubstance use (tobacco, opioids on methadone, Hep C positive), anemia, CKD, and epilepsy BIBEMS for AMS found to have Hgb 5.9 and melena admitted to MICU w/ c/f acute UGIB pending endoscopy.    Neuro/Psych  #AMS -- RESOLVED  Initially BIBEMS for AMS  Improvement in mentation, now AOx3    #Seizure  Home keppra 750mg BID  Per pt, has had breakthrough seizures, last 11/30 witnessed by roommate  Last dose of keppra taken morning of seizure  - c/w keppra 750mg BID  - seizure precautions  - epilepsy consult    #Opioid dependence  Pt reports she takes methadone 170mg qd -- last went to methadone clinic 1 week ago but missed appointment on 12/2 due to symptoms   Reported last use of heroin 10 days ago, has been taking gabapentin 400mg QID for the past week  - confirm dose w/ methadone clinic ([facility]: [phone])  - c/w methadone 40mg qd until confirmed dose  - f/u Utox    #Bipolar disorder  #Depression  Previously on buspar though self-discontinued by pt due to side effects (shakiness)  Per surescripts, is also on quetiapine 75mg qd  - formal med rec when pt more stable from GIB    Cardiovascular   #Hemorrhagic shock likely 2/2 acute GIB  100/65 --> 85/50, started on levo gtt  Unlikely septic shock given afebrile, no leukocytosis, no evidence of infection; unlikely cardiogenic given no significant cardiovascular hx, warm, well-perfused on exam  POCUS w/ normal heart function  - c/w levo gtt  - titrate to MAP > 65  - rest of plan per GI below    Pulmonary   CXR negative    #Sedated/intubated  12/3 AM. Per GI for pending EGD.    GI   #c/f active UGIB  Pt complaining of episodes of maroon/dark stools since 11/30  No prior hx, no prior colonoscopies. Pt reporting prior EGD in 7/2024 admission though none documented, no c/f bleeding at that time   Low concern for varices given no significant alcohol use hx   Rectal exam +maroon/dark stool, external hemorrhoid   - NPO pending scope w/ GI  - s/p erythromycin on 12/2 for better visualization  - pantoprazole gtt  - GI consult     #Transaminitis  #Hx of hepatitis C  AST 75, ALT 80 on adm; likely 2/ hemorrhagic shock.   Pt reporting ongoing abdominal pain since 11/30  Improving; AST 57, ALT 49 (12/3)  - trend w/ daily LFTs    Diet: NPO except meds  GI pptx: pantoprazole gtt  Bowel regimen: none    Renal   #BOB, resolved  Unclear baseline, BUN 60, Cr 1.5 at presentation  Cr 1.2 at prior admission in 7/2024  Likely pre-renal iso hypovolemia 2/2 acute GIB  Cr 0.93 (12/3)    #Urinary retention  At presentation, , SC x1 w/ 550cc output; straight cath x2. Hilton placed on 12/2-12/3.  - c/w hilton      ID  FAUSTO, afebrile, no leukocytosis    Heme  #Normocytic anemia likely 2/2 acute UGIB  Hgb 5.9 > 6.1 s/p 2 units pRBC, received 2 more units prior to transfer to MICU.  At last admission, Hgb 7.9-9  Total of 3 episodes of melena on 12/3; massive. Code fusion called 12/3 AM; having 8 more transfusions.     - repeat CBC   - maintain active T&S  - transfuse Hgb <7  - f/u iron studies  - f/u B12, folate    Endo  FAUSTO    MSK  FAUSTO    Prophylaxis  F: as needed  E: Replete as needed, target K>4, Phos>3, Mg>2  N: NPO  DVT pptx: hold iso GIB  GI pptx: pantoprazole gtt  Code status: Full code  Dispo: MICU  Lines: R femoral line

## 2024-12-03 NOTE — PATIENT PROFILE ADULT - FALL HARM RISK - HARM RISK INTERVENTIONS

## 2024-12-04 ENCOUNTER — TRANSCRIPTION ENCOUNTER (OUTPATIENT)
Age: 59
End: 2024-12-04

## 2024-12-04 LAB
ALBUMIN SERPL ELPH-MCNC: 2.2 G/DL — LOW (ref 3.3–5)
ALBUMIN SERPL ELPH-MCNC: 2.2 G/DL — LOW (ref 3.3–5)
ALBUMIN SERPL ELPH-MCNC: 2.4 G/DL — LOW (ref 3.3–5)
ALP SERPL-CCNC: 39 U/L — LOW (ref 40–120)
ALP SERPL-CCNC: 41 U/L — SIGNIFICANT CHANGE UP (ref 40–120)
ALP SERPL-CCNC: 42 U/L — SIGNIFICANT CHANGE UP (ref 40–120)
ALT FLD-CCNC: 38 U/L — SIGNIFICANT CHANGE UP (ref 10–45)
ALT FLD-CCNC: 45 U/L — SIGNIFICANT CHANGE UP (ref 10–45)
ALT FLD-CCNC: SIGNIFICANT CHANGE UP (ref 10–45)
ANION GAP SERPL CALC-SCNC: 5 MMOL/L — SIGNIFICANT CHANGE UP (ref 5–17)
ANION GAP SERPL CALC-SCNC: 6 MMOL/L — SIGNIFICANT CHANGE UP (ref 5–17)
ANION GAP SERPL CALC-SCNC: 7 MMOL/L — SIGNIFICANT CHANGE UP (ref 5–17)
AST SERPL-CCNC: 42 U/L — HIGH (ref 10–40)
AST SERPL-CCNC: 51 U/L — HIGH (ref 10–40)
AST SERPL-CCNC: SIGNIFICANT CHANGE UP (ref 10–40)
BASOPHILS # BLD AUTO: 0.01 K/UL — SIGNIFICANT CHANGE UP (ref 0–0.2)
BASOPHILS # BLD AUTO: 0.04 K/UL — SIGNIFICANT CHANGE UP (ref 0–0.2)
BASOPHILS NFR BLD AUTO: 0.1 % — SIGNIFICANT CHANGE UP (ref 0–2)
BASOPHILS NFR BLD AUTO: 0.4 % — SIGNIFICANT CHANGE UP (ref 0–2)
BILIRUB SERPL-MCNC: 0.3 MG/DL — SIGNIFICANT CHANGE UP (ref 0.2–1.2)
BILIRUB SERPL-MCNC: 0.4 MG/DL — SIGNIFICANT CHANGE UP (ref 0.2–1.2)
BILIRUB SERPL-MCNC: 0.6 MG/DL — SIGNIFICANT CHANGE UP (ref 0.2–1.2)
BUN SERPL-MCNC: 43 MG/DL — HIGH (ref 7–23)
BUN SERPL-MCNC: 49 MG/DL — HIGH (ref 7–23)
BUN SERPL-MCNC: 62 MG/DL — HIGH (ref 7–23)
CALCIUM SERPL-MCNC: 7.3 MG/DL — LOW (ref 8.4–10.5)
CALCIUM SERPL-MCNC: 7.3 MG/DL — LOW (ref 8.4–10.5)
CALCIUM SERPL-MCNC: 7.6 MG/DL — LOW (ref 8.4–10.5)
CHLORIDE SERPL-SCNC: 111 MMOL/L — HIGH (ref 96–108)
CHLORIDE SERPL-SCNC: 112 MMOL/L — HIGH (ref 96–108)
CHLORIDE SERPL-SCNC: 112 MMOL/L — HIGH (ref 96–108)
CO2 SERPL-SCNC: 20 MMOL/L — LOW (ref 22–31)
CO2 SERPL-SCNC: 23 MMOL/L — SIGNIFICANT CHANGE UP (ref 22–31)
CO2 SERPL-SCNC: 24 MMOL/L — SIGNIFICANT CHANGE UP (ref 22–31)
CREAT SERPL-MCNC: 1.35 MG/DL — HIGH (ref 0.5–1.3)
CREAT SERPL-MCNC: 1.41 MG/DL — HIGH (ref 0.5–1.3)
CREAT SERPL-MCNC: 1.6 MG/DL — HIGH (ref 0.5–1.3)
EGFR: 37 ML/MIN/1.73M2 — LOW
EGFR: 44 ML/MIN/1.73M2 — LOW
EGFR: 46 ML/MIN/1.73M2 — LOW
EOSINOPHIL # BLD AUTO: 0.23 K/UL — SIGNIFICANT CHANGE UP (ref 0–0.5)
EOSINOPHIL # BLD AUTO: 0.46 K/UL — SIGNIFICANT CHANGE UP (ref 0–0.5)
EOSINOPHIL NFR BLD AUTO: 2.5 % — SIGNIFICANT CHANGE UP (ref 0–6)
EOSINOPHIL NFR BLD AUTO: 4.4 % — SIGNIFICANT CHANGE UP (ref 0–6)
GAS PNL BLDV: SIGNIFICANT CHANGE UP
GLUCOSE SERPL-MCNC: 106 MG/DL — HIGH (ref 70–99)
GLUCOSE SERPL-MCNC: 118 MG/DL — HIGH (ref 70–99)
GLUCOSE SERPL-MCNC: 144 MG/DL — HIGH (ref 70–99)
HCT VFR BLD CALC: 21.6 % — LOW (ref 34.5–45)
HCT VFR BLD CALC: 23.8 % — LOW (ref 34.5–45)
HCT VFR BLD CALC: 27.4 % — LOW (ref 34.5–45)
HGB BLD-MCNC: 7 G/DL — CRITICAL LOW (ref 11.5–15.5)
HGB BLD-MCNC: 8.2 G/DL — LOW (ref 11.5–15.5)
HGB BLD-MCNC: 8.9 G/DL — LOW (ref 11.5–15.5)
IMM GRANULOCYTES NFR BLD AUTO: 0.7 % — SIGNIFICANT CHANGE UP (ref 0–0.9)
IMM GRANULOCYTES NFR BLD AUTO: 0.9 % — SIGNIFICANT CHANGE UP (ref 0–0.9)
LYMPHOCYTES # BLD AUTO: 1.62 K/UL — SIGNIFICANT CHANGE UP (ref 1–3.3)
LYMPHOCYTES # BLD AUTO: 1.8 K/UL — SIGNIFICANT CHANGE UP (ref 1–3.3)
LYMPHOCYTES # BLD AUTO: 17.4 % — SIGNIFICANT CHANGE UP (ref 13–44)
LYMPHOCYTES # BLD AUTO: 17.8 % — SIGNIFICANT CHANGE UP (ref 13–44)
MAGNESIUM SERPL-MCNC: 2 MG/DL — SIGNIFICANT CHANGE UP (ref 1.6–2.6)
MAGNESIUM SERPL-MCNC: 2.1 MG/DL — SIGNIFICANT CHANGE UP (ref 1.6–2.6)
MCHC RBC-ENTMCNC: 28.9 PG — SIGNIFICANT CHANGE UP (ref 27–34)
MCHC RBC-ENTMCNC: 29.7 PG — SIGNIFICANT CHANGE UP (ref 27–34)
MCHC RBC-ENTMCNC: 31.2 PG — SIGNIFICANT CHANGE UP (ref 27–34)
MCHC RBC-ENTMCNC: 32.4 G/DL — SIGNIFICANT CHANGE UP (ref 32–36)
MCHC RBC-ENTMCNC: 32.5 G/DL — SIGNIFICANT CHANGE UP (ref 32–36)
MCHC RBC-ENTMCNC: 34.5 G/DL — SIGNIFICANT CHANGE UP (ref 32–36)
MCV RBC AUTO: 89 FL — SIGNIFICANT CHANGE UP (ref 80–100)
MCV RBC AUTO: 90.5 FL — SIGNIFICANT CHANGE UP (ref 80–100)
MCV RBC AUTO: 91.5 FL — SIGNIFICANT CHANGE UP (ref 80–100)
MONOCYTES # BLD AUTO: 0.82 K/UL — SIGNIFICANT CHANGE UP (ref 0–0.9)
MONOCYTES # BLD AUTO: 1.06 K/UL — HIGH (ref 0–0.9)
MONOCYTES NFR BLD AUTO: 10.2 % — SIGNIFICANT CHANGE UP (ref 2–14)
MONOCYTES NFR BLD AUTO: 9 % — SIGNIFICANT CHANGE UP (ref 2–14)
NEUTROPHILS # BLD AUTO: 6.35 K/UL — SIGNIFICANT CHANGE UP (ref 1.8–7.4)
NEUTROPHILS # BLD AUTO: 6.92 K/UL — SIGNIFICANT CHANGE UP (ref 1.8–7.4)
NEUTROPHILS NFR BLD AUTO: 66.9 % — SIGNIFICANT CHANGE UP (ref 43–77)
NEUTROPHILS NFR BLD AUTO: 69.7 % — SIGNIFICANT CHANGE UP (ref 43–77)
NRBC # BLD: 0 /100 WBCS — SIGNIFICANT CHANGE UP (ref 0–0)
PHOSPHATE SERPL-MCNC: 3.5 MG/DL — SIGNIFICANT CHANGE UP (ref 2.5–4.5)
PHOSPHATE SERPL-MCNC: 3.6 MG/DL — SIGNIFICANT CHANGE UP (ref 2.5–4.5)
PLATELET # BLD AUTO: 110 K/UL — LOW (ref 150–400)
PLATELET # BLD AUTO: 116 K/UL — LOW (ref 150–400)
PLATELET # BLD AUTO: 95 K/UL — LOW (ref 150–400)
POTASSIUM SERPL-MCNC: 3.8 MMOL/L — SIGNIFICANT CHANGE UP (ref 3.5–5.3)
POTASSIUM SERPL-MCNC: 4 MMOL/L — SIGNIFICANT CHANGE UP (ref 3.5–5.3)
POTASSIUM SERPL-MCNC: SIGNIFICANT CHANGE UP (ref 3.5–5.3)
POTASSIUM SERPL-SCNC: 3.8 MMOL/L — SIGNIFICANT CHANGE UP (ref 3.5–5.3)
POTASSIUM SERPL-SCNC: 4 MMOL/L — SIGNIFICANT CHANGE UP (ref 3.5–5.3)
POTASSIUM SERPL-SCNC: SIGNIFICANT CHANGE UP (ref 3.5–5.3)
PROT SERPL-MCNC: 4 G/DL — LOW (ref 6–8.3)
PROT SERPL-MCNC: 4 G/DL — LOW (ref 6–8.3)
PROT SERPL-MCNC: 4.4 G/DL — LOW (ref 6–8.3)
RBC # BLD: 2.36 M/UL — LOW (ref 3.8–5.2)
RBC # BLD: 2.63 M/UL — LOW (ref 3.8–5.2)
RBC # BLD: 3.08 M/UL — LOW (ref 3.8–5.2)
RBC # FLD: 14.8 % — HIGH (ref 10.3–14.5)
RBC # FLD: 14.9 % — HIGH (ref 10.3–14.5)
RBC # FLD: 15.1 % — HIGH (ref 10.3–14.5)
SODIUM SERPL-SCNC: 138 MMOL/L — SIGNIFICANT CHANGE UP (ref 135–145)
SODIUM SERPL-SCNC: 141 MMOL/L — SIGNIFICANT CHANGE UP (ref 135–145)
SODIUM SERPL-SCNC: 141 MMOL/L — SIGNIFICANT CHANGE UP (ref 135–145)
WBC # BLD: 10.35 K/UL — SIGNIFICANT CHANGE UP (ref 3.8–10.5)
WBC # BLD: 9.11 K/UL — SIGNIFICANT CHANGE UP (ref 3.8–10.5)
WBC # BLD: 9.29 K/UL — SIGNIFICANT CHANGE UP (ref 3.8–10.5)
WBC # FLD AUTO: 10.35 K/UL — SIGNIFICANT CHANGE UP (ref 3.8–10.5)
WBC # FLD AUTO: 9.11 K/UL — SIGNIFICANT CHANGE UP (ref 3.8–10.5)
WBC # FLD AUTO: 9.29 K/UL — SIGNIFICANT CHANGE UP (ref 3.8–10.5)

## 2024-12-04 PROCEDURE — 43235 EGD DIAGNOSTIC BRUSH WASH: CPT | Mod: GC

## 2024-12-04 PROCEDURE — 31500 INSERT EMERGENCY AIRWAY: CPT

## 2024-12-04 PROCEDURE — 99291 CRITICAL CARE FIRST HOUR: CPT | Mod: 25

## 2024-12-04 PROCEDURE — 71045 X-RAY EXAM CHEST 1 VIEW: CPT | Mod: 26,76

## 2024-12-04 RX ORDER — NOREPINEPHRINE BITARTRATE 1 MG/ML
0.05 INJECTION, SOLUTION, CONCENTRATE INTRAVENOUS
Qty: 8 | Refills: 0 | Status: DISCONTINUED | OUTPATIENT
Start: 2024-12-04 | End: 2024-12-06

## 2024-12-04 RX ORDER — ROCURONIUM BROMIDE 10 MG/ML
80 INJECTION INTRAVENOUS ONCE
Refills: 0 | Status: COMPLETED | OUTPATIENT
Start: 2024-12-04 | End: 2024-12-04

## 2024-12-04 RX ORDER — FENTANYL 12 UG/H
0.5 PATCH, EXTENDED RELEASE TRANSDERMAL
Qty: 5000 | Refills: 0 | Status: DISCONTINUED | OUTPATIENT
Start: 2024-12-04 | End: 2024-12-04

## 2024-12-04 RX ORDER — FENTANYL 12 UG/H
200 PATCH, EXTENDED RELEASE TRANSDERMAL ONCE
Refills: 0 | Status: DISCONTINUED | OUTPATIENT
Start: 2024-12-04 | End: 2024-12-04

## 2024-12-04 RX ORDER — ROCURONIUM BROMIDE 10 MG/ML
100 INJECTION INTRAVENOUS ONCE
Refills: 0 | Status: DISCONTINUED | OUTPATIENT
Start: 2024-12-04 | End: 2024-12-04

## 2024-12-04 RX ORDER — METHADONE HYDROCHLORIDE 5 MG/1
170 TABLET ORAL EVERY 24 HOURS
Refills: 0 | Status: DISCONTINUED | OUTPATIENT
Start: 2024-12-04 | End: 2024-12-08

## 2024-12-04 RX ORDER — FENTANYL 12 UG/H
100 PATCH, EXTENDED RELEASE TRANSDERMAL ONCE
Refills: 0 | Status: DISCONTINUED | OUTPATIENT
Start: 2024-12-04 | End: 2024-12-04

## 2024-12-04 RX ORDER — VANCOMYCIN HCL 900 MCG/MG
1000 POWDER (GRAM) MISCELLANEOUS EVERY 12 HOURS
Refills: 0 | Status: COMPLETED | OUTPATIENT
Start: 2024-12-04 | End: 2024-12-04

## 2024-12-04 RX ORDER — PROPOFOL 10 MG/ML
10 INJECTION, EMULSION INTRAVENOUS
Qty: 1000 | Refills: 0 | Status: DISCONTINUED | OUTPATIENT
Start: 2024-12-04 | End: 2024-12-06

## 2024-12-04 RX ORDER — ETOMIDATE 2 MG/ML
20 INJECTION, SOLUTION INTRAVENOUS ONCE
Refills: 0 | Status: COMPLETED | OUTPATIENT
Start: 2024-12-04 | End: 2024-12-04

## 2024-12-04 RX ORDER — FENTANYL 12 UG/H
0.5 PATCH, EXTENDED RELEASE TRANSDERMAL
Qty: 2500 | Refills: 0 | Status: DISCONTINUED | OUTPATIENT
Start: 2024-12-04 | End: 2024-12-06

## 2024-12-04 RX ORDER — DEXMEDETOMIDINE HYDROCHLORIDE 4 UG/ML
0.1 INJECTION, SOLUTION INTRAVENOUS
Qty: 400 | Refills: 0 | Status: DISCONTINUED | OUTPATIENT
Start: 2024-12-04 | End: 2024-12-04

## 2024-12-04 RX ADMIN — NOREPINEPHRINE BITARTRATE 6.38 MICROGRAM(S)/KG/MIN: 1 INJECTION, SOLUTION, CONCENTRATE INTRAVENOUS at 05:28

## 2024-12-04 RX ADMIN — PROPOFOL 8.16 MICROGRAM(S)/KG/MIN: 10 INJECTION, EMULSION INTRAVENOUS at 07:38

## 2024-12-04 RX ADMIN — ETOMIDATE 20 MILLIGRAM(S): 2 INJECTION, SOLUTION INTRAVENOUS at 15:19

## 2024-12-04 RX ADMIN — LEVETIRACETAM 750 MILLIGRAM(S): 1000 TABLET ORAL at 02:19

## 2024-12-04 RX ADMIN — PIPERACILLIN SODIUM AND TAZOBACTAM SODIUM 25 GRAM(S): 4; .5 INJECTION, POWDER, LYOPHILIZED, FOR SOLUTION INTRAVENOUS at 15:37

## 2024-12-04 RX ADMIN — FENTANYL 3.4 MICROGRAM(S)/KG/HR: 12 PATCH, EXTENDED RELEASE TRANSDERMAL at 15:15

## 2024-12-04 RX ADMIN — ROCURONIUM BROMIDE 80 MILLIGRAM(S): 10 INJECTION INTRAVENOUS at 15:20

## 2024-12-04 RX ADMIN — PIPERACILLIN SODIUM AND TAZOBACTAM SODIUM 25 GRAM(S): 4; .5 INJECTION, POWDER, LYOPHILIZED, FOR SOLUTION INTRAVENOUS at 21:32

## 2024-12-04 RX ADMIN — PROPOFOL 4.08 MICROGRAM(S)/KG/MIN: 10 INJECTION, EMULSION INTRAVENOUS at 15:15

## 2024-12-04 RX ADMIN — Medication 250 MILLIGRAM(S): at 21:06

## 2024-12-04 RX ADMIN — FENTANYL 100 MICROGRAM(S): 12 PATCH, EXTENDED RELEASE TRANSDERMAL at 15:38

## 2024-12-04 RX ADMIN — FENTANYL 100 MICROGRAM(S): 12 PATCH, EXTENDED RELEASE TRANSDERMAL at 15:19

## 2024-12-04 RX ADMIN — PIPERACILLIN SODIUM AND TAZOBACTAM SODIUM 25 GRAM(S): 4; .5 INJECTION, POWDER, LYOPHILIZED, FOR SOLUTION INTRAVENOUS at 07:30

## 2024-12-04 RX ADMIN — CHLORHEXIDINE GLUCONATE 1 APPLICATION(S): 1.2 RINSE ORAL at 07:30

## 2024-12-04 RX ADMIN — FENTANYL 10.2 MICROGRAM(S)/KG/HR: 12 PATCH, EXTENDED RELEASE TRANSDERMAL at 05:22

## 2024-12-04 RX ADMIN — Medication 2 MILLIGRAM(S): at 16:35

## 2024-12-04 RX ADMIN — PANTOPRAZOLE SODIUM 40 MILLIGRAM(S): 40 TABLET, DELAYED RELEASE ORAL at 15:37

## 2024-12-04 RX ADMIN — METHADONE HYDROCHLORIDE 170 MILLIGRAM(S): 5 TABLET ORAL at 09:34

## 2024-12-04 RX ADMIN — LEVETIRACETAM 750 MILLIGRAM(S): 1000 TABLET ORAL at 15:38

## 2024-12-04 RX ADMIN — NOREPINEPHRINE BITARTRATE 6.38 MICROGRAM(S)/KG/MIN: 1 INJECTION, SOLUTION, CONCENTRATE INTRAVENOUS at 15:16

## 2024-12-04 RX ADMIN — PIPERACILLIN SODIUM AND TAZOBACTAM SODIUM 25 GRAM(S): 4; .5 INJECTION, POWDER, LYOPHILIZED, FOR SOLUTION INTRAVENOUS at 02:18

## 2024-12-04 RX ADMIN — PROPOFOL 8.16 MICROGRAM(S)/KG/MIN: 10 INJECTION, EMULSION INTRAVENOUS at 02:36

## 2024-12-04 RX ADMIN — PROPOFOL 4.08 MICROGRAM(S)/KG/MIN: 10 INJECTION, EMULSION INTRAVENOUS at 21:32

## 2024-12-04 RX ADMIN — PANTOPRAZOLE SODIUM 40 MILLIGRAM(S): 40 TABLET, DELAYED RELEASE ORAL at 02:19

## 2024-12-04 RX ADMIN — Medication 100 GRAM(S): at 15:20

## 2024-12-04 RX ADMIN — Medication 250 MILLIGRAM(S): at 09:41

## 2024-12-04 NOTE — PROCEDURE NOTE - NSINDICATIONS_GEN_A_CORE
airway protection
airway protection
critical illness/emergency venous access/hemodynamic monitoring/hypertonic/irritant infusion/venous access/volume resuscitation
emergency venous access/fluid administration
arterial puncture to obtain ABG's/blood sampling

## 2024-12-04 NOTE — PROCEDURE NOTE - ADDITIONAL PROCEDURE DETAILS
Endotracheal intubation with video laryngoscopy with S3 blade. Sedation with 20 mg etomidate, 80 mg rocuronium. One visualization, one successful attempt Endotracheal intubation with video laryngoscopy with S3 blade. Sedation with 20 mg etomidate, 80 mg rocuronium. Grade 1 view. One visualization, one successful attempt

## 2024-12-04 NOTE — PROGRESS NOTE ADULT - SUBJECTIVE AND OBJECTIVE BOX
GASTROENTEROLOGY PROGRESS NOTE  Patient seen and examined at bedside. Has had no further episodes of melena or hematochezia. No abdominal pain.     PERTINENT REVIEW OF SYSTEMS:  CONSTITUTIONAL: No weakness, fevers or chills  HEENT: No visual changes; No vertigo or throat pain   GASTROINTESTINAL: As above.  NEUROLOGICAL: No numbness or weakness  SKIN: No itching, burning, rashes, or lesions     Allergies    No Known Allergies    Intolerances      MEDICATIONS:  MEDICATIONS  (STANDING):  chlorhexidine 2% Cloths 1 Application(s) Topical <User Schedule>  dexMEDEtomidine Infusion 0.1 MICROgram(s)/kG/Hr (1.7 mL/Hr) IV Continuous <Continuous>  levETIRAcetam   Injectable 750 milliGRAM(s) IV Push every 12 hours  methadone    Tablet 170 milliGRAM(s) Oral every 24 hours  norepinephrine Infusion 0.05 MICROgram(s)/kG/Min (6.38 mL/Hr) IV Continuous <Continuous>  pantoprazole  Injectable 40 milliGRAM(s) IV Push every 12 hours  piperacillin/tazobactam IVPB.. 3.375 Gram(s) IV Intermittent every 8 hours  vancomycin  IVPB 1000 milliGRAM(s) IV Intermittent every 12 hours    MEDICATIONS  (PRN):  midazolam Injectable 2 milliGRAM(s) IV Push once PRN RASS>1  sodium chloride 0.9% lock flush 10 milliLiter(s) IV Push every 1 hour PRN Pre/post blood products, medications, blood draw, and to maintain line patency    Vital Signs Last 24 Hrs  T(C): 37 (04 Dec 2024 06:04), Max: 37.8 (03 Dec 2024 21:20)  T(F): 98.6 (04 Dec 2024 06:04), Max: 100 (03 Dec 2024 21:20)  HR: 101 (04 Dec 2024 09:15) (83 - 105)  BP: 99/52 (04 Dec 2024 09:15) (86/54 - 167/89)  BP(mean): 73 (04 Dec 2024 09:15) (64 - 113)  RR: 12 (04 Dec 2024 09:15) (12 - 28)  SpO2: 97% (04 Dec 2024 09:15) (97% - 100%)    Parameters below as of 04 Dec 2024 09:20    O2 Flow (L/min): 4      12-03 @ 07:01 - 12-04 @ 07:00  --------------------------------------------------------  IN: 5915.3 mL / OUT: 1790 mL / NET: 4125.3 mL    12-04 @ 07:01 - 12-04 @ 09:46  --------------------------------------------------------  IN: 173.8 mL / OUT: 240 mL / NET: -66.2 mL      PHYSICAL EXAM:    General: lying in bed, in no acute distress  HEENT: MMM, conjunctiva and sclera clear  Gastrointestinal: Soft non-tender non-distended; No rebound or guarding  Skin: Warm and dry. No obvious rash    LABS:                        8.2    10.35 )-----------( 116      ( 04 Dec 2024 05:30 )             23.8     12-04    141  |  112[H]  |  62[H]  ----------------------------<  118[H]  4.0   |  24  |  1.60[H]    Ca    7.3[L]      04 Dec 2024 05:30  Phos  3.6     12-04  Mg     2.1     12-04    TPro  4.0[L]  /  Alb  2.4[L]  /  TBili  0.3  /  DBili  x   /  AST  51[H]  /  ALT  45  /  AlkPhos  42  12-04    PT/INR - ( 03 Dec 2024 19:32 )   PT: 14.4 sec;   INR: 1.25          PTT - ( 03 Dec 2024 19:32 )  PTT:32.5 sec      Urinalysis Basic - ( 04 Dec 2024 05:30 )    Color: x / Appearance: x / SG: x / pH: x  Gluc: 118 mg/dL / Ketone: x  / Bili: x / Urobili: x   Blood: x / Protein: x / Nitrite: x   Leuk Esterase: x / RBC: x / WBC x   Sq Epi: x / Non Sq Epi: x / Bacteria: x                RADIOLOGY & ADDITIONAL STUDIES:  Reviewed

## 2024-12-04 NOTE — CHART NOTE - NSCHARTNOTEFT_GEN_A_CORE
Methadone dose confirmed with Methadone clinic ([facility] [phone])   Pt takes Methadone 170mg Qd as her confirmed dose.   This was confirmed with Nurse Maik Keane at the Methadone clinic

## 2024-12-04 NOTE — PROCEDURE NOTE - NSTRACHPOSTINTU_RESP_A_CORE
Appropriate capnography/Breath sounds bilateral/Breath sounds equal/Chest excursion noted/Chest X-Ray
Appropriate capnography/Breath sounds bilateral/Breath sounds equal/Chest X-Ray

## 2024-12-04 NOTE — CHART NOTE - NSCHARTNOTEFT_GEN_A_CORE
Repeat EGD done 12/4/24:    Findings:  Esophagus	Normal esophagus.  Stomach	Excavated lesions	A single non-bleeding 20 mm ulcer was found in the stomach. Previously clipped ulcer in antrum visualized w/ no old or active bleeding.  Duodenum	Normal duodenum.    Plan:  NPO  Complete 72 hours of PPI 40 mg BID  If patient rebleeds then recommend obtaining CT bleeding scan and contacting IR for possible embolization    Yogi Laboy MD  PGY-4 GI Fellow  GI consult pager (M-F 7a-5p): [phone]  Please call  for on-call fellow after hours

## 2024-12-04 NOTE — PROGRESS NOTE ADULT - ASSESSMENT
59F w/ PMHx of polysubstance use (tobacco, opioids on methadone), HepC, CKD, and epilepsy admitted to MICU due to hemorrhagic shock 2/2 GIB requiring pressors. GI consulted for GI bleed and now s/p EGD w/ ovesco clip placed over [place] 1b ulcer:    Recommendations:  - Advance diet to clear liquid diet today  - C/w PPI 40 mg BID (72 hours total)  - Trend CBC, maintain active T&S    Case not yet discussed w/ GI attending Dr. Mata, recommendations NOT final    Yogi Laboy MD  PGY-4 GI Fellow  GI consult pager (M-F 1v-4y): [phone]  Please call  for on-call fellow after hours   59F w/ PMHx of polysubstance use (tobacco, opioids on methadone), HepC, CKD, and epilepsy admitted to MICU due to hemorrhagic shock 2/2 GIB requiring pressors. GI consulted for GI bleed and now s/p EGD w/ ovesco clip placed over [place] 1b ulcer:    Later in morning had further episodes of melena with drop in Hgb    Recommendations:  - Plan for repeat EGD at bedside today  - C/w PPI 40 mg BID   - Trend CBC, maintain active T&S    Case discussed w/ GI attending Dr. Adolfo Laboy MD  PGY-4 GI Fellow  GI consult pager (M-F 3d-3n): [phone]  Please call  for on-call fellow after hours

## 2024-12-04 NOTE — PROGRESS NOTE ADULT - ASSESSMENT
Patient is a 59-year-old female w/ PMH of polysubstance use (tobacco, opioids on methadone, Hep C positive), anemia, CKD, and epilepsy BIBEMS for AMS found to have Hgb 5.9 and melena admitted to MICU w/ c/f acute UGIB pending endoscopy.    Neuro/Psych  #AMS -- RESOLVED  Initially BIBEMS for AMS  Improvement in mentation, now AOx3    #Seizure  Home keppra 750mg BID  Per pt, has had breakthrough seizures, last 11/30 witnessed by roommate  Last dose of keppra taken morning of seizure  - c/w keppra 750mg BID  - seizure precautions  - epilepsy consult    #Opioid dependence  Pt reports she takes methadone 170mg qd -- last went to methadone clinic 1 week ago but missed appointment on 12/2 due to symptoms   Reported last use of heroin 10 days ago, has been taking gabapentin 400mg QID for the past week  - confirm dose w/ methadone clinic ([facility]: [phone])  - c/w methadone 40mg qd until confirmed dose  - f/u Utox    #Bipolar disorder  #Depression  Previously on buspar though self-discontinued by pt due to side effects (shakiness)  Per surescripts, is also on quetiapine 75mg qd  - formal med rec when pt more stable from GIB    Cardiovascular   #Hemorrhagic shock likely 2/2 acute GIB  100/65 --> 85/50, started on levo gtt  Unlikely septic shock given afebrile, no leukocytosis, no evidence of infection; unlikely cardiogenic given no significant cardiovascular hx, warm, well-perfused on exam  POCUS w/ normal heart function  - c/w levo gtt  - titrate to MAP > 65  - rest of plan per GI below    Pulmonary   CXR negative    #Sedated/intubated  12/3 AM. Per GI for pending EGD.    GI   #c/f active UGIB  Pt complaining of episodes of maroon/dark stools since 11/30  No prior hx, no prior colonoscopies. Pt reporting prior EGD in 7/2024 admission though none documented, no c/f bleeding at that time   Low concern for varices given no significant alcohol use hx   Rectal exam +maroon/dark stool, external hemorrhoid   S/p 12u pRBC (8 recorded, 12 ordered), plasma, platelets  10mm ulcer in antrum found per EGD (12/3); [place] 1b ulcer, clipped.  - pantoprazole bid  - GI following; appreciate recs    #Transaminitis  #Hx of hepatitis C  AST 75, ALT 80 on adm; likely 2/ hemorrhagic shock.   Pt reporting ongoing abdominal pain since 11/30  Improving; AST 57, ALT 49 (12/3)  - trend w/ daily LFTs    Diet: NPO except meds  GI pptx: pantoprazole bid  Bowel regimen: none    Renal   #BOB, resolved  Unclear baseline, BUN 60, Cr 1.5 at presentation  Cr 1.2 at prior admission in 7/2024  Likely pre-renal iso hypovolemia 2/2 acute GIB  Cr 0.93 (12/3)    #Urinary retention  At presentation, , SC x1 w/ 550cc output; straight cath x2. Hilton placed on 12/2-12/3.  - c/w hilton      ID  FAUSTO, afebrile, no leukocytosis    Heme  #Normocytic anemia likely 2/2 acute UGIB  Hgb 5.9 > 6.1 s/p 2 units pRBC, received 2 more units prior to transfer to MICU.  At last admission, Hgb 7.9-9  Total of 3 episodes of melena on 12/3; massive. Code fusion called 12/3 AM; having 8 more transfusions.     - repeat CBC   - maintain active T&S  - transfuse Hgb <7  - f/u iron studies  - f/u B12, folate    Endo  FAUSTO    MSK  FAUSTO    Prophylaxis  F: as needed  E: Replete as needed, target K>4, Phos>3, Mg>2  N: NPO  DVT pptx: hold iso GIB  GI pptx: pantoprazole gtt  Code status: Full code  Dispo: MICU  Lines: R femoral line  Patient is a 59-year-old female w/ PMH of polysubstance use (tobacco, opioids on methadone, Hep C positive), anemia, CKD, and epilepsy BIBEMS for AMS found to have Hgb 5.9 and melena admitted to MICU w/ c/f acute UGIB pending endoscopy.    Neuro/Psych  #AMS -- RESOLVED  Initially BIBEMS for AMS  Improvement in mentation, now AOx3    #Seizure  Home keppra 750mg BID  Per pt, has had breakthrough seizures, last 11/30 witnessed by roommate  Last dose of keppra taken morning of seizure  - c/w keppra 750mg BID  - seizure precautions  - epilepsy consult; will reach out today as has ot seen pt yet    #Opioid dependence  Pt reports she takes methadone 170mg qd -- last went to methadone clinic 1 week ago but missed appointment on 12/2 due to symptoms   Reported last use of heroin 10 days ago, has been taking gabapentin 400mg QID for the past week  - confirm dose w/ methadone clinic ([facility]: [phone])  - c/w methadone 170mg qd   - f/u Utox    #Bipolar disorder  #Depression  Previously on buspar though self-discontinued by pt due to side effects (shakiness)  Per surescripts, is also on quetiapine 75mg qd  - formal med rec when pt more stable from GIB    Cardiovascular   #Hemorrhagic shock likely 2/2 acute GIB  100/65 --> 85/50, started on levo gtt  Unlikely septic shock given afebrile, no leukocytosis, no evidence of infection; unlikely cardiogenic given no significant cardiovascular hx, warm, well-perfused on exam  POCUS w/ normal heart function  - c/w levo gtt  - titrate to MAP > 65  - rest of plan per GI below    Pulmonary   CXR negative  Extubated @ 12/4 AM; stable.    GI   #c/f active UGIB  Pt complaining of episodes of maroon/dark stools since 11/30  No prior hx, no prior colonoscopies. Pt reporting prior EGD in 7/2024 admission though none documented, no c/f bleeding at that time   Low concern for varices given no significant alcohol use hx   Rectal exam +maroon/dark stool, external hemorrhoid   S/p 12u pRBC (8 recorded, 12 ordered), plasma, platelets  10mm ulcer in antrum found per EGD (12/3); [place] 1b ulcer, clipped.  - pantoprazole bid  - repeating cbc today as hgb dropped 9.3>8.2 (12/4)   - GI following; appreciate recs    #Transaminitis  #Hx of hepatitis C  AST 75, ALT 80 on adm; likely 2/ hemorrhagic shock.   Pt reporting ongoing abdominal pain since 11/30  Improving; AST 51, ALT 45 (12/4)  - trend w/ daily LFTs    Diet: NPO until repeat CBC returns, then CLD  GI pptx: pantoprazole bid  Bowel regimen: none    Renal   #BOB,  Unclear baseline, BUN 60, Cr 1.5 at presentation  Cr 1.2 at prior admission in 7/2024  Likely pre-renal iso hypovolemia 2/2 acute GIB  Cr 0.93 (12/3); cleared. However, uptrended to 1.60 (12/4). This is likely 2/2 massive blood loss from GIB and iso hemorrhagic shock. Expecting it to be improving to wnl.  - ctm Cr    #Urinary retention, resolved  At presentation, , SC x1 w/ 550cc output; straight cath x2. Hilton placed on 12/2-12/3.  - c/w hilton. TOV tomorrow       ID  FAUSTO, afebrile, no leukocytosis    Heme  #Normocytic anemia likely 2/2 acute UGIB  Hgb 5.9 > 6.1 s/p 2 units pRBC, received 2 more units prior to transfer to MICU.  At last admission, Hgb 7.9-9  Total of 3 episodes of melena on 12/3; massive. Code fusion called 12/3 AM; having 8 more transfusions.   Iron studies: total iron wnl, TIBC low, %iron sat wnl, ferritin wnl  Folate/B12 wnl  - repeat CBC   - maintain active T&S  - transfuse Hgb <7      Endo  FAUSTO    MSK  FAUSTO    Prophylaxis  F: as needed  E: Replete as needed, target K>4, Phos>3, Mg>2  N: NPO until repeat CBC (stable hgb), then CLD  DVT pptx: hold iso GIB; start tomorrow if hgb stable  GI pptx: pantoprazole gtt  Code status: Full code  Dispo: MICU  Lines: R femoral line

## 2024-12-04 NOTE — PROCEDURE NOTE - NSPROCNAME_GEN_A_CORE
Arterial Puncture/Cannulation
Point of Care Ultrasound Cardiac
Peripheral Line Insertion
Tracheal Intubation
Tracheal Intubation
Central Line Insertion

## 2024-12-04 NOTE — PROCEDURE NOTE - NSTRACHINTUBMED_RESP_A_CORE
1 increase the fluoxetine to 40 mg daily  2.  Trial of bupropion 100 mg once in the morning second dose if needed for a more energizing antidepressant supplement.  If it turns out you preferred the duloxetine simply call and we can change that back.  If you do like the bupropion we can send a prescription for 3-month supplies to [facility]  3.  The patient has lost 35 pounds since last year and we can get by with just a basic metabolic profile.  She thinks she can get that done more cheaply at [facility].  If not there than [facility] would be cheaper  4.  Nurse is trying to find information regarding discounted mammograms.  I would think [facility] which is a believer you get your mammograms would have some information for that  5.  We will call you regarding your basic metabolic profile no matter how it turns out.  If you have not heard from us a week by a week afterwards call and leave a phone message  
etomidate injectable/rocuronium injectable
etomidate injectable/rocuronium injectable

## 2024-12-04 NOTE — PROGRESS NOTE ADULT - SUBJECTIVE AND OBJECTIVE BOX
Patient is a 59y old  Female who presents with a chief complaint of AMS, Abdominal pain (04 Dec 2024 09:46)      INTERVAL HPI/OVERNIGHT EVENTS:   O/N: Decreased fentanyl from 5 to 4.   AM: Afebrile, hemodynamically stable. Awake, following commands. Eventually extubated @8:30AM; stable.     ICU Vital Signs Last 24 Hrs  T(C): 37 (04 Dec 2024 06:04), Max: 37.8 (03 Dec 2024 21:20)  T(F): 98.6 (04 Dec 2024 06:04), Max: 100 (03 Dec 2024 21:20)  HR: 91 (04 Dec 2024 12:00) (83 - 105)  BP: 97/47 (04 Dec 2024 12:00) (85/47 - 146/73)  BP(mean): 67 (04 Dec 2024 12:00) (62 - 103)  ABP: --  ABP(mean): --  RR: 14 (04 Dec 2024 12:00) (12 - 28)  SpO2: 98% (04 Dec 2024 12:00) (97% - 100%)    O2 Parameters below as of 04 Dec 2024 12:00  Patient On (Oxygen Delivery Method): room air          I&O's Summary    03 Dec 2024 07:01  -  04 Dec 2024 07:00  --------------------------------------------------------  IN: 5915.3 mL / OUT: 1790 mL / NET: 4125.3 mL    04 Dec 2024 07:01  -  04 Dec 2024 12:28  --------------------------------------------------------  IN: 408.8 mL / OUT: 590 mL / NET: -181.2 mL      Mode: AC/ CMV (Assist Control/ Continuous Mandatory Ventilation)  RR (machine): 16  TV (machine): 400  FiO2: 40  PEEP: 5  ITime: 1  MAP: 8  PIP: 16      LABS:                        8.2    10.35 )-----------( 116      ( 04 Dec 2024 05:30 )             23.8     12-04    141  |  112[H]  |  62[H]  ----------------------------<  118[H]  4.0   |  24  |  1.60[H]    Ca    7.3[L]      04 Dec 2024 05:30  Phos  3.6     12-04  Mg     2.1     12-04    TPro  4.0[L]  /  Alb  2.4[L]  /  TBili  0.3  /  DBili  x   /  AST  51[H]  /  ALT  45  /  AlkPhos  42  12-04    PT/INR - ( 03 Dec 2024 19:32 )   PT: 14.4 sec;   INR: 1.25          PTT - ( 03 Dec 2024 19:32 )  PTT:32.5 sec  Urinalysis Basic - ( 04 Dec 2024 05:30 )    Color: x / Appearance: x / SG: x / pH: x  Gluc: 118 mg/dL / Ketone: x  / Bili: x / Urobili: x   Blood: x / Protein: x / Nitrite: x   Leuk Esterase: x / RBC: x / WBC x   Sq Epi: x / Non Sq Epi: x / Bacteria: x      CAPILLARY BLOOD GLUCOSE        ABG - ( 03 Dec 2024 17:20 )  pH, Arterial: 7.33  pH, Blood: x     /  pCO2: 45    /  pO2: 158   / HCO3: 24    / Base Excess: -2.4  /  SaO2: 98.7                RADIOLOGY & ADDITIONAL TESTS:    Consultant(s) Notes Reviewed:  [x ] YES  [ ] NO    MEDICATIONS  (STANDING):  chlorhexidine 2% Cloths 1 Application(s) Topical <User Schedule>  levETIRAcetam   Injectable 750 milliGRAM(s) IV Push every 12 hours  methadone    Tablet 170 milliGRAM(s) Oral every 24 hours  norepinephrine Infusion 0.05 MICROgram(s)/kG/Min (6.38 mL/Hr) IV Continuous <Continuous>  pantoprazole  Injectable 40 milliGRAM(s) IV Push every 12 hours  piperacillin/tazobactam IVPB.. 3.375 Gram(s) IV Intermittent every 8 hours  vancomycin  IVPB 1000 milliGRAM(s) IV Intermittent every 12 hours    MEDICATIONS  (PRN):  sodium chloride 0.9% lock flush 10 milliLiter(s) IV Push every 1 hour PRN Pre/post blood products, medications, blood draw, and to maintain line patency      PHYSICAL EXAM:  GENERAL: awake, intubated, following commands.   HEAD:  Atraumatic, Normocephalic  EYES: EOMI, PERRLA, conjunctiva and sclera clear  CHEST/LUNG: B/L good air entry; No rales, rhonchi, or wheezing  HEART: S1S2 normal, no S3, Regular rate and rhythm; No murmurs  ABDOMEN: Soft, Nontender, Nondistended; Bowel sounds present  EXTREMITIES:  2+ Peripheral Pulses, No clubbing, cyanosis, or edema  SKIN: No rashes or lesions    Care Discussed with Consultants/Other Providers [ x] YES  [ ] NO

## 2024-12-04 NOTE — PROCEDURE NOTE - NSPROCDETAILS_GEN_ALL_CORE
guidewire recovered/lumen(s) aspirated and flushed/sterile dressing applied/sterile technique, catheter placed/ultrasound guidance with use of sterile gel and probe cove
patient pre-oxygenated, tube inserted, placement confirmed
patient pre-oxygenated, tube inserted, placement confirmed
location identified, draped/prepped, sterile technique used/blood seen on insertion/dressing applied/flushes easily/secured in place/sterile technique, catheter placed
location identified, draped/prepped, sterile technique used, needle inserted/introduced/all materials/supplies accounted for at end of procedure

## 2024-12-04 NOTE — PROCEDURE NOTE - NSINFORMCONSENT_GEN_A_CORE
This was an emergent procedure.
Benefits, risks, and possible complications of procedure explained to patient/caregiver who verbalized understanding and gave verbal consent.
Benefits, risks, and possible complications of procedure explained to patient/caregiver who verbalized understanding and gave written consent.
This was an emergent procedure.
This was an emergent procedure.

## 2024-12-05 LAB
ALBUMIN SERPL ELPH-MCNC: 2.4 G/DL — LOW (ref 3.3–5)
ALBUMIN SERPL ELPH-MCNC: 2.8 G/DL — LOW (ref 3.3–5)
ALP SERPL-CCNC: 48 U/L — SIGNIFICANT CHANGE UP (ref 40–120)
ALP SERPL-CCNC: 59 U/L — SIGNIFICANT CHANGE UP (ref 40–120)
ALT FLD-CCNC: 41 U/L — SIGNIFICANT CHANGE UP (ref 10–45)
ALT FLD-CCNC: 47 U/L — HIGH (ref 10–45)
ANION GAP SERPL CALC-SCNC: 6 MMOL/L — SIGNIFICANT CHANGE UP (ref 5–17)
ANION GAP SERPL CALC-SCNC: 6 MMOL/L — SIGNIFICANT CHANGE UP (ref 5–17)
ANION GAP SERPL CALC-SCNC: 7 MMOL/L — SIGNIFICANT CHANGE UP (ref 5–17)
AST SERPL-CCNC: 46 U/L — HIGH (ref 10–40)
AST SERPL-CCNC: 63 U/L — HIGH (ref 10–40)
BASOPHILS # BLD AUTO: 0 K/UL — SIGNIFICANT CHANGE UP (ref 0–0.2)
BASOPHILS NFR BLD AUTO: 0 % — SIGNIFICANT CHANGE UP (ref 0–2)
BILIRUB SERPL-MCNC: 0.5 MG/DL — SIGNIFICANT CHANGE UP (ref 0.2–1.2)
BILIRUB SERPL-MCNC: 0.5 MG/DL — SIGNIFICANT CHANGE UP (ref 0.2–1.2)
BUN SERPL-MCNC: 30 MG/DL — HIGH (ref 7–23)
BUN SERPL-MCNC: 32 MG/DL — HIGH (ref 7–23)
BUN SERPL-MCNC: 38 MG/DL — HIGH (ref 7–23)
CALCIUM SERPL-MCNC: 7.8 MG/DL — LOW (ref 8.4–10.5)
CALCIUM SERPL-MCNC: 7.8 MG/DL — LOW (ref 8.4–10.5)
CALCIUM SERPL-MCNC: 8.2 MG/DL — LOW (ref 8.4–10.5)
CHLORIDE SERPL-SCNC: 113 MMOL/L — HIGH (ref 96–108)
CHLORIDE SERPL-SCNC: 113 MMOL/L — HIGH (ref 96–108)
CHLORIDE SERPL-SCNC: 95 MMOL/L — LOW (ref 96–108)
CO2 SERPL-SCNC: 23 MMOL/L — SIGNIFICANT CHANGE UP (ref 22–31)
CO2 SERPL-SCNC: 25 MMOL/L — SIGNIFICANT CHANGE UP (ref 22–31)
CO2 SERPL-SCNC: 38 MMOL/L — HIGH (ref 22–31)
CREAT SERPL-MCNC: 0.77 MG/DL — SIGNIFICANT CHANGE UP (ref 0.5–1.3)
CREAT SERPL-MCNC: 1.26 MG/DL — SIGNIFICANT CHANGE UP (ref 0.5–1.3)
CREAT SERPL-MCNC: 1.28 MG/DL — SIGNIFICANT CHANGE UP (ref 0.5–1.3)
EGFR: 49 ML/MIN/1.73M2 — LOW
EGFR: 50 ML/MIN/1.73M2 — LOW
EGFR: 90 ML/MIN/1.73M2 — SIGNIFICANT CHANGE UP
EOSINOPHIL # BLD AUTO: 0 K/UL — SIGNIFICANT CHANGE UP (ref 0–0.5)
EOSINOPHIL NFR BLD AUTO: 0 % — SIGNIFICANT CHANGE UP (ref 0–6)
GLUCOSE SERPL-MCNC: 197 MG/DL — HIGH (ref 70–99)
GLUCOSE SERPL-MCNC: 86 MG/DL — SIGNIFICANT CHANGE UP (ref 70–99)
GLUCOSE SERPL-MCNC: 94 MG/DL — SIGNIFICANT CHANGE UP (ref 70–99)
GRAM STN FLD: SIGNIFICANT CHANGE UP
HCT VFR BLD CALC: 24.3 % — LOW (ref 34.5–45)
HCT VFR BLD CALC: 25 % — LOW (ref 34.5–45)
HCT VFR BLD CALC: 25.6 % — LOW (ref 34.5–45)
HCT VFR BLD CALC: 25.9 % — LOW (ref 34.5–45)
HCT VFR BLD CALC: 38.1 % — SIGNIFICANT CHANGE UP (ref 34.5–45)
HCV AB S/CO SERPL IA: 21.28 S/CO — HIGH
HCV AB SERPL-IMP: REACTIVE
HCV RNA SERPL NAA DL=5-ACNC: SIGNIFICANT CHANGE UP IU/ML
HCV RNA SPEC NAA+PROBE-LOG IU: 4.07 LOGIU/ML — SIGNIFICANT CHANGE UP
HCV RNA SPEC NAA+PROBE-LOG IU: DETECTED
HGB BLD-MCNC: 12 G/DL — SIGNIFICANT CHANGE UP (ref 11.5–15.5)
HGB BLD-MCNC: 8.3 G/DL — LOW (ref 11.5–15.5)
HGB BLD-MCNC: 8.3 G/DL — LOW (ref 11.5–15.5)
HGB BLD-MCNC: 8.5 G/DL — LOW (ref 11.5–15.5)
HGB BLD-MCNC: 8.5 G/DL — LOW (ref 11.5–15.5)
IMM GRANULOCYTES NFR BLD AUTO: 0.4 % — SIGNIFICANT CHANGE UP (ref 0–0.9)
LYMPHOCYTES # BLD AUTO: 0.57 K/UL — LOW (ref 1–3.3)
LYMPHOCYTES # BLD AUTO: 7.3 % — LOW (ref 13–44)
MAGNESIUM SERPL-MCNC: 2 MG/DL — SIGNIFICANT CHANGE UP (ref 1.6–2.6)
MAGNESIUM SERPL-MCNC: 2.7 MG/DL — HIGH (ref 1.6–2.6)
MCHC RBC-ENTMCNC: 29 PG — SIGNIFICANT CHANGE UP (ref 27–34)
MCHC RBC-ENTMCNC: 29.4 PG — SIGNIFICANT CHANGE UP (ref 27–34)
MCHC RBC-ENTMCNC: 29.8 PG — SIGNIFICANT CHANGE UP (ref 27–34)
MCHC RBC-ENTMCNC: 30.3 PG — SIGNIFICANT CHANGE UP (ref 27–34)
MCHC RBC-ENTMCNC: 31.3 PG — SIGNIFICANT CHANGE UP (ref 27–34)
MCHC RBC-ENTMCNC: 31.5 G/DL — LOW (ref 32–36)
MCHC RBC-ENTMCNC: 32.8 G/DL — SIGNIFICANT CHANGE UP (ref 32–36)
MCHC RBC-ENTMCNC: 33.2 G/DL — SIGNIFICANT CHANGE UP (ref 32–36)
MCHC RBC-ENTMCNC: 33.2 G/DL — SIGNIFICANT CHANGE UP (ref 32–36)
MCHC RBC-ENTMCNC: 34.2 G/DL — SIGNIFICANT CHANGE UP (ref 32–36)
MCV RBC AUTO: 87.4 FL — SIGNIFICANT CHANGE UP (ref 80–100)
MCV RBC AUTO: 88.7 FL — SIGNIFICANT CHANGE UP (ref 80–100)
MCV RBC AUTO: 89.6 FL — SIGNIFICANT CHANGE UP (ref 80–100)
MCV RBC AUTO: 89.8 FL — SIGNIFICANT CHANGE UP (ref 80–100)
MCV RBC AUTO: 99.5 FL — SIGNIFICANT CHANGE UP (ref 80–100)
MONOCYTES # BLD AUTO: 0.4 K/UL — SIGNIFICANT CHANGE UP (ref 0–0.9)
MONOCYTES NFR BLD AUTO: 5.1 % — SIGNIFICANT CHANGE UP (ref 2–14)
NEUTROPHILS # BLD AUTO: 6.81 K/UL — SIGNIFICANT CHANGE UP (ref 1.8–7.4)
NEUTROPHILS NFR BLD AUTO: 87.2 % — HIGH (ref 43–77)
NRBC # BLD: 0 /100 WBCS — SIGNIFICANT CHANGE UP (ref 0–0)
PHOSPHATE SERPL-MCNC: 3.8 MG/DL — SIGNIFICANT CHANGE UP (ref 2.5–4.5)
PHOSPHATE SERPL-MCNC: 4 MG/DL — SIGNIFICANT CHANGE UP (ref 2.5–4.5)
PLATELET # BLD AUTO: 102 K/UL — LOW (ref 150–400)
PLATELET # BLD AUTO: 105 K/UL — LOW (ref 150–400)
PLATELET # BLD AUTO: 114 K/UL — LOW (ref 150–400)
PLATELET # BLD AUTO: 116 K/UL — LOW (ref 150–400)
PLATELET # BLD AUTO: 135 K/UL — LOW (ref 150–400)
POTASSIUM SERPL-MCNC: 3.4 MMOL/L — LOW (ref 3.5–5.3)
POTASSIUM SERPL-MCNC: 4 MMOL/L — SIGNIFICANT CHANGE UP (ref 3.5–5.3)
POTASSIUM SERPL-MCNC: 4.2 MMOL/L — SIGNIFICANT CHANGE UP (ref 3.5–5.3)
POTASSIUM SERPL-SCNC: 3.4 MMOL/L — LOW (ref 3.5–5.3)
POTASSIUM SERPL-SCNC: 4 MMOL/L — SIGNIFICANT CHANGE UP (ref 3.5–5.3)
POTASSIUM SERPL-SCNC: 4.2 MMOL/L — SIGNIFICANT CHANGE UP (ref 3.5–5.3)
PROT SERPL-MCNC: 4.6 G/DL — LOW (ref 6–8.3)
PROT SERPL-MCNC: 6.6 G/DL — SIGNIFICANT CHANGE UP (ref 6–8.3)
RBC # BLD: 2.74 M/UL — LOW (ref 3.8–5.2)
RBC # BLD: 2.85 M/UL — LOW (ref 3.8–5.2)
RBC # BLD: 2.86 M/UL — LOW (ref 3.8–5.2)
RBC # BLD: 2.89 M/UL — LOW (ref 3.8–5.2)
RBC # BLD: 3.83 M/UL — SIGNIFICANT CHANGE UP (ref 3.8–5.2)
RBC # FLD: 15.4 % — HIGH (ref 10.3–14.5)
RBC # FLD: 15.9 % — HIGH (ref 10.3–14.5)
RBC # FLD: 17.9 % — HIGH (ref 10.3–14.5)
SODIUM SERPL-SCNC: 139 MMOL/L — SIGNIFICANT CHANGE UP (ref 135–145)
SODIUM SERPL-SCNC: 143 MMOL/L — SIGNIFICANT CHANGE UP (ref 135–145)
SODIUM SERPL-SCNC: 144 MMOL/L — SIGNIFICANT CHANGE UP (ref 135–145)
SPECIMEN SOURCE: SIGNIFICANT CHANGE UP
VANCOMYCIN TROUGH SERPL-MCNC: 13.3 UG/ML — SIGNIFICANT CHANGE UP (ref 10–20)
WBC # BLD: 4.79 K/UL — SIGNIFICANT CHANGE UP (ref 3.8–10.5)
WBC # BLD: 5.15 K/UL — SIGNIFICANT CHANGE UP (ref 3.8–10.5)
WBC # BLD: 5.43 K/UL — SIGNIFICANT CHANGE UP (ref 3.8–10.5)
WBC # BLD: 6.45 K/UL — SIGNIFICANT CHANGE UP (ref 3.8–10.5)
WBC # BLD: 7.81 K/UL — SIGNIFICANT CHANGE UP (ref 3.8–10.5)
WBC # FLD AUTO: 4.79 K/UL — SIGNIFICANT CHANGE UP (ref 3.8–10.5)
WBC # FLD AUTO: 5.15 K/UL — SIGNIFICANT CHANGE UP (ref 3.8–10.5)
WBC # FLD AUTO: 5.43 K/UL — SIGNIFICANT CHANGE UP (ref 3.8–10.5)
WBC # FLD AUTO: 6.45 K/UL — SIGNIFICANT CHANGE UP (ref 3.8–10.5)
WBC # FLD AUTO: 7.81 K/UL — SIGNIFICANT CHANGE UP (ref 3.8–10.5)

## 2024-12-05 PROCEDURE — 99232 SBSQ HOSP IP/OBS MODERATE 35: CPT | Mod: GC

## 2024-12-05 PROCEDURE — 99291 CRITICAL CARE FIRST HOUR: CPT

## 2024-12-05 RX ORDER — POTASSIUM CHLORIDE 600 MG/1
10 TABLET, EXTENDED RELEASE ORAL
Refills: 0 | Status: COMPLETED | OUTPATIENT
Start: 2024-12-05 | End: 2024-12-05

## 2024-12-05 RX ORDER — CHLORHEXIDINE GLUCONATE 1.2 MG/ML
15 RINSE ORAL EVERY 12 HOURS
Refills: 0 | Status: DISCONTINUED | OUTPATIENT
Start: 2024-12-05 | End: 2024-12-06

## 2024-12-05 RX ADMIN — FENTANYL 3.4 MICROGRAM(S)/KG/HR: 12 PATCH, EXTENDED RELEASE TRANSDERMAL at 18:31

## 2024-12-05 RX ADMIN — FENTANYL 3.4 MICROGRAM(S)/KG/HR: 12 PATCH, EXTENDED RELEASE TRANSDERMAL at 00:45

## 2024-12-05 RX ADMIN — POTASSIUM CHLORIDE 100 MILLIEQUIVALENT(S): 600 TABLET, EXTENDED RELEASE ORAL at 10:07

## 2024-12-05 RX ADMIN — PIPERACILLIN SODIUM AND TAZOBACTAM SODIUM 25 GRAM(S): 4; .5 INJECTION, POWDER, LYOPHILIZED, FOR SOLUTION INTRAVENOUS at 14:04

## 2024-12-05 RX ADMIN — PROPOFOL 4.08 MICROGRAM(S)/KG/MIN: 10 INJECTION, EMULSION INTRAVENOUS at 14:57

## 2024-12-05 RX ADMIN — CHLORHEXIDINE GLUCONATE 15 MILLILITER(S): 1.2 RINSE ORAL at 21:44

## 2024-12-05 RX ADMIN — POTASSIUM CHLORIDE 100 MILLIEQUIVALENT(S): 600 TABLET, EXTENDED RELEASE ORAL at 08:51

## 2024-12-05 RX ADMIN — PIPERACILLIN SODIUM AND TAZOBACTAM SODIUM 25 GRAM(S): 4; .5 INJECTION, POWDER, LYOPHILIZED, FOR SOLUTION INTRAVENOUS at 21:44

## 2024-12-05 RX ADMIN — CHLORHEXIDINE GLUCONATE 1 APPLICATION(S): 1.2 RINSE ORAL at 06:02

## 2024-12-05 RX ADMIN — CHLORHEXIDINE GLUCONATE 15 MILLILITER(S): 1.2 RINSE ORAL at 10:17

## 2024-12-05 RX ADMIN — PROPOFOL 4.08 MICROGRAM(S)/KG/MIN: 10 INJECTION, EMULSION INTRAVENOUS at 10:17

## 2024-12-05 RX ADMIN — POTASSIUM CHLORIDE 100 MILLIEQUIVALENT(S): 600 TABLET, EXTENDED RELEASE ORAL at 09:36

## 2024-12-05 RX ADMIN — PROPOFOL 4.08 MICROGRAM(S)/KG/MIN: 10 INJECTION, EMULSION INTRAVENOUS at 18:31

## 2024-12-05 RX ADMIN — NOREPINEPHRINE BITARTRATE 6.38 MICROGRAM(S)/KG/MIN: 1 INJECTION, SOLUTION, CONCENTRATE INTRAVENOUS at 21:45

## 2024-12-05 RX ADMIN — PROPOFOL 4.08 MICROGRAM(S)/KG/MIN: 10 INJECTION, EMULSION INTRAVENOUS at 02:35

## 2024-12-05 RX ADMIN — PROPOFOL 4.08 MICROGRAM(S)/KG/MIN: 10 INJECTION, EMULSION INTRAVENOUS at 06:27

## 2024-12-05 RX ADMIN — PIPERACILLIN SODIUM AND TAZOBACTAM SODIUM 25 GRAM(S): 4; .5 INJECTION, POWDER, LYOPHILIZED, FOR SOLUTION INTRAVENOUS at 06:02

## 2024-12-05 RX ADMIN — PANTOPRAZOLE SODIUM 40 MILLIGRAM(S): 40 TABLET, DELAYED RELEASE ORAL at 14:04

## 2024-12-05 RX ADMIN — LEVETIRACETAM 750 MILLIGRAM(S): 1000 TABLET ORAL at 18:00

## 2024-12-05 RX ADMIN — LEVETIRACETAM 750 MILLIGRAM(S): 1000 TABLET ORAL at 02:10

## 2024-12-05 RX ADMIN — FENTANYL 3.4 MICROGRAM(S)/KG/HR: 12 PATCH, EXTENDED RELEASE TRANSDERMAL at 09:42

## 2024-12-05 RX ADMIN — NOREPINEPHRINE BITARTRATE 6.38 MICROGRAM(S)/KG/MIN: 1 INJECTION, SOLUTION, CONCENTRATE INTRAVENOUS at 07:28

## 2024-12-05 RX ADMIN — PROPOFOL 4.08 MICROGRAM(S)/KG/MIN: 10 INJECTION, EMULSION INTRAVENOUS at 21:47

## 2024-12-05 RX ADMIN — PANTOPRAZOLE SODIUM 40 MILLIGRAM(S): 40 TABLET, DELAYED RELEASE ORAL at 02:34

## 2024-12-05 NOTE — PROGRESS NOTE ADULT - ASSESSMENT
-59F w/ PMHx of polysubstance use (tobacco, opioids on methadone), HepC, CKD and epilepsy admitted to MICU due to hemorrhagic shock 2/2 GIB requiring pressors. GI consulted for GI bleed.     EGD on 12/3 and 12/4/24:    Repeat EGD done 12/4/24:  Findings:  Esophagus	Normal esophagus.  Stomach	Excavated lesions	A single non-bleeding 20 mm ulcer was found in the stomach. Previously clipped ulcer in antrum visualized w/ no old or active bleeding.  Duodenum	Normal duodenum.    Recommendations:  - NPO  - Trend CBC, maintain active T&S  - Complete 72 hours of PPI 40 mg BID  - If patient rebleeds then recommend obtaining CT bleeding scan and contacting IR for possible embolization    Case discussed w/ GI attending Dr. Adolfo Laboy MD  PGY-4 GI Fellow  GI consult pager (M-F [id number]): [phone]  Please call  for on-call fellow after hours

## 2024-12-05 NOTE — DIETITIAN INITIAL EVALUATION ADULT - PERTINENT MEDS FT
MEDICATIONS  (STANDING):  chlorhexidine 0.12% Liquid 15 milliLiter(s) Oral Mucosa every 12 hours  chlorhexidine 2% Cloths 1 Application(s) Topical <User Schedule>  fentaNYL   Infusion. 0.5 MICROgram(s)/kG/Hr (3.4 mL/Hr) IV Continuous <Continuous>  levETIRAcetam   Injectable 750 milliGRAM(s) IV Push every 12 hours  methadone    Tablet 170 milliGRAM(s) Oral every 24 hours  norepinephrine Infusion 0.05 MICROgram(s)/kG/Min (6.38 mL/Hr) IV Continuous <Continuous>  pantoprazole  Injectable 40 milliGRAM(s) IV Push every 12 hours  piperacillin/tazobactam IVPB.. 3.375 Gram(s) IV Intermittent every 8 hours  propofol Infusion 10 MICROgram(s)/kG/Min (4.08 mL/Hr) IV Continuous <Continuous>    MEDICATIONS  (PRN):  sodium chloride 0.9% lock flush 10 milliLiter(s) IV Push every 1 hour PRN Pre/post blood products, medications, blood draw, and to maintain line patency

## 2024-12-05 NOTE — PROGRESS NOTE ADULT - SUBJECTIVE AND OBJECTIVE BOX
Patient is a 59y old  Female who presents with a chief complaint of AMS, Abdominal pain (05 Dec 2024 04:58)      INTERVAL HPI/OVERNIGHT EVENTS:   O/N: Pt had a moderate BM x 1 @ 3AM, which RN reported to appear like melena.  AM: Afebrile, hemodynamically stable. Pt awake this morning while intubated w/ fent4, prop60.     ICU Vital Signs Last 24 Hrs  T(C): 36.6 (05 Dec 2024 09:54), Max: 36.9 (04 Dec 2024 14:28)  T(F): 97.9 (05 Dec 2024 09:54), Max: 98.4 (04 Dec 2024 14:28)  HR: 61 (05 Dec 2024 10:00) (61 - 96)  BP: 98/57 (05 Dec 2024 10:00) (74/39 - 142/70)  BP(mean): 71 (05 Dec 2024 10:00) (51 - 100)  ABP: --  ABP(mean): --  RR: 16 (05 Dec 2024 10:00) (12 - 19)  SpO2: 100% (05 Dec 2024 10:00) (97% - 100%)    O2 Parameters below as of 05 Dec 2024 10:00  Patient On (Oxygen Delivery Method): ventilator    O2 Concentration (%): 40      I&O's Summary    04 Dec 2024 07:01  -  05 Dec 2024 07:00  --------------------------------------------------------  IN: 2698.8 mL / OUT: 2635 mL / NET: 63.8 mL    05 Dec 2024 07:01  -  05 Dec 2024 11:35  --------------------------------------------------------  IN: 544.7 mL / OUT: 475 mL / NET: 69.7 mL      Mode: AC/ CMV (Assist Control/ Continuous Mandatory Ventilation)  RR (machine): 16  TV (machine): 400  FiO2: 40  PEEP: 5  ITime: 1  MAP: 8.5  PIP: 16      LABS:                        8.3    5.43  )-----------( 102      ( 05 Dec 2024 06:57 )             24.3     12-05    144  |  113[H]  |  32[H]  ----------------------------<  94  3.4[L]   |  25  |  1.28    Ca    7.8[L]      05 Dec 2024 06:57  Phos  3.8     12-05  Mg     2.0     12-05    TPro  See Note  /  Alb  See Note  /  TBili  See Note  /  DBili  x   /  AST  See Note  /  ALT  See Note  /  AlkPhos  See Note  12-05    PT/INR - ( 03 Dec 2024 19:32 )   PT: 14.4 sec;   INR: 1.25          PTT - ( 03 Dec 2024 19:32 )  PTT:32.5 sec  Urinalysis Basic - ( 05 Dec 2024 06:57 )    Color: x / Appearance: x / SG: x / pH: x  Gluc: 94 mg/dL / Ketone: x  / Bili: x / Urobili: x   Blood: x / Protein: x / Nitrite: x   Leuk Esterase: x / RBC: x / WBC x   Sq Epi: x / Non Sq Epi: x / Bacteria: x      CAPILLARY BLOOD GLUCOSE      POCT Blood Glucose.: 97 mg/dL (05 Dec 2024 10:54)  POCT Blood Glucose.: 135 mg/dL (04 Dec 2024 18:13)    ABG - ( 03 Dec 2024 17:20 )  pH, Arterial: 7.33  pH, Blood: x     /  pCO2: 45    /  pO2: 158   / HCO3: 24    / Base Excess: -2.4  /  SaO2: 98.7                RADIOLOGY & ADDITIONAL TESTS:    Consultant(s) Notes Reviewed:  [x ] YES  [ ] NO    MEDICATIONS  (STANDING):  chlorhexidine 0.12% Liquid 15 milliLiter(s) Oral Mucosa every 12 hours  chlorhexidine 2% Cloths 1 Application(s) Topical <User Schedule>  fentaNYL   Infusion. 0.5 MICROgram(s)/kG/Hr (3.4 mL/Hr) IV Continuous <Continuous>  levETIRAcetam   Injectable 750 milliGRAM(s) IV Push every 12 hours  methadone    Tablet 170 milliGRAM(s) Oral every 24 hours  norepinephrine Infusion 0.05 MICROgram(s)/kG/Min (6.38 mL/Hr) IV Continuous <Continuous>  pantoprazole  Injectable 40 milliGRAM(s) IV Push every 12 hours  piperacillin/tazobactam IVPB.. 3.375 Gram(s) IV Intermittent every 8 hours  propofol Infusion 10 MICROgram(s)/kG/Min (4.08 mL/Hr) IV Continuous <Continuous>    MEDICATIONS  (PRN):  sodium chloride 0.9% lock flush 10 milliLiter(s) IV Push every 1 hour PRN Pre/post blood products, medications, blood draw, and to maintain line patency      PHYSICAL EXAM:  GENERAL: Awake. Unable to assess orientation as pt is intubated and cannot speak.   HEAD:  Atraumatic, Normocephalic  EYES: PERRLA, conjunctiva and sclera clear  NERVOUS SYSTEM: Awake, moving upper extremities.  CHEST/LUNG: B/L good air entry; No rales, rhonchi, or wheezing. Intubated  HEART: S1S2 normal, no S3, Regular rate and rhythm; No murmurs  ABDOMEN: Soft, Nontender, Nondistended; Bowel sounds present  EXTREMITIES:  2+ Peripheral Pulses, No clubbing, cyanosis, or xena  SKIN: No rashes or lesions    Care Discussed with Consultants/Other Providers [ x] YES  [ ] NO Patient is a 59y old  Female who presents with a chief complaint of AMS, Abdominal pain (05 Dec 2024 04:58)    HOSPITAL COURSE:  Patient is a 59-year-old female w/ PMH of polysubstance use (tobacco, opioids on methadone, Hep C positive), anemia, CKD, and epilepsy BIBEMS for AMS, found to be having anemia from active GIB iso melena iso use of naproxen x 7d (2-3 capsules/day). Pt denies any prior hx of GIB, no prior colonoscopies, but states she had an endoscopy during her last admission in 7/2024 for L iliopsoas bursitis. At ED, vitals were T98, HR 68, /65 --> 85/50, RR 18, SpO2 94% on RA. Started on levo gtt for hypotension. Labs showed Hgb 5.9 (s/p pRBC x4; complete ~4am); repeat hgb 6.2 around 9AM. Not appropriate response 2/2 active GIB. Another 8u pRBC ordered to be given. Intubated for EGD and code fusion was called around 11:25AM as pt had massive episode of melena x4. EGD showed 20mm ulceration in gastric antrum; clipped (12/3). Pt extubated on 12/4 AM. However, pt had melena x2 on 12/4 w/ drop in hgb 8.2>7. Pt was intubated again in PM and repeat EGD was done, which did not show any signs of bleed. Per GI, pt should be treated like pt w/ resolved GIB. Placed on PPI BID and CBC monitored q6h. Stable Hgb on 12/5 but pt having melena x2. VSS. If further signs/sx of active bleed, consider triphasic GI Bleed study, which involves CTA.       INTERVAL HPI/OVERNIGHT EVENTS:   O/N: Pt had a moderate BM x 1 @ 3AM, which RN reported to appear like melena.  AM: Afebrile, hemodynamically stable. Pt awake this morning while intubated w/ fent4, prop60.     ICU Vital Signs Last 24 Hrs  T(C): 36.6 (05 Dec 2024 09:54), Max: 36.9 (04 Dec 2024 14:28)  T(F): 97.9 (05 Dec 2024 09:54), Max: 98.4 (04 Dec 2024 14:28)  HR: 61 (05 Dec 2024 10:00) (61 - 96)  BP: 98/57 (05 Dec 2024 10:00) (74/39 - 142/70)  BP(mean): 71 (05 Dec 2024 10:00) (51 - 100)  ABP: --  ABP(mean): --  RR: 16 (05 Dec 2024 10:00) (12 - 19)  SpO2: 100% (05 Dec 2024 10:00) (97% - 100%)    O2 Parameters below as of 05 Dec 2024 10:00  Patient On (Oxygen Delivery Method): ventilator    O2 Concentration (%): 40      I&O's Summary    04 Dec 2024 07:01  -  05 Dec 2024 07:00  --------------------------------------------------------  IN: 2698.8 mL / OUT: 2635 mL / NET: 63.8 mL    05 Dec 2024 07:01  -  05 Dec 2024 11:35  --------------------------------------------------------  IN: 544.7 mL / OUT: 475 mL / NET: 69.7 mL      Mode: AC/ CMV (Assist Control/ Continuous Mandatory Ventilation)  RR (machine): 16  TV (machine): 400  FiO2: 40  PEEP: 5  ITime: 1  MAP: 8.5  PIP: 16      LABS:                        8.3    5.43  )-----------( 102      ( 05 Dec 2024 06:57 )             24.3     12-05    144  |  113[H]  |  32[H]  ----------------------------<  94  3.4[L]   |  25  |  1.28    Ca    7.8[L]      05 Dec 2024 06:57  Phos  3.8     12-05  Mg     2.0     12-05    TPro  See Note  /  Alb  See Note  /  TBili  See Note  /  DBili  x   /  AST  See Note  /  ALT  See Note  /  AlkPhos  See Note  12-05    PT/INR - ( 03 Dec 2024 19:32 )   PT: 14.4 sec;   INR: 1.25          PTT - ( 03 Dec 2024 19:32 )  PTT:32.5 sec  Urinalysis Basic - ( 05 Dec 2024 06:57 )    Color: x / Appearance: x / SG: x / pH: x  Gluc: 94 mg/dL / Ketone: x  / Bili: x / Urobili: x   Blood: x / Protein: x / Nitrite: x   Leuk Esterase: x / RBC: x / WBC x   Sq Epi: x / Non Sq Epi: x / Bacteria: x      CAPILLARY BLOOD GLUCOSE      POCT Blood Glucose.: 97 mg/dL (05 Dec 2024 10:54)  POCT Blood Glucose.: 135 mg/dL (04 Dec 2024 18:13)    ABG - ( 03 Dec 2024 17:20 )  pH, Arterial: 7.33  pH, Blood: x     /  pCO2: 45    /  pO2: 158   / HCO3: 24    / Base Excess: -2.4  /  SaO2: 98.7                RADIOLOGY & ADDITIONAL TESTS:    Consultant(s) Notes Reviewed:  [x ] YES  [ ] NO    MEDICATIONS  (STANDING):  chlorhexidine 0.12% Liquid 15 milliLiter(s) Oral Mucosa every 12 hours  chlorhexidine 2% Cloths 1 Application(s) Topical <User Schedule>  fentaNYL   Infusion. 0.5 MICROgram(s)/kG/Hr (3.4 mL/Hr) IV Continuous <Continuous>  levETIRAcetam   Injectable 750 milliGRAM(s) IV Push every 12 hours  methadone    Tablet 170 milliGRAM(s) Oral every 24 hours  norepinephrine Infusion 0.05 MICROgram(s)/kG/Min (6.38 mL/Hr) IV Continuous <Continuous>  pantoprazole  Injectable 40 milliGRAM(s) IV Push every 12 hours  piperacillin/tazobactam IVPB.. 3.375 Gram(s) IV Intermittent every 8 hours  propofol Infusion 10 MICROgram(s)/kG/Min (4.08 mL/Hr) IV Continuous <Continuous>    MEDICATIONS  (PRN):  sodium chloride 0.9% lock flush 10 milliLiter(s) IV Push every 1 hour PRN Pre/post blood products, medications, blood draw, and to maintain line patency      PHYSICAL EXAM:  GENERAL: Awake. Unable to assess orientation as pt is intubated and cannot speak.   HEAD:  Atraumatic, Normocephalic  EYES: PERRLA, conjunctiva and sclera clear  NERVOUS SYSTEM: Awake, moving upper extremities.  CHEST/LUNG: B/L good air entry; No rales, rhonchi, or wheezing. Intubated  HEART: S1S2 normal, no S3, Regular rate and rhythm; No murmurs  ABDOMEN: Soft, Nontender, Nondistended; Bowel sounds present  EXTREMITIES:  2+ Peripheral Pulses, No clubbing, cyanosis, or xena  SKIN: No rashes or lesions    Care Discussed with Consultants/Other Providers [ x] YES  [ ] NO

## 2024-12-05 NOTE — PROGRESS NOTE ADULT - SUBJECTIVE AND OBJECTIVE BOX
GASTROENTEROLOGY PROGRESS NOTE  Patient seen and examined at bedside. One medium sized episode of melena overnight.     PERTINENT REVIEW OF SYSTEMS:  CONSTITUTIONAL: No weakness, fevers or chills  HEENT: No visual changes; No vertigo or throat pain   GASTROINTESTINAL: As above.  NEUROLOGICAL: No numbness or weakness  SKIN: No itching, burning, rashes, or lesions     Allergies    No Known Allergies    Intolerances      MEDICATIONS:  MEDICATIONS  (STANDING):  chlorhexidine 0.12% Liquid 15 milliLiter(s) Oral Mucosa every 12 hours  chlorhexidine 2% Cloths 1 Application(s) Topical <User Schedule>  fentaNYL   Infusion. 0.5 MICROgram(s)/kG/Hr (3.4 mL/Hr) IV Continuous <Continuous>  levETIRAcetam   Injectable 750 milliGRAM(s) IV Push every 12 hours  methadone    Tablet 170 milliGRAM(s) Oral every 24 hours  norepinephrine Infusion 0.05 MICROgram(s)/kG/Min (6.38 mL/Hr) IV Continuous <Continuous>  pantoprazole  Injectable 40 milliGRAM(s) IV Push every 12 hours  piperacillin/tazobactam IVPB.. 3.375 Gram(s) IV Intermittent every 8 hours  propofol Infusion 10 MICROgram(s)/kG/Min (4.08 mL/Hr) IV Continuous <Continuous>    MEDICATIONS  (PRN):  sodium chloride 0.9% lock flush 10 milliLiter(s) IV Push every 1 hour PRN Pre/post blood products, medications, blood draw, and to maintain line patency    Vital Signs Last 24 Hrs  T(C): 36.6 (05 Dec 2024 09:54), Max: 36.9 (04 Dec 2024 14:28)  T(F): 97.9 (05 Dec 2024 09:54), Max: 98.4 (04 Dec 2024 14:28)  HR: 61 (05 Dec 2024 11:00) (61 - 96)  BP: 97/55 (05 Dec 2024 11:00) (74/39 - 142/70)  BP(mean): 72 (05 Dec 2024 11:00) (51 - 100)  RR: 16 (05 Dec 2024 11:00) (12 - 19)  SpO2: 100% (05 Dec 2024 11:00) (97% - 100%)    Parameters below as of 05 Dec 2024 11:00  Patient On (Oxygen Delivery Method): ventilator    O2 Concentration (%): 40    12-04 @ 07:01  -  12-05 @ 07:00  --------------------------------------------------------  IN: 2698.8 mL / OUT: 2635 mL / NET: 63.8 mL    12-05 @ 07:01  -  12-05 @ 12:04  --------------------------------------------------------  IN: 583.9 mL / OUT: 675 mL / NET: -91.1 mL      PHYSICAL EXAM:    General: lying in bed, intubated and sedated  HEENT: MMM, conjunctiva and sclera clear  Gastrointestinal: Soft non-tender non-distended; No rebound or guarding  Skin: Warm and dry. No obvious rash    LABS:                        8.3    5.43  )-----------( 102      ( 05 Dec 2024 06:57 )             24.3     12-05    144  |  113[H]  |  32[H]  ----------------------------<  94  3.4[L]   |  25  |  1.28    Ca    7.8[L]      05 Dec 2024 06:57  Phos  3.8     12-05  Mg     2.0     12-05    TPro  See Note  /  Alb  See Note  /  TBili  See Note  /  DBili  x   /  AST  See Note  /  ALT  See Note  /  AlkPhos  See Note  12-05    PT/INR - ( 03 Dec 2024 19:32 )   PT: 14.4 sec;   INR: 1.25          PTT - ( 03 Dec 2024 19:32 )  PTT:32.5 sec      Urinalysis Basic - ( 05 Dec 2024 06:57 )    Color: x / Appearance: x / SG: x / pH: x  Gluc: 94 mg/dL / Ketone: x  / Bili: x / Urobili: x   Blood: x / Protein: x / Nitrite: x   Leuk Esterase: x / RBC: x / WBC x   Sq Epi: x / Non Sq Epi: x / Bacteria: x                Culture - Blood (collected 03 Dec 2024 17:40)  Source: .Blood BLOOD  Preliminary Report (04 Dec 2024 22:02):    No growth at 24 hours      RADIOLOGY & ADDITIONAL STUDIES:  Reviewed

## 2024-12-05 NOTE — DIETITIAN INITIAL EVALUATION ADULT - PERTINENT LABORATORY DATA
12-05    143  |  113[H]  |  30[H]  ----------------------------<  86  4.0   |  23  |  1.26    Ca    7.8[L]      05 Dec 2024 11:47  Phos  3.8     12-05  Mg     2.0     12-05    TPro  4.6[L]  /  Alb  2.4[L]  /  TBili  0.5  /  DBili  x   /  AST  46[H]  /  ALT  41  /  AlkPhos  48  12-05  POCT Blood Glucose.: 97 mg/dL (12-05-24 @ 10:54)  A1C with Estimated Average Glucose Result: 5.1 % (07-02-24 @ 19:01)

## 2024-12-05 NOTE — DIETITIAN INITIAL EVALUATION ADULT - ADD RECOMMEND
-Initiate nutrition within 24-48 hrs as medically able    *If enteral nutrition support indicated, please consult RDN for specific recommendations    *If able to extubate, recommend regular diet with appropriate textures/consistencies when medically able   -Maintain aspiration precautions at all times  -Weekly wts  -Monitor chemistry, GI function, and skin integrity

## 2024-12-05 NOTE — DIETITIAN INITIAL EVALUATION ADULT - OTHER CALCULATIONS
Needs determined using ideal body weight 56.8 kg (120%IBW) adjusted for intubation and critical condition.

## 2024-12-05 NOTE — PROGRESS NOTE ADULT - ASSESSMENT
Patient is a 59-year-old female w/ PMH of polysubstance use (tobacco, opioids on methadone, Hep C positive), anemia, CKD, and epilepsy BIBEMS for AMS found to have Hgb 5.9 and melena admitted to MICU w/ c/f acute UGIB pending endoscopy.    Neuro/Psych  #AMS -- RESOLVED  Initially BIBEMS for AMS  Improvement in mentation, now AOx3    #Seizure  Home keppra 750mg BID  Per pt, has had breakthrough seizures, last 11/30 witnessed by roommate  Last dose of keppra taken morning of seizure  - c/w keppra 750mg BID  - seizure precautions  - epilepsy consult; will reach out today as has ot seen pt yet    #Opioid dependence  Pt reports she takes methadone 170mg qd -- last went to methadone clinic 1 week ago but missed appointment on 12/2 due to symptoms   Reported last use of heroin 10 days ago, has been taking gabapentin 400mg QID for the past week  - confirm dose w/ methadone clinic ([facility]: [phone])  - c/w methadone 170mg qd   - f/u Utox    #Bipolar disorder  #Depression  Previously on buspar though self-discontinued by pt due to side effects (shakiness)  Per surescripts, is also on quetiapine 75mg qd  - formal med rec when pt more stable from GIB    Cardiovascular   #Hemorrhagic shock likely 2/2 acute GIB  100/65 --> 85/50, started on levo gtt  Unlikely septic shock given afebrile, no leukocytosis, no evidence of infection; unlikely cardiogenic given no significant cardiovascular hx, warm, well-perfused on exam  POCUS w/ normal heart function  - c/w levo gtt  - titrate to MAP > 65  - rest of plan per GI below    Pulmonary   CXR negative  Extubated @ 12/4 AM; stable.    GI   #c/f active UGIB  Pt complaining of episodes of maroon/dark stools since 11/30  No prior hx, no prior colonoscopies. Pt reporting prior EGD in 7/2024 admission though none documented, no c/f bleeding at that time   Low concern for varices given no significant alcohol use hx   Rectal exam +maroon/dark stool, external hemorrhoid   S/p 12u pRBC (8 recorded, 12 ordered), plasma, platelets  10mm ulcer in antrum found per EGD (12/3); [place] 1b ulcer, clipped.  - pantoprazole bid  - repeating cbc today as hgb dropped 9.3>8.2 (12/4)   - GI following; appreciate recs    #Transaminitis  #Hx of hepatitis C  AST 75, ALT 80 on adm; likely 2/ hemorrhagic shock.   Pt reporting ongoing abdominal pain since 11/30  Improving; AST 51, ALT 45 (12/4)  - trend w/ daily LFTs    Diet: NPO until repeat CBC returns, then CLD  GI pptx: pantoprazole bid  Bowel regimen: none    Renal   #BOB,  Unclear baseline, BUN 60, Cr 1.5 at presentation  Cr 1.2 at prior admission in 7/2024  Likely pre-renal iso hypovolemia 2/2 acute GIB  Cr 0.93 (12/3); cleared. However, uptrended to 1.60 (12/4). This is likely 2/2 massive blood loss from GIB and iso hemorrhagic shock. Expecting it to be improving to wnl.  - ctm Cr    #Urinary retention, resolved  At presentation, , SC x1 w/ 550cc output; straight cath x2. Hilton placed on 12/2-12/3.  - c/w hilton. TOV tomorrow       ID  FAUSTO, afebrile, no leukocytosis    Heme  #Normocytic anemia likely 2/2 acute UGIB  Hgb 5.9 > 6.1 s/p 2 units pRBC, received 2 more units prior to transfer to MICU.  At last admission, Hgb 7.9-9  Total of 3 episodes of melena on 12/3; massive. Code fusion called 12/3 AM; having 8 more transfusions.   Iron studies: total iron wnl, TIBC low, %iron sat wnl, ferritin wnl  Folate/B12 wnl  - repeat CBC   - maintain active T&S  - transfuse Hgb <7      Endo  FAUSTO    MSK  FAUSTO    Prophylaxis  F: as needed  E: Replete as needed, target K>4, Phos>3, Mg>2  N: NPO until repeat CBC (stable hgb), then CLD  DVT pptx: hold iso GIB; start tomorrow if hgb stable  GI pptx: pantoprazole gtt  Code status: Full code  Dispo: MICU  Lines: R femoral line  Patient is a 59-year-old female w/ PMH of polysubstance use (tobacco, opioids on methadone, Hep C positive), anemia, CKD, and epilepsy BIBEMS for AMS found to have Hgb 5.9 and melena admitted to MICU w/ c/f acute UGIB, s/p clipping of 20mm ulceration in gastric antrum.     Neuro/Psych  #AMS -- RESOLVED  Initially BIBEMS for AMS  Improvement in mentation, now AOx3    #Seizure  Home keppra 750mg BID  Per pt, has had breakthrough seizures, last 11/30 witnessed by roommate  Last dose of keppra taken morning of seizure  - c/w keppra 750mg BID  - seizure precautions  - epilepsy consult; will reach out when pt stable as has not seen pt yet    #Opioid dependence  Pt reports she takes methadone 170mg qd -- last went to methadone clinic 1 week ago but missed appointment on 12/2 due to symptoms   Reported last use of heroin 10 days ago, has been taking gabapentin 400mg QID for the past week  - confirm dose w/ methadone clinic ([facility]: [phone])  - c/w methadone 170mg qd   - f/u Utox    #Bipolar disorder  #Depression  Previously on buspar though self-discontinued by pt due to side effects (shakiness)  Per surescripts, is also on quetiapine 75mg qd  - formal med rec when pt more stable from GIB    Cardiovascular   #Hemorrhagic shock likely 2/2 acute GIB  100/65 --> 85/50, started on levo gtt  Unlikely septic shock given afebrile, no leukocytosis, no evidence of infection; unlikely cardiogenic given no significant cardiovascular hx, warm, well-perfused on exam  POCUS w/ normal heart function  - c/w levo gtt; decreasing requirement  - titrate to MAP > 65  - rest of plan per GI below    Pulmonary   CXR negative  #Intubation  Extubated @ 12/4 AM, reintubated @ 12/4PM for repeat EGD. Stable.  Passed SABT (12/5 AM).   - will decrease sedation (fent/prop) and extubate when active GIB can be ruled out    GI   #c/f active UGIB  Pt complaining of episodes of maroon/dark stools since 11/30  No prior hx, no prior colonoscopies. Pt reporting prior EGD in 7/2024 admission though none documented, no c/f bleeding at that time   Low concern for varices given no significant alcohol use hx   Rectal exam +maroon/dark stool, external hemorrhoid   S/p 12u pRBC (8 recorded, 12 ordered), plasma, platelets  20mm ulcer in antrum found per EGD (12/3); [place] 1b ulcer, clipped.  Repeat EGD (12/4): no active bleed.  Hgb stable at 8.3 (12/4) w/ 2 episodes of melena.   - pantoprazole bid  - GI following; appreciate recs  - CBC q6h  - Consider triphasic GI bleed study if active sign/sx of bleed    #Transaminitis  #Hx of hepatitis C  AST 75, ALT 80 on adm; likely 2/ hemorrhagic shock.   Pt reporting ongoing abdominal pain since 11/30  Improving; AST 46, ALT 41 (12/5)  - trend w/ daily LFTs    Diet: NPO until repeat CBC returns, then CLD  GI pptx: pantoprazole bid  Bowel regimen: none    Renal   #BOB, resolved  Unclear baseline, BUN 60, Cr 1.5 at presentation  Cr 1.2 at prior admission in 7/2024  Likely pre-renal iso hypovolemia 2/2 acute GIB  Cr 0.93 (12/3); cleared. However, uptrended to 1.60 (12/4). This is likely 2/2 massive blood loss from GIB and iso hemorrhagic shock. Resolved on 12/5 w/ Cr. 1.26    #Urinary retention, resolved  At presentation, , SC x1 w/ 550cc output; straight cath x2. Hilton placed on 12/2-12/3.  - c/w hilton. TOV after extubation      ID  #C/f infection iso shock  FAUSTO, afebrile, no leukocytosis at this time.   - c/w empiric vanc, zosyn.     Heme  #Normocytic anemia likely 2/2 acute UGIB  Hgb 5.9 > 6.1 s/p 2 units pRBC, received 2 more units prior to transfer to MICU.  At last admission, Hgb 7.9-9  Iron studies: total iron wnl, TIBC low, %iron sat wnl, ferritin wnl. Folate/B12 wnl  Total of 3 episodes of melena on 12/3; massive. Code fusion called 12/3 AM; having 8 more transfusions.   12/4: given 2 more transfusions for what appeared to be recurrent GIB  - repeat CBC q6h  - maintain active T&S  - transfuse Hgb <7    Endo  FAUSTO    MSK  FAUSTO    Prophylaxis  F: as needed  E: Replete as needed, target K>4, Phos>3, Mg>2  N: NPO until repeat CBC (stable hgb), then CLD  DVT pptx: hold iso GIB; start tomorrow if hgb stable  GI pptx: pantoprazole gtt  Code status: Full code  Dispo: MICU  Lines: Peripheral IV in RUE (x2), LUE (x1), RIJ, hilton

## 2024-12-05 NOTE — DIETITIAN INITIAL EVALUATION ADULT - OTHER INFO
59-year-old female w/ PMH of polysubstance use (tobacco, opioids on methadone, Hep C positive), anemia, CKD, and epilepsy BIBEMS for AMS found to have Hgb 5.9 and melena admitted to MICU w/ c/f acute UGIB pending endoscopy.    Pt care discussed in interdisciplinary care team rounds. Rx and labs reviewed. Pt presents with no overt signs of nutritional wasting; limited nutrition focused physical examination in setting of critical condition and equipment. Pt intubated at time of assessment; vent to VC-AC, MAP 73, norepinephrine gtt, fentanyl gtt, and no propofol at time of assessment. Pt was extubated yesterday, but then required reintubation and emergent scope in setting of melena. GI following. Plan for SBT today; monitor for ability to start diet after extubation. No other reports GI distress or further nutritional concerns at this time. RDN to remain available, see recommendations below.     Pain: No non-verbal indicators   GI: Abdomen non-distended/non-tender, +BS x4, last bowel movement 12/5 -dark red  Skin: Warm/Dry/Intact, +1 bilateral leg

## 2024-12-06 LAB
ALBUMIN SERPL ELPH-MCNC: 2.8 G/DL — LOW (ref 3.3–5)
ALP SERPL-CCNC: 56 U/L — SIGNIFICANT CHANGE UP (ref 40–120)
ALT FLD-CCNC: 45 U/L — SIGNIFICANT CHANGE UP (ref 10–45)
ANION GAP SERPL CALC-SCNC: 6 MMOL/L — SIGNIFICANT CHANGE UP (ref 5–17)
AST SERPL-CCNC: 55 U/L — HIGH (ref 10–40)
BASOPHILS # BLD AUTO: 0.03 K/UL — SIGNIFICANT CHANGE UP (ref 0–0.2)
BASOPHILS NFR BLD AUTO: 0.5 % — SIGNIFICANT CHANGE UP (ref 0–2)
BILIRUB SERPL-MCNC: 0.6 MG/DL — SIGNIFICANT CHANGE UP (ref 0.2–1.2)
BUN SERPL-MCNC: 20 MG/DL — SIGNIFICANT CHANGE UP (ref 7–23)
CALCIUM SERPL-MCNC: 7.8 MG/DL — LOW (ref 8.4–10.5)
CHLORIDE SERPL-SCNC: 112 MMOL/L — HIGH (ref 96–108)
CO2 SERPL-SCNC: 25 MMOL/L — SIGNIFICANT CHANGE UP (ref 22–31)
CREAT SERPL-MCNC: 1.2 MG/DL — SIGNIFICANT CHANGE UP (ref 0.5–1.3)
EGFR: 53 ML/MIN/1.73M2 — LOW
EOSINOPHIL # BLD AUTO: 0.15 K/UL — SIGNIFICANT CHANGE UP (ref 0–0.5)
EOSINOPHIL NFR BLD AUTO: 2.7 % — SIGNIFICANT CHANGE UP (ref 0–6)
GLUCOSE SERPL-MCNC: 82 MG/DL — SIGNIFICANT CHANGE UP (ref 70–99)
HCT VFR BLD CALC: 23.7 % — LOW (ref 34.5–45)
HCT VFR BLD CALC: 25.7 % — LOW (ref 34.5–45)
HCT VFR BLD CALC: 25.9 % — LOW (ref 34.5–45)
HCT VFR BLD CALC: 26.2 % — LOW (ref 34.5–45)
HGB BLD-MCNC: 7.9 G/DL — LOW (ref 11.5–15.5)
HGB BLD-MCNC: 8.3 G/DL — LOW (ref 11.5–15.5)
HGB BLD-MCNC: 8.5 G/DL — LOW (ref 11.5–15.5)
HGB BLD-MCNC: 8.8 G/DL — LOW (ref 11.5–15.5)
IMM GRANULOCYTES NFR BLD AUTO: 2 % — HIGH (ref 0–0.9)
LYMPHOCYTES # BLD AUTO: 1.34 K/UL — SIGNIFICANT CHANGE UP (ref 1–3.3)
LYMPHOCYTES # BLD AUTO: 24.2 % — SIGNIFICANT CHANGE UP (ref 13–44)
MAGNESIUM SERPL-MCNC: 1.8 MG/DL — SIGNIFICANT CHANGE UP (ref 1.6–2.6)
MCHC RBC-ENTMCNC: 28.7 PG — SIGNIFICANT CHANGE UP (ref 27–34)
MCHC RBC-ENTMCNC: 29.9 PG — SIGNIFICANT CHANGE UP (ref 27–34)
MCHC RBC-ENTMCNC: 30.4 PG — SIGNIFICANT CHANGE UP (ref 27–34)
MCHC RBC-ENTMCNC: 30.5 PG — SIGNIFICANT CHANGE UP (ref 27–34)
MCHC RBC-ENTMCNC: 32.3 G/DL — SIGNIFICANT CHANGE UP (ref 32–36)
MCHC RBC-ENTMCNC: 32.8 G/DL — SIGNIFICANT CHANGE UP (ref 32–36)
MCHC RBC-ENTMCNC: 33.3 G/DL — SIGNIFICANT CHANGE UP (ref 32–36)
MCHC RBC-ENTMCNC: 33.6 G/DL — SIGNIFICANT CHANGE UP (ref 32–36)
MCV RBC AUTO: 88.9 FL — SIGNIFICANT CHANGE UP (ref 80–100)
MCV RBC AUTO: 90.7 FL — SIGNIFICANT CHANGE UP (ref 80–100)
MCV RBC AUTO: 91.2 FL — SIGNIFICANT CHANGE UP (ref 80–100)
MCV RBC AUTO: 91.5 FL — SIGNIFICANT CHANGE UP (ref 80–100)
MONOCYTES # BLD AUTO: 0.46 K/UL — SIGNIFICANT CHANGE UP (ref 0–0.9)
MONOCYTES NFR BLD AUTO: 8.3 % — SIGNIFICANT CHANGE UP (ref 2–14)
NEUTROPHILS # BLD AUTO: 3.44 K/UL — SIGNIFICANT CHANGE UP (ref 1.8–7.4)
NEUTROPHILS NFR BLD AUTO: 62.3 % — SIGNIFICANT CHANGE UP (ref 43–77)
NRBC # BLD: 0 /100 WBCS — SIGNIFICANT CHANGE UP (ref 0–0)
PHOSPHATE SERPL-MCNC: 4.3 MG/DL — SIGNIFICANT CHANGE UP (ref 2.5–4.5)
PLATELET # BLD AUTO: 106 K/UL — LOW (ref 150–400)
PLATELET # BLD AUTO: 111 K/UL — LOW (ref 150–400)
PLATELET # BLD AUTO: 121 K/UL — LOW (ref 150–400)
PLATELET # BLD AUTO: 140 K/UL — LOW (ref 150–400)
POTASSIUM SERPL-MCNC: 3.6 MMOL/L — SIGNIFICANT CHANGE UP (ref 3.5–5.3)
POTASSIUM SERPL-SCNC: 3.6 MMOL/L — SIGNIFICANT CHANGE UP (ref 3.5–5.3)
PROT SERPL-MCNC: 4.8 G/DL — LOW (ref 6–8.3)
RBC # BLD: 2.59 M/UL — LOW (ref 3.8–5.2)
RBC # BLD: 2.84 M/UL — LOW (ref 3.8–5.2)
RBC # BLD: 2.89 M/UL — LOW (ref 3.8–5.2)
RBC # BLD: 2.89 M/UL — LOW (ref 3.8–5.2)
RBC # FLD: 15.4 % — HIGH (ref 10.3–14.5)
RBC # FLD: 15.5 % — HIGH (ref 10.3–14.5)
RBC # FLD: 15.5 % — HIGH (ref 10.3–14.5)
RBC # FLD: 15.7 % — HIGH (ref 10.3–14.5)
SODIUM SERPL-SCNC: 143 MMOL/L — SIGNIFICANT CHANGE UP (ref 135–145)
WBC # BLD: 3.78 K/UL — LOW (ref 3.8–10.5)
WBC # BLD: 5.42 K/UL — SIGNIFICANT CHANGE UP (ref 3.8–10.5)
WBC # BLD: 5.53 K/UL — SIGNIFICANT CHANGE UP (ref 3.8–10.5)
WBC # BLD: 5.88 K/UL — SIGNIFICANT CHANGE UP (ref 3.8–10.5)
WBC # FLD AUTO: 3.78 K/UL — LOW (ref 3.8–10.5)
WBC # FLD AUTO: 5.42 K/UL — SIGNIFICANT CHANGE UP (ref 3.8–10.5)
WBC # FLD AUTO: 5.53 K/UL — SIGNIFICANT CHANGE UP (ref 3.8–10.5)
WBC # FLD AUTO: 5.88 K/UL — SIGNIFICANT CHANGE UP (ref 3.8–10.5)

## 2024-12-06 PROCEDURE — 99291 CRITICAL CARE FIRST HOUR: CPT

## 2024-12-06 PROCEDURE — 99232 SBSQ HOSP IP/OBS MODERATE 35: CPT | Mod: GC

## 2024-12-06 RX ORDER — OLANZAPINE 20 MG/1
2.5 TABLET ORAL ONCE
Refills: 0 | Status: COMPLETED | OUTPATIENT
Start: 2024-12-06 | End: 2024-12-06

## 2024-12-06 RX ORDER — DEXMEDETOMIDINE HYDROCHLORIDE 4 UG/ML
0.1 INJECTION, SOLUTION INTRAVENOUS
Qty: 400 | Refills: 0 | Status: DISCONTINUED | OUTPATIENT
Start: 2024-12-06 | End: 2024-12-06

## 2024-12-06 RX ORDER — POTASSIUM CHLORIDE 600 MG/1
10 TABLET, EXTENDED RELEASE ORAL
Refills: 0 | Status: COMPLETED | OUTPATIENT
Start: 2024-12-06 | End: 2024-12-06

## 2024-12-06 RX ADMIN — POTASSIUM CHLORIDE 100 MILLIEQUIVALENT(S): 600 TABLET, EXTENDED RELEASE ORAL at 09:21

## 2024-12-06 RX ADMIN — PIPERACILLIN SODIUM AND TAZOBACTAM SODIUM 25 GRAM(S): 4; .5 INJECTION, POWDER, LYOPHILIZED, FOR SOLUTION INTRAVENOUS at 21:48

## 2024-12-06 RX ADMIN — PROPOFOL 4.08 MICROGRAM(S)/KG/MIN: 10 INJECTION, EMULSION INTRAVENOUS at 02:32

## 2024-12-06 RX ADMIN — POTASSIUM CHLORIDE 100 MILLIEQUIVALENT(S): 600 TABLET, EXTENDED RELEASE ORAL at 08:18

## 2024-12-06 RX ADMIN — PANTOPRAZOLE SODIUM 40 MILLIGRAM(S): 40 TABLET, DELAYED RELEASE ORAL at 15:27

## 2024-12-06 RX ADMIN — CHLORHEXIDINE GLUCONATE 15 MILLILITER(S): 1.2 RINSE ORAL at 09:21

## 2024-12-06 RX ADMIN — PROPOFOL 4.08 MICROGRAM(S)/KG/MIN: 10 INJECTION, EMULSION INTRAVENOUS at 05:59

## 2024-12-06 RX ADMIN — OLANZAPINE 2.5 MILLIGRAM(S): 20 TABLET ORAL at 17:04

## 2024-12-06 RX ADMIN — CHLORHEXIDINE GLUCONATE 1 APPLICATION(S): 1.2 RINSE ORAL at 05:58

## 2024-12-06 RX ADMIN — PIPERACILLIN SODIUM AND TAZOBACTAM SODIUM 25 GRAM(S): 4; .5 INJECTION, POWDER, LYOPHILIZED, FOR SOLUTION INTRAVENOUS at 13:07

## 2024-12-06 RX ADMIN — PIPERACILLIN SODIUM AND TAZOBACTAM SODIUM 25 GRAM(S): 4; .5 INJECTION, POWDER, LYOPHILIZED, FOR SOLUTION INTRAVENOUS at 05:58

## 2024-12-06 RX ADMIN — Medication 25 GRAM(S): at 06:13

## 2024-12-06 RX ADMIN — PANTOPRAZOLE SODIUM 40 MILLIGRAM(S): 40 TABLET, DELAYED RELEASE ORAL at 02:33

## 2024-12-06 RX ADMIN — DEXMEDETOMIDINE HYDROCHLORIDE 1.7 MICROGRAM(S)/KG/HR: 4 INJECTION, SOLUTION INTRAVENOUS at 10:36

## 2024-12-06 RX ADMIN — LEVETIRACETAM 750 MILLIGRAM(S): 1000 TABLET ORAL at 15:27

## 2024-12-06 RX ADMIN — POTASSIUM CHLORIDE 100 MILLIEQUIVALENT(S): 600 TABLET, EXTENDED RELEASE ORAL at 06:12

## 2024-12-06 RX ADMIN — METHADONE HYDROCHLORIDE 170 MILLIGRAM(S): 5 TABLET ORAL at 09:51

## 2024-12-06 RX ADMIN — FENTANYL 3.4 MICROGRAM(S)/KG/HR: 12 PATCH, EXTENDED RELEASE TRANSDERMAL at 02:58

## 2024-12-06 RX ADMIN — LEVETIRACETAM 750 MILLIGRAM(S): 1000 TABLET ORAL at 02:33

## 2024-12-06 NOTE — PROGRESS NOTE ADULT - CRITICAL CARE SERVICES PROVIDED
Patient is critically ill, requiring critical care services.
Orbicularis Oris Muscle Flap Text: The defect edges were debeveled with a #15 scalpel blade.  Given that the defect affected the competency of the oral sphincter an obicularis oris muscle flap was deemed most appropriate to restore this competency and normal muscle function.  Using a sterile surgical marker, an appropriate flap was drawn incorporating the defect. The area thus outlined was incised with a #15 scalpel blade.

## 2024-12-06 NOTE — PROGRESS NOTE ADULT - ASSESSMENT
Patient is a 59-year-old female w/ PMH of polysubstance use (tobacco, opioids on methadone, Hep C positive), anemia, CKD, and epilepsy BIBEMS for AMS found to have Hgb 5.9 and melena admitted to MICU w/ c/f acute UGIB, s/p clipping of 20mm ulceration in gastric antrum.     Neuro/Psych  #AMS -- RESOLVED  Initially BIBEMS for AMS  Improvement in mentation, now AOx3    #Seizure  Home keppra 750mg BID  Per pt, has had breakthrough seizures, last 11/30 witnessed by roommate  Last dose of keppra taken morning of seizure  - c/w keppra 750mg BID  - seizure precautions  - epilepsy consult; will reach out when pt stable as has not seen pt yet    #Opioid dependence  Pt reports she takes methadone 170mg qd -- last went to methadone clinic 1 week ago but missed appointment on 12/2 due to symptoms   Reported last use of heroin 10 days ago, has been taking gabapentin 400mg QID for the past week  - confirm dose w/ methadone clinic ([facility]: [phone])  - c/w methadone 170mg qd   - f/u Utox    #Bipolar disorder  #Depression  Previously on buspar though self-discontinued by pt due to side effects (shakiness)  Per surescripts, is also on quetiapine 75mg qd  - formal med rec when pt more stable from GIB    Cardiovascular   #Hemorrhagic shock likely 2/2 acute GIB  100/65 --> 85/50, started on levo gtt  Unlikely septic shock given afebrile, no leukocytosis, no evidence of infection; unlikely cardiogenic given no significant cardiovascular hx, warm, well-perfused on exam  POCUS w/ normal heart function  - c/w levo gtt; decreasing requirement  - titrate to MAP > 65  - rest of plan per GI below    Pulmonary   CXR negative  #Intubation  Extubated @ 12/4 AM, reintubated @ 12/4PM for repeat EGD. Stable.  Passed SABT (12/5 AM).   - will decrease sedation (fent/prop) and extubate when active GIB can be ruled out    GI   #c/f active UGIB  Pt complaining of episodes of maroon/dark stools since 11/30  No prior hx, no prior colonoscopies. Pt reporting prior EGD in 7/2024 admission though none documented, no c/f bleeding at that time   Low concern for varices given no significant alcohol use hx   Rectal exam +maroon/dark stool, external hemorrhoid   S/p 12u pRBC (8 recorded, 12 ordered), plasma, platelets  20mm ulcer in antrum found per EGD (12/3); [place] 1b ulcer, clipped.  Repeat EGD (12/4): no active bleed.  Hgb stable at 8.3 (12/4) w/ 2 episodes of melena.   - pantoprazole bid  - GI following; appreciate recs  - CBC q6h  - Consider triphasic GI bleed study if active sign/sx of bleed    #Transaminitis  #Hx of hepatitis C  AST 75, ALT 80 on adm; likely 2/ hemorrhagic shock.   Pt reporting ongoing abdominal pain since 11/30  Improving; AST 46, ALT 41 (12/5)  - trend w/ daily LFTs    Diet: NPO until repeat CBC returns, then CLD  GI pptx: pantoprazole bid  Bowel regimen: none    Renal   #BOB, resolved  Unclear baseline, BUN 60, Cr 1.5 at presentation  Cr 1.2 at prior admission in 7/2024  Likely pre-renal iso hypovolemia 2/2 acute GIB  Cr 0.93 (12/3); cleared. However, uptrended to 1.60 (12/4). This is likely 2/2 massive blood loss from GIB and iso hemorrhagic shock. Resolved on 12/5 w/ Cr. 1.26    #Urinary retention, resolved  At presentation, , SC x1 w/ 550cc output; straight cath x2. Hilton placed on 12/2-12/3.  - c/w hilton. TOV after extubation      ID  #C/f infection iso shock  FAUSTO, afebrile, no leukocytosis at this time.   - c/w empiric vanc, zosyn.     Heme  #Normocytic anemia likely 2/2 acute UGIB  Hgb 5.9 > 6.1 s/p 2 units pRBC, received 2 more units prior to transfer to MICU.  At last admission, Hgb 7.9-9  Iron studies: total iron wnl, TIBC low, %iron sat wnl, ferritin wnl. Folate/B12 wnl  Total of 3 episodes of melena on 12/3; massive. Code fusion called 12/3 AM; having 8 more transfusions.   12/4: given 2 more transfusions for what appeared to be recurrent GIB  - repeat CBC q6h  - maintain active T&S  - transfuse Hgb <7    Endo  FAUSTO    MSK  FAUSTO    Prophylaxis  F: as needed  E: Replete as needed, target K>4, Phos>3, Mg>2  N: NPO until repeat CBC (stable hgb), then CLD  DVT pptx: hold iso GIB; start tomorrow if hgb stable  GI pptx: pantoprazole gtt  Code status: Full code  Dispo: MICU  Lines: Peripheral IV in RUE (x2), LUE (x1), RIJ, hilton Patient is a 59-year-old female w/ PMH of polysubstance use (tobacco, opioids on methadone, Hep C positive), anemia, CKD, and epilepsy BIBEMS for AMS found to have Hgb 5.9 and melena admitted to MICU w/ hemorrhagic shock 2/2 UGIB iso NSAID use, s/p clipping of 20mm ulceration in gastric antrum.     Neuro/Psych  #AMS -- RESOLVED  Initially BIBEMS for AMS  Improvement in mentation after initial extubation.   - will reassess when pt extubated    #Seizure  Home keppra 750mg BID  Per pt, has had breakthrough seizures, last 11/30 witnessed by roommate  Last dose of keppra taken morning of seizure  - c/w keppra 750mg BID; IV as NPO  - seizure precautions  - epilepsy consult; will reach out when pt stable as has not seen pt yet    #Opioid dependence  Pt reports she takes methadone 170mg qd -- last went to methadone clinic 1 week ago but missed appointment on 12/2 due to symptoms   Reported last use of heroin 10 days ago, has been taking gabapentin 400mg QID for the past week  - confirm dose w/ methadone clinic ([facility]: [phone])  - c/w methadone 170mg qd when off NPO/extubated    #Bipolar disorder  #Depression  Previously on buspar though self-discontinued by pt due to side effects (shakiness)  Per surescripts, is also on quetiapine 75mg qd (last picked up 12/2023)  - formal med rec when pt more stable from GIB    Cardiovascular   #Hemorrhagic shock likely 2/2 acute GIB  100/65 --> 85/50, started on levo gtt  Unlikely septic shock given afebrile, no leukocytosis, no evidence of infection; unlikely cardiogenic given no significant cardiovascular hx, warm, well-perfused on exam  POCUS w/ normal heart function  - c/w levo gtt; decreasing requirement  - titrate to MAP > 65  - rest of plan per GI below    Pulmonary   CXR negative  #Intubation  Extubated @ 12/4 AM, reintubated @ 12/4PM for repeat EGD. Stable.  Passed SABT (12/5 AM).   - will decrease sedation (fent/prop) and extubate today    GI   #c/f active UGIB  Pt complaining of episodes of maroon/dark stools since 11/30  No prior hx, no prior colonoscopies. Pt reporting prior EGD in 7/2024 admission though none documented, no c/f bleeding at that time   Low concern for varices given no significant alcohol use hx   Rectal exam +maroon/dark stool, external hemorrhoid   S/p 12u pRBC (8 recorded, 12 ordered), plasma, platelets  20mm ulcer in antrum found per EGD (12/3); [place] 1b ulcer, clipped.  Repeat EGD (12/4): no active bleed.  Hgb stable at 8.3 (12/4) w/ 2 episodes of melena.   - pantoprazole bid  - GI following; appreciate recs  - CBC q12h  - Consider triphasic GI bleed study if active sign/sx of bleed    #Transaminitis  #Hx of hepatitis C  AST 75, ALT 80 on adm; likely 2/ hemorrhagic shock.   Pt reporting ongoing abdominal pain since 11/30  improved; AST 46, ALT 41 (12/5)  - trend w/ daily LFTs    Diet: NPO until extubation, then CLD  GI pptx: pantoprazole bid  Bowel regimen: none (last BM 12/5)    Renal   #BOB, resolved  Unclear baseline, BUN 60, Cr 1.5 at presentation  Cr 1.2 at prior admission in 7/2024  Likely pre-renal iso hypovolemia 2/2 acute GIB  Cr 0.93 (12/3); cleared. However, uptrended to 1.60 (12/4). This is likely 2/2 massive blood loss from GIB and iso hemorrhagic shock. Resolved on 12/5 w/ Cr. 1.26    #Urinary retention, resolved  At presentation, , SC x1 w/ 550cc output; straight cath x2. Hilton placed on 12/2-12/3.  - pull out hilton today; tov      ID  #C/f infection iso shock  FAUSTO, afebrile, no leukocytosis at this time.   - c/w empiric zosyn.     Heme  #Normocytic anemia likely 2/2 acute UGIB  Hgb 5.9 > 6.1 s/p 2 units pRBC, received 2 more units prior to transfer to MICU.  At last admission, Hgb 7.9-9  Iron studies: total iron wnl, TIBC low, %iron sat wnl, ferritin wnl. Folate/B12 wnl  Total of 3 episodes of melena on 12/3; massive. Code fusion called 12/3 AM; having 8 more transfusions.   12/4: given 2 more transfusions for what appeared to be recurrent GIB  - repeat CBC q12h  - maintain active T&S  - transfuse Hgb <7    Endo  FAUSTO    MSK  FAUSTO    Prophylaxis  F: as needed  E: Replete as needed, target K>4, Phos>3, Mg>2  N: NPO until extubation, then CLD  DVT pptx: hold iso GIB; consider starting on 12/7 if hgb stable  GI pptx: pantoprazole bid  Code status: Full code  Dispo: MICU  Lines: Peripheral IV in RUE (x2), LUE (x1), RIJ, hilton; all since 12/3

## 2024-12-06 NOTE — PROGRESS NOTE ADULT - SUBJECTIVE AND OBJECTIVE BOX
GASTROENTEROLOGY PROGRESS NOTE  Patient seen and examined at bedside. No further episodes of melena.     PERTINENT REVIEW OF SYSTEMS:  CONSTITUTIONAL: No weakness, fevers or chills  HEENT: No visual changes; No vertigo or throat pain   GASTROINTESTINAL: As above.  NEUROLOGICAL: No numbness or weakness  SKIN: No itching, burning, rashes, or lesions     Allergies    No Known Allergies    Intolerances      MEDICATIONS:  MEDICATIONS  (STANDING):  chlorhexidine 2% Cloths 1 Application(s) Topical <User Schedule>  dexMEDEtomidine Infusion 0.1 MICROgram(s)/kG/Hr (1.7 mL/Hr) IV Continuous <Continuous>  levETIRAcetam   Injectable 750 milliGRAM(s) IV Push every 12 hours  methadone    Tablet 170 milliGRAM(s) Oral every 24 hours  norepinephrine Infusion 0.05 MICROgram(s)/kG/Min (6.38 mL/Hr) IV Continuous <Continuous>  pantoprazole  Injectable 40 milliGRAM(s) IV Push every 12 hours  piperacillin/tazobactam IVPB.. 3.375 Gram(s) IV Intermittent every 8 hours    MEDICATIONS  (PRN):  sodium chloride 0.9% lock flush 10 milliLiter(s) IV Push every 1 hour PRN Pre/post blood products, medications, blood draw, and to maintain line patency    Vital Signs Last 24 Hrs  T(C): 36.3 (06 Dec 2024 09:47), Max: 36.7 (05 Dec 2024 14:26)  T(F): 97.4 (06 Dec 2024 09:47), Max: 98.1 (05 Dec 2024 21:06)  HR: 70 (06 Dec 2024 13:30) (47 - 90)  BP: 107/72 (06 Dec 2024 13:30) (95/55 - 122/64)  BP(mean): 85 (06 Dec 2024 13:30) (70 - 92)  RR: 13 (06 Dec 2024 13:30) (13 - 21)  SpO2: 96% (06 Dec 2024 13:30) (96% - 100%)    Parameters below as of 06 Dec 2024 13:30  Patient On (Oxygen Delivery Method): nasal cannula  O2 Flow (L/min): 4      12-05 @ 07:01  -  12-06 @ 07:00  --------------------------------------------------------  IN: 2191.2 mL / OUT: 3350 mL / NET: -1158.8 mL    12-06 @ 07:01  -  12-06 @ 13:34  --------------------------------------------------------  IN: 630.2 mL / OUT: 695 mL / NET: -64.8 mL      PHYSICAL EXAM:    General: lying in bed, intubated and sedated   Gastrointestinal: Soft non-tender non-distended; No rebound or guarding  Skin: Warm and dry. No obvious rash    LABS:                        8.8    5.42  )-----------( 121      ( 06 Dec 2024 10:52 )             26.2     12-06    143  |  112[H]  |  20  ----------------------------<  82  3.6   |  25  |  1.20    Ca    7.8[L]      06 Dec 2024 05:12  Phos  4.3     12-06  Mg     1.8     12-06    TPro  4.8[L]  /  Alb  2.8[L]  /  TBili  0.6  /  DBili  x   /  AST  55[H]  /  ALT  45  /  AlkPhos  56  12-06          Urinalysis Basic - ( 06 Dec 2024 05:12 )    Color: x / Appearance: x / SG: x / pH: x  Gluc: 82 mg/dL / Ketone: x  / Bili: x / Urobili: x   Blood: x / Protein: x / Nitrite: x   Leuk Esterase: x / RBC: x / WBC x   Sq Epi: x / Non Sq Epi: x / Bacteria: x                Culture - Sputum (collected 05 Dec 2024 12:09)  Source: .Sputum Sputum  Gram Stain (05 Dec 2024 23:23):    Few polymorphonuclear leukocytes per low power field    No Squamous epithelial cells per low power field    No organisms seen per oil power field    Culture - Blood (collected 03 Dec 2024 17:40)  Source: .Blood BLOOD  Preliminary Report (05 Dec 2024 22:01):    No growth at 48 Hours      RADIOLOGY & ADDITIONAL STUDIES:  Reviewed

## 2024-12-06 NOTE — AIRWAY REMOVAL NOTE  ADULT & PEDS - RESPIRATORY RHYTHM/PATTERN
no shortness of breath/rate regular/depth regular/pattern regular/unlabored
depth regular/pattern regular

## 2024-12-06 NOTE — PROGRESS NOTE ADULT - SUBJECTIVE AND OBJECTIVE BOX
Patient is a 59y old  Female who presents with a chief complaint of AMS, Abdominal pain (06 Dec 2024 04:25)    HOSPITAL COURSE:  Patient is a 59-year-old female w/ PMH of polysubstance use (tobacco, opioids on methadone, Hep C positive), anemia, CKD, and epilepsy BIBEMS for AMS, found to be having anemia from active GIB iso melena iso use of naproxen x 7d (2-3 capsules/day). Pt denies any prior hx of GIB, no prior colonoscopies, but states she had an endoscopy during her last admission in 7/2024 for L iliopsoas bursitis. At ED, vitals were T98, HR 68, /65 --> 85/50, RR 18, SpO2 94% on RA. Started on levo gtt for hypotension. Labs showed Hgb 5.9 (s/p pRBC x4; complete ~4am); repeat hgb 6.2 around 9AM. Not appropriate response 2/2 active GIB. Another 8u pRBC ordered to be given. Intubated for EGD and code fusion was called around 11:25AM as pt had massive episode of melena x4. EGD showed 20mm ulceration in gastric antrum; clipped (12/3). Pt extubated on 12/4 AM. However, pt had melena x2 on 12/4 w/ drop in hgb 8.2>7. Pt was intubated again in PM and repeat EGD was done, which did not show any signs of bleed. Per GI, pt should be treated like pt w/ resolved GIB. Placed on PPI BID and CBC monitored q6h. Stable Hgb on 12/5 but pt hadmelena x2. VSS. Hgb stable at 8.3 on 12/6. Will trial SAT/SBT today and extubate. If further signs/sx of active bleed, consider triphasic GI Bleed study, which involves CTA.       INTERVAL HPI/OVERNIGHT EVENTS:   O/N: No further episodes of melena. Hgb stable at 8.3.   AM: Afebrile, hemodynamically stable. Asleep but woke up to voice.     ICU Vital Signs Last 24 Hrs  T(C): 36.4 (06 Dec 2024 06:04), Max: 36.7 (05 Dec 2024 14:26)  T(F): 97.5 (06 Dec 2024 06:04), Max: 98.1 (05 Dec 2024 21:06)  HR: 58 (06 Dec 2024 09:00) (47 - 74)  BP: 107/65 (06 Dec 2024 09:00) (95/55 - 122/64)  BP(mean): 81 (06 Dec 2024 09:00) (69 - 90)  ABP: --  ABP(mean): --  RR: 16 (06 Dec 2024 09:00) (16 - 18)  SpO2: 100% (06 Dec 2024 09:00) (99% - 100%)    O2 Parameters below as of 06 Dec 2024 09:00  Patient On (Oxygen Delivery Method): ventilator    O2 Concentration (%): 30      I&O's Summary    05 Dec 2024 07:01  -  06 Dec 2024 07:00  --------------------------------------------------------  IN: 2191.2 mL / OUT: 3350 mL / NET: -1158.8 mL    06 Dec 2024 07:01  -  06 Dec 2024 09:17  --------------------------------------------------------  IN: 330.7 mL / OUT: 375 mL / NET: -44.3 mL      Mode: AC/ CMV (Assist Control/ Continuous Mandatory Ventilation)  RR (machine): 16  TV (machine): 400  FiO2: 40  PEEP: 5  ITime: 1  MAP: 14  PIP: 21      LABS:                        8.3    5.53  )-----------( 140      ( 06 Dec 2024 05:12 )             25.7     12-06    143  |  112[H]  |  20  ----------------------------<  82  3.6   |  25  |  1.20    Ca    7.8[L]      06 Dec 2024 05:12  Phos  4.3     12-06  Mg     1.8     12-06    TPro  4.8[L]  /  Alb  2.8[L]  /  TBili  0.6  /  DBili  x   /  AST  55[H]  /  ALT  45  /  AlkPhos  56  12-06      Urinalysis Basic - ( 06 Dec 2024 05:12 )    Color: x / Appearance: x / SG: x / pH: x  Gluc: 82 mg/dL / Ketone: x  / Bili: x / Urobili: x   Blood: x / Protein: x / Nitrite: x   Leuk Esterase: x / RBC: x / WBC x   Sq Epi: x / Non Sq Epi: x / Bacteria: x      CAPILLARY BLOOD GLUCOSE      POCT Blood Glucose.: 102 mg/dL (05 Dec 2024 18:12)  POCT Blood Glucose.: 97 mg/dL (05 Dec 2024 10:54)        RADIOLOGY & ADDITIONAL TESTS:    Consultant(s) Notes Reviewed:  [x ] YES  [ ] NO    MEDICATIONS  (STANDING):  chlorhexidine 0.12% Liquid 15 milliLiter(s) Oral Mucosa every 12 hours  chlorhexidine 2% Cloths 1 Application(s) Topical <User Schedule>  fentaNYL   Infusion. 0.5 MICROgram(s)/kG/Hr (3.4 mL/Hr) IV Continuous <Continuous>  levETIRAcetam   Injectable 750 milliGRAM(s) IV Push every 12 hours  methadone    Tablet 170 milliGRAM(s) Oral every 24 hours  norepinephrine Infusion 0.05 MICROgram(s)/kG/Min (6.38 mL/Hr) IV Continuous <Continuous>  pantoprazole  Injectable 40 milliGRAM(s) IV Push every 12 hours  piperacillin/tazobactam IVPB.. 3.375 Gram(s) IV Intermittent every 8 hours  potassium chloride  10 mEq/100 mL IVPB 10 milliEquivalent(s) IV Intermittent every 1 hour  propofol Infusion 10 MICROgram(s)/kG/Min (4.08 mL/Hr) IV Continuous <Continuous>    MEDICATIONS  (PRN):  sodium chloride 0.9% lock flush 10 milliLiter(s) IV Push every 1 hour PRN Pre/post blood products, medications, blood draw, and to maintain line patency      PHYSICAL EXAM:  GENERAL: Sleeping comfortably. Wakes up to voice. RASS -1.   HEAD:  Atraumatic, Normocephalic  EYES: PERRLA, conjunctiva and sclera clear  NERVOUS SYSTEM:  Unable to assess  CHEST/LUNG: B/L good air entry; No rales, rhonchi, or wheezing. Intubated. FIO2 30%, PEEP5, .  HEART: S1S2 normal, no S3, Regular rate and rhythm; No murmurs  ABDOMEN: Soft, Nondistended; Bowel sounds present. Pt wincing when abd palpated. Began guarding after deep palpation. Nodded when asked if there was pain in abd.  EXTREMITIES:  2+ Peripheral Pulses, No clubbing, cyanosis, or edema  SKIN: No rashes or lesions    Care Discussed with Consultants/Other Providers [ x] YES  [ ] NO

## 2024-12-06 NOTE — PROGRESS NOTE ADULT - ASSESSMENT
59F w/ PMHx of polysubstance use (tobacco, opioids on methadone), HepC, CKD and epilepsy admitted to MICU due to hemorrhagic shock 2/2 GIB requiring pressors. GI consulted for GI bleed.     EGD on 12/3 and 12/4/24:    Repeat EGD done 12/4/24:  Findings:  Esophagus	Normal esophagus.  Stomach	Excavated lesions	A single non-bleeding 20 mm ulcer was found in the stomach. Previously clipped ulcer in antrum visualized w/ no old or active bleeding.  Duodenum	Normal duodenum.    Recommendations:  - Clear liquid diet  - Trend CBC, maintain active T&S  - Completed 72 hours of PPI BID, can transition to pantoprazole 40 mg PO BID for 2 weeks (can switch to omeprazole 40 mg daily on d/c) and then omeprazole 40 mg daily   - If patient rebleeds then recommend obtaining CT bleeding scan and contacting IR for possible embolization    Case to be discussed w/ GI attending Dr. Adolfo Laboy MD  PGY-4 GI Fellow  GI consult pager (M-F 7a-5p): [phone]  Please call  for on-call fellow after hours     59F w/ PMHx of polysubstance use (tobacco, opioids on methadone), HepC, CKD and epilepsy admitted to MICU due to hemorrhagic shock 2/2 GIB requiring pressors. GI consulted for GI bleed.     EGD on 12/3 and 12/4/24:    Repeat EGD done 12/4/24:  Findings:  Esophagus	Normal esophagus.  Stomach	Excavated lesions	A single non-bleeding 20 mm ulcer was found in the stomach. Previously clipped ulcer in antrum visualized w/ no old or active bleeding.  Duodenum	Normal duodenum.    Recommendations:  - Clear liquid diet  - Trend CBC, maintain active T&S  - Complete 72 hours of PPI IV BID, can then transition to pantoprazole 40 mg PO BID for 2 weeks (can switch to omeprazole 40 mg daily on d/c) and then omeprazole 40 mg daily   - If patient rebleeds then recommend obtaining CT bleeding scan and contacting IR for possible embolization    Case discussed w/ GI attending Dr. Adolfo Laboy MD  PGY-4 GI Fellow  GI consult pager (M-F 7a-5p): [phone]  Please call  for on-call fellow after hours

## 2024-12-07 DIAGNOSIS — F11.20 OPIOID DEPENDENCE, UNCOMPLICATED: ICD-10-CM

## 2024-12-07 DIAGNOSIS — G40.909 EPILEPSY, UNSPECIFIED, NOT INTRACTABLE, WITHOUT STATUS EPILEPTICUS: ICD-10-CM

## 2024-12-07 DIAGNOSIS — D64.9 ANEMIA, UNSPECIFIED: ICD-10-CM

## 2024-12-07 DIAGNOSIS — K29.70 GASTRITIS, UNSPECIFIED, WITHOUT BLEEDING: ICD-10-CM

## 2024-12-07 DIAGNOSIS — F31.9 BIPOLAR DISORDER, UNSPECIFIED: ICD-10-CM

## 2024-12-07 DIAGNOSIS — Z00.00 ENCOUNTER FOR GENERAL ADULT MEDICAL EXAMINATION WITHOUT ABNORMAL FINDINGS: ICD-10-CM

## 2024-12-07 DIAGNOSIS — F19.90 OTHER PSYCHOACTIVE SUBSTANCE USE, UNSPECIFIED, UNCOMPLICATED: ICD-10-CM

## 2024-12-07 DIAGNOSIS — B19.20 UNSPECIFIED VIRAL HEPATITIS C WITHOUT HEPATIC COMA: ICD-10-CM

## 2024-12-07 LAB
ALBUMIN SERPL ELPH-MCNC: 2.7 G/DL — LOW (ref 3.3–5)
ALP SERPL-CCNC: 63 U/L — SIGNIFICANT CHANGE UP (ref 40–120)
ALT FLD-CCNC: 52 U/L — HIGH (ref 10–45)
ANION GAP SERPL CALC-SCNC: 4 MMOL/L — LOW (ref 5–17)
AST SERPL-CCNC: 72 U/L — HIGH (ref 10–40)
BASOPHILS # BLD AUTO: 0.02 K/UL — SIGNIFICANT CHANGE UP (ref 0–0.2)
BASOPHILS NFR BLD AUTO: 0.4 % — SIGNIFICANT CHANGE UP (ref 0–2)
BILIRUB SERPL-MCNC: 0.5 MG/DL — SIGNIFICANT CHANGE UP (ref 0.2–1.2)
BLD GP AB SCN SERPL QL: NEGATIVE — SIGNIFICANT CHANGE UP
BUN SERPL-MCNC: 10 MG/DL — SIGNIFICANT CHANGE UP (ref 7–23)
CALCIUM SERPL-MCNC: 8.3 MG/DL — LOW (ref 8.4–10.5)
CHLORIDE SERPL-SCNC: 111 MMOL/L — HIGH (ref 96–108)
CO2 SERPL-SCNC: 28 MMOL/L — SIGNIFICANT CHANGE UP (ref 22–31)
CREAT SERPL-MCNC: 1.05 MG/DL — SIGNIFICANT CHANGE UP (ref 0.5–1.3)
CULTURE RESULTS: NO GROWTH — SIGNIFICANT CHANGE UP
EGFR: 62 ML/MIN/1.73M2 — SIGNIFICANT CHANGE UP
EOSINOPHIL # BLD AUTO: 0.21 K/UL — SIGNIFICANT CHANGE UP (ref 0–0.5)
EOSINOPHIL NFR BLD AUTO: 4.3 % — SIGNIFICANT CHANGE UP (ref 0–6)
GLUCOSE SERPL-MCNC: 75 MG/DL — SIGNIFICANT CHANGE UP (ref 70–99)
HCT VFR BLD CALC: 24.8 % — LOW (ref 34.5–45)
HGB BLD-MCNC: 7.9 G/DL — LOW (ref 11.5–15.5)
IMM GRANULOCYTES NFR BLD AUTO: 1.2 % — HIGH (ref 0–0.9)
LYMPHOCYTES # BLD AUTO: 1.08 K/UL — SIGNIFICANT CHANGE UP (ref 1–3.3)
LYMPHOCYTES # BLD AUTO: 22.3 % — SIGNIFICANT CHANGE UP (ref 13–44)
MAGNESIUM SERPL-MCNC: 1.8 MG/DL — SIGNIFICANT CHANGE UP (ref 1.6–2.6)
MCHC RBC-ENTMCNC: 28.9 PG — SIGNIFICANT CHANGE UP (ref 27–34)
MCHC RBC-ENTMCNC: 31.9 G/DL — LOW (ref 32–36)
MCV RBC AUTO: 90.8 FL — SIGNIFICANT CHANGE UP (ref 80–100)
MONOCYTES # BLD AUTO: 0.38 K/UL — SIGNIFICANT CHANGE UP (ref 0–0.9)
MONOCYTES NFR BLD AUTO: 7.8 % — SIGNIFICANT CHANGE UP (ref 2–14)
NEUTROPHILS # BLD AUTO: 3.1 K/UL — SIGNIFICANT CHANGE UP (ref 1.8–7.4)
NEUTROPHILS NFR BLD AUTO: 64 % — SIGNIFICANT CHANGE UP (ref 43–77)
NRBC # BLD: 0 /100 WBCS — SIGNIFICANT CHANGE UP (ref 0–0)
PHOSPHATE SERPL-MCNC: 3.9 MG/DL — SIGNIFICANT CHANGE UP (ref 2.5–4.5)
PLATELET # BLD AUTO: 131 K/UL — LOW (ref 150–400)
POTASSIUM SERPL-MCNC: 3.9 MMOL/L — SIGNIFICANT CHANGE UP (ref 3.5–5.3)
POTASSIUM SERPL-SCNC: 3.9 MMOL/L — SIGNIFICANT CHANGE UP (ref 3.5–5.3)
PROT SERPL-MCNC: 5.1 G/DL — LOW (ref 6–8.3)
RBC # BLD: 2.73 M/UL — LOW (ref 3.8–5.2)
RBC # FLD: 15.5 % — HIGH (ref 10.3–14.5)
RH IG SCN BLD-IMP: POSITIVE — SIGNIFICANT CHANGE UP
SODIUM SERPL-SCNC: 143 MMOL/L — SIGNIFICANT CHANGE UP (ref 135–145)
SPECIMEN SOURCE: SIGNIFICANT CHANGE UP
WBC # BLD: 4.85 K/UL — SIGNIFICANT CHANGE UP (ref 3.8–10.5)
WBC # FLD AUTO: 4.85 K/UL — SIGNIFICANT CHANGE UP (ref 3.8–10.5)

## 2024-12-07 PROCEDURE — 99222 1ST HOSP IP/OBS MODERATE 55: CPT

## 2024-12-07 PROCEDURE — 93010 ELECTROCARDIOGRAM REPORT: CPT

## 2024-12-07 PROCEDURE — 99233 SBSQ HOSP IP/OBS HIGH 50: CPT

## 2024-12-07 RX ORDER — LEVETIRACETAM 1000 MG/1
750 TABLET ORAL EVERY 12 HOURS
Refills: 0 | Status: DISCONTINUED | OUTPATIENT
Start: 2024-12-07 | End: 2024-12-12

## 2024-12-07 RX ORDER — PANTOPRAZOLE SODIUM 40 MG/1
40 TABLET, DELAYED RELEASE ORAL EVERY 12 HOURS
Refills: 0 | Status: DISCONTINUED | OUTPATIENT
Start: 2024-12-07 | End: 2024-12-12

## 2024-12-07 RX ORDER — OLANZAPINE 20 MG/1
2.5 TABLET ORAL AT BEDTIME
Refills: 0 | Status: DISCONTINUED | OUTPATIENT
Start: 2024-12-07 | End: 2024-12-12

## 2024-12-07 RX ORDER — POTASSIUM CHLORIDE 600 MG/1
10 TABLET, EXTENDED RELEASE ORAL ONCE
Refills: 0 | Status: COMPLETED | OUTPATIENT
Start: 2024-12-07 | End: 2024-12-07

## 2024-12-07 RX ADMIN — METHADONE HYDROCHLORIDE 170 MILLIGRAM(S): 5 TABLET ORAL at 09:24

## 2024-12-07 RX ADMIN — Medication 25 GRAM(S): at 06:56

## 2024-12-07 RX ADMIN — LEVETIRACETAM 750 MILLIGRAM(S): 1000 TABLET ORAL at 17:23

## 2024-12-07 RX ADMIN — POTASSIUM CHLORIDE 10 MILLIEQUIVALENT(S): 600 TABLET, EXTENDED RELEASE ORAL at 09:23

## 2024-12-07 RX ADMIN — PANTOPRAZOLE SODIUM 40 MILLIGRAM(S): 40 TABLET, DELAYED RELEASE ORAL at 17:22

## 2024-12-07 RX ADMIN — OLANZAPINE 2.5 MILLIGRAM(S): 20 TABLET ORAL at 21:54

## 2024-12-07 RX ADMIN — PANTOPRAZOLE SODIUM 40 MILLIGRAM(S): 40 TABLET, DELAYED RELEASE ORAL at 10:47

## 2024-12-07 RX ADMIN — LEVETIRACETAM 750 MILLIGRAM(S): 1000 TABLET ORAL at 02:59

## 2024-12-07 RX ADMIN — SODIUM CHLORIDE 10 MILLILITER(S): 9 INJECTION, SOLUTION INTRAMUSCULAR; INTRAVENOUS; SUBCUTANEOUS at 03:00

## 2024-12-07 RX ADMIN — CHLORHEXIDINE GLUCONATE 1 APPLICATION(S): 1.2 RINSE ORAL at 06:56

## 2024-12-07 RX ADMIN — PIPERACILLIN SODIUM AND TAZOBACTAM SODIUM 25 GRAM(S): 4; .5 INJECTION, POWDER, LYOPHILIZED, FOR SOLUTION INTRAVENOUS at 06:56

## 2024-12-07 RX ADMIN — PANTOPRAZOLE SODIUM 40 MILLIGRAM(S): 40 TABLET, DELAYED RELEASE ORAL at 02:59

## 2024-12-07 NOTE — PROGRESS NOTE ADULT - SUBJECTIVE AND OBJECTIVE BOX
Hospital Course:     *****INCOMPLETE NOTE*****    INTERVAL HPI/OVERNIGHT EVENTS:    SUBJECTIVE: Patient seen and examined at bedside, resting comfortably in bed, and does not appear to be in any acute distress. Patient reports    Vital Signs Last 24 Hrs  T(C): 36.7 (07 Dec 2024 01:21), Max: 36.9 (06 Dec 2024 18:15)  T(F): 98.1 (07 Dec 2024 01:21), Max: 98.4 (06 Dec 2024 18:15)  HR: 80 (07 Dec 2024 06:00) (55 - 90)  BP: 93/52 (07 Dec 2024 06:00) (81/47 - 117/68)  BP(mean): 70 (07 Dec 2024 06:00) (60 - 92)  RR: 15 (07 Dec 2024 06:00) (10 - 28)  SpO2: 98% (07 Dec 2024 06:00) (92% - 100%)    Parameters below as of 07 Dec 2024 06:00  Patient On (Oxygen Delivery Method): room air        PHYSICAL EXAM:  General: in no acute distress  Eyes: EOMI intact bilaterally. Anicteric sclerae, moist conjunctivae  HENT: Moist mucous membranes  Neck: Trachea midline, supple  Lungs: CTA B/L. No wheezes, rales, or rhonchi  Cardiovascular: RRR. No murmurs, rubs, or gallops  Abdomen: Soft, non-tender non-distended; No rebound or guarding  Extremities: WWP, No clubbing, cyanosis or edema  Neurological: Alert and oriented  Skin: Warm and dry. No obvious rash     LABS:                        7.9    4.85  )-----------( 131      ( 07 Dec 2024 05:30 )             24.8     12-07    143  |  111[H]  |  10  ----------------------------<  75  3.9   |  28  |  1.05    Ca    8.3[L]      07 Dec 2024 05:30  Phos  3.9     12-07  Mg     1.8     12-07    TPro  5.1[L]  /  Alb  2.7[L]  /  TBili  0.5  /  DBili  x   /  AST  72[H]  /  ALT  52[H]  /  AlkPhos  63  12-07   Hospital Course:     HOSPITAL COURSE:  Patient is a 59-year-old female w/ PMH of polysubstance use (tobacco, opioids on methadone, Hep C positive), anemia, CKD, and epilepsy BIBEMS for AMS, found to be having anemia from active GIB iso melena iso use of naproxen x 7d (2-3 capsules/day). Pt denies any prior hx of GIB, no prior colonoscopies, but states she had an endoscopy during her last admission in 7/2024 for L iliopsoas bursitis. At ED, vitals were T98, HR 68, /65 --> 85/50, RR 18, SpO2 94% on RA. Started on levo gtt for hypotension. Labs showed Hgb 5.9 (s/p pRBC x4; complete ~4am); repeat hgb 6.2 around 9AM. Not appropriate response 2/2 active GIB. Another 8u pRBC ordered to be given. Intubated for EGD and code fusion was called around 11:25AM as pt had massive episode of melena x4. EGD showed 20mm ulceration in gastric antrum; clipped (12/3). Pt extubated on 12/4 AM. However, pt had melena x2 on 12/4 w/ drop in hgb 8.2>7. Pt was intubated again in PM and repeat EGD was done, which did not show any signs of bleed. Per GI, pt should be treated like pt w/ resolved GIB. Placed on PPI BID and CBC monitored q6h. Stable Hgb on 12/5 but pt hadmelena x2. VSS. Hgb stable at 8.3 on 12/6. Successful extubation on 12/6. No episodes of melena following extubation. If further signs/sx of active bleed, consider triphasic GI Bleed study, which involves CTA.     INTERVAL HPI/OVERNIGHT EVENTS: passed TOV.     SUBJECTIVE: Patient seen and examined at bedside, resting comfortably in bed, and does not appear to be in any acute distress. Patient apologizing for having given the staff a hard time but said she was frustrated about being in the hospital for so long. Patient also requesting to advance her diet.     Vital Signs Last 24 Hrs  T(C): 36.7 (07 Dec 2024 01:21), Max: 36.9 (06 Dec 2024 18:15)  T(F): 98.1 (07 Dec 2024 01:21), Max: 98.4 (06 Dec 2024 18:15)  HR: 80 (07 Dec 2024 06:00) (55 - 90)  BP: 93/52 (07 Dec 2024 06:00) (81/47 - 117/68)  BP(mean): 70 (07 Dec 2024 06:00) (60 - 92)  RR: 15 (07 Dec 2024 06:00) (10 - 28)  SpO2: 98% (07 Dec 2024 06:00) (92% - 100%)    Parameters below as of 07 Dec 2024 06:00  Patient On (Oxygen Delivery Method): room air        PHYSICAL EXAM:  General: in no acute distress  Eyes: EOMI intact bilaterally. Anicteric sclerae, moist conjunctivae  HENT: Dry mucous membranes  Neck: Trachea midline, supple  Lungs: CTA B/L. No wheezes, rales, or rhonchi  Cardiovascular: RRR. No murmurs, rubs, or gallops  Abdomen: Nontender. Hyperactive bowel sounds.   Extremities: WWP, No clubbing, cyanosis or edema  Neurological: Alert and oriented x3.   Skin: Warm and dry. No obvious rash     LABS:                        7.9    4.85  )-----------( 131      ( 07 Dec 2024 05:30 )             24.8     12-07    143  |  111[H]  |  10  ----------------------------<  75  3.9   |  28  |  1.05    Ca    8.3[L]      07 Dec 2024 05:30  Phos  3.9     12-07  Mg     1.8     12-07    TPro  5.1[L]  /  Alb  2.7[L]  /  TBili  0.5  /  DBili  x   /  AST  72[H]  /  ALT  52[H]  /  AlkPhos  63  12-07   *****************Transfer from [facility] to [facility]******************    HOSPITAL COURSE:  Patient is a 59-year-old female w/ PMH of polysubstance use (tobacco, opioids on methadone, Hep C positive), anemia, CKD, and epilepsy BIBEMS for AMS, found to be having anemia from active GIB iso melena iso use of naproxen x 7d (2-3 capsules/day). Pt denies any prior hx of GIB, no prior colonoscopies, but states she had an endoscopy during her last admission in 7/2024 for L iliopsoas bursitis. At ED, vitals were T98, HR 68, /65 --> 85/50, RR 18, SpO2 94% on RA. Started on levo gtt for hypotension. Labs showed Hgb 5.9 (s/p pRBC x4; complete ~4am); repeat hgb 6.2 around 9AM. Not appropriate response 2/2 active GIB. Another 8u pRBC ordered to be given. Intubated for EGD and code fusion was called around 11:25AM as pt had massive episode of melena x4. EGD showed 20mm ulceration in gastric antrum; clipped (12/3). Pt extubated on 12/4 AM. However, pt had melena x2 on 12/4 w/ drop in hgb 8.2>7. Pt was intubated again in PM and repeat EGD was done, which did not show any signs of bleed. Per GI, pt should be treated like pt w/ resolved GIB. Placed on PPI BID and CBC monitored q6h. Stable Hgb on 12/5 but pt hadmelena x2. VSS. Hgb stable at 8.3 on 12/6. Successful extubation on 12/6. No episodes of melena following extubation. If further signs/sx of active bleed, consider triphasic GI Bleed study, which involves CTA. 12/7 pantoprazole was switched to PO BID, zyprexa added for management of bipolar/depression, and zosyn discontinued as patient has remained afebrile with negative WBCs. Pt required straight catheterization in AM of 12/ INTERVAL HPI/OVERNIGHT EVENTS: passed TOV.     SUBJECTIVE: Patient seen and examined at bedside, resting comfortably in bed, and does not appear to be in any acute distress. Patient apologizing for having given the staff a hard time but said she was frustrated about being in the hospital for so long. Patient also requesting to advance her diet.     Vital Signs Last 24 Hrs  T(C): 36.7 (07 Dec 2024 01:21), Max: 36.9 (06 Dec 2024 18:15)  T(F): 98.1 (07 Dec 2024 01:21), Max: 98.4 (06 Dec 2024 18:15)  HR: 80 (07 Dec 2024 06:00) (55 - 90)  BP: 93/52 (07 Dec 2024 06:00) (81/47 - 117/68)  BP(mean): 70 (07 Dec 2024 06:00) (60 - 92)  RR: 15 (07 Dec 2024 06:00) (10 - 28)  SpO2: 98% (07 Dec 2024 06:00) (92% - 100%)    Parameters below as of 07 Dec 2024 06:00  Patient On (Oxygen Delivery Method): room air        PHYSICAL EXAM:  General: in no acute distress  Eyes: EOMI intact bilaterally. Anicteric sclerae, moist conjunctivae  HENT: Dry mucous membranes  Neck: Trachea midline, supple  Lungs: CTA B/L. No wheezes, rales, or rhonchi  Cardiovascular: RRR. No murmurs, rubs, or gallops  Abdomen: Nontender. Hyperactive bowel sounds.   Extremities: WWP, No clubbing, cyanosis or edema  Neurological: Alert and oriented x3.   Skin: Warm and dry. No obvious rash     LABS:                        7.9    4.85  )-----------( 131      ( 07 Dec 2024 05:30 )             24.8     12-07    143  |  111[H]  |  10  ----------------------------<  75  3.9   |  28  |  1.05    Ca    8.3[L]      07 Dec 2024 05:30  Phos  3.9     12-07  Mg     1.8     12-07    TPro  5.1[L]  /  Alb  2.7[L]  /  TBili  0.5  /  DBili  x   /  AST  72[H]  /  ALT  52[H]  /  AlkPhos  63  12-07   *****************Transfer from [facility] to [facility]******************    HOSPITAL COURSE:  Patient is a 59-year-old female w/ PMH of polysubstance use (tobacco, opioids on methadone, Hep C positive), anemia, CKD, and epilepsy BIBEMS for AMS, found to be having anemia from active GIB iso melena iso use of naproxen x 7d (2-3 capsules/day). Pt denies any prior hx of GIB, no prior colonoscopies, but states she had an endoscopy during her last admission in 7/2024 for L iliopsoas bursitis. At ED, vitals were T98, HR 68, /65 --> 85/50, RR 18, SpO2 94% on RA. Started on levo gtt for hypotension. Labs showed Hgb 5.9 (s/p pRBC x4; complete ~4am); repeat hgb 6.2 around 9AM. Not appropriate response 2/2 active GIB. Another 8u pRBC ordered to be given. Intubated for EGD and code fusion was called around 11:25AM as pt had massive episode of melena x4. EGD showed 20mm ulceration in gastric antrum; clipped (12/3). Pt extubated on 12/4 AM. However, pt had melena x2 on 12/4 w/ drop in hgb 8.2>7. Pt was intubated again in PM and repeat EGD was done, which did not show any signs of bleed. Per GI, pt should be treated like pt w/ resolved GIB. Placed on PPI BID and CBC monitored q6h. Stable Hgb on 12/5 but pt hadmelena x2. VSS. Hgb stable at 8.3 on 12/6. Successful extubation on 12/6. No episodes of melena following extubation. If further signs/sx of active bleed, consider triphasic GI Bleed study, which involves CTA. 12/7 pantoprazole was switched to PO BID, zyprexa added for management of bipolar/depression, and zosyn discontinued as patient has remained afebrile with negative WBCs. Pt required straight catheterization in AM of 12/7. Diet advanced to regular diet morning 12/7.     INTERVAL HPI/OVERNIGHT EVENTS: passed TOV.     SUBJECTIVE: Patient seen and examined at bedside, resting comfortably in bed, and does not appear to be in any acute distress. Patient apologizing for having given the staff a hard time but said she was frustrated about being in the hospital for so long. Patient also requesting to advance her diet.     Vital Signs Last 24 Hrs  T(C): 36.7 (07 Dec 2024 01:21), Max: 36.9 (06 Dec 2024 18:15)  T(F): 98.1 (07 Dec 2024 01:21), Max: 98.4 (06 Dec 2024 18:15)  HR: 80 (07 Dec 2024 06:00) (55 - 90)  BP: 93/52 (07 Dec 2024 06:00) (81/47 - 117/68)  BP(mean): 70 (07 Dec 2024 06:00) (60 - 92)  RR: 15 (07 Dec 2024 06:00) (10 - 28)  SpO2: 98% (07 Dec 2024 06:00) (92% - 100%)    Parameters below as of 07 Dec 2024 06:00  Patient On (Oxygen Delivery Method): room air        PHYSICAL EXAM:  General: in no acute distress  Eyes: EOMI intact bilaterally. Anicteric sclerae, moist conjunctivae  HENT: Dry mucous membranes  Neck: Trachea midline, supple  Lungs: CTA B/L. No wheezes, rales, or rhonchi  Cardiovascular: RRR. No murmurs, rubs, or gallops  Abdomen: Nontender. Hyperactive bowel sounds.   Extremities: WWP, No clubbing, cyanosis or edema  Neurological: Alert and oriented x3.   Skin: Warm and dry. No obvious rash     LABS:                        7.9    4.85  )-----------( 131      ( 07 Dec 2024 05:30 )             24.8     12-07    143  |  111[H]  |  10  ----------------------------<  75  3.9   |  28  |  1.05    Ca    8.3[L]      07 Dec 2024 05:30  Phos  3.9     12-07  Mg     1.8     12-07    TPro  5.1[L]  /  Alb  2.7[L]  /  TBili  0.5  /  DBili  x   /  AST  72[H]  /  ALT  52[H]  /  AlkPhos  63  12-07

## 2024-12-07 NOTE — PROGRESS NOTE ADULT - PROBLEM SELECTOR PLAN 5
On Keppra for recurrent seizures, thought to be due to Seroquel initiation in the past.     - Continue with home keppra. At last admission, Hgb 7.9-9. Iron studies: total iron wnl, TIBC low, %iron sat wnl, ferritin wnl. Folate/B12 wnl.     - At last admission, Hgb 7.9-9. Iron studies: total iron wnl, TIBC low, %iron sat wnl, ferritin wnl. Folate/B12 wnl.     - Continue to monitor.

## 2024-12-07 NOTE — PROGRESS NOTE ADULT - ASSESSMENT
Ms. Escobar is a 59-year-old female w/ PMH of polysubstance use (tobacco, opioids on methadone, Hep C positive), anemia, CKD, and epilepsy BIBEMS for AMS found to have Hgb 5.9 and melena admitted to MICU w/ hemorrhagic shock 2/2 UGIB iso NSAID use, s/p clipping of 20mm ulceration in gastric antrum. Patient has had several small episodes of melena since that event which have now resolved.

## 2024-12-07 NOTE — PROGRESS NOTE ADULT - SUBJECTIVE AND OBJECTIVE BOX
GASTROENTEROLOGY PROGRESS NOTE    INTERVAL/SUBJECTIVE:  Patient feels well this morning. Tolerating diet well. Denies nausea and vomiting.     Allergies    No Known Allergies    Intolerances      MEDICATIONS:  MEDICATIONS  (STANDING):  chlorhexidine 2% Cloths 1 Application(s) Topical <User Schedule>  levETIRAcetam 750 milliGRAM(s) Oral every 12 hours  methadone    Tablet 170 milliGRAM(s) Oral every 24 hours  OLANZapine 2.5 milliGRAM(s) Oral at bedtime  pantoprazole    Tablet 40 milliGRAM(s) Oral every 12 hours    MEDICATIONS  (PRN):    Vital Signs Last 24 Hrs  T(C): 37.4 (07 Dec 2024 15:06), Max: 37.4 (07 Dec 2024 15:06)  T(F): 99.4 (07 Dec 2024 15:06), Max: 99.4 (07 Dec 2024 15:06)  HR: 79 (07 Dec 2024 15:06) (69 - 95)  BP: 121/85 (07 Dec 2024 15:06) (81/47 - 140/82)  BP(mean): 86 (07 Dec 2024 14:15) (60 - 104)  RR: 18 (07 Dec 2024 15:06) (12 - 28)  SpO2: 98% (07 Dec 2024 15:06) (92% - 100%)    Parameters below as of 07 Dec 2024 15:06  Patient On (Oxygen Delivery Method): room air        12-06 @ 07:01  -  12-07 @ 07:00  --------------------------------------------------------  IN: 1772.2 mL / OUT: 2045 mL / NET: -272.8 mL    12-07 @ 07:01  -  12-07 @ 17:35  --------------------------------------------------------  IN: 0 mL / OUT: 1600 mL / NET: -1600 mL        Constitutional: No acute distress, resting comfortably in bed.    HEENT: Sclera non-icteric, no JVD, MMM.  Respiratory: Clear to auscultation bilaterally, adequate air entry, no wheezing, no rhonchi, no rales.  Cardiovascular: Regular rate and rhythm, normal S1S2.  Gastrointestinal: soft, non-tender and non-distended.  Extremities: Warm, well perfused, pulses equal bilateral upper and lower extremities, no edema. Cap refill <3 in bilateral hands   Neurological: AAOx3.   Skin: Normal temperature, warm, dry.      LABS:                        7.9    4.85  )-----------( 131      ( 07 Dec 2024 05:30 )             24.8     12-07    143  |  111[H]  |  10  ----------------------------<  75  3.9   |  28  |  1.05    Ca    8.3[L]      07 Dec 2024 05:30  Phos  3.9     12-07  Mg     1.8     12-07    TPro  5.1[L]  /  Alb  2.7[L]  /  TBili  0.5  /  DBili  x   /  AST  72[H]  /  ALT  52[H]  /  AlkPhos  63  12-07        RADIOLOGY & ADDITIONAL STUDIES: Reviewed

## 2024-12-07 NOTE — PROGRESS NOTE ADULT - PROBLEM SELECTOR PLAN 4
At last admission, Hgb 7.9-9. Iron studies: total iron wnl, TIBC low, %iron sat wnl, ferritin wnl. Folate/B12 wnl.     - Reports primary condition is depression. Previously taking Seroquel with the development of seizures, now off maintenance medication. Previously on buspar though self-discontinued by pt due to side effects (shakiness).  Started on Zyprexa inpatient for treatment.    - Can consider psych consult though patient is currently stable.   - Reports no current home medications. Reports primary condition is depression. Previously taking Seroquel with the development of seizures, now off maintenance medication. Previously on buspar though self-discontinued by pt due to side effects (shakiness).  Started on Zyprexa inpatient for treatment.    - Can consider psych consult though patient is currently stable.   - Reports no current home medications.  - Plan to make Zyprexa 2.5 prn if QTC still normal.

## 2024-12-07 NOTE — PROGRESS NOTE ADULT - PROBLEM SELECTOR PLAN 6
F: PO  E: replete PRN.  N: Regular diet.   DVT ppx: Continue to hold.   Dispo: [facility]. On Keppra for recurrent seizures, thought to be due to Seroquel initiation in the past.     - Continue with home keppra. On Keppra for recurrent seizures, thought to be due to Seroquel initiation in the past.     - Continue with home keppra.  - consider Epilepsy consult, possible presenting symptom of seizure.

## 2024-12-07 NOTE — PROGRESS NOTE ADULT - PROBLEM SELECTOR PLAN 2
Currently enrolled in methadone program, reports that her regimen has been stable and she feels comfortable at this time. COWS 0.     - Continue with home methadone dose.

## 2024-12-07 NOTE — PROGRESS NOTE ADULT - ASSESSMENT
Patient is a 59-year-old female w/ PMH of polysubstance use (tobacco, opioids on methadone, Hep C positive), anemia, CKD, and epilepsy BIBEMS for AMS found to have Hgb 5.9 and melena admitted to MICU w/ hemorrhagic shock 2/2 UGIB iso NSAID use, s/p clipping of 20mm ulceration in gastric antrum.     Neuro/Psych  #AMS -- RESOLVED  AAOx3 this AM.     #Seizure  Home keppra 750mg BID  Per pt, has had breakthrough seizures, last 11/30 witnessed by roommate  Last dose of keppra taken morning of seizure  - c/w keppra 750mg BID; IV as NPO  - seizure precautions  - pt now stable, consider epilepsy consult     #Opioid dependence  Pt reports she takes methadone 170mg qd -- last went to methadone clinic 1 week ago but missed appointment on 12/2 due to symptoms   Reported last use of heroin 10 days ago, has been taking gabapentin 400mg QID for the past week  - dose confirmed w/ methadone clinic ([facility]: [phone])  - c/w methadone 170mg qd when off NPO/extubated    #Bipolar disorder  #Depression  Previously on buspar though self-discontinued by pt due to side effects (shakiness)  Per surescripts, is also on quetiapine 75mg qd (last picked up 12/2023)  - formal med rec when pt more stable from GIB    Cardiovascular   #Hemorrhagic shock likely 2/2 acute GIB  100/65 --> 85/50, started on levo gtt  Unlikely septic shock given afebrile, no leukocytosis, no evidence of infection; unlikely cardiogenic given no significant cardiovascular hx, warm, well-perfused on exam  POCUS w/ normal heart function. No longer requiring pressors as of 12/7 AM   - titrate to MAP > 65  - rest of plan per GI below    Pulmonary   CXR negative  #Intubation  Extubated @ 12/4 AM, reintubated @ 12/4PM for repeat EGD. Stable.  Passed SABT (12/5 AM). S/p extubation in AM 12/6   - ctm resp status     GI   #c/f active UGIB  Pt complaining of episodes of maroon/dark stools since 11/30  No prior hx, no prior colonoscopies. Pt reporting prior EGD in 7/2024 admission though none documented, no c/f bleeding at that time   Low concern for varices given no significant alcohol use hx   Rectal exam +maroon/dark stool, external hemorrhoid   S/p 12u pRBC (8 recorded, 12 ordered), plasma, platelets  20mm ulcer in antrum found per EGD (12/3); [place] 1b ulcer, clipped.  Repeat EGD (12/4): no active bleed.  Hgb stable at 8.3 (12/4) w/ 2 episodes of melena.   - pantoprazole bid  - GI following; appreciate recs  - CBC q12h  - Consider triphasic GI bleed study if active sign/sx of bleed    #Transaminitis  #Hx of hepatitis C  AST 75, ALT 80 on adm; likely 2/ hemorrhagic shock.   Pt reporting ongoing abdominal pain since 11/30  improved; AST 46, ALT 41 (12/5)  - trend w/ daily LFTs    Diet: NPO until extubation, then CLD  GI pptx: pantoprazole bid  Bowel regimen: none (last BM 12/5)    Renal   #BOB, resolved  Unclear baseline, BUN 60, Cr 1.5 at presentation  Cr 1.2 at prior admission in 7/2024  Likely pre-renal iso hypovolemia 2/2 acute GIB  Cr 0.93 (12/3); cleared. However, uptrended to 1.60 (12/4). This is likely 2/2 massive blood loss from GIB and iso hemorrhagic shock. Resolved on 12/5 w/ Cr. 1.26, Cr currently 1.05 today     #Urinary retention, resolved  At presentation, , SC x1 w/ 550cc output; straight cath x2. Babin placed on 12/2-12/3.  - passed TOV 12/7   - ctm urine output     ID  #C/f infection iso shock  FAUSTO, afebrile, no leukocytosis at this time.   - c/w empiric zosyn.     Heme  #Normocytic anemia likely 2/2 acute UGIB  Hgb 5.9 > 6.1 s/p 2 units pRBC, received 2 more units prior to transfer to MICU.  At last admission, Hgb 7.9-9  Iron studies: total iron wnl, TIBC low, %iron sat wnl, ferritin wnl. Folate/B12 wnl  Total of 3 episodes of melena on 12/3; massive. Code fusion called 12/3 AM; having 8 more transfusions.   12/4: given 2 more transfusions for what appeared to be recurrent GIB  - repeat CBC q12h  - maintain active T&S  - transfuse Hgb <7    Endo  FAUSTO    MSK  FAUSTO    Prophylaxis  F: as needed  E: Replete as needed, target K>4, Phos>3, Mg>2  N: NPO until extubation, then CLD  DVT pptx: hold iso GIB; consider starting on 12/7 if hgb stable  GI pptx: pantoprazole bid  Code status: Full code  Dispo: MICU  Lines: Peripheral IV in RUE (x2), LUE (x1), RIJ, all since 12/3 Patient is a 59-year-old female w/ PMH of polysubstance use (tobacco, opioids on methadone, Hep C positive), anemia, CKD, and epilepsy BIBEMS for AMS found to have Hgb 5.9 and melena admitted to MICU w/ hemorrhagic shock 2/2 UGIB iso NSAID use, s/p clipping of 20mm ulceration in gastric antrum.     Neuro/Psych  #AMS -- RESOLVED  AAOx3 this AM.     #Seizure  Home keppra 750mg BID  Per pt, has had breakthrough seizures, last 11/30 witnessed by roommate  Last dose of keppra taken morning of seizure  - c/w keppra 750mg BID; IV as NPO  - seizure precautions  - pt now stable, consider epilepsy consult     #Opioid dependence  Pt reports she takes methadone 170mg qd -- last went to methadone clinic 1 week ago but missed appointment on 12/2 due to symptoms   Reported last use of heroin 10 days ago, has been taking gabapentin 400mg QID for the past week  - dose confirmed w/ methadone clinic ([facility]: [phone])  - c/w methadone 170mg qd when off NPO/extubated    #Bipolar disorder  #Depression  Previously on buspar though self-discontinued by pt due to side effects (shakiness)  Per surescripts, is also on quetiapine 75mg qd (last picked up 12/2023), per history quetiapine has induced seizures for pt in the past  - ordered seroquel qhs to treat bp/depression     Cardiovascular   #Hemorrhagic shock likely 2/2 acute GIB  100/65 --> 85/50, started on levo gtt  Unlikely septic shock given afebrile, no leukocytosis, no evidence of infection; unlikely cardiogenic given no significant cardiovascular hx, warm, well-perfused on exam  POCUS w/ normal heart function. No longer requiring pressors as of 12/7 AM   - titrate to MAP > 65  - rest of plan per GI below    Pulmonary   CXR negative  #Intubation  Extubated @ 12/4 AM, reintubated @ 12/4PM for repeat EGD. Stable.  Passed SABT (12/5 AM). S/p extubation in AM 12/6   - ctm resp status     GI   #c/f active UGIB  Pt complaining of episodes of maroon/dark stools since 11/30  No prior hx, no prior colonoscopies. Pt reporting prior EGD in 7/2024 admission though none documented, no c/f bleeding at that time   Low concern for varices given no significant alcohol use hx   Rectal exam +maroon/dark stool, external hemorrhoid   S/p 12u pRBC (8 recorded, 12 ordered), plasma, platelets  20mm ulcer in antrum found per EGD (12/3); [place] 1b ulcer, clipped.  Repeat EGD (12/4): no active bleed.  Hgb stable at 8.3 (12/4) w/ 2 episodes of melena.   - transitioned pantoprazole bid to PO (completed 3 day course IV protonix)   - advanced diet   - GI following; continue to appreciate recs  - CBC q12h  - Consider triphasic GI bleed study if active sign/sx of bleed    #Transaminitis  #Hx of hepatitis C  AST 75, ALT 80 on adm; likely 2/ hemorrhagic shock.   Pt reporting ongoing abdominal pain since 11/30  improved; AST 46, ALT 41 (12/5)  - trend w/ daily LFTs  - arrange outpatient GI follow up     Diet: NPO until extubation, then CLD  GI pptx: pantoprazole bid  Bowel regimen: none (last BM 12/5)    Renal   #BOB, resolved  Unclear baseline, BUN 60, Cr 1.5 at presentation  Cr 1.2 at prior admission in 7/2024  Likely pre-renal iso hypovolemia 2/2 acute GIB  Cr 0.93 (12/3); cleared. However, uptrended to 1.60 (12/4). This is likely 2/2 massive blood loss from GIB and iso hemorrhagic shock. Resolved on 12/5 w/ Cr. 1.26, Cr currently 1.05 today     #Urinary retention, resolved  At presentation, , SC x1 w/ 550cc output; straight cath x2. Babin placed on 12/2-12/3.  - passed TOV 12/7 but then required straight catheter during 12/7  - mobilize, OOB to chair and then reassess urine output     ID  #C/f infection iso shock  FAUSTO, afebrile, no leukocytosis at this time.   - stop empiric zosyn  - monitor off abx as no signs of infection     Heme  #Normocytic anemia likely 2/2 acute UGIB  Hgb 5.9 > 6.1 s/p 2 units pRBC, received 2 more units prior to transfer to MICU.  At last admission, Hgb 7.9-9  Iron studies: total iron wnl, TIBC low, %iron sat wnl, ferritin wnl. Folate/B12 wnl  Total of 3 episodes of melena on 12/3; massive. Code fusion called 12/3 AM; having 8 more transfusions.   12/4: given 2 more transfusions for what appeared to be recurrent GIB  - ctm CBCs   - maintain active T&S  - transfuse Hgb <7    Endo  FAUSTO    MSK  FAUSTO    Prophylaxis  F: as needed  E: Replete as needed, target K>4, Phos>3, Mg>2  N: Regular diet (advanced today)   DVT pptx: hold iso GIB; consider starting on 12/7 if hgb stable  GI pptx: pantoprazole bid  Code status: Full code  Dispo: MICU --> [facility]   Lines: Peripheral IV in RUE (x2), LUE (x1), all in place since 12/3

## 2024-12-07 NOTE — PROGRESS NOTE ADULT - SUBJECTIVE AND OBJECTIVE BOX
*****************Transfer from [facility] to [facility]******************    HOSPITAL COURSE:  Patient is a 59-year-old female w/ PMH of polysubstance use (tobacco, opioids on methadone, Hep C positive), anemia, CKD, and epilepsy BIBEMS for AMS, found to be having anemia from active GIB iso melena iso use of naproxen x 7d (2-3 capsules/day). Pt denies any prior hx of GIB, no prior colonoscopies, but states she had an endoscopy during her last admission in 7/2024 for L iliopsoas bursitis. Started on levo gtt for hypotension, found to be anemic and transfused 4 units of PRBCs. Not appropriate response 2/2 active GIB. Another 8u pRBC ordered to be given. Intubated for EGD and code fusion was called around 11:25AM as pt had massive episode of melena x4. EGD showed 20mm ulceration in gastric antrum; clipped (12/3). Pt extubated on 12/4 AM. However, pt had melena x2 on 12/4 w/ drop in hgb 8.2>7. Pt was intubated again in PM and repeat EGD was done, which did not show any signs of bleed. Per GI, pt should be treated like pt w/ resolved GIB. Placed on PPI BID and CBC monitored q6h. Stable Hgb on 12/5 but pt hadmelena x2. VSS. Hgb stable at 8.3 on 12/6. Successful extubation on 12/6. No episodes of melena following extubation. If further signs/sx of active bleed, consider triphasic GI Bleed study, which involves CTA. 12/7 pantoprazole was switched to PO BID, zyprexa added for management of bipolar/depression, and zosyn discontinued as patient has remained afebrile with negative WBCs. Pt required straight catheterization in AM of 12/7. Diet advanced to regular diet morning 12/7.     INTERVAL HPI/OVERNIGHT EVENTS:  Patient was seen and examined at bedside. Case discussed with attending physician.     As per patient, No complaints at this time. She was able to tolerate solid food today with no evidence of blood or melena. Patient denies: lethargy, fever, chills, Chest pain, palpitations, SOB, cough, N/V/D/C, dysuria, changes in bowel movements.    VITAL SIGNS:  T(F): 99.4 (12-07-24 @ 15:06)  HR: 79 (12-07-24 @ 15:06)  BP: 121/85 (12-07-24 @ 15:06)  RR: 18 (12-07-24 @ 15:06)  SpO2: 98% (12-07-24 @ 15:06)  Wt(kg): --    PHYSICAL EXAM:    Constitutional: No acute distress, resting comfortably in bed.    HEENT: Sclera non-icteric, no JVD, MMM.  Respiratory: Clear to auscultation bilaterally, adequate air entry, no wheezing, no rhonchi, no rales.  Cardiovascular: Regular rate and rhythm, normal S1S2.  Gastrointestinal: soft, non-tender and non-distended.  Extremities: Warm, well perfused, pulses equal bilateral upper and lower extremities, no edema. Cap refill <3 in bilateral hands   Neurological: AAOx3.   Skin: Normal temperature, warm, dry.    MEDICATIONS  (STANDING):  chlorhexidine 2% Cloths 1 Application(s) Topical <User Schedule>  levETIRAcetam 750 milliGRAM(s) Oral every 12 hours  methadone    Tablet 170 milliGRAM(s) Oral every 24 hours  OLANZapine 2.5 milliGRAM(s) Oral at bedtime  pantoprazole    Tablet 40 milliGRAM(s) Oral every 12 hours    MEDICATIONS  (PRN):      Allergies    No Known Allergies    Intolerances        LABS:                        7.9    4.85  )-----------( 131      ( 07 Dec 2024 05:30 )             24.8     12-07    143  |  111[H]  |  10  ----------------------------<  75  3.9   |  28  |  1.05    Ca    8.3[L]      07 Dec 2024 05:30  Phos  3.9     12-07  Mg     1.8     12-07    TPro  5.1[L]  /  Alb  2.7[L]  /  TBili  0.5  /  DBili  x   /  AST  72[H]  /  ALT  52[H]  /  AlkPhos  63  12-07      Urinalysis Basic - ( 07 Dec 2024 05:30 )    Color: x / Appearance: x / SG: x / pH: x  Gluc: 75 mg/dL / Ketone: x  / Bili: x / Urobili: x   Blood: x / Protein: x / Nitrite: x   Leuk Esterase: x / RBC: x / WBC x   Sq Epi: x / Non Sq Epi: x / Bacteria: x          RADIOLOGY & ADDITIONAL TESTS:  Reviewed

## 2024-12-07 NOTE — PROGRESS NOTE ADULT - PROBLEM SELECTOR PLAN 1
Presented with encephalopathy, found to be in hemorrhagic shock. Patient is s/p a total of 8 units of PRBCs transfused, clipping of 20mm ulceration in gastric antrum performed. Repeat EGD performed which shows no evidence of rebleed, hemoglobin has stabilized.     - Maintain active T&S  - Continue to monitor for repeat bleeding event.  - PT eval after reintubations and anemia. Presented with encephalopathy, found to be in hemorrhagic shock. Patient is s/p a total of 12 units of PRBCs transfused, clipping of 20mm ulceration in gastric antrum performed. Repeat EGD performed which shows no evidence of rebleed, hemoglobin has stabilized.     - Maintain active T&S  - Continue to monitor for repeat bleeding event.  - PT eval after reintubations and anemia.  - Omeprazole on discharge.  - Hpylori testing in outpatient setting.

## 2024-12-07 NOTE — PROGRESS NOTE ADULT - PROBLEM SELECTOR PLAN 3
Reports primary condition is depression. Previously taking Seroquel with the development of seizures, now off maintenance medication. Previously on buspar though self-discontinued by pt due to side effects (shakiness).  Started on Zyprexa inpatient for treatment.    - Can consider psych consult though patient is currently stable.   - Reports no current home medications. Patient found with chronic Hepatitis C infection, discussion with GI for outpatient follow up for treatment.

## 2024-12-07 NOTE — PROGRESS NOTE ADULT - ASSESSMENT
59F w/ PMHx of polysubstance use (tobacco, opioids on methadone), HepC, CKD and epilepsy admitted to MICU due to hemorrhagic shock 2/2 GIB requiring pressors. GI consulted for GI bleed.     EGD on 12/3 and 12/4/24:    Repeat EGD done 12/4/24:  Findings:  Esophagus	Normal esophagus.  Stomach	Excavated lesions	A single non-bleeding 20 mm ulcer was found in the stomach. Previously clipped ulcer in antrum visualized w/ no old or active bleeding.  Duodenum	Normal duodenum.    Recommendations:  - Regular diet as tolerated  - Trend CBC, maintain active T&S. Hemoglobin stable and no further episodes melena.   - Continue pantoprazole 40 mg PO BID for 2 weeks (can switch to omeprazole 40 mg daily on d/c) and then omeprazole 40 mg daily   - If patient rebleeds then recommend obtaining CT bleeding scan and contacting IR for possible embolization    Will sign off for now. Please reconsult if needed.       Ector Palmer DO  Gastroenterology Fellow  GI Consult Pager Weekdays 7am-5pm: [phone]  Weeknights/Weekend/Holiday Coverage: Please Call the  for Contact Information  Follow Up Questions Welcome via [place] Microsoft Teams Messenger

## 2024-12-08 LAB
ALBUMIN SERPL ELPH-MCNC: 2.9 G/DL — LOW (ref 3.3–5)
ALP SERPL-CCNC: 73 U/L — SIGNIFICANT CHANGE UP (ref 40–120)
ALT FLD-CCNC: 62 U/L — HIGH (ref 10–45)
ANION GAP SERPL CALC-SCNC: 10 MMOL/L — SIGNIFICANT CHANGE UP (ref 5–17)
AST SERPL-CCNC: 85 U/L — HIGH (ref 10–40)
BILIRUB SERPL-MCNC: 0.3 MG/DL — SIGNIFICANT CHANGE UP (ref 0.2–1.2)
BUN SERPL-MCNC: 8 MG/DL — SIGNIFICANT CHANGE UP (ref 7–23)
CALCIUM SERPL-MCNC: 8.5 MG/DL — SIGNIFICANT CHANGE UP (ref 8.4–10.5)
CHLORIDE SERPL-SCNC: 108 MMOL/L — SIGNIFICANT CHANGE UP (ref 96–108)
CO2 SERPL-SCNC: 27 MMOL/L — SIGNIFICANT CHANGE UP (ref 22–31)
CREAT SERPL-MCNC: 0.96 MG/DL — SIGNIFICANT CHANGE UP (ref 0.5–1.3)
CULTURE RESULTS: SIGNIFICANT CHANGE UP
EGFR: 69 ML/MIN/1.73M2 — SIGNIFICANT CHANGE UP
GLUCOSE SERPL-MCNC: 98 MG/DL — SIGNIFICANT CHANGE UP (ref 70–99)
HCT VFR BLD CALC: 28.3 % — LOW (ref 34.5–45)
HGB BLD-MCNC: 9.1 G/DL — LOW (ref 11.5–15.5)
INR BLD: 1.11 — SIGNIFICANT CHANGE UP (ref 0.85–1.16)
MAGNESIUM SERPL-MCNC: 1.7 MG/DL — SIGNIFICANT CHANGE UP (ref 1.6–2.6)
MCHC RBC-ENTMCNC: 30.5 PG — SIGNIFICANT CHANGE UP (ref 27–34)
MCHC RBC-ENTMCNC: 32.2 G/DL — SIGNIFICANT CHANGE UP (ref 32–36)
MCV RBC AUTO: 95 FL — SIGNIFICANT CHANGE UP (ref 80–100)
NRBC # BLD: 0 /100 WBCS — SIGNIFICANT CHANGE UP (ref 0–0)
PHOSPHATE SERPL-MCNC: 3.1 MG/DL — SIGNIFICANT CHANGE UP (ref 2.5–4.5)
PLATELET # BLD AUTO: 161 K/UL — SIGNIFICANT CHANGE UP (ref 150–400)
POTASSIUM SERPL-MCNC: 3.9 MMOL/L — SIGNIFICANT CHANGE UP (ref 3.5–5.3)
POTASSIUM SERPL-SCNC: 3.9 MMOL/L — SIGNIFICANT CHANGE UP (ref 3.5–5.3)
PROT SERPL-MCNC: 5.9 G/DL — LOW (ref 6–8.3)
PROTHROM AB SERPL-ACNC: 12.7 SEC — SIGNIFICANT CHANGE UP (ref 9.9–13.4)
RBC # BLD: 2.98 M/UL — LOW (ref 3.8–5.2)
RBC # FLD: 15.6 % — HIGH (ref 10.3–14.5)
SODIUM SERPL-SCNC: 145 MMOL/L — SIGNIFICANT CHANGE UP (ref 135–145)
SPECIMEN SOURCE: SIGNIFICANT CHANGE UP
WBC # BLD: 5.17 K/UL — SIGNIFICANT CHANGE UP (ref 3.8–10.5)
WBC # FLD AUTO: 5.17 K/UL — SIGNIFICANT CHANGE UP (ref 3.8–10.5)

## 2024-12-08 PROCEDURE — 73501 X-RAY EXAM HIP UNI 1 VIEW: CPT | Mod: 26,LT

## 2024-12-08 PROCEDURE — 99233 SBSQ HOSP IP/OBS HIGH 50: CPT | Mod: GC

## 2024-12-08 PROCEDURE — 73560 X-RAY EXAM OF KNEE 1 OR 2: CPT | Mod: 26,LT

## 2024-12-08 RX ORDER — LIDOCAINE 40 MG/G
1 CREAM TOPICAL ONCE
Refills: 0 | Status: COMPLETED | OUTPATIENT
Start: 2024-12-08 | End: 2024-12-09

## 2024-12-08 RX ORDER — METHADONE HYDROCHLORIDE 5 MG/1
170 TABLET ORAL ONCE
Refills: 0 | Status: DISCONTINUED | OUTPATIENT
Start: 2024-12-08 | End: 2024-12-08

## 2024-12-08 RX ORDER — ACETAMINOPHEN 500MG 500 MG/1
1000 TABLET, COATED ORAL ONCE
Refills: 0 | Status: COMPLETED | OUTPATIENT
Start: 2024-12-08 | End: 2024-12-08

## 2024-12-08 RX ORDER — ACETAMINOPHEN 500MG 500 MG/1
650 TABLET, COATED ORAL ONCE
Refills: 0 | Status: COMPLETED | OUTPATIENT
Start: 2024-12-08 | End: 2024-12-08

## 2024-12-08 RX ADMIN — OLANZAPINE 2.5 MILLIGRAM(S): 20 TABLET ORAL at 22:31

## 2024-12-08 RX ADMIN — CHLORHEXIDINE GLUCONATE 1 APPLICATION(S): 1.2 RINSE ORAL at 06:55

## 2024-12-08 RX ADMIN — ACETAMINOPHEN 500MG 650 MILLIGRAM(S): 500 TABLET, COATED ORAL at 22:31

## 2024-12-08 RX ADMIN — LEVETIRACETAM 750 MILLIGRAM(S): 1000 TABLET ORAL at 06:56

## 2024-12-08 RX ADMIN — ACETAMINOPHEN 500MG 1000 MILLIGRAM(S): 500 TABLET, COATED ORAL at 18:21

## 2024-12-08 RX ADMIN — PANTOPRAZOLE SODIUM 40 MILLIGRAM(S): 40 TABLET, DELAYED RELEASE ORAL at 06:55

## 2024-12-08 RX ADMIN — PANTOPRAZOLE SODIUM 40 MILLIGRAM(S): 40 TABLET, DELAYED RELEASE ORAL at 17:22

## 2024-12-08 RX ADMIN — ACETAMINOPHEN 500MG 650 MILLIGRAM(S): 500 TABLET, COATED ORAL at 23:31

## 2024-12-08 RX ADMIN — ACETAMINOPHEN 500MG 1000 MILLIGRAM(S): 500 TABLET, COATED ORAL at 14:58

## 2024-12-08 RX ADMIN — ACETAMINOPHEN 500MG 400 MILLIGRAM(S): 500 TABLET, COATED ORAL at 17:21

## 2024-12-08 RX ADMIN — LEVETIRACETAM 750 MILLIGRAM(S): 1000 TABLET ORAL at 18:23

## 2024-12-08 RX ADMIN — METHADONE HYDROCHLORIDE 170 MILLIGRAM(S): 5 TABLET ORAL at 09:54

## 2024-12-08 RX ADMIN — ACETAMINOPHEN 500MG 400 MILLIGRAM(S): 500 TABLET, COATED ORAL at 13:58

## 2024-12-08 NOTE — PROGRESS NOTE ADULT - PROBLEM SELECTOR PLAN 6
On Keppra for recurrent seizures, thought to be due to Seroquel initiation in the past.     - Continue with home keppra.  - consider Epilepsy consult, possible presenting symptom of seizure. On Keppra for recurrent seizures, thought to be due to Seroquel initiation in the past.     - Continue with home keppra.  - Epilepsy consulted

## 2024-12-08 NOTE — PHYSICAL THERAPY INITIAL EVALUATION ADULT - GENERAL OBSERVATIONS, REHAB EVAL
Received supine complaints of LLE pain 7/10 +IV hep, primafit, 1:1. Left as found +RN antonina aware, call kenyon , bed alarm Received supine complaints of LLE pain 7/10 (MD Flynn aware) +IV hep, primafit, 1:1. Left as found +RN antonina fagan, call kenyon , bed alarm

## 2024-12-08 NOTE — PROGRESS NOTE ADULT - PROBLEM SELECTOR PLAN 4
Reports primary condition is depression. Previously taking Seroquel with the development of seizures, now off maintenance medication. Previously on buspar though self-discontinued by pt due to side effects (shakiness).  Started on Zyprexa inpatient for treatment.    - Can consider psych consult though patient is currently stable.   - Reports no current home medications.  - Plan to make Zyprexa 2.5 prn if QTC still normal.

## 2024-12-08 NOTE — PROGRESS NOTE ADULT - SUBJECTIVE AND OBJECTIVE BOX
Internal Medicine Progress Note  Thiago Mcgovern PGY1     OVERNIGHT EVENTS/INTERVAL HPI:   As per patient, No complaints at this time. She was able to tolerate solid food today with no evidence of blood or melena. Patient denies: lethargy, fever, chills, Chest pain, palpitations, SOB, cough, N/V/D/C, dysuria, changes in bowel movements.    OBJECTIVE:  Vital Signs Last 24 Hrs  T(C): 36.8 (08 Dec 2024 06:06), Max: 37.4 (07 Dec 2024 15:06)  T(F): 98.3 (08 Dec 2024 06:06), Max: 99.4 (07 Dec 2024 15:06)  HR: 74 (08 Dec 2024 06:06) (74 - 95)  BP: 100/60 (08 Dec 2024 06:06) (84/60 - 140/82)  BP(mean): 73 (08 Dec 2024 06:06) (68 - 104)  RR: 18 (08 Dec 2024 06:06) (12 - 24)  SpO2: 94% (08 Dec 2024 06:06) (93% - 100%)    Parameters below as of 08 Dec 2024 06:06  Patient On (Oxygen Delivery Method): room air      I&O's Detail    07 Dec 2024 07:01  -  08 Dec 2024 07:00  --------------------------------------------------------  IN:  Total IN: 0 mL    OUT:    Intermittent Catheterization - Urethral (mL): 1100 mL    Voided (mL): 500 mL  Total OUT: 1600 mL    Total NET: -1600 mL      08 Dec 2024 07:01  -  08 Dec 2024 07:48  --------------------------------------------------------  IN:  Total IN: 0 mL    OUT:    Voided (mL): 950 mL  Total OUT: 950 mL    Total NET: -950 mL    PHYSICAL EXAM:  Constitutional: No acute distress, resting comfortably in bed.    HEENT: Sclera non-icteric, no JVD, MMM.  Respiratory: Clear to auscultation bilaterally, adequate air entry, no wheezing, no rhonchi, no rales.  Cardiovascular: Regular rate and rhythm, normal S1S2.  Gastrointestinal: soft, non-tender and non-distended.  Extremities: Warm, well perfused, pulses equal bilateral upper and lower extremities, no edema. Cap refill <3 in bilateral hands   Neurological: AAOx3.   Skin: Normal temperature, warm, dry.    Medications:  MEDICATIONS  (STANDING):  chlorhexidine 2% Cloths 1 Application(s) Topical <User Schedule>  levETIRAcetam 750 milliGRAM(s) Oral every 12 hours  methadone    Tablet 170 milliGRAM(s) Oral every 24 hours  OLANZapine 2.5 milliGRAM(s) Oral at bedtime  pantoprazole    Tablet 40 milliGRAM(s) Oral every 12 hours    MEDICATIONS  (PRN):      Labs:                        7.9    4.85  )-----------( 131      ( 07 Dec 2024 05:30 )             24.8     12-07    143  |  111[H]  |  10  ----------------------------<  75  3.9   |  28  |  1.05    Ca    8.3[L]      07 Dec 2024 05:30  Phos  3.9     12-07  Mg     1.8     12-07    TPro  5.1[L]  /  Alb  2.7[L]  /  TBili  0.5  /  DBili  x   /  AST  72[H]  /  ALT  52[H]  /  AlkPhos  63  12-07        Urinalysis Basic - ( 07 Dec 2024 05:30 )    Color: x / Appearance: x / SG: x / pH: x  Gluc: 75 mg/dL / Ketone: x  / Bili: x / Urobili: x   Blood: x / Protein: x / Nitrite: x   Leuk Esterase: x / RBC: x / WBC x   Sq Epi: x / Non Sq Epi: x / Bacteria: x          Radiology: Reviewed Internal Medicine Progress Note  Thiago Mcgovern PGY1     OVERNIGHT EVENTS/INTERVAL HPI:   As per patient, No complaints at this time. She was able to tolerate solid food today with no evidence of blood or melena. Patient denies: lethargy, fever, chills, Chest pain, palpitations, SOB, cough, N/V/D/C, dysuria, changes in bowel movements. Patient notes pain in her L hip and L knee, PT was not able to walk her with that pain.    OBJECTIVE:  Vital Signs Last 24 Hrs  T(C): 36.8 (08 Dec 2024 06:06), Max: 37.4 (07 Dec 2024 15:06)  T(F): 98.3 (08 Dec 2024 06:06), Max: 99.4 (07 Dec 2024 15:06)  HR: 74 (08 Dec 2024 06:06) (74 - 95)  BP: 100/60 (08 Dec 2024 06:06) (84/60 - 140/82)  BP(mean): 73 (08 Dec 2024 06:06) (68 - 104)  RR: 18 (08 Dec 2024 06:06) (12 - 24)  SpO2: 94% (08 Dec 2024 06:06) (93% - 100%)    Parameters below as of 08 Dec 2024 06:06  Patient On (Oxygen Delivery Method): room air      I&O's Detail    07 Dec 2024 07:01  -  08 Dec 2024 07:00  --------------------------------------------------------  IN:  Total IN: 0 mL    OUT:    Intermittent Catheterization - Urethral (mL): 1100 mL    Voided (mL): 500 mL  Total OUT: 1600 mL    Total NET: -1600 mL      08 Dec 2024 07:01  -  08 Dec 2024 07:48  --------------------------------------------------------  IN:  Total IN: 0 mL    OUT:    Voided (mL): 950 mL  Total OUT: 950 mL    Total NET: -950 mL    PHYSICAL EXAM:  Constitutional: No acute distress, resting comfortably in bed.    HEENT: Sclera non-icteric, no JVD, MMM.  Respiratory: Clear to auscultation bilaterally, adequate air entry, no wheezing, no rhonchi, no rales.  Cardiovascular: Regular rate and rhythm, normal S1S2.  Gastrointestinal: soft, non-tender and non-distended.  Extremities: Warm, well perfused, pulses equal bilateral upper and lower extremities, no edema. Cap refill <3 in bilateral hands   Neurological: AAOx3.   Skin: Normal temperature, warm, dry.    Medications:  MEDICATIONS  (STANDING):  chlorhexidine 2% Cloths 1 Application(s) Topical <User Schedule>  levETIRAcetam 750 milliGRAM(s) Oral every 12 hours  methadone    Tablet 170 milliGRAM(s) Oral every 24 hours  OLANZapine 2.5 milliGRAM(s) Oral at bedtime  pantoprazole    Tablet 40 milliGRAM(s) Oral every 12 hours    MEDICATIONS  (PRN):      Labs:                        7.9    4.85  )-----------( 131      ( 07 Dec 2024 05:30 )             24.8     12-07    143  |  111[H]  |  10  ----------------------------<  75  3.9   |  28  |  1.05    Ca    8.3[L]      07 Dec 2024 05:30  Phos  3.9     12-07  Mg     1.8     12-07    TPro  5.1[L]  /  Alb  2.7[L]  /  TBili  0.5  /  DBili  x   /  AST  72[H]  /  ALT  52[H]  /  AlkPhos  63  12-07        Urinalysis Basic - ( 07 Dec 2024 05:30 )    Color: x / Appearance: x / SG: x / pH: x  Gluc: 75 mg/dL / Ketone: x  / Bili: x / Urobili: x   Blood: x / Protein: x / Nitrite: x   Leuk Esterase: x / RBC: x / WBC x   Sq Epi: x / Non Sq Epi: x / Bacteria: x          Radiology: Reviewed

## 2024-12-08 NOTE — PHYSICAL THERAPY INITIAL EVALUATION ADULT - PERTINENT HX OF CURRENT PROBLEM, REHAB EVAL
59F BIBEMS for AMS. Pt reported that she was at her shelter when she had a seizure that was witnessed by her roommate. Following her seizure, she developed significant, diffuse abdominal pain and had multiple episodes of loose, maroon/dark stools to the point where she was unable to take her home medications and missed her appointment with the methadone clinic. Pt reports she was taking 2-3 capsules of Naproxen for 7 days and gabapentin 400mg QID that she purchased from a dealer

## 2024-12-08 NOTE — CONSULT NOTE ADULT - SUBJECTIVE AND OBJECTIVE BOX
HPI: As per chat: "59-year-old female w/ PMH of polysubstance use (tobacco, opioids on methadone, Hep C positive), anemia, CKD, and epilepsy BIBEMS for AMS.  Pt reported that she was at her shelter when she had a seizure that was witnessed by her roommate. Following her seizure, she developed significant, diffuse abdominal pain and had multiple episodes of loose, maroon/dark stools to the point where she was unable to take her home medications and missed her appointment with the methadone clinic. Pt reports she was taking 2-3 capsules of Naproxen for 7 days and gabapentin 400mg QID that she purchased from a dealer. She also reports she takes keppra 750mg BID and methadone 170mg qd, both last taken on 11/30. She denies any recent alcohol use. Found to be having anemia from active GIB iso melena iso use of naproxen. Pt denies any prior hx of GIB, no prior colonoscopies, but states she had an endoscopy during her last admission in 7/2024 for L iliopsoas bursitis. Started on levo gtt for hypotension, found to be anemic and transfused 4 units of PRBCs. Not appropriate response 2/2 active GIB. Another 8u pRBC ordered to be given. Intubated for EGD and code fusion was called around 11:25AM as pt had massive episode of melena x4. EGD showed 20mm ulceration in gastric antrum; clipped (12/3). Pt extubated on 12/4 AM. However, pt had melena x2 on 12/4 w/ drop in hgb 8.2>7. Pt was intubated again in PM and repeat EGD was done, which did not show any signs of bleed. Per GI, pt should be treated like pt w/ resolved GIB. Placed on PPI BID and CBC monitored q6h. Stable Hgb on 12/5 but pt hadmelena x2. VSS. Hgb stable at 8.3 on 12/6. Successful extubation on 12/6. No episodes of melena following extubation. If further signs/sx of active bleed, consider triphasic GI Bleed study, which involves CTA. 12/7 pantoprazole was switched to PO BID, zyprexa added for management of bipolar/depression, and zosyn discontinued as patient has remained afebrile with negative WBCs. Pt required straight catheterization in AM of 12/7. Diet advanced to regular diet morning 12/7."     Epilepsy consulted to assess patient.     Epilepsy risk factors:  Head injury with subsequent LOC: None  Febrile seizures in infancy: None  Hx of CNS infection: None  Family hx of epilepsy: None  Known CNS pathology: None     Prior AEDs: Keppra.     Prior imaging     Prior EEGs     Other diagnostic work-up     Review of Systems:  CONSTITUTIONAL:  No weight loss, fever, chills, weakness or fatigue.  HEENT:  Eyes:  No visual loss, blurred vision, double vision or yellow sclerae. Ears, Nose, Throat:  No hearing loss, sneezing, congestion, runny nose or sore throat.  SKIN:  No rash or itching.  CARDIOVASCULAR:  No chest pain, chest pressure or chest discomfort. No palpitations or edema.  RESPIRATORY:  No shortness of breath, cough or sputum.  GASTROINTESTINAL:  No anorexia, nausea, vomiting or diarrhea. No abdominal pain or blood.  GENITOURINARY: No Burning on urination.   NEUROLOGICAL:  see HPI  MUSCULOSKELETAL:  No muscle, back pain, joint pain or stiffness.  HEMATOLOGIC:  No anemia, bleeding or bruising.  LYMPHATICS:  No enlarged nodes. No history of splenectomy.  PSYCHIATRIC:  No history of depression or anxiety.  ENDOCRINOLOGIC:  No reports of sweating, cold or heat intolerance. No polyuria or polydipsia.  ALLERGIES:  No history of asthma, hives, eczema or rhinitis.      PAST MEDICAL & SURGICAL HISTORY:  IVDU (intravenous drug user)  Anemia  Asthma  Epilepsy  No significant past surgical history    FAMILY HISTORY:  Family history of AIDS (Mother, Sibling)  Family history of alcohol abuse (Father)     Social History:  Alcohol, illicits, smoking:  Driving:  Occupation:     Allergies  No Known Allergies    MEDICATIONS  (STANDING):  chlorhexidine 2% Cloths 1 Application(s) Topical <User Schedule>  levETIRAcetam 750 milliGRAM(s) Oral every 12 hours  OLANZapine 2.5 milliGRAM(s) Oral at bedtime  pantoprazole    Tablet 40 milliGRAM(s) Oral every 12 hours     T(C): 36.7 (12-08-24 @ 09:02), Max: 37.4 (12-07-24 @ 15:06)  HR: 72 (12-08-24 @ 09:02) (72 - 79)  BP: 103/67 (12-08-24 @ 09:02) (100/60 - 121/85)  RR: 18 (12-08-24 @ 09:02) (18 - 18)  SpO2: 99% (12-08-24 @ 09:02) (94% - 99%)    Constitutional:  Sitting comfortably in NAD.  Psychiatric: calm, normal affect, no overt anxiety or internal preoccupation  Ears, Nose, Throat: no abnormalities, mucus membranes moist  Neck: supple, no lymphadenopathy or nodules palpable  Cardiovascular: regular rate and rhythm, normal S1/S2, no murmurs   Chest: Clear to bases. 	  Abdomen: soft, non-tender, no hepatosplenomegaly   Extremities: no edema, clubbing or cyanosis  Skin: no rash or neurocutaneous signs   Neurological  -Cognitive: Orientation, language, memory and knowledge screens intact. Registration: 	4/4		Serial 7’s: normal		Recall 4/4  -Cranial Nerves: II: Full to confrontation. III/IV/VI: PERRL EOMF No nystagmus. V1V2V3: Symmetric, VII: Face appears symmetric VIII: Normal to screening, IX/X: Palate -Elevates Symmetrical  XI: Trapezius Symmetric  XII: Tongue midline  -Motor: Power: 5/5 throughout, tone: normal x 4 limbs, no tremor   -Sensation: Intact to light touch. Intact to pinprick/temperature and vibration.  -Coordination/Gait: Finger-nose-finger intact, normal rapid-alternating movements. Fine motor normal with normal rapid finger taps and heel-toe tapping, narrow based gait, tandem forward and back ok, hops well on both feet, heel and toe walking normal   -Reflexes: DTR: 2+ symmetric all 4 limbs, no clonus. Plantar responses: Down bilaterally     Labs  CBC Full  -  ( 07 Dec 2024 05:30 )  WBC Count : 4.85 K/uL  RBC Count : 2.73 M/uL  Hemoglobin : 7.9 g/dL  Hematocrit : 24.8 %  Platelet Count - Automated : 131 K/uL  Mean Cell Volume : 90.8 fl  Mean Cell Hemoglobin : 28.9 pg  Mean Cell Hemoglobin Concentration : 31.9 g/dL  Auto Neutrophil # : 3.10 K/uL  Auto Lymphocyte # : 1.08 K/uL  Auto Monocyte # : 0.38 K/uL  Auto Eosinophil # : 0.21 K/uL  Auto Basophil # : 0.02 K/uL  Auto Neutrophil % : 64.0 %  Auto Lymphocyte % : 22.3 %  Auto Monocyte % : 7.8 %  Auto Eosinophil % : 4.3 %  Auto Basophil % : 0.4 %    12-07    143  |  111[H]  |  10  ----------------------------<  75  3.9   |  28  |  1.05    Ca    8.3[L]      07 Dec 2024 05:30  Phos  3.9     12-07  Mg     1.8     12-07    TPro  5.1[L]  /  Alb  2.7[L]  /  TBili  0.5  /  DBili  x   /  AST  72[H]  /  ALT  52[H]  /  AlkPhos  63  12-07    LIVER FUNCTIONS - ( 07 Dec 2024 05:30 )  Alb: 2.7 g/dL / Pro: 5.1 g/dL / ALK PHOS: 63 U/L / ALT: 52 U/L / AST: 72 U/L / GGT: x        HPI: As per chart: "59-year-old female w/ PMH of polysubstance use (tobacco, opioids on methadone, Hep C positive), anemia, CKD, and epilepsy BIBEMS for AMS.  Pt reported that she was at her shelter when she had a seizure that was witnessed by her roommate. Following her seizure, she developed significant, diffuse abdominal pain and had multiple episodes of loose, maroon/dark stools to the point where she was unable to take her home medications and missed her appointment with the methadone clinic. Pt reports she was taking 2-3 capsules of Naproxen for 7 days and gabapentin 400mg QID that she purchased from a dealer. She also reports she takes keppra 750mg BID and methadone 170mg qd, both last taken on 11/30. She denies any recent alcohol use. Found to be having anemia from active GIB iso melena iso use of naproxen. Pt denies any prior hx of GIB, no prior colonoscopies, but states she had an endoscopy during her last admission in 7/2024 for L iliopsoas bursitis. Started on levo gtt for hypotension, found to be anemic and transfused 4 units of PRBCs. Not appropriate response 2/2 active GIB. Another 8u pRBC ordered to be given. Intubated for EGD and code fusion was called around 11:25AM as pt had massive episode of melena x4. EGD showed 20mm ulceration in gastric antrum; clipped (12/3). Pt extubated on 12/4 AM. However, pt had melena x2 on 12/4 w/ drop in hgb 8.2>7. Pt was intubated again in PM and repeat EGD was done, which did not show any signs of bleed. Per GI, pt should be treated like pt w/ resolved GIB. Placed on PPI BID and CBC monitored q6h. Stable Hgb on 12/5 but pt hadmelena x2. VSS. Hgb stable at 8.3 on 12/6. Successful extubation on 12/6. No episodes of melena following extubation. If further signs/sx of active bleed, consider triphasic GI Bleed study, which involves CTA. 12/7 pantoprazole was switched to PO BID, zyprexa added for management of bipolar/depression, and zosyn discontinued as patient has remained afebrile with negative WBCs. Pt required straight catheterization in AM of 12/7. Diet advanced to regular diet morning 12/7."     Epilepsy consulted to assess patient.     Epilepsy risk factors:  Head injury with subsequent LOC: None  Febrile seizures in infancy: None  Hx of CNS infection: None  Family hx of epilepsy: None  Known CNS pathology: None     Prior AEDs: Keppra.     Prior imaging     Prior EEGs     Other diagnostic work-up     Review of Systems:  CONSTITUTIONAL:  No weight loss, fever, chills, weakness or fatigue.  HEENT:  Eyes:  No visual loss, blurred vision, double vision or yellow sclerae. Ears, Nose, Throat:  No hearing loss, sneezing, congestion, runny nose or sore throat.  SKIN:  No rash or itching.  CARDIOVASCULAR:  No chest pain, chest pressure or chest discomfort. No palpitations or edema.  RESPIRATORY:  No shortness of breath, cough or sputum.  GASTROINTESTINAL:  No anorexia, nausea, vomiting or diarrhea. No abdominal pain or blood.  GENITOURINARY: No Burning on urination.   NEUROLOGICAL:  see HPI  MUSCULOSKELETAL:  No muscle, back pain, joint pain or stiffness.  HEMATOLOGIC:  No anemia, bleeding or bruising.  LYMPHATICS:  No enlarged nodes. No history of splenectomy.  PSYCHIATRIC:  No history of depression or anxiety.  ENDOCRINOLOGIC:  No reports of sweating, cold or heat intolerance. No polyuria or polydipsia.  ALLERGIES:  No history of asthma, hives, eczema or rhinitis.      PAST MEDICAL & SURGICAL HISTORY:  IVDU (intravenous drug user)  Anemia  Asthma  Epilepsy  No significant past surgical history    FAMILY HISTORY:  Family history of AIDS (Mother, Sibling)  Family history of alcohol abuse (Father)     Social History:  Alcohol, illicits, smoking:  Driving:  Occupation:     Allergies  No Known Allergies    MEDICATIONS  (STANDING):  chlorhexidine 2% Cloths 1 Application(s) Topical <User Schedule>  levETIRAcetam 750 milliGRAM(s) Oral every 12 hours  OLANZapine 2.5 milliGRAM(s) Oral at bedtime  pantoprazole    Tablet 40 milliGRAM(s) Oral every 12 hours     T(C): 36.7 (12-08-24 @ 09:02), Max: 37.4 (12-07-24 @ 15:06)  HR: 72 (12-08-24 @ 09:02) (72 - 79)  BP: 103/67 (12-08-24 @ 09:02) (100/60 - 121/85)  RR: 18 (12-08-24 @ 09:02) (18 - 18)  SpO2: 99% (12-08-24 @ 09:02) (94% - 99%)    Constitutional:  Sitting comfortably in NAD.  Psychiatric: calm, normal affect, no overt anxiety or internal preoccupation  Ears, Nose, Throat: no abnormalities, mucus membranes moist  Neck: supple, no lymphadenopathy or nodules palpable  Cardiovascular: regular rate and rhythm, normal S1/S2, no murmurs   Chest: Clear to bases. 	  Abdomen: soft, non-tender, no hepatosplenomegaly   Extremities: no edema, clubbing or cyanosis  Skin: no rash or neurocutaneous signs   Neurological  -Cognitive: Orientation, language, memory and knowledge screens intact. Registration: 	4/4		Serial 7’s: normal		Recall 4/4  -Cranial Nerves: II: Full to confrontation. III/IV/VI: PERRL EOMF No nystagmus. V1V2V3: Symmetric, VII: Face appears symmetric VIII: Normal to screening, IX/X: Palate -Elevates Symmetrical  XI: Trapezius Symmetric  XII: Tongue midline  -Motor: Power: 5/5 throughout, tone: normal x 4 limbs, no tremor   -Sensation: Intact to light touch. Intact to pinprick/temperature and vibration.  -Coordination/Gait: Finger-nose-finger intact, normal rapid-alternating movements. Fine motor normal with normal rapid finger taps and heel-toe tapping, narrow based gait, tandem forward and back ok, hops well on both feet, heel and toe walking normal   -Reflexes: DTR: 2+ symmetric all 4 limbs, no clonus. Plantar responses: Down bilaterally     Labs  CBC Full  -  ( 07 Dec 2024 05:30 )  WBC Count : 4.85 K/uL  RBC Count : 2.73 M/uL  Hemoglobin : 7.9 g/dL  Hematocrit : 24.8 %  Platelet Count - Automated : 131 K/uL  Mean Cell Volume : 90.8 fl  Mean Cell Hemoglobin : 28.9 pg  Mean Cell Hemoglobin Concentration : 31.9 g/dL  Auto Neutrophil # : 3.10 K/uL  Auto Lymphocyte # : 1.08 K/uL  Auto Monocyte # : 0.38 K/uL  Auto Eosinophil # : 0.21 K/uL  Auto Basophil # : 0.02 K/uL  Auto Neutrophil % : 64.0 %  Auto Lymphocyte % : 22.3 %  Auto Monocyte % : 7.8 %  Auto Eosinophil % : 4.3 %  Auto Basophil % : 0.4 %    12-07    143  |  111[H]  |  10  ----------------------------<  75  3.9   |  28  |  1.05    Ca    8.3[L]      07 Dec 2024 05:30  Phos  3.9     12-07  Mg     1.8     12-07    TPro  5.1[L]  /  Alb  2.7[L]  /  TBili  0.5  /  DBili  x   /  AST  72[H]  /  ALT  52[H]  /  AlkPhos  63  12-07    LIVER FUNCTIONS - ( 07 Dec 2024 05:30 )  Alb: 2.7 g/dL / Pro: 5.1 g/dL / ALK PHOS: 63 U/L / ALT: 52 U/L / AST: 72 U/L / GGT: x        HPI: As per chart: "59-year-old female, mRS = 4, walking with a walker since July 24,  with children, (proxy José Luis Escobar, brother), w/ PMH of polysubstance use (tobacco, opioids on methadone, acknowledge last heroin dose 21 days ago, Hep C positive), anemia, CKD, bipolar disorder (since 15 yo, episodes of amando and depression, not following with psychiatrist since 5 years), and epilepsy (when she was 8 yo, normally 2 generalized seizures per year when she is stressed, previous seizure before July 2024, no consistent with Keppra in the previous days, on 750 mg BID), BIBEMS for AMS.  Pt reported that she was at her shelter when she had a seizure that was witnessed by her roommate. Following her seizure, she developed significant, diffuse abdominal pain and had multiple episodes of loose, maroon/dark stools to the point where she was unable to take her home medications and missed her appointment with the methadone clinic. Pt reports she was taking 2-3 capsules of Naproxen for 7 days and gabapentin 400mg QID that she purchased from a dealer. She also reports she takes keppra 750mg BID and methadone 170mg qd, both last taken on 11/30. She denies any recent alcohol use. Found to be having anemia from active GIB iso melena iso use of naproxen. Pt denies any prior hx of GIB, no prior colonoscopies, but states she had an endoscopy during her last admission in 7/2024 for L iliopsoas bursitis. Started on levo gtt for hypotension, found to be anemic and transfused 4 units of PRBCs. Not appropriate response 2/2 active GIB. Another 8u pRBC ordered to be given. Intubated for EGD and code fusion was called around 11:25AM as pt had massive episode of melena x4. EGD showed 20mm ulceration in gastric antrum; clipped (12/3). Pt extubated on 12/4 AM. However, pt had melena x2 on 12/4 w/ drop in hgb 8.2>7. Pt was intubated again in PM and repeat EGD was done, which did not show any signs of bleed. Per GI, pt should be treated like pt w/ resolved GIB. Placed on PPI BID and CBC monitored q6h. Stable Hgb on 12/5 but pt hadmelena x2. VSS. Hgb stable at 8.3 on 12/6. Successful extubation on 12/6. No episodes of melena following extubation. If further signs/sx of active bleed, consider triphasic GI Bleed study, which involves CTA. 12/7 pantoprazole was switched to PO BID, zyprexa added for management of bipolar/depression, and zosyn discontinued as patient has remained afebrile with negative WBCs. Pt required straight catheterization in AM of 12/7. Diet advanced to regular diet morning 12/7."     Epilepsy consulted to assess patient. Patient has amnesia of the episode. She just remembers the ambulance in her room during the afternoon. She had urine incontinence, amnesia of the episode and somnolence, no muscle pain or byte tongue.    Epilepsy risk factors:  Head injury with subsequent LOC: None  Febrile seizures in infancy: None  Hx of CNS infection: None  Family hx of epilepsy: None  Known CNS pathology: None     Prior AEDs: Keppra not taking it recently. Took Dilantin, Phenobarbital, Clonopin, Depakote     Prior imaging CT head in 2022     Prior EEGs years ago     Review of Systems:  CONSTITUTIONAL:  No weight loss, fever, chills, weakness or fatigue.  HEENT:  Eyes:  No visual loss, blurred vision, double vision or yellow sclerae. Ears, Nose, Throat:  No hearing loss, sneezing, congestion, runny nose or sore throat.  SKIN:  No rash or itching.  CARDIOVASCULAR:  No chest pain, chest pressure or chest discomfort. No palpitations or edema.  RESPIRATORY:  No shortness of breath, cough or sputum.  GASTROINTESTINAL:  No anorexia, nausea, vomiting or diarrhea. No abdominal pain or blood.  GENITOURINARY: No Burning on urination.   NEUROLOGICAL:  see HPI  MUSCULOSKELETAL:  No muscle, back pain, joint pain or stiffness.  HEMATOLOGIC:  No anemia, bleeding or bruising.  LYMPHATICS:  No enlarged nodes. No history of splenectomy.  PSYCHIATRIC:  No history of depression or anxiety.  ENDOCRINOLOGIC:  No reports of sweating, cold or heat intolerance. No polyuria or polydipsia.  ALLERGIES:  No history of asthma, hives, eczema or rhinitis.      PAST MEDICAL & SURGICAL HISTORY:  IVDU (intravenous drug user)  Anemia  Asthma  Epilepsy  No significant past surgical history    FAMILY HISTORY:  Family history of AIDS (Mother, Sibling)  Family history of alcohol abuse (Father)     Social History:  Alcohol, illicits, smoking: tobacco 2 cigarettes per day (since 17 yo), opioids on methadone and occasionally using heroin (since 10 yo), K-2 (4 years ago for 9 months)  Driving: No  Occupation: None     Allergies  No Known Allergies    MEDICATIONS  (STANDING):  chlorhexidine 2% Cloths 1 Application(s) Topical <User Schedule>  levETIRAcetam 750 milliGRAM(s) Oral every 12 hours  OLANZapine 2.5 milliGRAM(s) Oral at bedtime  pantoprazole    Tablet 40 milliGRAM(s) Oral every 12 hours     T(C): 36.7 (12-08-24 @ 09:02), Max: 37.4 (12-07-24 @ 15:06)  HR: 72 (12-08-24 @ 09:02) (72 - 79)  BP: 103/67 (12-08-24 @ 09:02) (100/60 - 121/85)  RR: 18 (12-08-24 @ 09:02) (18 - 18)  SpO2: 99% (12-08-24 @ 09:02) (94% - 99%)    Constitutional:  Sitting comfortably in NAD.  Psychiatric: calm, normal affect, no overt anxiety or internal preoccupation  Ears, Nose, Throat: no abnormalities, mucus membranes moist  Neck: supple, no lymphadenopathy or nodules palpable  Cardiovascular: regular rate and rhythm, normal S1/S2, no murmurs   Chest: Clear to bases. 	  Abdomen: soft, non-tender, no hepatosplenomegaly   Extremities: no edema, clubbing or cyanosis  Skin: no rash or neurocutaneous signs   Neurological  -Cognitive: Orientation, language, and knowledge screens intact.  -Cranial Nerves: II: Full to confrontation. III/IV/VI: PERRL EOMF No nystagmus. V1V2V3: Symmetric, VII: Face appears symmetric VIII: Normal to screening, IX/X: Palate -Elevates Symmetrical  XI: Trapezius Symmetric  XII: Tongue midline  -Motor: Power: 5/5 throughout, tone: normal x 4 limbs except LLE with exam limited by severe pain, no tremor   -Sensation: Intact to light touch. Intact to pinprick/temperature and vibration.  -Reflexes: DTR: 3+ symmetric all 4 limbs  --Coordination/Gait: Finger-nose-finger intact, normal rapid-alternating. Gait deferred     Labs  CBC Full  -  ( 07 Dec 2024 05:30 )  WBC Count : 4.85 K/uL  RBC Count : 2.73 M/uL  Hemoglobin : 7.9 g/dL  Hematocrit : 24.8 %  Platelet Count - Automated : 131 K/uL  Mean Cell Volume : 90.8 fl  Mean Cell Hemoglobin : 28.9 pg  Mean Cell Hemoglobin Concentration : 31.9 g/dL  Auto Neutrophil # : 3.10 K/uL  Auto Lymphocyte # : 1.08 K/uL  Auto Monocyte # : 0.38 K/uL  Auto Eosinophil # : 0.21 K/uL  Auto Basophil # : 0.02 K/uL  Auto Neutrophil % : 64.0 %  Auto Lymphocyte % : 22.3 %  Auto Monocyte % : 7.8 %  Auto Eosinophil % : 4.3 %  Auto Basophil % : 0.4 %    12-07    143  |  111[H]  |  10  ----------------------------<  75  3.9   |  28  |  1.05    Ca    8.3[L]      07 Dec 2024 05:30  Phos  3.9     12-07  Mg     1.8     12-07    TPro  5.1[L]  /  Alb  2.7[L]  /  TBili  0.5  /  DBili  x   /  AST  72[H]  /  ALT  52[H]  /  AlkPhos  63  12-07    LIVER FUNCTIONS - ( 07 Dec 2024 05:30 )  Alb: 2.7 g/dL / Pro: 5.1 g/dL / ALK PHOS: 63 U/L / ALT: 52 U/L / AST: 72 U/L / GGT: x        HPI: As per chart: "59-year-old female, mRS = 4, walking with a walker since July 24,  with children, (proxy José Luis Escobar, brother), w/ PMH of polysubstance use (tobacco, opioids on methadone, acknowledge last heroin dose 21 days ago, Hep C positive), anemia, CKD, bipolar disorder (since 15 yo, episodes of amando and depression, not following with psychiatrist since 5 years), and epilepsy (when she was 8 yo, normally 2 generalized seizures per year when she is stressed, previous seizure before July 2024, no consistent with Keppra in the previous days, on 750 mg BID), BIBEMS for AMS.  Pt reported that she was at her shelter when she had a seizure that was witnessed by her roommate. Following her seizure, she developed significant, diffuse abdominal pain and had multiple episodes of loose, maroon/dark stools to the point where she was unable to take her home medications and missed her appointment with the methadone clinic. Pt reports she was taking 2-3 capsules of Naproxen for 7 days and gabapentin 400mg QID that she purchased from a dealer. She also reports she takes keppra 750mg BID and methadone 170mg qd, both last taken on 11/30. She denies any recent alcohol use. Found to be having anemia from active GIB iso melena iso use of naproxen. Pt denies any prior hx of GIB, no prior colonoscopies, but states she had an endoscopy during her last admission in 7/2024 for L iliopsoas bursitis. Started on levo gtt for hypotension, found to be anemic and transfused 4 units of PRBCs. Not appropriate response 2/2 active GIB. Another 8u pRBC ordered to be given. Intubated for EGD and code fusion was called around 11:25AM as pt had massive episode of melena x4. EGD showed 20mm ulceration in gastric antrum; clipped (12/3). Pt extubated on 12/4 AM. However, pt had melena x2 on 12/4 w/ drop in hgb 8.2>7. Pt was intubated again in PM and repeat EGD was done, which did not show any signs of bleed. Per GI, pt should be treated like pt w/ resolved GIB. Placed on PPI BID and CBC monitored q6h. Stable Hgb on 12/5 but pt hadmelena x2. VSS. Hgb stable at 8.3 on 12/6. Successful extubation on 12/6. No episodes of melena following extubation. If further signs/sx of active bleed, consider triphasic GI Bleed study, which involves CTA. 12/7 pantoprazole was switched to PO BID, zyprexa added for management of bipolar/depression, and zosyn discontinued as patient has remained afebrile with negative WBCs. Pt required straight catheterization in AM of 12/7. Diet advanced to regular diet morning 12/7."     Epilepsy consulted to assess patient. Patient has amnesia of the episode. She just remembers the ambulance in her room during the afternoon. She had urine incontinence, amnesia of the episode and somnolence, no muscle pain or byte tongue.    Epilepsy risk factors:  Head injury with subsequent LOC: None  Febrile seizures in infancy: None  Hx of CNS infection: None  Family hx of epilepsy: None  Known CNS pathology: None     Prior AEDs: Keppra not taking it recently. Took Dilantin, Phenobarbital, Clonopin, Depakote     Prior imaging CT head in 2022     Prior EEGs years ago     Review of Systems:  CONSTITUTIONAL:  No weight loss, fever, chills, weakness or fatigue.  HEENT:  Eyes:  No visual loss, blurred vision, double vision or yellow sclerae. Ears, Nose, Throat:  No hearing loss, sneezing, congestion, runny nose or sore throat.  SKIN:  No rash or itching.  CARDIOVASCULAR:  No chest pain, chest pressure or chest discomfort. No palpitations or edema.  RESPIRATORY:  No shortness of breath, cough or sputum.  GASTROINTESTINAL:  No anorexia, nausea, vomiting or diarrhea. No abdominal pain or blood.  GENITOURINARY: No Burning on urination.   NEUROLOGICAL:  see HPI  MUSCULOSKELETAL:  No muscle, back pain, joint pain or stiffness.  HEMATOLOGIC:  No anemia, bleeding or bruising.  LYMPHATICS:  No enlarged nodes. No history of splenectomy.  PSYCHIATRIC:  No history of depression or anxiety.  ENDOCRINOLOGIC:  No reports of sweating, cold or heat intolerance. No polyuria or polydipsia.  ALLERGIES:  No history of asthma, hives, eczema or rhinitis.      PAST MEDICAL & SURGICAL HISTORY:  IVDU (intravenous drug user)  Anemia  Asthma  Epilepsy  No significant past surgical history    FAMILY HISTORY:  Family history of AIDS (Mother, Sibling)  Family history of alcohol abuse (Father)     Social History:  Alcohol, illicits, smoking: tobacco 2 cigarettes per day (since 17 yo), opioids on methadone and occasionally using heroin (since 12 yo), K-2 (4 years ago for 9 months)  Driving: No  Occupation: None     Allergies  No Known Allergies    MEDICATIONS  (STANDING):  chlorhexidine 2% Cloths 1 Application(s) Topical <User Schedule>  levETIRAcetam 750 milliGRAM(s) Oral every 12 hours  OLANZapine 2.5 milliGRAM(s) Oral at bedtime  pantoprazole    Tablet 40 milliGRAM(s) Oral every 12 hours     T(C): 36.7 (12-08-24 @ 09:02), Max: 37.4 (12-07-24 @ 15:06)  HR: 72 (12-08-24 @ 09:02) (72 - 79)  BP: 103/67 (12-08-24 @ 09:02) (100/60 - 121/85)  RR: 18 (12-08-24 @ 09:02) (18 - 18)  SpO2: 99% (12-08-24 @ 09:02) (94% - 99%)    Constitutional:  Sitting comfortably in NAD.  Psychiatric: calm, normal affect, no overt anxiety or internal preoccupation  Ears, Nose, Throat: no abnormalities, mucus membranes moist  Neck: supple, no lymphadenopathy or nodules palpable  Cardiovascular: regular rate and rhythm, normal S1/S2, no murmurs   Chest: Clear to bases. 	  Abdomen: soft, non-tender, no hepatosplenomegaly   Extremities: no edema, clubbing or cyanosis  Skin: no rash or neurocutaneous signs   Neurological  -Cognitive: Orientation, language, and knowledge screens intact.  -Cranial Nerves: II: Full to confrontation. III/IV/VI: PERRL EOMF No nystagmus. V1V2V3: Symmetric, VII: Face assymetric VIII: Normal to screening, IX/X: Palate -Elevates Symmetrical  XI: Trapezius Symmetric  XII: Tongue midline  -Motor: Power: 5/5 throughout, tone: normal x 4 limbs except LLE with exam limited by severe pain, no tremor   -Sensation: Intact to light touch. Intact to pinprick/temperature and vibration.  -Reflexes: DTR: 3+ symmetric all 4 limbs  -Coordination/Gait: Finger-nose-finger intact, normal rapid-alternating. Gait deferred     Labs  CBC Full  -  ( 07 Dec 2024 05:30 )  WBC Count : 4.85 K/uL  RBC Count : 2.73 M/uL  Hemoglobin : 7.9 g/dL  Hematocrit : 24.8 %  Platelet Count - Automated : 131 K/uL  Mean Cell Volume : 90.8 fl  Mean Cell Hemoglobin : 28.9 pg  Mean Cell Hemoglobin Concentration : 31.9 g/dL  Auto Neutrophil # : 3.10 K/uL  Auto Lymphocyte # : 1.08 K/uL  Auto Monocyte # : 0.38 K/uL  Auto Eosinophil # : 0.21 K/uL  Auto Basophil # : 0.02 K/uL  Auto Neutrophil % : 64.0 %  Auto Lymphocyte % : 22.3 %  Auto Monocyte % : 7.8 %  Auto Eosinophil % : 4.3 %  Auto Basophil % : 0.4 %    12-07    143  |  111[H]  |  10  ----------------------------<  75  3.9   |  28  |  1.05    Ca    8.3[L]      07 Dec 2024 05:30  Phos  3.9     12-07  Mg     1.8     12-07    TPro  5.1[L]  /  Alb  2.7[L]  /  TBili  0.5  /  DBili  x   /  AST  72[H]  /  ALT  52[H]  /  AlkPhos  63  12-07    LIVER FUNCTIONS - ( 07 Dec 2024 05:30 )  Alb: 2.7 g/dL / Pro: 5.1 g/dL / ALK PHOS: 63 U/L / ALT: 52 U/L / AST: 72 U/L / GGT: x

## 2024-12-08 NOTE — PROGRESS NOTE ADULT - PROBLEM SELECTOR PLAN 1
Presented with encephalopathy, found to be in hemorrhagic shock. Patient is s/p a total of 12 units of PRBCs transfused, clipping of 20mm ulceration in gastric antrum performed. Repeat EGD performed which shows no evidence of rebleed, hemoglobin has stabilized.     - Maintain active T&S  - Continue to monitor for repeat bleeding event.  - PT eval after reintubations and anemia.  - Omeprazole on discharge.  - Hpylori testing in outpatient setting. Presented with encephalopathy, found to be in hemorrhagic shock. Patient is s/p a total of 12 units of PRBCs transfused, clipping of 20mm ulceration in gastric antrum performed. Repeat EGD performed which shows no evidence of rebleed, hemoglobin has stabilized.     - Maintain active T&S  - Continue to monitor for repeat bleeding event.  - PT eval after reintubations and anemia, pending xrays due to L hip and knee pain  - Omeprazole on discharge.  - Hpylori testing in outpatient setting.

## 2024-12-08 NOTE — PROGRESS NOTE ADULT - PROBLEM SELECTOR PLAN 3
Patient found with chronic Hepatitis C infection, discussion with GI for outpatient follow up for treatment.

## 2024-12-08 NOTE — CONSULT NOTE ADULT - ASSESSMENT
Ms. Escobar is a 59-year-old female w/ PMH of polysubstance use (tobacco, opioids on methadone, Hep C positive), anemia, CKD, and epilepsy BIBEMS for AMS found to have Hgb 5.9 and melena admitted to MICU w/ hemorrhagic shock 2/2 UGIB iso NSAID use, s/p clipping of 20mm ulceration in gastric antrum. Patient has had several small episodes of melena since that event which have now resolved.  59-year-old female, mRS = 4, walking with a walker since July 24,  with children, (proxy José Luis Escobar, brother), w/ PMH of polysubstance use (tobacco, opioids on methadone, Hep C positive), anemia, CKD, and epilepsy BIBEMS for AMS found to have Hgb 5.9 and melena admitted to MICU w/ hemorrhagic shock 2/2 UGIB iso NSAID use, s/p clipping of 20mm ulceration in gastric antrum. Patient has had several small episodes of melena since that event which have now resolved. 59-year-old female, mRS = 4, walking with a walker since July 24,  with children, (proxy José Luis Escobar, brother), w/ PMH of polysubstance use (tobacco, opioids on methadone, acknowledge last heroin dose 21 days ago, Hep C positive), anemia, CKD, bipolar disorder (since 13 yo, episodes of amando and depression, not following with psychiatrist since 5 years), and epilepsy (when she was 6 yo, normally 2 generalized seizures per year when she is stressed, previous seizure before July 2024, no consistent with Keppra in the previous days, on 750 mg BID), BIBEMS for AMS.  Pt reported that she was at her shelter when she had a seizure that was witnessed by her roommate. Following her seizure, she developed significant, diffuse abdominal pain and had multiple episodes of loose, maroon/dark stools to the point where she was unable to take her home medications and missed her appointment with the methadone clinic. She also reports she takes keppra 750mg BID and methadone 170mg qd, both last taken on 11/30. Found to be having anemia from active GIB iso melena iso use of naproxen. Pt denies any prior hx of GIB, no prior colonoscopies, but states she had an endoscopy during her last admission in 7/2024 for L iliopsoas bursitis. Started on levo gtt for hypotension, found to be anemic and transfused PRBCs. Epilepsy consulted to assess patient. Patient has amnesia of the episode. She just remembers the ambulance in her room during the afternoon. She had urine incontinence, amnesia of the episode and somnolence, no muscle pain or byte tongue. Patient failing Keppra medications. New gabapentin and naproxen acquired in black market.     Impression: Provoked breakthrough generalized seizure in patient with PMHx of epilepsy    PLAN:  Continue with AED Keppra 750 mg BID   Order CT head wo contrast  Utox and routine labs including TSH, Mg and Phos, lactate.  Seizure & Fall precautions  Ativan 2mg IVP for seizures > 2mins  Keep Mg>2 and K >4.   PT/OT evaluation  Management per primary team.     Please call us if any questions or neurological changes.     Case discussed with Epilepsy attending Dr. Ravi 59-year-old female, mRS = 4, walking with a walker since July 24,  with children, (proxy José Luis Escobar, brother), w/ PMH of polysubstance use (tobacco, opioids on methadone, acknowledge last heroin dose 21 days ago, Hep C positive), anemia, CKD, bipolar disorder (since 13 yo, episodes of amando and depression, not following with psychiatrist since 5 years), and epilepsy (when she was 8 yo, normally 2 generalized seizures per year when she is stressed, previous seizure before July 2024, no consistent with Keppra in the previous days, on 750 mg BID), BIBEMS for AMS. found to have Hgb 5.9 and melena admitted to MICU w/ hemorrhagic shock 2/2 UGIB iso NSAID use, s/p clipping of 20mm ulceration in gastric antrum. Patient has had several small episodes of melena since that event which have now resolved.  Pt reported that she was at her shelter when she had a seizure that was witnessed by her roommate. Following her seizure, she developed significant, diffuse abdominal pain and had multiple episodes of loose, maroon/dark stools to the point where she was unable to take her home medications and missed her appointment with the methadone clinic. She also reports she takes keppra 750mg BID and methadone 170mg qd, both last taken on 11/30. Found to be having anemia from active GIB iso melena iso use of naproxen. Pt denies any prior hx of GIB, no prior colonoscopies, but states she had an endoscopy during her last admission in 7/2024 for L iliopsoas bursitis. Started on levo gtt for hypotension, found to be anemic and transfused PRBCs. Epilepsy consulted to assess patient. Patient has amnesia of the episode. She just remembers the ambulance in her room during the afternoon. She had urine incontinence, amnesia of the episode and somnolence, no muscle pain or byte tongue. Patient failing Keppra medications. New gabapentin and naproxen acquired in black market.     Impression: Provoked breakthrough generalized seizure in patient with PMHx of epilepsy    PLAN:  Continue with AED Keppra 750 mg BID   Order CT head wo contrast  Utox and routine labs including TSH, Mg and Phos, lactate.  Seizure & Fall precautions  Ativan 2mg IVP for seizures > 2mins  Keep Mg>2 and K >4.   PT/OT evaluation  Management per primary team.     Please call us if any questions or neurological changes.     Case discussed with Epilepsy attending Dr. Ravi 59-year-old female, mRS = 4, walking with a walker since July 24,  with children, (proxy José Luis Escobar, brother), w/ PMH of polysubstance use (tobacco, opioids on methadone, acknowledge last heroin dose 21 days ago, Hep C positive), anemia, CKD, bipolar disorder (since 13 yo, episodes of amando and depression, not following with psychiatrist since 5 years), and epilepsy (when she was 8 yo, normally 2 generalized seizures per year when she is stressed, previous seizure before July 2024, no consistent with Keppra in the previous days, on 750 mg BID), BIBEMS for AMS. found to have Hgb 5.9 and melena admitted to MICU w/ hemorrhagic shock 2/2 UGIB iso NSAID use, s/p clipping of 20mm ulceration in gastric antrum. Patient has had several small episodes of melena since that event which have now resolved.  Pt reported that she was at her shelter when she had a seizure that was witnessed by her roommate. Following her seizure, she developed significant, diffuse abdominal pain and had multiple episodes of loose, maroon/dark stools to the point where she was unable to take her home medications and missed her appointment with the methadone clinic. She also reports she takes keppra 750mg BID and methadone 170mg qd, both last taken on 11/30. Found to be having anemia from active GIB iso melena iso use of naproxen. Pt denies any prior hx of GIB, no prior colonoscopies, but states she had an endoscopy during her last admission in 7/2024 for L iliopsoas bursitis. Started on levo gtt for hypotension, found to be anemic and transfused PRBCs. Epilepsy consulted to assess patient. Patient has amnesia of the episode. She just remembers the ambulance in her room during the afternoon. She had urine incontinence, amnesia of the episode and somnolence, no muscle pain or byte tongue. Patient failing Keppra medications. New gabapentin and naproxen acquired in black market.     Impression: Provoked breakthrough generalized seizure in patient with PMHx of epilepsy and bipolar disorder    PLAN:  Gather collateral about the episode to verify episode and current medications  Continue with AED Keppra 750 mg BID   Order CT head wo contrast  Utox and routine labs including TSH, Mg and Phos  Seizure & Fall precautions  Ativan 2mg IVP for seizures > 2mins  Keep Mg>2 and K >4.   PT/OT evaluation  Management per primary team.   Should follow outpatient with neurologist and psychiatrist  Evaluate if orthopedic surgery evaluation is needed to assess left lower extremity pain    Please call us if any questions or neurological changes.     Case discussed with Epilepsy attending Dr. Ravi 59-year-old female, mRS = 4, walking with a walker since July 24,  with children, (proxy José Luis Escobar, brother), w/ PMH of polysubstance use (tobacco, opioids on methadone, acknowledge last heroin dose 21 days ago, Hep C positive), anemia, CKD, bipolar disorder (since 15 yo, episodes of amando and depression, not following with psychiatrist since 5 years), and epilepsy (when she was 8 yo, normally 2 generalized seizures per year when she is stressed, previous seizure before July 2024, no consistent with Keppra in the previous days, on 750 mg BID), BIBEMS for AMS. found to have Hgb 5.9 and melena admitted to MICU w/ hemorrhagic shock 2/2 UGIB iso NSAID use, s/p clipping of 20mm ulceration in gastric antrum. Patient has had several small episodes of melena since that event which have now resolved.  Pt reported that she was at her shelter when she had a seizure that was witnessed by her roommate. Following her seizure, she developed significant, diffuse abdominal pain and had multiple episodes of loose, maroon/dark stools to the point where she was unable to take her home medications and missed her appointment with the methadone clinic. She also reports she takes keppra 750mg BID and methadone 170mg qd, both last taken on 11/30. Found to be having anemia from active GIB iso melena iso use of naproxen. Pt denies any prior hx of GIB, no prior colonoscopies, but states she had an endoscopy during her last admission in 7/2024 for L iliopsoas bursitis. Started on levo gtt for hypotension, found to be anemic and transfused PRBCs. Epilepsy consulted to assess patient. Patient has amnesia of the episode. She just remembers the ambulance in her room during the afternoon. She had urine incontinence, amnesia of the episode and somnolence, no muscle pain or byte tongue. Patient failing Keppra medications. New gabapentin and naproxen acquired in black market. Neuroexam shows left facial paresis and left lower extremity exam is limited by pain.     Impression: Provoked breakthrough generalized seizure and headache in patient with PMHx of epilepsy and bipolar disorder    PLAN:  Gather collateral about the episode to verify episode and current medications.   Continue with AED Keppra 750 mg BID   Order CT head wo contrast  Utox and routine labs including TSH, Mg and Phos  Seizure & Fall precautions  Ativan 2mg IVP for seizures > 2mins  Keep Mg>2 and K >4.   PT/OT evaluation  Management per primary team.   Should follow outpatient with neurologist and psychiatrist  Evaluate if orthopedic surgery evaluation is needed to assess left lower extremity pain  Social evaluation since patient manifests feel she feels threaten in her environment    Please call us if any questions or neurological changes.     Case discussed with Epilepsy attending Dr. Ravi 59-year-old female, mRS = 4, walking with a walker since July 24,  with children, (proxy José Luis Escobar, brother), w/ PMH of polysubstance use (tobacco, opioids on methadone, acknowledge last heroin dose 21 days ago, Hep C positive), anemia, CKD, bipolar disorder (since 13 yo, episodes of amando and depression, not following with psychiatrist since 5 years), and epilepsy (when she was 8 yo, normally 2 generalized seizures per year when she is stressed, previous seizure before July 2024, no consistent with Keppra in the previous days, on 750 mg BID), BIBEMS for AMS. Pt reported that she was at her shelter when she had a seizure that was witnessed by her roommate. Following her seizure, she developed significant, diffuse abdominal pain and had multiple episodes of loose, maroon/dark stools to the point where she was unable to take her home medications and missed her appointment with the methadone clinic. New gabapentin and naproxen acquired in black market. She also reports she takes keppra 750mg BID and methadone 170mg qd, both last taken on 11/30. Found to be having anemia from active GIB iso melena iso use of naproxen. Pt denies any prior hx of GIB, no prior colonoscopies, but states she had an endoscopy during her last admission in 7/2024 for L iliopsoas bursitis. Started on levo gtt for hypotension, found to be anemic and transfused PRBCs. Found to have Hgb 5.9 and melena admitted to MICU w/ hemorrhagic shock 2/2 UGIB iso NSAID use, s/p clipping of 20mm ulceration in gastric antrum. Patient has had several small episodes of melena since that event which have now resolved. Epilepsy consulted to assess patient. Patient has amnesia of the episode. She just remembers the ambulance in her room during the afternoon. She had urine incontinence, amnesia of the episode and somnolence, no muscle pain or byte tongue. Patient failing Keppra medications. Neuroexam shows left facial paresis and left lower extremity exam is limited by pain. Patient with holocranial headache mild-moderate that she did not have before.     Impression: Provoked breakthrough generalized seizure and new onset headache in vulnerable patient non-compliant with her home Keppra due to social problems with PMHx of epilepsy and bipolar disorder. R/o intracranial structural abnormalities since patient new onset headache    PLAN:  Gather collateral about the episode to verify episode and current medications.   Continue with AED Keppra 750 mg BID   Symptomatic treatment of headache  Order CT head wo contrast  Utox and routine labs including TSH, Mg and Phos  Seizure & Fall precautions  Ativan 2mg IVP for seizures > 2mins  Keep Mg>2 and K >4.   PT/OT evaluation  Management per primary team.   Should follow outpatient with neurologist and psychiatrist  Evaluate if orthopedic surgery evaluation is needed to assess left lower extremity pain  Social evaluation since patient is vulnerable given her dependent baseline status and manifests feeling threaten in her environment    Please call us if any questions or neurological changes.     Case discussed with Epilepsy attending Dr. Ravi

## 2024-12-08 NOTE — PROGRESS NOTE ADULT - PROBLEM SELECTOR PLAN 5
At last admission, Hgb 7.9-9. Iron studies: total iron wnl, TIBC low, %iron sat wnl, ferritin wnl. Folate/B12 wnl.     - Continue to monitor.

## 2024-12-08 NOTE — PROGRESS NOTE ADULT - PROBLEM SELECTOR PLAN 2
Currently enrolled in methadone program, reports that her regimen has been stable and she feels comfortable at this time. COWS 0.     - Continue with home methadone dose. Currently enrolled in methadone program, reports that her regimen has been stable and she feels comfortable at this time. COWS 0.     - Continue with home methadone dose (changed it to oral solution due to pill burden with pills)

## 2024-12-09 DIAGNOSIS — F14.10 COCAINE ABUSE, UNCOMPLICATED: ICD-10-CM

## 2024-12-09 PROCEDURE — 99222 1ST HOSP IP/OBS MODERATE 55: CPT

## 2024-12-09 PROCEDURE — 99223 1ST HOSP IP/OBS HIGH 75: CPT

## 2024-12-09 PROCEDURE — 99233 SBSQ HOSP IP/OBS HIGH 50: CPT | Mod: GC

## 2024-12-09 RX ORDER — METHADONE HYDROCHLORIDE 5 MG/1
170 TABLET ORAL EVERY 24 HOURS
Refills: 0 | Status: DISCONTINUED | OUTPATIENT
Start: 2024-12-09 | End: 2024-12-10

## 2024-12-09 RX ORDER — ACETAMINOPHEN 500MG 500 MG/1
650 TABLET, COATED ORAL ONCE
Refills: 0 | Status: COMPLETED | OUTPATIENT
Start: 2024-12-09 | End: 2024-12-09

## 2024-12-09 RX ORDER — ACETAMINOPHEN 500MG 500 MG/1
1000 TABLET, COATED ORAL ONCE
Refills: 0 | Status: COMPLETED | OUTPATIENT
Start: 2024-12-09 | End: 2024-12-09

## 2024-12-09 RX ORDER — ACETAMINOPHEN 500MG 500 MG/1
975 TABLET, COATED ORAL EVERY 8 HOURS
Refills: 0 | Status: DISCONTINUED | OUTPATIENT
Start: 2024-12-09 | End: 2024-12-12

## 2024-12-09 RX ADMIN — ACETAMINOPHEN 500MG 975 MILLIGRAM(S): 500 TABLET, COATED ORAL at 18:07

## 2024-12-09 RX ADMIN — ACETAMINOPHEN 500MG 650 MILLIGRAM(S): 500 TABLET, COATED ORAL at 04:48

## 2024-12-09 RX ADMIN — PANTOPRAZOLE SODIUM 40 MILLIGRAM(S): 40 TABLET, DELAYED RELEASE ORAL at 06:14

## 2024-12-09 RX ADMIN — LIDOCAINE 1 PATCH: 40 CREAM TOPICAL at 10:00

## 2024-12-09 RX ADMIN — OLANZAPINE 2.5 MILLIGRAM(S): 20 TABLET ORAL at 23:00

## 2024-12-09 RX ADMIN — LEVETIRACETAM 750 MILLIGRAM(S): 1000 TABLET ORAL at 06:14

## 2024-12-09 RX ADMIN — ACETAMINOPHEN 500MG 400 MILLIGRAM(S): 500 TABLET, COATED ORAL at 13:30

## 2024-12-09 RX ADMIN — CHLORHEXIDINE GLUCONATE 1 APPLICATION(S): 1.2 RINSE ORAL at 06:15

## 2024-12-09 RX ADMIN — LIDOCAINE 1 PATCH: 40 CREAM TOPICAL at 00:31

## 2024-12-09 RX ADMIN — LIDOCAINE 1 PATCH: 40 CREAM TOPICAL at 06:27

## 2024-12-09 RX ADMIN — ACETAMINOPHEN 500MG 975 MILLIGRAM(S): 500 TABLET, COATED ORAL at 19:07

## 2024-12-09 RX ADMIN — METHADONE HYDROCHLORIDE 170 MILLIGRAM(S): 5 TABLET ORAL at 09:45

## 2024-12-09 RX ADMIN — LEVETIRACETAM 750 MILLIGRAM(S): 1000 TABLET ORAL at 18:07

## 2024-12-09 RX ADMIN — PANTOPRAZOLE SODIUM 40 MILLIGRAM(S): 40 TABLET, DELAYED RELEASE ORAL at 18:07

## 2024-12-09 RX ADMIN — ACETAMINOPHEN 500MG 650 MILLIGRAM(S): 500 TABLET, COATED ORAL at 03:48

## 2024-12-09 RX ADMIN — ACETAMINOPHEN 500MG 1000 MILLIGRAM(S): 500 TABLET, COATED ORAL at 14:30

## 2024-12-09 NOTE — BH CONSULTATION LIAISON ASSESSMENT NOTE - NSBHHPIREASONCL_PSY_A_CORE
Hx of bipolar disorder, >10 psychiatric hospitalizations  Past heroin use, more recent fentanyl and cocaine  Methadone maintenance therapy/med management/other substance
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
## 2024-12-09 NOTE — OCCUPATIONAL THERAPY INITIAL EVALUATION ADULT - ADDITIONAL COMMENTS
Pt states she lives in a shelter. Pt reports being independent in ADLs/IADLs however having difficulty caring for herself recently. Pt is independent with mobility tasks, use of rollator. Pt has a hx of falls.

## 2024-12-09 NOTE — BH CONSULTATION LIAISON ASSESSMENT NOTE - NSBHCHARTREVIEWVS_PSY_A_CORE FT
Vital Signs Last 24 Hrs  T(C): 36.9 (09 Dec 2024 09:23), Max: 36.9 (09 Dec 2024 09:23)  T(F): 98.4 (09 Dec 2024 09:23), Max: 98.4 (09 Dec 2024 09:23)  HR: 77 (09 Dec 2024 09:23) (72 - 91)  BP: 84/61 (09 Dec 2024 09:23) (84/61 - 98/64)  BP(mean): 67 (09 Dec 2024 05:25) (67 - 67)  RR: 18 (09 Dec 2024 09:23) (18 - 18)  SpO2: 100% (09 Dec 2024 09:23) (97% - 100%)    Parameters below as of 09 Dec 2024 09:23  Patient On (Oxygen Delivery Method): room air

## 2024-12-09 NOTE — DISCHARGE NOTE PROVIDER - PROVIDER TOKENS
PROVIDER:[TOKEN:[[id number]],FOLLOWUP:[1 week],ESTABLISHEDPATIENT:[T]] PROVIDER:[TOKEN:[[id number]],FOLLOWUP:[1 week],ESTABLISHEDPATIENT:[T]],PROVIDER:[TOKEN:[[id number]],SCHEDULEDAPPT:[12/23/2024],SCHEDULEDAPPTTIME:[11:00 AM]]

## 2024-12-09 NOTE — BH CONSULTATION LIAISON ASSESSMENT NOTE - CURRENT MEDICATION
MEDICATIONS  (STANDING):  chlorhexidine 2% Cloths 1 Application(s) Topical <User Schedule>  levETIRAcetam 750 milliGRAM(s) Oral every 12 hours  methadone  Concentrate 170 milliGRAM(s) Oral every 24 hours  OLANZapine 2.5 milliGRAM(s) Oral at bedtime  pantoprazole    Tablet 40 milliGRAM(s) Oral every 12 hours    MEDICATIONS  (PRN):
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
## 2024-12-09 NOTE — BH CONSULTATION LIAISON ASSESSMENT NOTE - SUMMARY
Ms. Escobar is a 59 year old woman with a history of polysubstance use (cocaine, heroin, fentanyl), >10 inpatient psychiatric hospitalizations, and bipolar disorder. She was admitted to the hospital for anemia and hypovolemia secondary to an upper GI bleed, now s/p clipping of ulcer. She is seeking psychiatric treatment for her bipolar disorder and substance use disorder.    - continue Zyprexa 2.5mg  - continue methadone  - [facility] for [facility] care  - [facility] for outpatient substance use rehab program    - Requested social work for help finding more stable housing     Ms. Escobar is a 59 year old woman with a history of polysubstance use (cocaine, heroin, fentanyl), >10 inpatient psychiatric hospitalizations, and bipolar disorder. She was admitted to the hospital for anemia and hypovolemia secondary to an upper GI bleed, now s/p clipping of ulcer. She is seeking psychiatric treatment for her bipolar disorder and substance use disorder. Overall doing well psychiatrically, no acute amando/psychosis/depression present, no persisting delirium.     RECOMMENDATIONS\  -routine observation  - continue Zyprexa 2.5mg QHS for mood stabilization and recent agitation; to be reassessed as OP  - continue home methadone for OUD - consider divided dose  -olanzapine 2.5mg po/IM Q6H PRN for acute agitation that is not verbally redirectable  -monitor EKG for QTc prolongation with use of multiple QTc-prolonging agents  - pt plans to established care at [facility] for outpatient psychiatric care  - pt plans to establish care at [facility] for outpatient substance use rehab program  -consider SW consult re: pt concerns about her housing  -delirium precautions  -no psychiatric barrier to dc when medically ready

## 2024-12-09 NOTE — DISCHARGE NOTE PROVIDER - CARE PROVIDERS DIRECT ADDRESSES
,[email or web address]

## 2024-12-09 NOTE — DISCHARGE NOTE PROVIDER - ATTENDING DISCHARGE PHYSICAL EXAMINATION:
******************************************************  ATTENDING ATTESTATION    I have read and agree with the resident Discharge Note above. Patient seen and discussed with resident team on the day of discharge.     Briefly, Ms. Escobar is a 60yo woman with PMHx OUD on methadone, epilepsy, bipolar disorder, L knee OA who was BIBEMS for AMS found to be in hemorrhagic shock 2/2 gastric ulcer iso NSAID use s/p clipping, now stable for discharge.    Physical Exam:  T(C): 37  HR: 97  BP: 111/72  RR: 18  SpO2: 96%    Gen: sitting upright in bed at time of exam  HEENT: NCAT, MMM, clear OP  Neck: supple, trachea at midline  CV: RRR, +S1/S2  Pulm: adequate respiratory effort, no increased work of breathing  Abd: soft, NTND  Skin: warm and dry, no new rashes vs prior report  Ext: WWP, 1+ TONY b/l  Neuro: AOx3, no gross focal neurological deficits  Psych: affect and behavior appropriate    I was physically present for the evaluation and management services provided. I agree with the included history, physical, and plan which I reviewed and edited where appropriate. I spent 33 minutes on direct patient care and discharge planning with more than 50% of the visit spent on counseling and/or coordination of care.

## 2024-12-09 NOTE — BH CONSULTATION LIAISON ASSESSMENT NOTE - NSBHCONSULTFOLLOWAFTERCARE_PSY_A_CORE FT
- [facility] for psychiatric care, medication management, therapy  - [facility] for outpatient rehab program

## 2024-12-09 NOTE — BH CONSULTATION LIAISON ASSESSMENT NOTE - NSBHTIMEACTIVITIESPERFORMED_PSY_A_CORE
chart review, pt interview, medical decision making, coordination of care    time spent excludes time teaching student

## 2024-12-09 NOTE — BH CONSULTATION LIAISON ASSESSMENT NOTE - NSBHMSEINSIGHT_PSY_A_CORE
Patient sees methadone as something she must get off of and seems to be using it interchangeably with fentanyl, as if skipping a step between fentanyl abuse and being clean./Fair

## 2024-12-09 NOTE — PROGRESS NOTE ADULT - PROBLEM SELECTOR PLAN 2
Currently enrolled in methadone program, reports that her regimen has been stable and she feels comfortable at this time. COWS 0.     - Continue with home methadone dose (changed it to oral solution due to pill burden with pills) Currently enrolled in methadone program, reports that her regimen has been stable and she feels comfortable at this time. COWS 0. No active drug use.    - Continue with home methadone dose (changed it to oral solution due to pill burden with pills)

## 2024-12-09 NOTE — BH CONSULTATION LIAISON ASSESSMENT NOTE - VIOLENCE RISK FACTORS:
Feeling of being under threat and being unable to control threat/Substance abuse/History of being victimized/traumatized/Noncompliance with treatment/Community stressors that increase the risk of destabilization

## 2024-12-09 NOTE — DISCHARGE NOTE PROVIDER - CARE PROVIDER_API CALL
Antonio Mata  Gastroenterology  [address] [place] [address], [address]  [place] [address]  Phone: [phone]  Fax: [phone]  Established Patient  Follow Up Time: 1 week   Antonio Mata  Gastroenterology  [address] [place] [address], [address]  [place] [address]  Phone: [phone]  Fax: [phone]  Established Patient  Follow Up Time: 1 week    Ketan Eaton  Joint Reconstruction  [address], [address]  [place] [address]  Phone: [phone]  Fax: [phone]  Scheduled Appointment: 12/23/2024 11:00 AM

## 2024-12-09 NOTE — DISCHARGE NOTE PROVIDER - NSDCFUADDAPPT_GEN_ALL_CORE_FT
Please schedule follow up with outpatient gastroenterology for evaluation of GI bleed and for Hepatitis C treatment.     Please schedule follow up with outpatient Orthopedic surgery. (1) Please note that we were to schedule an appointment with your Gastroenterology Provider, Dr. Antonio Mata as we were unable to get a hold of office personnel.  Please schedule for evaluation of GI bleed and for Hepatitis C treatment with 1 week of our hospital discharge.    Appointment was facilitated by Ms. YOKO Solorzano, Referral Coordinator.    Please schedule follow up with outpatient Orthopedic surgery. Please bring your Insurance card, Photo ID and Discharge paperwork to the following appointment:    (1) Please follow up with your Orthopaedic Surgery Provider, Dr. Ketan Eaton at [address] [place] [address], [address], [place] [id number] on 12/23/2024 at 11:00am.    Appointment was scheduled by Ms. YOKO Solorzano, Referral Coordinator.      (2) Please note that we were to schedule an appointment with your Gastroenterology Provider, Dr. Antonio Mata as we were unable to get a hold of office personnel.  Please schedule for evaluation of GI bleed and for Hepatitis C treatment with 1 week of our hospital discharge.    Appointment was facilitated by Ms. YOKO Solorzano, Referral Coordinator.

## 2024-12-09 NOTE — BH CONSULTATION LIAISON ASSESSMENT NOTE - NSICDXBHSECONDARYDX_PSY_ALL_CORE
Bipolar disorder   F31.9  Methadone dependence   F11.20   Bipolar disorder   F31.9  Cocaine use disorder   F14.10

## 2024-12-09 NOTE — BH CONSULTATION LIAISON ASSESSMENT NOTE - NSBHMSEJUDGE_PSY_A_CORE
Patient actively seeking both psychiatric and substance abuse treatment and advocating for herself/Good

## 2024-12-09 NOTE — BH CONSULTATION LIAISON ASSESSMENT NOTE - OTHER PAST PSYCHIATRIC HISTORY (INCLUDE DETAILS REGARDING ONSET, COURSE OF ILLNESS, INPATIENT/OUTPATIENT TREATMENT)
Reports >10 inpatient psychiatric hospitalizations.  First at age 14 [place], received bipolar disorder diagnosis  Most recent about 5 months ago at [facility]. Per chart review, pt with prior listed diagnoses of :  Schizoaffective disorder, bipolar type   F25.0  Post traumatic stress disorder (PTSD)   F43.10  Polysubstance use disorder   F19.90  Opioid use disorder, severe, on maintenance therapy   F11.20  Borderline personality disorder   F60.3  R/O Substance induced mood disorder   F19.94  Treated on last admission in July 2024 with Seroquel and Remeron  Reports >10 inpatient psychiatric hospitalizations.  First at age 14 [place], received bipolar disorder diagnosis  Most recent about 5 months ago at [facility].  No current OP psychiatrist

## 2024-12-09 NOTE — BH CONSULTATION LIAISON ASSESSMENT NOTE - NSSUICPROTFACT_PSY_ALL_CORE
Identifies reasons for living/Cultural, spiritual and/or moral attitudes against suicide/Hoahaoism beliefs

## 2024-12-09 NOTE — BH CONSULTATION LIAISON ASSESSMENT NOTE - DESCRIPTION
Hx polysubstance use since age 11.  Most recently using fentanyl, worse since her   in 2020.  Clean for 18 months starting 2022, then using fentanyl again every day, with occasional cocaine use.  Now engaged in methadone therapy Lives with roommate at [facility] for people with mental illness/substance use disorders  Has 10 children, 2 reported   Hx polysubstance use since age 11.  Most recently using fentanyl, worse since her   in 2020.  Clean for 18 months starting 2022, then using fentanyl again every day, with occasional cocaine use.  Now engaged in methadone therapy

## 2024-12-09 NOTE — BH CONSULTATION LIAISON ASSESSMENT NOTE - HPI (INCLUDE ILLNESS QUALITY, SEVERITY, DURATION, TIMING, CONTEXT, MODIFYING FACTORS, ASSOCIATED SIGNS AND SYMPTOMS)
Ms. Escobar is a 59 year old woman with a past medical history of polysubstance use (cocaine, opioids, methadone), Hepatitis C positive, anemia, CKD, epilepsy, and recent upper GI bleed, admitted to the hospital for anemia and hypovolemic shock secondary to UGIB. She is s/p clipping of a 20mm ulceration. She attributes upper GI bleed to Naproxen use ("I was taking them like candy").    Today, the attending psychiatrist and medical student encountered the patient on the phone with a  rehab program trying to secure services there.  Her methadone was administered after she got off the phone, at which point the psychiatry team was able to begin the interview.      Ms. Escobar was alert, pleasant, and cooperative with the interview. She is interested in finding a therapist and a psychiatrist to help manage her medications. She has used the services at [facility] on [address] [place] [address] in the past and hopes to continue her care there.  She reports recent success with Zyprexa 2.5mg for her bipolar disorder.   Questioned about past depressive and manic episodes, she states that she is "never in the middle, always too high or too low." She also mentions that she felt odd after the births of her 10 children, saying "I didn't want to take care of them."    She described a long history of drug use since the age of 11. She recalls her first inpatient psychiatric admission at [facility] at the age of 14 after a suicide attempt, when she was first diagnosed with bipolar disorder. She reports a history of some "touching" by family members during her childhood. She estimates that she has experienced more than 10 inpatient psychiatric hospitalizations ("they always want to lock me up").  Her last hospitalization was about 5 months ago at [facility].     From 10/26/22 - 10/31/23, the patient was in an inpatient rehab program, [facility], and remained clean for 18 months total. After that, she reports using fentanyl "every day." She is currently engaged in methadone maintenance therapy and last used fentanyl about 3 weeks ago. She is hoping to taper down her methadone and get off of it eventually. She is counseled to stay engaged with the methadone clinic to decide what kind of dose changes are safest for her.    She is currently living in a hotel, which she describes as very dangerous. She says she must sneak out at 3:30 or 4 in the morning to avoid being robbed in the lobby at 5, when residents are officially allowed to leave. She is hoping to find independent living arrangements with the help of social work.    She denies SI ("that's a sin"), HI, current AVH. She describes sometimes "seeing things" but sees these experiences as "spiritual" and connected to Evangelical and culture and says they do not cause her any distress.  Ms. Escobar is a 59 year old woman with a past medical history of polysubstance use (cocaine, opioids, methadone), bipolar v. schizoaffective d/o, Hepatitis C positive, anemia, CKD, epilepsy, and recent upper GI bleed, admitted to the hospital for anemia and hypovolemic shock secondary to UGIB. She is s/p clipping of a 20mm ulceration. She attributes upper GI bleed to Naproxen use ("I was taking them like candy").    Today, the attending psychiatrist and medical student encountered the patient on the phone with a  rehab program trying to secure services there.  Her methadone was administered after she got off the phone, at which point the psychiatry team was able to begin the interview.      Ms. Escobar was alert, pleasant, and cooperative with the interview. She is interested in finding a therapist and a psychiatrist to help manage her medications. She has used the services at [facility] on [address] [place] [address] in the past and hopes to continue her care there.  She reports recent success with Zyprexa 2.5mg for her bipolar disorder.   Questioned about past depressive and manic episodes, she states that she is "never in the middle, always too high or too low." She also mentions that she felt odd after the births of her 10 children, saying "I didn't want to take care of them."    She described a long history of drug use since the age of 11. She recalls her first inpatient psychiatric admission at [facility] at the age of 14 after a suicide attempt, when she was first diagnosed with bipolar disorder. She reports a history of some "touching" by family members during her childhood. She estimates that she has experienced more than 10 inpatient psychiatric hospitalizations ("they always want to lock me up").  Her last hospitalization was about 5 months ago at [facility].     From 10/26/22 - 10/31/23, the patient was in an inpatient rehab program, [facility], and remained clean for 18 months total. After that, she reports using fentanyl "every day." She is currently engaged in methadone maintenance therapy and last used fentanyl about 3 weeks ago. She is hoping to taper down her methadone and get off of it eventually. She is counseled to stay engaged with the methadone clinic to decide what kind of dose changes are safest for her.    She is currently living in a hotel, which she describes as very dangerous. She says she must sneak out at 3:30 or 4 in the morning to avoid being robbed in the lobby at 5, when residents are officially allowed to leave. She is hoping to find independent living arrangements with the help of social work.    She denies SI ("that's a sin"), HI, current AVH. She describes sometimes "seeing things" but sees these experiences as "spiritual" and connected to Ep[employer] and culture and says they do not cause her any distress.     Prior medical record reviewed including past CL notes during medical admission in July 2024 - dx was schizoaffective d/o bipolar type at that time, with additional listed diagnoses of BPD, PTSD, OUD. treated with Seroquel and Remeron and referred to OP care.

## 2024-12-09 NOTE — PROGRESS NOTE ADULT - PROBLEM SELECTOR PLAN 4
Reports primary condition is depression. Previously taking Seroquel with the development of seizures, now off maintenance medication. Previously on buspar though self-discontinued by pt due to side effects (shakiness).  Started on Zyprexa inpatient for treatment.    - Psych consulted   - Reports no current home medications.  - Plan to make Zyprexa 2.5 prn if QTC still normal.

## 2024-12-09 NOTE — BH CONSULTATION LIAISON ASSESSMENT NOTE - NSBHCHARTREVIEWLAB_PSY_A_CORE FT
9.4    5.24  )-----------( 214      ( 09 Dec 2024 10:00 )             30.5     12-09    141  |  105  |  8   ----------------------------<  93  4.1   |  29  |  0.83    Ca    8.6      09 Dec 2024 10:00  Phos  3.1     12-09  Mg     1.8     12-09    TPro  5.9[L]  /  Alb  2.9[L]  /  TBili  0.3  /  DBili  x   /  AST  85[H]  /  ALT  62[H]  /  AlkPhos  73  12-08    No utox seen

## 2024-12-09 NOTE — BH CONSULTATION LIAISON ASSESSMENT NOTE - NSBHATTESTCOMMENTATTENDFT_PSY_A_CORE
60yo woman, , domiciled at a hotel, unemployed, with a PPH of polysubstance abuse since age 11 (including heroin, fentanyl, cocaine, nicotine) currently part of [facility], self-reported bipolar disorder, childhood trauma, past charted diagnoses of schizoaffective d/o, borderline PD, PMH of anemia, CKD, HCV, epilepsy, at least 10 reported past psychiatric admissions first at age 14 (for suicide attempt) most recently at [facility]six months ago, not currently engaged in OP psychiatric care, who presented  with AMS, found to have large melena, admitted for management of GI bleed. Psychiatry consulted for medication management of bipolar disorder and connection to OP treatment. Pt has been treated with home methadone 170mg and was started on olanzapine 2.5mg QHS for agitation post-extubation per chart, with prior home quetiapine held as pt reported past seizure provocation with use.    On interview this morning, pt found calm, cooperative, pleasant, fully oriented, euthymic affect, somewhat tangential TP at times, reality based TC, fair insight and judgment. Reports current good mood, though historically has high highs and low lows that are difficult to characterize, no recent sx suggestive of a depressive episode, amando or clear psychosis (does report long-standing perceptual differences that she attributes to her Egyptian culture; chart indicates past psychosis). Reports regret for “popping Naproxen like candy” and potentially causing a GI bleed because she should have “known better”. Shares that she has had a difficult time since her   in , with no engagement in psychiatric care and intermittent relapse on fentanyl and cocaine, difficult social situation since losing her apartment. Has been consistently attending [facility] and is trying to taper her dose (from 250mg to 170mg today, goal of discontinuing). States that she is currently highly motivated for OP psychiatric care and OP substance rehab, and has been making arrangements for both at [facility] () and a [facility] rehab program while in hospital. No recent reported SI, no HI.     Impression is of pt with a severe opioid use disorder, cocaine use disorder, and possible schizoaffective d/o (self-reported bipolar disorder, reported history of post-partum onset mood sx after births of 10 children, prior records indicate psychotic d/o dx), without current acute mood episode or decompensated psychosis. Pt is future oriented and motivated for treatment, interested in OP psychiatric care and substance rehab in addition to continuing with [facility]. It is reasonable to continue low dose olanzapine for now for mood stabilization and possible psychosis pending OP assessment, though long-term need is unclear and earlier agitation was likely due to delirium now resolved.    Recommendations: Routine observation. Continue home methadone – consider divided doses if any concern for adverse effects such as oversedation or hypotension – for methadone maintenance. Continue olanzapine 2.5mg QHS for mood stabilization. Counseling provided about substance use. Encourage OP psychiatric care and substance rehab – pt making own arrangements for both. Delirium precautions. No psychiatric barrier to dc when medically ready.

## 2024-12-09 NOTE — BH CONSULTATION LIAISON ASSESSMENT NOTE - NSBHMSEAFFRANGE_PSY_A_CORE
Pt to ed with right sided weakness, slurred speech, and altered mental status. Pt was last seen normal by roommate at 1800hr last night. Ems report roommate heard patient fall this am at 0200hr and was unable to assist pt back up.  
Full

## 2024-12-09 NOTE — PROGRESS NOTE ADULT - PROBLEM SELECTOR PLAN 1
Presented with encephalopathy, found to be in hemorrhagic shock. Patient is s/p a total of 12 units of PRBCs transfused, clipping of 20mm ulceration in gastric antrum performed. Repeat EGD performed which shows no evidence of rebleed, hemoglobin has stabilized.     - Maintain active T&S  - Continue to monitor for repeat bleeding event.  - PT eval after reintubations and anemia, pending xrays due to L hip and knee pain  - Lidocaine patch for pain as needed   - Omeprazole on discharge.  - Hpylori testing in outpatient setting.

## 2024-12-09 NOTE — OCCUPATIONAL THERAPY INITIAL EVALUATION ADULT - DIAGNOSIS, OT EVAL
Pt admitted s/p AMS and Abdominal pain, presents with generalized deconditioning, impaired balance, and decreased activity tolerance.

## 2024-12-09 NOTE — PROGRESS NOTE ADULT - PROBLEM SELECTOR PLAN 6
On Keppra for recurrent seizures, thought to be due to Seroquel initiation in the past.     - Continue with home keppra.  - Epilepsy consulted

## 2024-12-09 NOTE — OCCUPATIONAL THERAPY INITIAL EVALUATION ADULT - GENERAL OBSERVATIONS, REHAB EVAL
Pt received seated EOB, +heplock, NAD, and agreeable to OT. Rehab Tech Jonathan present for evaluation. Cleared by ANUJ Soliz to see.

## 2024-12-09 NOTE — OCCUPATIONAL THERAPY INITIAL EVALUATION ADULT - MODIFIED CLINICAL TEST OF SENSORY INTEGRATION IN BALANCE TEST
Pt able to ambulate 15 feet x5 with min A +1 person for safety using rollator. Pt required multiple rest breaks 2/2 fatigue and SOB ambulating short distance. Pt demonstrates unsteadiness, difficulty weight-shifting, and mod verbal cues for safety awareness with rollator.

## 2024-12-09 NOTE — CHART NOTE - NSCHARTNOTEFT_GEN_A_CORE
No further inpatient epilepsy workup required. Patient can be discharged on her home Keppra dose with routine follow ups with her outpatient neurologist.

## 2024-12-09 NOTE — BH CONSULTATION LIAISON ASSESSMENT NOTE - NSBHMSEREMMEM_PSY_A_CORE
Difficulty recalling events leading up to first psychiatric hospitalization, but does recall how she felt after the births of her children/Impaired

## 2024-12-09 NOTE — BH CONSULTATION LIAISON ASSESSMENT NOTE - NSBHSAOPI_PSY_A_CORE FT
Last use fentanyl 3 weeks ago Last use fentanyl 3 weeks ago. opioid use reported since age 11, past IV heroin

## 2024-12-09 NOTE — PROGRESS NOTE ADULT - SUBJECTIVE AND OBJECTIVE BOX
Internal Medicine Progress Note  Lizethsarai Shaniqua PGY1     OVERNIGHT EVENTS/INTERVAL HPI: No acute events, resting comfortably. Lidocaine patch for L hip pain. repeat /64, HR 80, last BM this afternoon brown. gave tylenol knee pain. BP 89/56, HR 91 in am, rechecked 92/61 HR 79, pt mentating well    OBJECTIVE:  Vital Signs Last 24 Hrs  T(C): 36.6 (09 Dec 2024 05:25), Max: 36.8 (08 Dec 2024 15:39)  T(F): 97.9 (09 Dec 2024 05:25), Max: 98.2 (08 Dec 2024 15:39)  HR: 91 (09 Dec 2024 05:25) (72 - 91)  BP: 89/56 (09 Dec 2024 05:25) (89/56 - 103/67)  BP(mean): 67 (09 Dec 2024 05:25) (67 - 67)  RR: 18 (09 Dec 2024 05:25) (18 - 18)  SpO2: 97% (09 Dec 2024 05:25) (97% - 100%)    Parameters below as of 09 Dec 2024 05:25  Patient On (Oxygen Delivery Method): room air      I&O's Detail    08 Dec 2024 07:01  -  09 Dec 2024 07:00  --------------------------------------------------------  IN:  Total IN: 0 mL    OUT:    Voided (mL): 1350 mL  Total OUT: 1350 mL    Total NET: -1350 mL          PHYSICAL EXAM:  Constitutional: No acute distress, resting comfortably in bed.    HEENT: Sclera non-icteric, no JVD, MMM.  Respiratory: Clear to auscultation bilaterally, adequate air entry, no wheezing, no rhonchi, no rales.  Cardiovascular: Regular rate and rhythm, normal S1S2.  Gastrointestinal: soft, non-tender and non-distended.  Extremities: Warm, well perfused, pulses equal bilateral upper and lower extremities, no edema. Cap refill <3 in bilateral hands   Neurological: AAOx3.   Skin: Normal temperature, warm, dry.affect    Medications:  MEDICATIONS  (STANDING):  chlorhexidine 2% Cloths 1 Application(s) Topical <User Schedule>  levETIRAcetam 750 milliGRAM(s) Oral every 12 hours  OLANZapine 2.5 milliGRAM(s) Oral at bedtime  pantoprazole    Tablet 40 milliGRAM(s) Oral every 12 hours    MEDICATIONS  (PRN):      Labs:                        9.1    5.17  )-----------( 161      ( 08 Dec 2024 15:30 )             28.3     12-08    145  |  108  |  8   ----------------------------<  98  3.9   |  27  |  0.96    Ca    8.5      08 Dec 2024 15:30  Phos  3.1     12-08  Mg     1.7     12-08    TPro  5.9[L]  /  Alb  2.9[L]  /  TBili  0.3  /  DBili  x   /  AST  85[H]  /  ALT  62[H]  /  AlkPhos  73  12-08    PT/INR - ( 08 Dec 2024 15:30 )   PT: 12.7 sec;   INR: 1.11              Urinalysis Basic - ( 08 Dec 2024 15:30 )    Color: x / Appearance: x / SG: x / pH: x  Gluc: 98 mg/dL / Ketone: x  / Bili: x / Urobili: x   Blood: x / Protein: x / Nitrite: x   Leuk Esterase: x / RBC: x / WBC x   Sq Epi: x / Non Sq Epi: x / Bacteria: x          Radiology: Reviewed

## 2024-12-09 NOTE — DISCHARGE NOTE PROVIDER - NSDCCPCAREPLAN_GEN_ALL_CORE_FT
PRINCIPAL DISCHARGE DIAGNOSIS  Diagnosis: Acute GI bleeding  Assessment and Plan of Treatment: Ms. Escobar, you were admitted to the hospital because you were confused and had a significant bleed in your stomach from an ulcer. You needed a blood transfusion and a procedure to stop the bleeding. Thankfully, the procedure was successful, and the bleeding has stopped.  Here's a summary of what happened during your hospital stay:  Stomach Ulcer: The bleeding ulcer was treated, and you're now taking omeprazole to help prevent future ulcers. You'll need a test for H. pylori infection when you see the gastroenterologist. It's important to avoid medications like ibuprofen and naproxen (NSAIDs) as they can cause ulcers.  Methadone: You'll continue taking your methadone, but we've switched it to a liquid form to make it easier to take.  Hepatitis C: We've arranged for you to see a gastroenterologist to discuss treatment options for your Hepatitis C.  Mood: We consulted with the psychiatry team, and they recommended Zyprexa, which you can take as needed if you experience mood changes. Be sure to check your heart rhythm (QTC interval) before regularly taking Zyprexa.  Anemia (Low Blood Count): Your blood count is improving. We'll continue to monitor it.  Seizures: You should continue taking your Keppra to prevent seizures. We've also consulted with the neurology/epilepsy team for your ongoing care.  Pain: We understand you've been experiencing pain in your left hip and knee. You'll need to get x-rays to determine the cause of the pain. We've prescribed lidocaine patches to help manage the pain, and you'll also have a physical therapy evaluation.  Important next steps:   FOLLOW UPS AND MEDS      SECONDARY DISCHARGE DIAGNOSES  Diagnosis: Moderate substance use disorder  Assessment and Plan of Treatment:      PRINCIPAL DISCHARGE DIAGNOSIS  Diagnosis: Acute GI bleeding  Assessment and Plan of Treatment: Your digestive or gastrointestinal (GI) tract includes the esophagus, stomach, small intestine, large intestine or colon, rectum, and anus. Massive bleeding can be life-threatening. Small amounts of bleeding over time may lead to other conditions, such as anemia.  Treatment involves identifying the source of the bleeding, usually with a colonoscopy. During the procedure, various techniques can stop the bleeding. You were also treated with a medication called pantoprazole. You will continue to take this medication for one week. Please follow up with a primary care physician and GI doctor.   MEDICATIONS TO START:  - Pantoprazole 40 mg twice a day for one week then once daily

## 2024-12-09 NOTE — BH CONSULTATION LIAISON ASSESSMENT NOTE - DIFFERENTIAL
Opioid use disorder  Bipolar disorder Opioid use disorder, dependence, on maintenance treatment  Cocaine use disorder  Bipolar disorder by history. r/o substance induced mood/psychotic d/o  recent delirium

## 2024-12-09 NOTE — DISCHARGE NOTE PROVIDER - HOSPITAL COURSE
#Discharge, do not delete     Ms. Escobar, a 59-year-old female with a past medical history of polysubstance use (tobacco, opioid dependence on methadone, hepatitis C), anemia, chronic kidney disease, and epilepsy, presented with altered mental status and was found to be in hemorrhagic shock secondary to an upper gastrointestinal bleed from a gastric antral ulcer likely precipitated by NSAID use. On admission, her hemoglobin was 5.9 g/dL, and she had melena. She received 12 units of packed red blood cells and underwent endoscopic clipping of a 20mm ulcer. Repeat EGD showed no further bleeding, and her hemoglobin stabilized. She had a few small episodes of melena post-procedure, which have now resolved.    Hospital Course:    GI Bleed: Successfully treated with endoscopic clipping and blood transfusion. To be followed outpatient for H. pylori testing. Discharged on omeprazole.  Methadone Dependence: Continued on her home methadone dose, which was changed to oral solution due to pill burden.  Hepatitis C: GI consulted for outpatient follow-up and treatment.  Bipolar Disorder: Psychiatry consulted. Started on Zyprexa 2.5 mg PRN for mood stabilization, pending normal QTC. Previous Seroquel use was associated with seizures.  Anemia: Will continue to be monitored outpatient. Previous iron studies were notable for low TIBC. Folate and B12 levels were normal.  Epilepsy: Continued on home Keppra. Epilepsy consulted.  Pain: Experienced left hip and knee pain. Pending x-rays as outpatient. Prescribed lidocaine patches PRN. Physical therapy evaluation planned after recovery from anemia and intubation.  Discharge Medications: Omeprazole, methadone solution, Keppra, Zyprexa 2.5mg PRN, lidocaine patches PRN.    Discharge Instructions:  Follow up with GI for Hepatitis C treatment and H. Pylori testing.  Follow up with Psychiatry for mood management.  Follow up with Neurology/Epilepsy.  Follow up for left hip and knee pain and physical therapy evaluation.  Continue to avoid NSAIDs.    Disposition: ?? #Discharge, do not delete     Ms. Escobar, a 59-year-old female with a past medical history of polysubstance use (tobacco, opioid dependence on methadone, hepatitis C), anemia, chronic kidney disease, and epilepsy, presented with altered mental status and was found to be in hemorrhagic shock secondary to an upper gastrointestinal bleed from a gastric antral ulcer likely precipitated by NSAID use. On admission, her hemoglobin was 5.9 g/dL, and she had melena. She received 12 units of packed red blood cells and underwent endoscopic clipping of a 20mm ulcer. Repeat EGD showed no further bleeding, and her hemoglobin stabilized. She had a few small episodes of melena post-procedure, which have now resolved.    Hospital Course:    GI Bleed: Successfully treated with endoscopic clipping and blood transfusion. To be followed outpatient for H. pylori testing. Discharged on omeprazole.  Methadone Dependence: Continued on her home methadone dose, which was changed to oral solution due to pill burden.  Hepatitis C: GI consulted for outpatient follow-up and treatment.  Bipolar Disorder: Psychiatry consulted. Started on Zyprexa 2.5 mg PRN for mood stabilization, pending normal QTC. Previous Seroquel use was associated with seizures.  Anemia: Will continue to be monitored outpatient. Previous iron studies were notable for low TIBC. Folate and B12 levels were normal.  Epilepsy: Continued on home Keppra. Epilepsy consulted.  Experienced left hip and knee pain. Xrays show joint erosion in the left hip and knee, ortho consulted with plans for outpatient evaluation.  Prescribed lidocaine patches PRN. Physical therapy evaluation planned with pain control.   Discharge Medications: Omeprazole, methadone solution, Keppra, Zyprexa 2.5mg PRN, lidocaine patches PRN.    Discharge Instructions:  Follow up with GI for Hepatitis C treatment and H. Pylori testing.  Follow up with Psychiatry for mood management.  Follow up with Neurology/Epilepsy.  Follow up for left hip and knee pain and physical therapy evaluation.  Continue to avoid NSAIDs.    Disposition: ?? Ms. Escobar, a 59-year-old female with a past medical history of polysubstance use (tobacco, opioid dependence on methadone, hepatitis C), anemia, chronic kidney disease, and epilepsy, presented with altered mental status and was found to be in hemorrhagic shock secondary to an upper gastrointestinal bleed from a gastric antral ulcer likely precipitated by NSAID use. On admission, her hemoglobin was 5.9 g/dL, and she had melena. She received 12 units of packed red blood cells and underwent endoscopic clipping of a 20mm ulcer. Repeat EGD showed no further bleeding, and her hemoglobin stabilized. She had a few small episodes of melena post-procedure, which have now resolved.    Hospital Course:  GI Bleed: Successfully treated with endoscopic clipping and blood transfusion. To be followed outpatient for H. pylori testing. Discharged on omeprazole.  Methadone Dependence: Continued on her home methadone dose, which was changed to oral solution due to pill burden.  Hepatitis C: GI consulted for outpatient follow-up and treatment.  Bipolar Disorder: Psychiatry consulted. Started on Zyprexa 2.5 mg PRN for mood stabilization, pending normal QTC. Previous Seroquel use was associated with seizures.  Anemia: Will continue to be monitored outpatient. Previous iron studies were notable for low TIBC. Folate and B12 levels were normal.  Epilepsy: Continued on home Keppra. Epilepsy consulted.  Experienced left hip and knee pain. Xrays show joint erosion in the left hip and knee, ortho consulted with plans for outpatient evaluation.  Prescribed lidocaine patches PRN. Physical therapy evaluation planned with pain control.   Discharge Medications: Omeprazole, methadone solution, Keppra, Zyprexa 2.5mg PRN, lidocaine patches PRN.    Discharge Instructions:  Follow up with GI for Hepatitis C treatment and H. Pylori testing.  Follow up with Psychiatry for mood management.  Follow up with Neurology/Epilepsy.  Follow up for left hip and knee pain and physical therapy evaluation.  Continue to avoid NSAIDs.    Discharge to shelter Ms. Escobar, a 59-year-old female with a past medical history of polysubstance use (tobacco, opioid dependence on methadone, hepatitis C), anemia, chronic kidney disease, and epilepsy, presented with altered mental status and was found to be in hemorrhagic shock secondary to an upper gastrointestinal bleed from a gastric antral ulcer likely precipitated by NSAID use. On admission, her hemoglobin was 5.9 g/dL, and she had melena. She received 12 units of packed red blood cells and underwent endoscopic clipping of a 20mm ulcer. Repeat EGD showed no further bleeding, and her hemoglobin stabilized. She had a few small episodes of melena post-procedure, which have now resolved.  Ms. Escobar is a 59-year-old female w/ PMH of polysubstance use (tobacco, opioids on methadone, Hep C positive), anemia, CKD, and epilepsy BIBEMS for AMS found to have Hgb 5.9 and melena admitted to MICU w/ hemorrhagic shock 2/2 UGIB iso NSAID use, s/p clipping of 20mm ulceration in gastric antrum. Patient has had several small episodes of melena since that event which have now resolved.     Hemorrhagic shock.   ·  Plan: Presented with encephalopathy, found to be in hemorrhagic shock. Patient is s/p a total of 12 units of PRBCs transfused, clipping of 20mm ulceration in gastric antrum performed. Repeat EGD performed which shows no evidence of rebleed, hemoglobin has stabilized.   - H pylori testing outpatient  - Follow up GI outpatient  - Pantoprazole 40 BID for 2 weeks then once daily    Severe opioid use disorder on maintenance therapy.   Currently enrolled in methadone program, reports that her regimen has been stable and she feels comfortable at this time. COWS 0. No active drug use.  - Continue with home methadone dose (changed it to oral solution due to pill burden with pills).    HCV (hepatitis C virus).   ·  Plan: Patient found with chronic Hepatitis C infection, discussion with GI for outpatient follow up for treatment.    Bipolar disorder.   Reports primary condition is depression. Previously taking Seroquel with the development of seizures, now off maintenance medication. Previously on buspar though self-discontinued by pt due to side effects (shakiness).  - Psych consulted. recs: - continue Zyprexa 2.5mg QHS for mood stabilization and recent agitation; to be reassessed as OP    Epilepsy.   On Keppra for recurrent seizures, thought to be due to Seroquel initiation in the past.    - c/w Keppra 750mg bid .     Hip pain, acute.   With hip pain that is limiting ambulation.   #L hip OA - xrays done, note bone-on-bone contanct of femoral head  - WBS: WBAT with walker  - follow up outpatient orthopedics    Physical exam on DC:  Constitutional: No acute distress, resting comfortably in bed.    HEENT: Sclera non-icteric, no JVD, MMM.  Respiratory: Clear to auscultation bilaterally, adequate air entry, no wheezing, no rhonchi, no rales.  Cardiovascular: Regular rate and rhythm, normal S1S2.  Gastrointestinal: soft, non-tender and non-distended.  Extremities: Warm, well perfused, pulses equal bilateral upper and lower extremities, no edema. Cap refill <3 in bilateral hands   Neurological: AAOx3.   Skin: Normal temperature, warm, dry.affect

## 2024-12-09 NOTE — CONSULT NOTE ADULT - SUBJECTIVE AND OBJECTIVE BOX
Orthopaedic Surgery Consult Note  For Surgeon: Mary    CC: Left hip and knee pain    HPI:  60yo Female with Past hx of IVD use currently on methadone treatments complaining of left hip and left knee pain for many years now that has been getting worse over time without improvement. Pt states that there was no accident or injury and that she has been having a hard time walking more and more. Pt uses a rolling walker at baseline in order to ambulate as her pain makes it difficult to do so. Pt states that she was taking naproxen at home to help with the pain up until she was admitted to the hospital. Pt denies CP, SOB, N/V, HA, dizziness, numbness/tingling.       ROS: + left hip pain, + left knee pain  Rest of ROS is noncontributory    Allergies    No Known Allergies    Intolerances        PAST MEDICAL & SURGICAL HISTORY:  IVDU (intravenous drug user)      Anemia      Asthma      Epilepsy      No significant past surgical history    Family History: noncontributory    Social History: noncontributory    MEDICATIONS  (STANDING):  chlorhexidine 2% Cloths 1 Application(s) Topical <User Schedule>  levETIRAcetam 750 milliGRAM(s) Oral every 12 hours  methadone  Concentrate 170 milliGRAM(s) Oral every 24 hours  OLANZapine 2.5 milliGRAM(s) Oral at bedtime  pantoprazole    Tablet 40 milliGRAM(s) Oral every 12 hours    MEDICATIONS  (PRN):      Vital Signs Last 24 Hrs  T(C): 37 (09 Dec 2024 15:34), Max: 37 (09 Dec 2024 15:34)  T(F): 98.6 (09 Dec 2024 15:34), Max: 98.6 (09 Dec 2024 15:34)  HR: 88 (09 Dec 2024 15:34) (77 - 91)  BP: 101/70 (09 Dec 2024 15:34) (84/61 - 101/70)  BP(mean): 80 (09 Dec 2024 15:34) (67 - 80)  RR: 18 (09 Dec 2024 15:34) (18 - 18)  SpO2: 98% (09 Dec 2024 15:34) (97% - 100%)    Parameters below as of 09 Dec 2024 15:34  Patient On (Oxygen Delivery Method): room air        Physical Exam:  VS: stable, reviewed per nursing documentation  General: No acute distress, resting comfortably  CV: no cyanosis, no peripheral edema   Skin: Warm, dry, no rash, no lesions, no abrasions no ecchymosis   MSK: atraumatic with exception of below  LLE     -Inspection/palpation: no tenderness to palpation over the left hip, left knee, no swelling over left knee or hip, no deformity of the left knee and hip    -ROM: limited AROM secondary to pain of the left knee and left hip, limited PROM of left hip and knee but painful    -Motor: Quad/hamstring/TA/GS/EHL/FHL 5/5 bilateral lower extremities;     -Sensation: intact to light touch bilateral lower extremities    -Pulses: 2+ DP pulses bilaterally, symmetric, extremities warm and well perfused, capillary refill brisk                              9.4    5.24  )-----------( 214      ( 09 Dec 2024 10:00 )             30.5     12-09    141  |  105  |  8   ----------------------------<  93  4.1   |  29  |  0.83    Ca    8.6      09 Dec 2024 10:00  Phos  3.1     12-09  Mg     1.8     12-09    TPro  5.9[L]  /  Alb  2.9[L]  /  TBili  0.3  /  DBili  x   /  AST  85[H]  /  ALT  62[H]  /  AlkPhos  73  12-08      Imaging:     Xray Left Knee:  Impression: No fracture, dislocation, or joint effusion.  Tricompartment osteoarthritis with medial tibiofemoral compartment   predominance. No joint margin erosions.  Unremarkable quadriceps and patellar tendon shadows.    Xray Left Hip:  IMPRESSION:  Currently markedly flattened/collapsed femoral head with slight lateral   femoral subluxation and advanced left hip osteoarthritis with   bone-on-bone contact of collapsed femoral head againstthe acetabular   roof, differential considerations include advanced AVN or rapidly   destructive coxarthropathy (Postel's).    No acute fractures or dislocations.    No discrete lytic or blastic lesions.      Generalized osteopenia otherwise no discrete lytic or blastic lesions.      A/P: 60yo Female with left hip and left knee osteoarthritis  - No acute orthopedic intervention at this time  - Pain control  - WBS: WBAT with walker  - Continue Physical therapy  - Outpatient f/u for discussion on surgical intervention at a later date  - Discussed plan of care with Dr. Hernandez     I have spent 45 minutes on this encounter and reviewing imaging    Ortho Pager [phone]   Orthopaedic Surgery Consult Note  For Surgeon: Mary    CC: Left hip and knee pain    HPI:  60yo Female with Past hx of IVD use currently on methadone treatments complaining of left hip and left knee pain for many years now that has been getting worse over time without improvement. Pt states that there was no accident or injury and that she has been having a hard time walking more and more. Pt uses a rolling walker at baseline in order to ambulate as her pain makes it difficult to do so. Pt states that she was taking naproxen at home to help with the pain up until she was admitted to the hospital. Pt denies CP, SOB, N/V, HA, dizziness, numbness/tingling.       ROS: + left hip pain, + left knee pain  Rest of ROS is noncontributory    Allergies    No Known Allergies    Intolerances        PAST MEDICAL & SURGICAL HISTORY:  IVDU (intravenous drug user)      Anemia      Asthma      Epilepsy      No significant past surgical history    Family History: noncontributory    Social History: noncontributory    MEDICATIONS  (STANDING):  chlorhexidine 2% Cloths 1 Application(s) Topical <User Schedule>  levETIRAcetam 750 milliGRAM(s) Oral every 12 hours  methadone  Concentrate 170 milliGRAM(s) Oral every 24 hours  OLANZapine 2.5 milliGRAM(s) Oral at bedtime  pantoprazole    Tablet 40 milliGRAM(s) Oral every 12 hours    MEDICATIONS  (PRN):      Vital Signs Last 24 Hrs  T(C): 37 (09 Dec 2024 15:34), Max: 37 (09 Dec 2024 15:34)  T(F): 98.6 (09 Dec 2024 15:34), Max: 98.6 (09 Dec 2024 15:34)  HR: 88 (09 Dec 2024 15:34) (77 - 91)  BP: 101/70 (09 Dec 2024 15:34) (84/61 - 101/70)  BP(mean): 80 (09 Dec 2024 15:34) (67 - 80)  RR: 18 (09 Dec 2024 15:34) (18 - 18)  SpO2: 98% (09 Dec 2024 15:34) (97% - 100%)    Parameters below as of 09 Dec 2024 15:34  Patient On (Oxygen Delivery Method): room air        Physical Exam:  VS: stable, reviewed per nursing documentation  General: No acute distress, resting comfortably  CV: no cyanosis, no peripheral edema   Skin: Warm, dry, no rash, no lesions, no abrasions no ecchymosis   MSK: atraumatic with exception of below  LLE     -Inspection/palpation: no tenderness to palpation over the left hip, left knee, no swelling over left knee or hip, no deformity of the left knee and hip    -ROM: limited AROM secondary to pain of the left knee and left hip, limited PROM of left hip and knee but painful    -Motor: Quad/hamstring/TA/GS/EHL/FHL 5/5 bilateral lower extremities;     -Sensation: intact to light touch bilateral lower extremities    -Pulses: 2+ DP pulses bilaterally, symmetric, extremities warm and well perfused, capillary refill brisk                              9.4    5.24  )-----------( 214      ( 09 Dec 2024 10:00 )             30.5     12-09    141  |  105  |  8   ----------------------------<  93  4.1   |  29  |  0.83    Ca    8.6      09 Dec 2024 10:00  Phos  3.1     12-09  Mg     1.8     12-09    TPro  5.9[L]  /  Alb  2.9[L]  /  TBili  0.3  /  DBili  x   /  AST  85[H]  /  ALT  62[H]  /  AlkPhos  73  12-08      Imaging:     Xray Left Knee:  Impression: No fracture, dislocation, or joint effusion.  Tricompartment osteoarthritis with medial tibiofemoral compartment   predominance. No joint margin erosions.  Unremarkable quadriceps and patellar tendon shadows.    Xray Left Hip:  IMPRESSION:  Currently markedly flattened/collapsed femoral head with slight lateral   femoral subluxation and advanced left hip osteoarthritis with   bone-on-bone contact of collapsed femoral head againstthe acetabular   roof, differential considerations include advanced AVN or rapidly   destructive coxarthropathy (Postel's).    No acute fractures or dislocations.    No discrete lytic or blastic lesions.      Generalized osteopenia otherwise no discrete lytic or blastic lesions.      A/P: 60yo Female with left hip and left knee osteoarthritis  - Non surgical intervention at this time.  - Pain control  - WBS: WBAT with walker  - Continue Physical therapy  - Outpatient f/u for discussion on surgical intervention at a later date  - Discussed plan of care with Dr. Hernandez     I have spent 45 minutes on this encounter and reviewing imaging    Ortho Pager [phone]

## 2024-12-09 NOTE — DISCHARGE NOTE PROVIDER - NSDCMRMEDTOKEN_GEN_ALL_CORE_FT
dicloxacillin 250 mg oral capsule: 2 cap(s) orally every 6 hours Please take 500mg of Dicloxacillin (2 tabs) every 6 hours for 14 days. Note each tablet is 250mg  levETIRAcetam 500 mg oral tablet: 1 tab(s) orally 2 times a day  melatonin 3 mg oral tablet: 1 tab(s) orally once a day (at bedtime)  methadone 10 mg oral tablet: 17 tab(s) orally every 24 hours  QUEtiapine 25 mg oral tablet: 3 tab(s) orally once a day (at bedtime)   Keppra 750 mg oral tablet: 1 tab(s) orally every 12 hours  Lidocare Pain Relief Patch 4% topical film: Apply topically to affected area once a day Apply 1 patch to affected area for 12 hours, then remove for 12 hours.  Melatonin 5 mg oral tablet: 1 tab(s) orally once a day (at bedtime) as needed for  insomnia  methadone 10 mg oral tablet: 17 tab(s) orally every 24 hours  Neurontin 300 mg oral capsule: 1 cap(s) orally every 12 hours  Protonix 40 mg oral delayed release tablet: 1 tab(s) orally every 12 hours  Tylenol 325 mg oral tablet: 3 tab(s) orally every 8 hours  ZyPREXA 2.5 mg oral tablet: 1 tab(s) orally once a day (at bedtime)   Keppra 750 mg oral tablet: 1 tab(s) orally every 12 hours  Lidocare Pain Relief Patch 4% topical film: Apply topically to affected area once a day Apply 1 patch to affected area for 12 hours, then remove for 12 hours.  Melatonin 5 mg oral tablet: 1 tab(s) orally once a day (at bedtime) as needed for  insomnia  methadone 10 mg oral tablet: 17 tab(s) orally every 24 hours  Neurontin 300 mg oral capsule: 1 cap(s) orally every 12 hours  Protonix 40 mg oral delayed release tablet: 1 tab(s) orally every 12 hours Continue taking pantoprazole 40mg twice a day until 12/18, then take 40mg once daily  Tylenol 325 mg oral tablet: 3 tab(s) orally every 8 hours  ZyPREXA 2.5 mg oral tablet: 1 tab(s) orally once a day (at bedtime)

## 2024-12-09 NOTE — BH CONSULTATION LIAISON ASSESSMENT NOTE - NSBHSACONSEQUENCE_PSY_A_CORE FT
Multiple psychiatric hospitalizations, difficulty with staying clean, estrangement from some family members past detox/rehab, currenty MMTP, multiple psychiatric hospitalizations, difficulty with staying clean, estrangement from some family members

## 2024-12-09 NOTE — DISCHARGE NOTE PROVIDER - NSFOLLOWUPCLINICS_GEN_ALL_ED_FT
[facility] [place]  [facility]  .  [place]   Phone: [phone]  Fax:   Follow Up Time: 1 week

## 2024-12-09 NOTE — OCCUPATIONAL THERAPY INITIAL EVALUATION ADULT - PERTINENT HX OF CURRENT PROBLEM, REHAB EVAL
Patient is a 59-year-old female w/ PMH of polysubstance use (tobacco, opioids on methadone, Hep C positive), anemia, CKD, and epilepsy BIBEMS for AMS. Pt reported that she was at her shelter when she had a seizure that was witnessed by her roommate. Following her seizure, she developed significant, diffuse abdominal pain and had multiple episodes of loose, maroon/dark stools to the point where she was unable to take her home medications and missed her appointment with the methadone clinic. Pt reports she was taking 2-3 capsules of Naproxen for 7 days and gabapentin 400mg QID that she purchased from a dealer. She also reports she takes keppra 750mg BID and methadone 170mg qd, both last taken on 11/30. Pt denies any prior hx of GIB, no prior colonoscopies, but states she had an endoscopy during her last admission in 7/2024 for L iliopsoas bursitis. She denies any recent alcohol use.

## 2024-12-10 DIAGNOSIS — R57.8 OTHER SHOCK: ICD-10-CM

## 2024-12-10 DIAGNOSIS — F11.20 OPIOID DEPENDENCE, UNCOMPLICATED: ICD-10-CM

## 2024-12-10 DIAGNOSIS — M25.559 PAIN IN UNSPECIFIED HIP: ICD-10-CM

## 2024-12-10 PROCEDURE — 70450 CT HEAD/BRAIN W/O DYE: CPT | Mod: 26

## 2024-12-10 PROCEDURE — 99232 SBSQ HOSP IP/OBS MODERATE 35: CPT | Mod: GC

## 2024-12-10 RX ORDER — METHADONE HYDROCHLORIDE 5 MG/1
170 TABLET ORAL EVERY 24 HOURS
Refills: 0 | Status: DISCONTINUED | OUTPATIENT
Start: 2024-12-11 | End: 2024-12-12

## 2024-12-10 RX ORDER — ACETAMINOPHEN, DIPHENHYDRAMINE HCL, PHENYLEPHRINE HCL 325; 25; 5 MG/1; MG/1; MG/1
5 TABLET ORAL AT BEDTIME
Refills: 0 | Status: DISCONTINUED | OUTPATIENT
Start: 2024-12-10 | End: 2024-12-12

## 2024-12-10 RX ORDER — LIDOCAINE 40 MG/G
1 CREAM TOPICAL EVERY 24 HOURS
Refills: 0 | Status: DISCONTINUED | OUTPATIENT
Start: 2024-12-10 | End: 2024-12-12

## 2024-12-10 RX ORDER — GABAPENTIN 300 MG/1
300 CAPSULE ORAL EVERY 12 HOURS
Refills: 0 | Status: DISCONTINUED | OUTPATIENT
Start: 2024-12-10 | End: 2024-12-12

## 2024-12-10 RX ORDER — ONDANSETRON HYDROCHLORIDE 4 MG/1
4 TABLET, FILM COATED ORAL ONCE
Refills: 0 | Status: COMPLETED | OUTPATIENT
Start: 2024-12-10 | End: 2024-12-10

## 2024-12-10 RX ADMIN — LEVETIRACETAM 750 MILLIGRAM(S): 1000 TABLET ORAL at 05:31

## 2024-12-10 RX ADMIN — PANTOPRAZOLE SODIUM 40 MILLIGRAM(S): 40 TABLET, DELAYED RELEASE ORAL at 05:31

## 2024-12-10 RX ADMIN — ACETAMINOPHEN, DIPHENHYDRAMINE HCL, PHENYLEPHRINE HCL 5 MILLIGRAM(S): 325; 25; 5 TABLET ORAL at 21:22

## 2024-12-10 RX ADMIN — GABAPENTIN 300 MILLIGRAM(S): 300 CAPSULE ORAL at 09:37

## 2024-12-10 RX ADMIN — CHLORHEXIDINE GLUCONATE 1 APPLICATION(S): 1.2 RINSE ORAL at 05:33

## 2024-12-10 RX ADMIN — PANTOPRAZOLE SODIUM 40 MILLIGRAM(S): 40 TABLET, DELAYED RELEASE ORAL at 17:22

## 2024-12-10 RX ADMIN — ACETAMINOPHEN 500MG 975 MILLIGRAM(S): 500 TABLET, COATED ORAL at 10:20

## 2024-12-10 RX ADMIN — LIDOCAINE 1 PATCH: 40 CREAM TOPICAL at 18:28

## 2024-12-10 RX ADMIN — ACETAMINOPHEN 500MG 975 MILLIGRAM(S): 500 TABLET, COATED ORAL at 18:28

## 2024-12-10 RX ADMIN — LIDOCAINE 1 PATCH: 40 CREAM TOPICAL at 09:36

## 2024-12-10 RX ADMIN — GABAPENTIN 300 MILLIGRAM(S): 300 CAPSULE ORAL at 21:22

## 2024-12-10 RX ADMIN — OLANZAPINE 2.5 MILLIGRAM(S): 20 TABLET ORAL at 21:22

## 2024-12-10 RX ADMIN — ONDANSETRON HYDROCHLORIDE 4 MILLIGRAM(S): 4 TABLET, FILM COATED ORAL at 01:33

## 2024-12-10 RX ADMIN — ACETAMINOPHEN 500MG 975 MILLIGRAM(S): 500 TABLET, COATED ORAL at 09:36

## 2024-12-10 RX ADMIN — LEVETIRACETAM 750 MILLIGRAM(S): 1000 TABLET ORAL at 17:22

## 2024-12-10 RX ADMIN — ACETAMINOPHEN 500MG 975 MILLIGRAM(S): 500 TABLET, COATED ORAL at 17:22

## 2024-12-10 RX ADMIN — ACETAMINOPHEN 500MG 975 MILLIGRAM(S): 500 TABLET, COATED ORAL at 01:33

## 2024-12-10 RX ADMIN — Medication 100 GRAM(S): at 15:56

## 2024-12-10 RX ADMIN — LIDOCAINE 1 PATCH: 40 CREAM TOPICAL at 21:35

## 2024-12-10 RX ADMIN — METHADONE HYDROCHLORIDE 170 MILLIGRAM(S): 5 TABLET ORAL at 09:37

## 2024-12-10 NOTE — PROGRESS NOTE ADULT - PROBLEM SELECTOR PLAN 1
(RESOLVED) Presented with encephalopathy, found to be in hemorrhagic shock. Patient is s/p a total of 12 units of PRBCs transfused, clipping of 20mm ulceration in gastric antrum performed. Repeat EGD performed which shows no evidence of rebleed, hemoglobin has stabilized.     - Maintain active T&S  - Continue to monitor for repeat bleeding event.  - PT eval after reintubations and anemia, pending xrays due to L hip and knee pain  - Lidocaine patch for pain as needed   - Omeprazole on discharge.  - Hpylori testing in outpatient setting. Presented with encephalopathy, found to be in hemorrhagic shock. Patient is s/p a total of 12 units of PRBCs transfused, clipping of 20mm ulceration in gastric antrum performed. Repeat EGD performed which shows no evidence of rebleed, hemoglobin has stabilized.     - Maintain active T&S  - Continue to monitor for repeat bleeding event.  - PT eval after reintubations and anemia, pending xrays due to L hip and knee pain  - Lidocaine patch for pain as needed   - Omeprazole on discharge.  - Hpylori testing in outpatient setting.

## 2024-12-10 NOTE — PROGRESS NOTE ADULT - SUBJECTIVE AND OBJECTIVE BOX
Internal Medicine Progress Note  Thiago Choileobardo PGY1     OVERNIGHT EVENTS/INTERVAL HPI:    OBJECTIVE:  Vital Signs Last 24 Hrs  T(C): 36.4 (10 Dec 2024 06:27), Max: 37 (09 Dec 2024 15:34)  T(F): 97.6 (10 Dec 2024 06:27), Max: 98.6 (09 Dec 2024 15:34)  HR: 73 (10 Dec 2024 06:27) (73 - 88)  BP: 105/69 (10 Dec 2024 06:27) (78/49 - 106/73)  BP(mean): 80 (09 Dec 2024 15:34) (80 - 80)  RR: 16 (10 Dec 2024 06:27) (16 - 18)  SpO2: 98% (10 Dec 2024 06:27) (98% - 100%)    Parameters below as of 09 Dec 2024 21:09  Patient On (Oxygen Delivery Method): room air      I&O's Detail    PHYSICAL EXAM:  Constitutional: No acute distress, resting comfortably in bed.    HEENT: Sclera non-icteric, no JVD, MMM.  Respiratory: Clear to auscultation bilaterally, adequate air entry, no wheezing, no rhonchi, no rales.  Cardiovascular: Regular rate and rhythm, normal S1S2.  Gastrointestinal: soft, non-tender and non-distended.  Extremities: Warm, well perfused, pulses equal bilateral upper and lower extremities, no edema. Cap refill <3 in bilateral hands   Neurological: AAOx3.   Skin: Normal temperature, warm, dry.affect      Medications:  MEDICATIONS  (STANDING):  acetaminophen     Tablet .. 975 milliGRAM(s) Oral every 8 hours  chlorhexidine 2% Cloths 1 Application(s) Topical <User Schedule>  levETIRAcetam 750 milliGRAM(s) Oral every 12 hours  methadone  Concentrate 170 milliGRAM(s) Oral every 24 hours  OLANZapine 2.5 milliGRAM(s) Oral at bedtime  pantoprazole    Tablet 40 milliGRAM(s) Oral every 12 hours    MEDICATIONS  (PRN):      Labs:                        9.4    5.24  )-----------( 214      ( 09 Dec 2024 10:00 )             30.5     12-09    141  |  105  |  8   ----------------------------<  93  4.1   |  29  |  0.83    Ca    8.6      09 Dec 2024 10:00  Phos  3.1     12-09  Mg     1.8     12-09    TPro  5.9[L]  /  Alb  2.9[L]  /  TBili  0.3  /  DBili  x   /  AST  85[H]  /  ALT  62[H]  /  AlkPhos  73  12-08    PT/INR - ( 08 Dec 2024 15:30 )   PT: 12.7 sec;   INR: 1.11              Urinalysis Basic - ( 09 Dec 2024 10:00 )    Color: x / Appearance: x / SG: x / pH: x  Gluc: 93 mg/dL / Ketone: x  / Bili: x / Urobili: x   Blood: x / Protein: x / Nitrite: x   Leuk Esterase: x / RBC: x / WBC x   Sq Epi: x / Non Sq Epi: x / Bacteria: x          Radiology: Reviewed

## 2024-12-10 NOTE — PROGRESS NOTE ADULT - PROBLEM SELECTOR PLAN 6
On Keppra for recurrent seizures, thought to be due to Seroquel initiation in the past.      c/w Keppra 750mg bid .   - Epilepsy consulted with no further reccs and patient can be discharged on home keppra.

## 2024-12-10 NOTE — PROGRESS NOTE ADULT - PROBLEM SELECTOR PLAN 2
Currently enrolled in methadone program, reports that her regimen has been stable and she feels comfortable at this time. COWS 0. No active drug use.    - Continue with home methadone dose (changed it to oral solution due to pill burden with pills) Currently enrolled in methadone program, reports that her regimen has been stable and she feels comfortable at this time. COWS 0. No active drug use.    - Continue with home methadone dose (changed it to oral solution due to pill burden with pills).

## 2024-12-10 NOTE — PROGRESS NOTE ADULT - PROBLEM SELECTOR PLAN 4
Reports primary condition is depression. Previously taking Seroquel with the development of seizures, now off maintenance medication. Previously on buspar though self-discontinued by pt due to side effects (shakiness).  Started on Zyprexa inpatient for treatment.    - Psych consulted   - Reports no current home medications.  - Plan to make Zyprexa 2.5 prn if QTC still normal.  Ms. Escobar is a 59 year old woman with a history of polysubstance use (cocaine, heroin, fentanyl), >10 inpatient psychiatric hospitalizations, and bipolar disorder. She was admitted to the hospital for anemia and hypovolemia secondary to an upper GI bleed, now s/p clipping of ulcer. She is seeking psychiatric treatment for her bipolar disorder and substance use disorder. Overall doing well psychiatrically, no acute amando/psychosis/depression present, no persisting delirium.     RECOMMENDATIONS\  -routine observation  - continue Zyprexa 2.5mg QHS for mood stabilization and recent agitation; to be reassessed as OP  - continue home methadone for OUD - consider divided dose  -olanzapine 2.5mg po/IM Q6H PRN for acute agitation that is not verbally redirectable  -monitor EKG for QTc prolongation with use of multiple QTc-prolonging agents  - pt plans to established care at [facility] for outpatient psychiatric care  - pt plans to establish care at [facility] for outpatient substance use rehab program  -consider SW consult re: pt concerns about her housing  -delirium precautions  -no psychiatric barrier to dc when medically ready

## 2024-12-10 NOTE — CHART NOTE - NSCHARTNOTEFT_GEN_A_CORE
Admitting Diagnosis:   Patient is a 59y old  Female who presents with a chief complaint of AMS, Abdominal pain (10 Dec 2024 07:32)      PAST MEDICAL & SURGICAL HISTORY:  IVDU (intravenous drug user)      Anemia      Asthma      Epilepsy      No significant past surgical history          Current Nutrition Order:  Regular Diet      PO Intake: Good (%) [ XX  ]  Fair (50-75%) [   ] Poor (<25%) [   ]    GI Issues: Protonix ordered, GI WNL per flow sheets - LBM +     Pain:  Pt with left hip and left knee osteoarthritis; Non surgical intervention at this time    Skin Integrity:  Aakash 19  1+ L leg Edema  No pressure ulcer         12-10-24 @ 07:01  -  12-10-24 @ 12:03  --------------------------------------------------------  IN: 120 mL / OUT: 0 mL / NET: 120 mL        Labs:       141  |  105  |  8   ----------------------------<  93  4.1   |  29  |  0.83    Ca    8.6      09 Dec 2024 10:00  Phos  3.1       Mg     1.8         TPro  5.9[L]  /  Alb  2.9[L]  /  TBili  0.3  /  DBili  x   /  AST  85[H]  /  ALT  62[H]  /  AlkPhos  73      CAPILLARY BLOOD GLUCOSE          Medications:  MEDICATIONS  (STANDING):  acetaminophen     Tablet .. 975 milliGRAM(s) Oral every 8 hours  chlorhexidine 2% Cloths 1 Application(s) Topical <User Schedule>  gabapentin 300 milliGRAM(s) Oral every 12 hours  levETIRAcetam 750 milliGRAM(s) Oral every 12 hours  lidocaine   4% Patch 1 Patch Transdermal every 24 hours  methadone  Concentrate 170 milliGRAM(s) Oral every 24 hours  OLANZapine 2.5 milliGRAM(s) Oral at bedtime  pantoprazole    Tablet 40 milliGRAM(s) Oral every 12 hours    MEDICATIONS  (PRN):      5'5''  pounds +-10%  Wt 149 pounds, BMI 25, %WIB=568.2     Weight Change: Based on most recent EMR wt     Estimated energy needs:   Current body wt used for energy calculations as pt falls within % IBW  Adjusted for age and current Clinical Course   25-30kcal/k-2040kcal/day   1.0-1.2gm/k-82gm prot/day   25-30ml/k-2040ml/day     Subjective: 59-year-old F, PMH of polysubstance use (tobacco, opioids on methadone, Hep C positive), anemia, CKD, and epilepsy BIBEMS for AMS found to have Hgb 5.9 and melena admitted to MICU for hemorrhagic shock 2/2 UGIB iso NSAID use -- is now s/p clipping of 20mm ulceration in gastric antrum. Pt has had several small episodes of melena since that event which have now resolved. Repeat EGD performed  shows no evidence of rebleed, hemoglobin has stabilized. GI has since s/o.     Pt seen this AM on 4UR. Alert in Room. RN present. Pt reports good appetite during admit. Noted to be missing teeth, however tolerating meals without issues chewing/swallowing at this time (Bedside dysphagia screen passed ). PTA pt reports living in a shelter/ hotel. Does not get food stamps however would like them - Social Work made aware.   Labs: POCT o/a 174, BUN/Cr WNL, HH Low, Lytes WNL.   Please see below for nutritions recommendations.  Recommendations Provided to team.     Prior PES: Inadequate Oral Intake related to unable to meet at least 75% estimated energy requirements as evidenced by NPO.  >> D/C  NEW PES: Increased needs related to increased demands  as evidenced by Clinical Course   GOAL: Pt will meet 75% or more of protein/energy needs via most appropriate route for nutrition.    Recommendations:  1. Current Diet: Regular.  - Add low Fiber PRN Pending GI.  - Monitor %PO intake, Diet tolerance.  - Should pt be noted with s/s of issues swallowing, recommend NPO.  2. Pain/GI per team; Optimize regime PRN.   3. Labs: Monitor BMP, CBC, glucose, lytes - Replete PRN, trend renal indices, LFTs. Please check A1c - however note HH remains low / pt is s/p Transfusions during admit, thus may not be fully accurate.   4. Spoke with pt about [facility]. Pt with interest in program. RD provided pt with [facility] Coupons as well and [facility] Market Map .   5. Monitor Skin, wt.  6. RD to remain available for additional nutrition interventions as needed.     Education: D/w pt [facility]. Understanding stated, provide additional motivation as needed.     Risk Level: High [ XX  ] Moderate [   ] Low [   ] Admitting Diagnosis:   Patient is a 59y old  Female who presents with a chief complaint of AMS, Abdominal pain (10 Dec 2024 07:32)      PAST MEDICAL & SURGICAL HISTORY:  IVDU (intravenous drug user)      Anemia      Asthma      Epilepsy      No significant past surgical history          Current Nutrition Order:  Regular Diet      PO Intake: Good (%) [ XX  ]  Fair (50-75%) [   ] Poor (<25%) [   ]    GI Issues: Protonix ordered, GI WNL per flow sheets - LBM +     Pain:  Pt with left hip and left knee osteoarthritis; Non surgical intervention at this time    Skin Integrity:  Aakash 19  1+ L leg Edema  No pressure ulcer         12-10-24 @ 07:01  -  12-10-24 @ 12:03  --------------------------------------------------------  IN: 120 mL / OUT: 0 mL / NET: 120 mL        Labs:       141  |  105  |  8   ----------------------------<  93  4.1   |  29  |  0.83    Ca    8.6      09 Dec 2024 10:00  Phos  3.1       Mg     1.8         TPro  5.9[L]  /  Alb  2.9[L]  /  TBili  0.3  /  DBili  x   /  AST  85[H]  /  ALT  62[H]  /  AlkPhos  73      CAPILLARY BLOOD GLUCOSE          Medications:  MEDICATIONS  (STANDING):  acetaminophen     Tablet .. 975 milliGRAM(s) Oral every 8 hours  chlorhexidine 2% Cloths 1 Application(s) Topical <User Schedule>  gabapentin 300 milliGRAM(s) Oral every 12 hours  levETIRAcetam 750 milliGRAM(s) Oral every 12 hours  lidocaine   4% Patch 1 Patch Transdermal every 24 hours  methadone  Concentrate 170 milliGRAM(s) Oral every 24 hours  OLANZapine 2.5 milliGRAM(s) Oral at bedtime  pantoprazole    Tablet 40 milliGRAM(s) Oral every 12 hours    MEDICATIONS  (PRN):      5'5''  pounds +-10%  Wt 149 pounds, BMI 25, %CEW=869.2     Weight Change: Based on most recent EMR wt     Estimated energy needs:   Current body wt used for energy calculations as pt falls within % IBW  Adjusted for age and current Clinical Course   25-30kcal/k-2040kcal/day   1.0-1.2gm/k-82gm prot/day   25-30ml/k-2040ml/day     Subjective: 59-year-old F, PMH of polysubstance use (tobacco, opioids on methadone, Hep C positive), anemia, CKD, and epilepsy BIBEMS for AMS found to have Hgb 5.9 and melena admitted to MICU for hemorrhagic shock 2/2 UGIB iso NSAID use -- is now s/p clipping of 20mm ulceration in gastric antrum. Pt has had several small episodes of melena since that event which have now resolved. Repeat EGD performed  shows no evidence of rebleed, hemoglobin has stabilized. GI has since s/o.     Pt seen this AM on 4UR. Alert in Room. RN present. Pt reports good appetite during admit. Noted to be missing teeth, however tolerating meals without issues chewing/swallowing at this time (Bedside dysphagia screen passed ). PTA pt reports living in a shelter/ hotel. Does not get food stamps however would like them - Social Work made aware.   Labs: POCT o/a 174, BUN/Cr WNL, HH Low, Lytes WNL.   Please see below for nutritions recommendations.  Recommendations Provided to team.     Prior PES: Inadequate Oral Intake related to unable to meet at least 75% estimated energy requirements as evidenced by NPO.  >> D/C  NEW PES: Increased needs related to increased demands  as evidenced by Clinical Course   GOAL: Pt will meet 75% or more of protein/energy needs via most appropriate route for nutrition.    Recommendations:  1. Current Diet: Regular.  - Add low Fiber PRN Pending GI.  - Monitor %PO intake, Diet tolerance.  - Should pt be noted with s/s of issues swallowing, recommend NPO.  2. Pain/GI per team; Optimize regime PRN.   3. Labs: Monitor BMP, CBC, glucose, lytes - Replete PRN, trend renal indices, LFTs. Please check A1c - however note HH remains low / pt is s/p Transfusions during admit, thus may not be fully accurate.   4. Spoke with pt about [facility]. Pt with interest in program. RD provided pt with [facility] Coupons as well and [facility] [facility] Market Map .   5. Monitor Skin, wt.  6. RD to remain available for additional nutrition interventions as needed.     Education: D/w pt [facility]. Understanding stated, provide additional motivation as needed.     Risk Level: High [  ] Moderate [  XX ] Low [   ]

## 2024-12-11 LAB
ANION GAP SERPL CALC-SCNC: 7 MMOL/L — SIGNIFICANT CHANGE UP (ref 5–17)
BUN SERPL-MCNC: 14 MG/DL — SIGNIFICANT CHANGE UP (ref 7–23)
CALCIUM SERPL-MCNC: 8.6 MG/DL — SIGNIFICANT CHANGE UP (ref 8.4–10.5)
CHLORIDE SERPL-SCNC: 102 MMOL/L — SIGNIFICANT CHANGE UP (ref 96–108)
CO2 SERPL-SCNC: 33 MMOL/L — HIGH (ref 22–31)
CREAT SERPL-MCNC: 0.95 MG/DL — SIGNIFICANT CHANGE UP (ref 0.5–1.3)
EGFR: 70 ML/MIN/1.73M2 — SIGNIFICANT CHANGE UP
GLUCOSE SERPL-MCNC: 84 MG/DL — SIGNIFICANT CHANGE UP (ref 70–99)
HCT VFR BLD CALC: 28.8 % — LOW (ref 34.5–45)
HGB BLD-MCNC: 8.8 G/DL — LOW (ref 11.5–15.5)
MAGNESIUM SERPL-MCNC: 2.1 MG/DL — SIGNIFICANT CHANGE UP (ref 1.6–2.6)
MCHC RBC-ENTMCNC: 30.2 PG — SIGNIFICANT CHANGE UP (ref 27–34)
MCHC RBC-ENTMCNC: 30.6 G/DL — LOW (ref 32–36)
MCV RBC AUTO: 99 FL — SIGNIFICANT CHANGE UP (ref 80–100)
NRBC # BLD: 0 /100 WBCS — SIGNIFICANT CHANGE UP (ref 0–0)
PHOSPHATE SERPL-MCNC: 3.8 MG/DL — SIGNIFICANT CHANGE UP (ref 2.5–4.5)
PLATELET # BLD AUTO: 260 K/UL — SIGNIFICANT CHANGE UP (ref 150–400)
POTASSIUM SERPL-MCNC: 4.6 MMOL/L — SIGNIFICANT CHANGE UP (ref 3.5–5.3)
POTASSIUM SERPL-SCNC: 4.6 MMOL/L — SIGNIFICANT CHANGE UP (ref 3.5–5.3)
RBC # BLD: 2.91 M/UL — LOW (ref 3.8–5.2)
RBC # FLD: 15.4 % — HIGH (ref 10.3–14.5)
SODIUM SERPL-SCNC: 142 MMOL/L — SIGNIFICANT CHANGE UP (ref 135–145)
WBC # BLD: 4.03 K/UL — SIGNIFICANT CHANGE UP (ref 3.8–10.5)
WBC # FLD AUTO: 4.03 K/UL — SIGNIFICANT CHANGE UP (ref 3.8–10.5)

## 2024-12-11 PROCEDURE — 99232 SBSQ HOSP IP/OBS MODERATE 35: CPT | Mod: GC

## 2024-12-11 RX ORDER — ACETAMINOPHEN 500MG 500 MG/1
975 TABLET, COATED ORAL ONCE
Refills: 0 | Status: COMPLETED | OUTPATIENT
Start: 2024-12-11 | End: 2024-12-11

## 2024-12-11 RX ORDER — ACETAMINOPHEN 500MG 500 MG/1
1000 TABLET, COATED ORAL ONCE
Refills: 0 | Status: COMPLETED | OUTPATIENT
Start: 2024-12-11 | End: 2024-12-11

## 2024-12-11 RX ORDER — ACETAMINOPHEN 500MG 500 MG/1
650 TABLET, COATED ORAL ONCE
Refills: 0 | Status: DISCONTINUED | OUTPATIENT
Start: 2024-12-11 | End: 2024-12-11

## 2024-12-11 RX ADMIN — METHADONE HYDROCHLORIDE 170 MILLIGRAM(S): 5 TABLET ORAL at 07:25

## 2024-12-11 RX ADMIN — GABAPENTIN 300 MILLIGRAM(S): 300 CAPSULE ORAL at 21:25

## 2024-12-11 RX ADMIN — CHLORHEXIDINE GLUCONATE 1 APPLICATION(S): 1.2 RINSE ORAL at 07:24

## 2024-12-11 RX ADMIN — PANTOPRAZOLE SODIUM 40 MILLIGRAM(S): 40 TABLET, DELAYED RELEASE ORAL at 07:25

## 2024-12-11 RX ADMIN — PANTOPRAZOLE SODIUM 40 MILLIGRAM(S): 40 TABLET, DELAYED RELEASE ORAL at 17:24

## 2024-12-11 RX ADMIN — ACETAMINOPHEN 500MG 400 MILLIGRAM(S): 500 TABLET, COATED ORAL at 22:13

## 2024-12-11 RX ADMIN — ACETAMINOPHEN 500MG 975 MILLIGRAM(S): 500 TABLET, COATED ORAL at 14:53

## 2024-12-11 RX ADMIN — ACETAMINOPHEN, DIPHENHYDRAMINE HCL, PHENYLEPHRINE HCL 5 MILLIGRAM(S): 325; 25; 5 TABLET ORAL at 22:13

## 2024-12-11 RX ADMIN — ACETAMINOPHEN 500MG 975 MILLIGRAM(S): 500 TABLET, COATED ORAL at 03:03

## 2024-12-11 RX ADMIN — OLANZAPINE 2.5 MILLIGRAM(S): 20 TABLET ORAL at 21:25

## 2024-12-11 RX ADMIN — LEVETIRACETAM 750 MILLIGRAM(S): 1000 TABLET ORAL at 17:24

## 2024-12-11 RX ADMIN — ACETAMINOPHEN 500MG 1000 MILLIGRAM(S): 500 TABLET, COATED ORAL at 22:40

## 2024-12-11 RX ADMIN — GABAPENTIN 300 MILLIGRAM(S): 300 CAPSULE ORAL at 09:58

## 2024-12-11 RX ADMIN — LEVETIRACETAM 750 MILLIGRAM(S): 1000 TABLET ORAL at 07:25

## 2024-12-11 NOTE — PROGRESS NOTE ADULT - PROBLEM SELECTOR PLAN 5
At last admission, Hgb 7.9-9. Iron studies: total iron wnl, TIBC low, %iron sat wnl, ferritin wnl. Folate/B12 wnl.     - Continue to monitor. At last admission, Hgb 7.9-9. Iron studies: total iron wnl, TIBC low, %iron sat wnl, ferritin wnl. Folate/B12 wnl. Hb stable    - Continue to monitor.

## 2024-12-11 NOTE — PROGRESS NOTE ADULT - PROBLEM SELECTOR PLAN 2
Currently enrolled in methadone program, reports that her regimen has been stable and she feels comfortable at this time. COWS 0. No active drug use.    - Continue with home methadone dose (changed it to oral solution due to pill burden with pills).

## 2024-12-11 NOTE — PROGRESS NOTE ADULT - TIME BILLING
Bedside exam and interview   Reviewed vitals, labs   Discussed patient's plan of care with housestaff   Documentation of encounter  Excludes teaching and separately reported services
Bedside exam and interview   Reviewed vitals, labs   Discussed patient's plan of care with housestaff   Documentation of encounter  Excludes teaching and separately reported services
Time-based billing (NON-critical care).   50 minutes spent on total encounter. The necessity of the time spent during the encounter on this date of service was due to:    Bedside exam and interview   Reviewed vitals, labs   Discussed patient's plan of care with housestaff   Documentation of encounter  Excludes teaching and separately reported services
Bedside exam and interview   Reviewed vitals, labs   Discussed patient's plan of care with housestaff   Documentation of encounter  Excludes teaching and separately reported services
Bedside exam and interview   Reviewed vitals, labs   Discussed patient's plan of care with housestaff   Documentation of encounter  Excludes teaching time and/or separately reported services

## 2024-12-11 NOTE — PROGRESS NOTE ADULT - SUBJECTIVE AND OBJECTIVE BOX
OVERNIGHT EVENTS:    SUBJECTIVE / INTERVAL HPI: Patient seen and examined at bedside.     VITAL SIGNS:  Vital Signs Last 24 Hrs  T(C): 36.6 (11 Dec 2024 05:00), Max: 36.9 (11 Dec 2024 00:00)  T(F): 97.8 (11 Dec 2024 05:00), Max: 98.4 (11 Dec 2024 00:00)  HR: 77 (11 Dec 2024 05:00) (73 - 90)  BP: 95/62 (11 Dec 2024 05:00) (79/54 - 97/67)  BP(mean): --  RR: 18 (11 Dec 2024 05:00) (16 - 18)  SpO2: 98% (11 Dec 2024 05:00) (96% - 100%)    Parameters below as of 11 Dec 2024 05:00  Patient On (Oxygen Delivery Method): room air      I&O's Summary    10 Dec 2024 07:01  -  11 Dec 2024 07:00  --------------------------------------------------------  IN: 120 mL / OUT: 0 mL / NET: 120 mL        PHYSICAL EXAM:    General: NAD  HEENT: NC/AT; PERRL, anicteric sclera; MMM  Neck: supple  Cardiovascular: +S1/S2; RRR  Respiratory: CTA B/L; no W/R/R  Gastrointestinal: soft, NT/ND; +BSx4  Extremities: WWP; no edema, clubbing or cyanosis  Vascular: 2+ radial, DP/PT pulses B/L  Neurological: AAOx3; no focal deficits    MEDICATIONS:  MEDICATIONS  (STANDING):  acetaminophen     Tablet .. 975 milliGRAM(s) Oral every 8 hours  chlorhexidine 2% Cloths 1 Application(s) Topical <User Schedule>  gabapentin 300 milliGRAM(s) Oral every 12 hours  levETIRAcetam 750 milliGRAM(s) Oral every 12 hours  lidocaine   4% Patch 1 Patch Transdermal every 24 hours  melatonin 5 milliGRAM(s) Oral at bedtime  methadone  Concentrate 170 milliGRAM(s) Oral every 24 hours  OLANZapine 2.5 milliGRAM(s) Oral at bedtime  pantoprazole    Tablet 40 milliGRAM(s) Oral every 12 hours    MEDICATIONS  (PRN):      ALLERGIES:  Allergies    No Known Allergies    Intolerances        LABS:                        9.4    5.24  )-----------( 214      ( 09 Dec 2024 10:00 )             30.5     12-09    141  |  105  |  8   ----------------------------<  93  4.1   |  29  |  0.83    Ca    8.6      09 Dec 2024 10:00  Phos  3.1     12-09  Mg     1.8     12-09        Urinalysis Basic - ( 09 Dec 2024 10:00 )    Color: x / Appearance: x / SG: x / pH: x  Gluc: 93 mg/dL / Ketone: x  / Bili: x / Urobili: x   Blood: x / Protein: x / Nitrite: x   Leuk Esterase: x / RBC: x / WBC x   Sq Epi: x / Non Sq Epi: x / Bacteria: x      CAPILLARY BLOOD GLUCOSE          RADIOLOGY & ADDITIONAL TESTS: Reviewed.   OVERNIGHT EVENTS: JAKE    SUBJECTIVE / INTERVAL HPI: Patient seen and examined at bedside. Feels well. Had one dark brown, non bloody watery BM last night. Denies abdominal pain, chest pain.     VITAL SIGNS:  Vital Signs Last 24 Hrs  T(C): 36.6 (11 Dec 2024 05:00), Max: 36.9 (11 Dec 2024 00:00)  T(F): 97.8 (11 Dec 2024 05:00), Max: 98.4 (11 Dec 2024 00:00)  HR: 77 (11 Dec 2024 05:00) (73 - 90)  BP: 95/62 (11 Dec 2024 05:00) (79/54 - 97/67)  BP(mean): --  RR: 18 (11 Dec 2024 05:00) (16 - 18)  SpO2: 98% (11 Dec 2024 05:00) (96% - 100%)    Parameters below as of 11 Dec 2024 05:00  Patient On (Oxygen Delivery Method): room air      I&O's Summary    10 Dec 2024 07:01  -  11 Dec 2024 07:00  --------------------------------------------------------  IN: 120 mL / OUT: 0 mL / NET: 120 mL        PHYSICAL EXAM:    General: NAD, laying comfortably in bed  HEENT: NC/AT; PERRL, anicteric sclera; MMM  Neck: supple  Cardiovascular: +S1/S2; RRR  Respiratory: CTA B/L; no W/R/R  Gastrointestinal: soft, NT/ND; +BSx4  Extremities: WWP; no edema, clubbing or cyanosis  Vascular: 2+ radial, DP/PT pulses B/L  Neurological: AAOx3; no focal deficits    MEDICATIONS:  MEDICATIONS  (STANDING):  acetaminophen     Tablet .. 975 milliGRAM(s) Oral every 8 hours  chlorhexidine 2% Cloths 1 Application(s) Topical <User Schedule>  gabapentin 300 milliGRAM(s) Oral every 12 hours  levETIRAcetam 750 milliGRAM(s) Oral every 12 hours  lidocaine   4% Patch 1 Patch Transdermal every 24 hours  melatonin 5 milliGRAM(s) Oral at bedtime  methadone  Concentrate 170 milliGRAM(s) Oral every 24 hours  OLANZapine 2.5 milliGRAM(s) Oral at bedtime  pantoprazole    Tablet 40 milliGRAM(s) Oral every 12 hours    MEDICATIONS  (PRN):      ALLERGIES:  Allergies    No Known Allergies    Intolerances        LABS:                        9.4    5.24  )-----------( 214      ( 09 Dec 2024 10:00 )             30.5     12-09    141  |  105  |  8   ----------------------------<  93  4.1   |  29  |  0.83    Ca    8.6      09 Dec 2024 10:00  Phos  3.1     12-09  Mg     1.8     12-09        Urinalysis Basic - ( 09 Dec 2024 10:00 )    Color: x / Appearance: x / SG: x / pH: x  Gluc: 93 mg/dL / Ketone: x  / Bili: x / Urobili: x   Blood: x / Protein: x / Nitrite: x   Leuk Esterase: x / RBC: x / WBC x   Sq Epi: x / Non Sq Epi: x / Bacteria: x      CAPILLARY BLOOD GLUCOSE          RADIOLOGY & ADDITIONAL TESTS: Reviewed.

## 2024-12-11 NOTE — PROGRESS NOTE ADULT - ATTENDING COMMENTS
Massive ugi bleed.  Seen before endoscopy.
Stable at present.  Call if rebleeds.  thanks
# Metabolic Encephalopathy - polysubstance use, hx of psychiatric illness unable to confirm home medications, methadone confirmed and resumed, wean precedex as able  # Acute hypoxemic respiratory failure - extubated to HFNC, wean O2  # Hemorrhagic shock- resolved, Hb stable
Stable at present.  cont ppi drip.  following with you.
59-year-old female with history of polysubstance abuse, hepatitis C, hemorrhagic shock status post endoscopy and successful clipping of peptic ulcer.  She had 2 episodes of melanotic stools today, hemoglobin has been stable at around 8.5, slowly coming down on pressors, follows few commands even on high sedative drips.  Will DC vancomycin, continue with Zosyn, follow culture results, remains intubated in the setting of ongoing GI bleed.  GI recs appreciated. If she has increasing pressor requirements or continues to have worsening blood loss anemia, will require CTA of the abdomen to evaluate for lower GI bleed.
Looks fine.  it appears gi bleed episode has resolved.  Can change to oral ppi, and we will follow up the h pylori histology.  regular diet ok.  thanks
59-year-old female with hemorrhagic shock found to have peptic ulcer status post endoscopy and clipping, extubated earlier today.  Had 3 melanotic stools with worsening blood loss anemia and hemorrhagic shock requiring 2 units of PRBCs.  Repeat endoscopy showed no active bleeding from the ulcer site, clip still secured.  Hemoglobin stabilized, continue with close monitoring of CBC, transfuse t0 maintain hemoglobin above 7, continue with broad-spectrum antibiotics, panculture, on Levophed.
59-year-old female with past medical history of polysubstance abuse, was on NSAIDs for hip pain, presented with melena admitted to the ICU for hemorrhagic shock status post 6 units of PRBCs for volume resuscitation prior to the endoscopy and was started on levo.  Patient was intubated for the endoscopy, transfusion protocol initiated during the endoscopy as patient became further hypotensive and had bright red bleeding per rectum.  Successful hemostasis achieved after clipping a 1 cm ulcer in the antrum with active spurting.  Hemoglobin stabilized, patient continues to be on levo. Started broad spectrum antibiotics and sepsis work up. Remains intubated.
Ms. Escobar is a 60yo woman with PMHx OUD on methadone, epilepsy, bipolar disorder, L knee OA who was BIBEMS for AMS found to be in hemorrhagic shock 2/2 gastric ulcer iso NSAID use s/p clipping, now on RMF and with stable Hgb. Patient seen this afternoon, sitting upright in chair. Endorsing pain to left knee. No further melena. Working with PT.    #Hemorrhagic shock (RESOLVED)  - S/p clipping of gastric ulcer  - C/w PPI BID x2 weeks --> PPI daily  - Counseled on cessation of NSAIDs    #L knee OA  #L hip OA - xrays done, note bone-on-bone contanct of femoral head  - Ortho consult - suspect OP f/u  - May benefit from OP injections for pain control given now unable to take NSAIDs  - PT working with patient    #Bipolar disorder  - Seen by psych: c/w olanzapine 2.5mg qhs  - Pt arranging for OP f/u    #OUD  - C/w home methadone dose    #Epilepsy  - Seen by epilepsy team: c/w Keppra 750mg bid
Ms. Escobar is a 59-year-old female w/ PMH of polysubstance use (tobacco, opioids on methadone, Hep C positive), anemia, CKD, and epilepsy BIBEMS for AMS admitted to the MICU w/ hemorrhagic shock 2/2 UGIB iso NSAID use, s/p clipping of 20mm ulceration in gastric antrum, now stepped down to the RMF.     #UGIB  #Hemorrhagic shock (resolved)  #Epilepsy   #Bipolar disease  #L hip and knee pain  For her GI bleed she is now on PO PPI BID and her diet is advanced. She reports no episodes of melena overnight and her hgb is stable as of yesterday, pending a cbc this afternoon. Plan is to continue pantoprazole 40 mg PO BID for 2 weeks (can switch to omeprazole 40 mg daily on d/c) and then omeprazole 40 mg daily. Of note, she has untreated bipolar disorder and was agitated in the micu, requiring prn zyprexa. She has poor f/u with psych and has avoided psych medication over the past few years with concern for the meds causing seizures, which she recently had before admission. Given agitation and recent seizure, epilepsy and psych consulted to help with medication adjustment of AEDs and starting medication for BPD. She may just need an increase in keppra and f/u with psych and epilepsy as outpatient. She was unable to work with PT due to L hip and L knee pain, suspect osteoarthritis exacerbated by lying in bed for several days. She has good active ROM of both knee and hip joints with crepitus of her knee with movement, no erythema or other signs of infection. Will give tylenol and get xrays.
Ms. Escobar is a 58yo woman with PMHx OUD on methadone, epilepsy, bipolar disorder, L knee OA who was BIBEMS for AMS found to be in hemorrhagic shock 2/2 gastric ulcer iso NSAID use s/p clipping, now on RMF and with stable Hgb.     Patient seen this afternoon, sitting upright in chair. Still with pain to left knee. Trialing gabapentin and lidocaine patch in addition to scheduled Tylenol. Working with PT/OT.     #Hemorrhagic shock (RESOLVED)  - S/p clipping of gastric ulcer  - C/w PPI BID x2 weeks --> PPI daily  - Counseled on cessation of NSAIDs    #L knee OA  #L hip OA - xrays done, note bone-on-bone contanct of femoral head  - Ortho f/u OP  - May benefit from OP injections for pain control given now unable to take NSAIDs and patient also does not want opioids iso OUD  - PT working with patient    #Bipolar disorder  - Seen by psych: c/w olanzapine 2.5mg qhs  - Pt arranging for OP f/u    #OUD  - C/w home methadone dose    #Epilepsy  - Seen by epilepsy team: c/w Keppra 750mg bid .
Ms. Escobar is a 58yo woman with PMHx OUD on methadone, epilepsy, bipolar disorder, L knee OA who was BIBEMS for AMS found to be in hemorrhagic shock 2/2 gastric ulcer iso NSAID use s/p clipping, now on RMF and with stable Hgb.     Patient continues to work with OT/PT, anticipate dc tomorrow to shelter. Will ensure patient has ortho f/u for L hip OA and GI f/u for gastric ulcer. Seen this afternoon, no acute complaints, pain improved with gabapentin.
I agree with residents plan as stated above    #Methadone dependence  #UGIB s/p clip  #Epilepsy  #BPD    - PT consult to aid with disposition, patient unsteady on her feet per MICU team  - Hgb stable, ctm daily H/H, now on PO PPI, can be discharged on daily omeprazole per GI  - F/U H pylori results outpatient   - Will hold off on standing zyprexa for now, can keep prn, patient appears calm, was on seroquel at one point and reports it contributed to siezures, now not on anything for  BPD, maintain 1:1 given intermittent agitation  - Will have   see patient in morning- asking to comment on antipsychotics lowering seizure threshold and whether it is safe to initiate zyprexa for mood. Qtc 430 on 12/2. Will monitor closely as patient also on methadone  - Epilepsy consult in morning as patient was reportedly compliant with keppra and had breakthrough seizure prior to admission       [facility]po: [facility] pending PT

## 2024-12-11 NOTE — PROGRESS NOTE ADULT - ATTENDING SUPERVISION STATEMENT
Fellow
Resident
Fellow
Resident

## 2024-12-11 NOTE — PROGRESS NOTE ADULT - PROBLEM SELECTOR PLAN 4
Reports primary condition is depression. Previously taking Seroquel with the development of seizures, now off maintenance medication. Previously on buspar though self-discontinued by pt due to side effects (shakiness).  Started on Zyprexa inpatient for treatment.    - Psych consulted   - Reports no current home medications.  - Plan to make Zyprexa 2.5 prn if QTC still normal.  Ms. Escobar is a 59 year old woman with a history of polysubstance use (cocaine, heroin, fentanyl), >10 inpatient psychiatric hospitalizations, and bipolar disorder. She was admitted to the hospital for anemia and hypovolemia secondary to an upper GI bleed, now s/p clipping of ulcer. She is seeking psychiatric treatment for her bipolar disorder and substance use disorder. Overall doing well psychiatrically, no acute amando/psychosis/depression present, no persisting delirium.     RECOMMENDATIONS\  -routine observation  - continue Zyprexa 2.5mg QHS for mood stabilization and recent agitation; to be reassessed as OP  - continue home methadone for OUD - consider divided dose  -olanzapine 2.5mg po/IM Q6H PRN for acute agitation that is not verbally redirectable  -monitor EKG for QTc prolongation with use of multiple QTc-prolonging agents  - pt plans to established care at [facility] for outpatient psychiatric care  - pt plans to establish care at [facility] for outpatient substance use rehab program  -consider SW consult re: pt concerns about her housing  -delirium precautions  -no psychiatric barrier to dc when medically ready Reports primary condition is depression. Previously taking Seroquel with the development of seizures, now off maintenance medication. Previously on buspar though self-discontinued by pt due to side effects (shakiness).  Started on Zyprexa inpatient for treatment.    - Psych consulted. recs: - continue Zyprexa 2.5mg QHS for mood stabilization and recent agitation; to be reassessed as OP. continue home methadone for OUD - consider divided dose. olanzapine 2.5mg po/IM Q6H PRN for acute agitation that is not verbally redirectable  - Reports no current home medications.  - delirium precautions

## 2024-12-12 ENCOUNTER — TRANSCRIPTION ENCOUNTER (OUTPATIENT)
Age: 59
End: 2024-12-12

## 2024-12-12 ENCOUNTER — NON-APPOINTMENT (OUTPATIENT)
Age: 59
End: 2024-12-12

## 2024-12-12 VITALS
RESPIRATION RATE: 16 BRPM | DIASTOLIC BLOOD PRESSURE: 70 MMHG | SYSTOLIC BLOOD PRESSURE: 104 MMHG | HEART RATE: 95 BPM | OXYGEN SATURATION: 97 % | TEMPERATURE: 99 F

## 2024-12-12 PROCEDURE — 86923 COMPATIBILITY TEST ELECTRIC: CPT

## 2024-12-12 PROCEDURE — 87205 SMEAR GRAM STAIN: CPT

## 2024-12-12 PROCEDURE — P9016: CPT

## 2024-12-12 PROCEDURE — 71045 X-RAY EXAM CHEST 1 VIEW: CPT

## 2024-12-12 PROCEDURE — 99285 EMERGENCY DEPT VISIT HI MDM: CPT

## 2024-12-12 PROCEDURE — 87522 HEPATITIS C REVRS TRNSCRPJ: CPT

## 2024-12-12 PROCEDURE — 82746 ASSAY OF FOLIC ACID SERUM: CPT

## 2024-12-12 PROCEDURE — 84484 ASSAY OF TROPONIN QUANT: CPT

## 2024-12-12 PROCEDURE — 82570 ASSAY OF URINE CREATININE: CPT

## 2024-12-12 PROCEDURE — 84132 ASSAY OF SERUM POTASSIUM: CPT

## 2024-12-12 PROCEDURE — 84300 ASSAY OF URINE SODIUM: CPT

## 2024-12-12 PROCEDURE — 86850 RBC ANTIBODY SCREEN: CPT

## 2024-12-12 PROCEDURE — 85610 PROTHROMBIN TIME: CPT

## 2024-12-12 PROCEDURE — 97161 PT EVAL LOW COMPLEX 20 MIN: CPT

## 2024-12-12 PROCEDURE — 86900 BLOOD TYPING SEROLOGIC ABO: CPT

## 2024-12-12 PROCEDURE — 85025 COMPLETE CBC W/AUTO DIFF WBC: CPT

## 2024-12-12 PROCEDURE — 82803 BLOOD GASES ANY COMBINATION: CPT

## 2024-12-12 PROCEDURE — 73501 X-RAY EXAM HIP UNI 1 VIEW: CPT

## 2024-12-12 PROCEDURE — 82330 ASSAY OF CALCIUM: CPT

## 2024-12-12 PROCEDURE — 84466 ASSAY OF TRANSFERRIN: CPT

## 2024-12-12 PROCEDURE — 96374 THER/PROPH/DIAG INJ IV PUSH: CPT

## 2024-12-12 PROCEDURE — 86803 HEPATITIS C AB TEST: CPT

## 2024-12-12 PROCEDURE — 94002 VENT MGMT INPAT INIT DAY: CPT

## 2024-12-12 PROCEDURE — 80048 BASIC METABOLIC PNL TOTAL CA: CPT

## 2024-12-12 PROCEDURE — 82607 VITAMIN B-12: CPT

## 2024-12-12 PROCEDURE — 87521 HEPATITIS C PROBE&RVRS TRNSC: CPT

## 2024-12-12 PROCEDURE — 73560 X-RAY EXAM OF KNEE 1 OR 2: CPT

## 2024-12-12 PROCEDURE — 36430 TRANSFUSION BLD/BLD COMPNT: CPT

## 2024-12-12 PROCEDURE — 82962 GLUCOSE BLOOD TEST: CPT

## 2024-12-12 PROCEDURE — 85384 FIBRINOGEN ACTIVITY: CPT

## 2024-12-12 PROCEDURE — 85027 COMPLETE CBC AUTOMATED: CPT

## 2024-12-12 PROCEDURE — 36415 COLL VENOUS BLD VENIPUNCTURE: CPT

## 2024-12-12 PROCEDURE — 70450 CT HEAD/BRAIN W/O DYE: CPT | Mod: MC

## 2024-12-12 PROCEDURE — 80202 ASSAY OF VANCOMYCIN: CPT

## 2024-12-12 PROCEDURE — 97116 GAIT TRAINING THERAPY: CPT

## 2024-12-12 PROCEDURE — P9037: CPT

## 2024-12-12 PROCEDURE — 86920 COMPATIBILITY TEST SPIN: CPT

## 2024-12-12 PROCEDURE — 83935 ASSAY OF URINE OSMOLALITY: CPT

## 2024-12-12 PROCEDURE — 99239 HOSP IP/OBS DSCHRG MGMT >30: CPT | Mod: GC

## 2024-12-12 PROCEDURE — 97535 SELF CARE MNGMENT TRAINING: CPT

## 2024-12-12 PROCEDURE — 83540 ASSAY OF IRON: CPT

## 2024-12-12 PROCEDURE — 87070 CULTURE OTHR SPECIMN AEROBIC: CPT

## 2024-12-12 PROCEDURE — 84295 ASSAY OF SERUM SODIUM: CPT

## 2024-12-12 PROCEDURE — 97165 OT EVAL LOW COMPLEX 30 MIN: CPT

## 2024-12-12 PROCEDURE — 81003 URINALYSIS AUTO W/O SCOPE: CPT

## 2024-12-12 PROCEDURE — 83735 ASSAY OF MAGNESIUM: CPT

## 2024-12-12 PROCEDURE — 83605 ASSAY OF LACTIC ACID: CPT

## 2024-12-12 PROCEDURE — 83550 IRON BINDING TEST: CPT

## 2024-12-12 PROCEDURE — 84133 ASSAY OF URINE POTASSIUM: CPT

## 2024-12-12 PROCEDURE — 87040 BLOOD CULTURE FOR BACTERIA: CPT

## 2024-12-12 PROCEDURE — 80053 COMPREHEN METABOLIC PANEL: CPT

## 2024-12-12 PROCEDURE — 84100 ASSAY OF PHOSPHORUS: CPT

## 2024-12-12 PROCEDURE — 85730 THROMBOPLASTIN TIME PARTIAL: CPT

## 2024-12-12 PROCEDURE — 86901 BLOOD TYPING SEROLOGIC RH(D): CPT

## 2024-12-12 PROCEDURE — 82728 ASSAY OF FERRITIN: CPT

## 2024-12-12 PROCEDURE — 93005 ELECTROCARDIOGRAM TRACING: CPT

## 2024-12-12 PROCEDURE — 83880 ASSAY OF NATRIURETIC PEPTIDE: CPT

## 2024-12-12 PROCEDURE — 84145 PROCALCITONIN (PCT): CPT

## 2024-12-12 PROCEDURE — 97110 THERAPEUTIC EXERCISES: CPT

## 2024-12-12 PROCEDURE — P9100: CPT

## 2024-12-12 RX ORDER — ACETAMINOPHEN, DIPHENHYDRAMINE HCL, PHENYLEPHRINE HCL 325; 25; 5 MG/1; MG/1; MG/1
1 TABLET ORAL
Qty: 30 | Refills: 0
Start: 2024-12-12 | End: 2025-01-10

## 2024-12-12 RX ORDER — OLANZAPINE 20 MG/1
1 TABLET ORAL
Qty: 30 | Refills: 0
Start: 2024-12-12 | End: 2025-01-10

## 2024-12-12 RX ORDER — ACETAMINOPHEN 500MG 500 MG/1
3 TABLET, COATED ORAL
Qty: 270 | Refills: 0
Start: 2024-12-12 | End: 2025-01-10

## 2024-12-12 RX ORDER — LEVETIRACETAM 1000 MG/1
1 TABLET ORAL
Qty: 60 | Refills: 0
Start: 2024-12-12 | End: 2025-01-10

## 2024-12-12 RX ORDER — PANTOPRAZOLE SODIUM 40 MG/1
1 TABLET, DELAYED RELEASE ORAL
Qty: 60 | Refills: 0
Start: 2024-12-12 | End: 2025-01-10

## 2024-12-12 RX ORDER — LIDOCAINE 40 MG/G
1 CREAM TOPICAL
Qty: 5 | Refills: 0
Start: 2024-12-12 | End: 2025-01-10

## 2024-12-12 RX ORDER — GABAPENTIN 300 MG/1
1 CAPSULE ORAL
Qty: 60 | Refills: 0
Start: 2024-12-12 | End: 2025-01-10

## 2024-12-12 RX ADMIN — LEVETIRACETAM 750 MILLIGRAM(S): 1000 TABLET ORAL at 17:58

## 2024-12-12 RX ADMIN — LEVETIRACETAM 750 MILLIGRAM(S): 1000 TABLET ORAL at 06:31

## 2024-12-12 RX ADMIN — GABAPENTIN 300 MILLIGRAM(S): 300 CAPSULE ORAL at 09:17

## 2024-12-12 RX ADMIN — PANTOPRAZOLE SODIUM 40 MILLIGRAM(S): 40 TABLET, DELAYED RELEASE ORAL at 06:31

## 2024-12-12 RX ADMIN — PANTOPRAZOLE SODIUM 40 MILLIGRAM(S): 40 TABLET, DELAYED RELEASE ORAL at 17:58

## 2024-12-12 RX ADMIN — ACETAMINOPHEN 500MG 975 MILLIGRAM(S): 500 TABLET, COATED ORAL at 17:58

## 2024-12-12 RX ADMIN — METHADONE HYDROCHLORIDE 170 MILLIGRAM(S): 5 TABLET ORAL at 06:31

## 2024-12-12 NOTE — PROGRESS NOTE ADULT - PROBLEM SELECTOR PLAN 4
Reports primary condition is depression. Previously taking Seroquel with the development of seizures, now off maintenance medication. Previously on buspar though self-discontinued by pt due to side effects (shakiness).  Started on Zyprexa inpatient for treatment.    - Psych consulted. recs: - continue Zyprexa 2.5mg QHS for mood stabilization and recent agitation; to be reassessed as OP. continue home methadone for OUD - consider divided dose. olanzapine 2.5mg po/IM Q6H PRN for acute agitation that is not verbally redirectable  - Reports no current home medications.  - delirium precautions

## 2024-12-12 NOTE — PROGRESS NOTE ADULT - PROBLEM SELECTOR PLAN 7
With hip pain that is limiting ambulation.   #L hip OA - xrays done, note bone-on-bone contanct of femoral head  - Ortho consult - suspect OP f/u  - May benefit from OP injections for pain control given now unable to take NSAIDs  - PT working with patient  - No acute orthopedic intervention at this time  - Pain control  - WBS: WBAT with walker  - Continue Physical therapy
F: PO  E: replete PRN.  N: Regular diet.   DVT ppx: Continue to hold.   Dispo: [facility].
With hip pain that is limiting ambulation.   #L hip OA - xrays done, note bone-on-bone contanct of femoral head  - Ortho consult - suspect OP f/u  - May benefit from OP injections for pain control given now unable to take NSAIDs  - PT working with patient  - No acute orthopedic intervention at this time  - Pain control  - WBS: WBAT with walker  - Continue Physical therapy
F: PO  E: replete PRN.  N: Regular diet.   DVT ppx: Continue to hold.   Dispo: [facility].
With hip pain that is limiting ambulation.   #L hip OA - xrays done, note bone-on-bone contanct of femoral head  - Ortho consult - suspect OP f/u  - May benefit from OP injections for pain control given now unable to take NSAIDs  - PT working with patient  - No acute orthopedic intervention at this time  - Pain control  - WBS: WBAT with walker  - Continue Physical therapy
F: PO  E: replete PRN.  N: Regular diet.   DVT ppx: Continue to hold.   Dispo: [facility].

## 2024-12-12 NOTE — PROGRESS NOTE ADULT - PROBLEM SELECTOR PLAN 5
At last admission, Hgb 7.9-9. Iron studies: total iron wnl, TIBC low, %iron sat wnl, ferritin wnl. Folate/B12 wnl. Hb stable    - Continue to monitor.

## 2024-12-12 NOTE — DISCHARGE NOTE NURSING/CASE MANAGEMENT/SOCIAL WORK - NSDCFUADDAPPT_GEN_ALL_CORE_FT
Please bring your Insurance card, Photo ID and Discharge paperwork to the following appointment:    (1) Please follow up with your Orthopaedic Surgery Provider, Dr. Ketan Eaton at [address] [place] [address], [address], [place] [id number] on 12/23/2024 at 11:00am.    Appointment was scheduled by Ms. YOKO Solorzano, Referral Coordinator.      (2) Please note that we were to schedule an appointment with your Gastroenterology Provider, Dr. Antonio Mata as we were unable to get a hold of office personnel.  Please schedule for evaluation of GI bleed and for Hepatitis C treatment with 1 week of our hospital discharge.    Appointment was facilitated by Ms. YOKO Solorzano, Referral Coordinator.

## 2024-12-12 NOTE — PROGRESS NOTE ADULT - PROBLEM SELECTOR PLAN 8
F: PO  E: replete PRN.  N: Regular diet.   DVT ppx: Continue to hold.   Dispo: [facility].    OT: [facility]

## 2024-12-12 NOTE — PROGRESS NOTE ADULT - REASON FOR ADMISSION
AMS, Abdominal pain

## 2024-12-12 NOTE — PROGRESS NOTE ADULT - PROVIDER SPECIALTY LIST ADULT
MICU
MICU
Internal Medicine
MICU
Gastroenterology
Gastroenterology
MICU
MICU
Gastroenterology
Gastroenterology
Internal Medicine

## 2024-12-12 NOTE — DISCHARGE NOTE NURSING/CASE MANAGEMENT/SOCIAL WORK - PATIENT PORTAL LINK FT
You can access the FollowMyHealth Patient Portal offered by [facility] by registering at the following website: [email or web address]. By joining [facility] FollowMyHealth portal, you will also be able to view your health information using other applications (apps) compatible with our system.

## 2024-12-12 NOTE — PROGRESS NOTE ADULT - PROBLEM SELECTOR PROBLEM 7
Hip pain, acute
Hip pain, acute
Healthcare maintenance
Hip pain, acute
Healthcare maintenance
Healthcare maintenance

## 2024-12-12 NOTE — PROGRESS NOTE ADULT - SUBJECTIVE AND OBJECTIVE BOX
OVERNIGHT EVENTS:    SUBJECTIVE / INTERVAL HPI: Patient seen and examined at bedside.     VITAL SIGNS:  Vital Signs Last 24 Hrs  T(C): 36.7 (12 Dec 2024 06:38), Max: 36.9 (11 Dec 2024 21:00)  T(F): 98.1 (12 Dec 2024 06:38), Max: 98.4 (11 Dec 2024 21:00)  HR: 91 (12 Dec 2024 06:38) (79 - 106)  BP: 97/60 (12 Dec 2024 06:38) (95/64 - 102/71)  BP(mean): --  RR: 18 (12 Dec 2024 06:38) (18 - 18)  SpO2: 99% (12 Dec 2024 06:38) (95% - 99%)    Parameters below as of 12 Dec 2024 06:38  Patient On (Oxygen Delivery Method): room air      I&O's Summary      PHYSICAL EXAM:    General: NAD  HEENT: NC/AT; PERRL, anicteric sclera; MMM  Neck: supple  Cardiovascular: +S1/S2; RRR  Respiratory: CTA B/L; no W/R/R  Gastrointestinal: soft, NT/ND; +BSx4  Extremities: WWP; no edema, clubbing or cyanosis  Vascular: 2+ radial, DP/PT pulses B/L  Neurological: AAOx3; no focal deficits    MEDICATIONS:  MEDICATIONS  (STANDING):  acetaminophen     Tablet .. 975 milliGRAM(s) Oral every 8 hours  gabapentin 300 milliGRAM(s) Oral every 12 hours  levETIRAcetam 750 milliGRAM(s) Oral every 12 hours  lidocaine   4% Patch 1 Patch Transdermal every 24 hours  melatonin 5 milliGRAM(s) Oral at bedtime  methadone  Concentrate 170 milliGRAM(s) Oral every 24 hours  OLANZapine 2.5 milliGRAM(s) Oral at bedtime  pantoprazole    Tablet 40 milliGRAM(s) Oral every 12 hours    MEDICATIONS  (PRN):      ALLERGIES:  Allergies    No Known Allergies    Intolerances        LABS:                        8.8    4.03  )-----------( 260      ( 11 Dec 2024 05:30 )             28.8     12-11    142  |  102  |  14  ----------------------------<  84  4.6   |  33[H]  |  0.95    Ca    8.6      11 Dec 2024 05:30  Phos  3.8     12-11  Mg     2.1     12-11        Urinalysis Basic - ( 11 Dec 2024 05:30 )    Color: x / Appearance: x / SG: x / pH: x  Gluc: 84 mg/dL / Ketone: x  / Bili: x / Urobili: x   Blood: x / Protein: x / Nitrite: x   Leuk Esterase: x / RBC: x / WBC x   Sq Epi: x / Non Sq Epi: x / Bacteria: x      CAPILLARY BLOOD GLUCOSE          RADIOLOGY & ADDITIONAL TESTS: Reviewed.

## 2024-12-12 NOTE — DISCHARGE NOTE NURSING/CASE MANAGEMENT/SOCIAL WORK - FINANCIAL ASSISTANCE
[facility] provides services at a reduced cost to those who are determined to be eligible through [facility]’s financial assistance program. Information regarding [facility]’s financial assistance program can be found by going to [email or web address] or by calling [phone].

## 2024-12-23 ENCOUNTER — APPOINTMENT (OUTPATIENT)
Dept: ORTHOPEDIC SURGERY | Facility: CLINIC | Age: 59
End: 2024-12-23

## 2025-02-04 NOTE — BH CONSULTATION LIAISON ASSESSMENT NOTE - DESCRIPTION
Male The patient states that she was born and raised in [place].  She states that she lives in a shelter with a roommate.  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  The patient declines to provide any more details at this time.

## 2025-04-26 NOTE — ED ADULT TRIAGE NOTE - CHIEF COMPLAINT QUOTE
Pt presents to ED C/O urinary symptoms. States, " I'm peeing every 20 minutes" C/P pelvic pain. States, " It hurts when I sit and when I pee, I went to all different hospitals last time and this is the only hospital that helped me, in July I had a UTI and took antibiotics, my program put me in an Uber here  I go to [facility] and to a clinic for methadone". Pt walks with walker at baseline. Pt took Motrin and Methadone PTA. 26-Apr-2025 12:00